# Patient Record
Sex: FEMALE | Race: WHITE | Employment: UNEMPLOYED | URBAN - METROPOLITAN AREA
[De-identification: names, ages, dates, MRNs, and addresses within clinical notes are randomized per-mention and may not be internally consistent; named-entity substitution may affect disease eponyms.]

---

## 2017-02-09 ENCOUNTER — HOSPITAL ENCOUNTER (OUTPATIENT)
Dept: PHYSICAL THERAPY | Age: 57
End: 2017-02-09

## 2017-02-14 ENCOUNTER — APPOINTMENT (OUTPATIENT)
Dept: PHYSICAL THERAPY | Age: 57
End: 2017-02-14

## 2017-02-15 ENCOUNTER — HOSPITAL ENCOUNTER (OUTPATIENT)
Dept: PHYSICAL THERAPY | Age: 57
End: 2017-02-15

## 2017-02-15 ENCOUNTER — HOSPITAL ENCOUNTER (OUTPATIENT)
Dept: PHYSICAL THERAPY | Age: 57
Discharge: HOME | End: 2017-02-15

## 2017-02-15 ENCOUNTER — HOSPITAL ENCOUNTER (EMERGENCY)
Age: 57
Discharge: HOME | End: 2017-02-15
Attending: EMERGENCY MEDICINE | Admitting: EMERGENCY MEDICINE
Payer: COMMERCIAL

## 2017-02-15 VITALS
SYSTOLIC BLOOD PRESSURE: 144 MMHG | HEART RATE: 67 BPM | WEIGHT: 148 LBS | TEMPERATURE: 98.3 F | DIASTOLIC BLOOD PRESSURE: 67 MMHG | HEIGHT: 66 IN | BODY MASS INDEX: 23.78 KG/M2 | RESPIRATION RATE: 13 BRPM | OXYGEN SATURATION: 93 %

## 2017-02-15 DIAGNOSIS — R42 VERTIGO: Primary | ICD-10-CM

## 2017-02-15 DIAGNOSIS — R73.9 HYPERGLYCEMIA: ICD-10-CM

## 2017-02-15 LAB
ALBUMIN SERPL BCP-MCNC: 3.7 G/DL (ref 3.4–5)
ALBUMIN/GLOB SERPL: 1 {RATIO} (ref 0.8–1.7)
ALP SERPL-CCNC: 80 U/L (ref 45–117)
ALT SERPL-CCNC: 30 U/L (ref 13–56)
ANION GAP BLD CALC-SCNC: 3 MMOL/L (ref 3–18)
AST SERPL W P-5'-P-CCNC: 18 U/L (ref 15–37)
ATRIAL RATE: 78 BPM
BASOPHILS # BLD AUTO: 0 K/UL (ref 0–0.1)
BASOPHILS # BLD: 0 % (ref 0–2)
BILIRUB SERPL-MCNC: 0.9 MG/DL (ref 0.2–1)
BUN SERPL-MCNC: 15 MG/DL (ref 7–18)
BUN/CREAT SERPL: 19 (ref 12–20)
CALCIUM SERPL-MCNC: 9.3 MG/DL (ref 8.5–10.1)
CALCULATED P AXIS, ECG09: 79 DEGREES
CALCULATED R AXIS, ECG10: 62 DEGREES
CALCULATED T AXIS, ECG11: 57 DEGREES
CHLORIDE SERPL-SCNC: 97 MMOL/L (ref 100–108)
CO2 SERPL-SCNC: 31 MMOL/L (ref 21–32)
CREAT SERPL-MCNC: 0.8 MG/DL (ref 0.6–1.3)
DIAGNOSIS, 93000: NORMAL
DIFFERENTIAL METHOD BLD: NORMAL
EOSINOPHIL # BLD: 0.1 K/UL (ref 0–0.4)
EOSINOPHIL NFR BLD: 2 % (ref 0–5)
ERYTHROCYTE [DISTWIDTH] IN BLOOD BY AUTOMATED COUNT: 12.6 % (ref 11.6–14.5)
GLOBULIN SER CALC-MCNC: 3.6 G/DL (ref 2–4)
GLUCOSE SERPL-MCNC: 257 MG/DL (ref 74–99)
HCT VFR BLD AUTO: 39.6 % (ref 35–45)
HGB BLD-MCNC: 13.8 G/DL (ref 12–16)
LYMPHOCYTES # BLD AUTO: 42 % (ref 21–52)
LYMPHOCYTES # BLD: 2.8 K/UL (ref 0.9–3.6)
MCH RBC QN AUTO: 32.1 PG (ref 24–34)
MCHC RBC AUTO-ENTMCNC: 34.8 G/DL (ref 31–37)
MCV RBC AUTO: 92.1 FL (ref 74–97)
MONOCYTES # BLD: 0.4 K/UL (ref 0.05–1.2)
MONOCYTES NFR BLD AUTO: 5 % (ref 3–10)
NEUTS SEG # BLD: 3.5 K/UL (ref 1.8–8)
NEUTS SEG NFR BLD AUTO: 51 % (ref 40–73)
P-R INTERVAL, ECG05: 172 MS
PLATELET # BLD AUTO: 246 K/UL (ref 135–420)
PMV BLD AUTO: 11.2 FL (ref 9.2–11.8)
POTASSIUM SERPL-SCNC: 4.1 MMOL/L (ref 3.5–5.5)
PROT SERPL-MCNC: 7.3 G/DL (ref 6.4–8.2)
Q-T INTERVAL, ECG07: 398 MS
QRS DURATION, ECG06: 86 MS
QTC CALCULATION (BEZET), ECG08: 453 MS
RBC # BLD AUTO: 4.3 M/UL (ref 4.2–5.3)
SODIUM SERPL-SCNC: 131 MMOL/L (ref 136–145)
VENTRICULAR RATE, ECG03: 78 BPM
WBC # BLD AUTO: 6.8 K/UL (ref 4.6–13.2)

## 2017-02-15 PROCEDURE — 93005 ELECTROCARDIOGRAM TRACING: CPT

## 2017-02-15 PROCEDURE — 74011250637 HC RX REV CODE- 250/637: Performed by: EMERGENCY MEDICINE

## 2017-02-15 PROCEDURE — 99283 EMERGENCY DEPT VISIT LOW MDM: CPT

## 2017-02-15 PROCEDURE — 85025 COMPLETE CBC W/AUTO DIFF WBC: CPT | Performed by: EMERGENCY MEDICINE

## 2017-02-15 PROCEDURE — 80053 COMPREHEN METABOLIC PANEL: CPT | Performed by: EMERGENCY MEDICINE

## 2017-02-15 RX ORDER — MECLIZINE HCL 12.5 MG 12.5 MG/1
25 TABLET ORAL ONCE
Status: COMPLETED | OUTPATIENT
Start: 2017-02-15 | End: 2017-02-15

## 2017-02-15 RX ADMIN — MECLIZINE 25 MG: 12.5 TABLET ORAL at 17:02

## 2017-02-15 NOTE — DISCHARGE INSTRUCTIONS
Vertigo: Care Instructions  Your Care Instructions  Vertigo is the feeling that you or your surroundings are moving when there is no actual movement. It is often described as a feeling of spinning, whirling, falling, or tilting. Vertigo may make you vomit or feel nauseated. You may have trouble standing or walking and may lose your balance. Vertigo is often related to an inner ear problem, but it can have other more serious causes. If vertigo continues, you may need more tests to find its cause. Follow-up care is a key part of your treatment and safety. Be sure to make and go to all appointments, and call your doctor if you are having problems. Its also a good idea to know your test results and keep a list of the medicines you take. How can you care for yourself at home? · Do not lie flat on your back. Prop yourself up slightly. This may reduce the spinning feeling. Keep your eyes open. · Move slowly so that you do not fall. · If your doctor recommends medicine, take it exactly as directed. · Do not drive while you are having vertigo. Certain exercises, called Le-Daroff exercises, can help decrease vertigo. To do Le-Daroff exercises:  · Sit on the edge of a bed or sofa and quickly lie down on the side that causes the worst vertigo. Lie on your side with your ear down. · Stay in this position for at least 30 seconds or until the vertigo goes away. · Sit up. If this causes vertigo, wait for it to stop. · Repeat the procedure on the other side. · Repeat this 10 times. Do these exercises 2 times a day until the vertigo is gone. When should you call for help? Call 911 anytime you think you may need emergency care. For example, call if:  · You passed out (lost consciousness). · You have symptoms of a stroke. These may include:  ¨ Sudden numbness, tingling, weakness, or loss of movement in your face, arm, or leg, especially on only one side of your body. ¨ Sudden vision changes.   ¨ Sudden trouble speaking. ¨ Sudden confusion or trouble understanding simple statements. ¨ Sudden problems with walking or balance. ¨ A sudden, severe headache that is different from past headaches. Call your doctor now or seek immediate medical care if:  · Vertigo occurs with a fever, a headache, or ringing in your ears. · You have new or increased nausea and vomiting. Watch closely for changes in your health, and be sure to contact your doctor if:  · Vertigo gets worse or happens more often. · Vertigo has not gotten better after 2 weeks. Where can you learn more? Go to http://rhona-ilia.info/. Enter L353 in the search box to learn more about \"Vertigo: Care Instructions. \"  Current as of: July 29, 2016  Content Version: 11.1  © 9831-3864 [employer]. Care instructions adapted under license by [employer] (which disclaims liability or warranty for this information). If you have questions about a medical condition or this instruction, always ask your healthcare professional. [place] [address] any warranty or liability for your use of this information.

## 2017-02-15 NOTE — ED NOTES
Patient is in bed resting with eyes open , patient is alert and oriented at this time. Patient is stable on the monitor. Call bell is within reach. Patient has been updated on plan of care. Will continue to monitor at this time.

## 2017-02-15 NOTE — ED PROVIDER NOTES
HPI Comments: Evonne Bhatti is a 49 y.o. female presenting with complaints of dizziness. States she was at PT for a wrist fracture when she began to have symptoms so she was sent to the ED. States when she turns her head to the left she feels \"dizzy\", nauseated and compares the feeling to being intoxicated. States she was diagnosed with vertigo before and is being worked up by ENT and neurology to rule out any other abnormality but has had these symptoms intermittently for the past 6 months. Denies any cp, sob, headache, blurry vision or any other complaints. The history is provided by the patient. Past Medical History:   Diagnosis Date    Arthritis     Back pain     Chronic pain     Compression fracture T9-10    [facility]     Depression     Diabetes mellitus ([facility]      type 1    GERD (gastroesophageal reflux disease)     Hypertension     Osteoporosis     Tobacco use        Past Surgical History:   Procedure Laterality Date    Hx hysterectomy  2002    Hx back surgery  2009    Hx dilation and curettage           Family History:   Problem Relation Age of Onset    Thyroid Disease Mother     Stroke Mother    Francescastmonet iMguel Arthritis-rheumatoid Mother     Osteoporosis Mother     Diabetes Father     Kidney Disease Father     Tip's Disease Father    Fer Miguel Arthritis-rheumatoid Sister     Diabetes Brother        Social History     Social History    Marital status:      Spouse name: N/A    Number of children: N/A    Years of education: N/A     Occupational History    Not on file.      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 Denies any history chemical and or asbestos exposure          ALLERGIES: Bactrim [sulfamethoprim]; Dilaudid [hydromorphone (bulk)]; Morphine; and Pcn [penicillins]    Review of Systems   Constitutional: Negative for fever. HENT: Negative for congestion. Respiratory: Negative for cough and shortness of breath. Cardiovascular: Negative for chest pain and leg swelling. Gastrointestinal: Negative for abdominal pain, nausea and vomiting. Genitourinary: Negative for dysuria. Musculoskeletal: Negative. Neurological: Positive for dizziness. Negative for syncope, speech difficulty and headaches. All other systems reviewed and are negative. Vitals:    02/15/17 1158 02/15/17 1530   BP: 129/82 144/67   Pulse: 70 67   Resp: 20 13   Temp: 98.3 °F (36.8 °C)    SpO2: 99% 93%   Weight: 67.1 kg (148 lb)    Height: 5' 6\" (1.676 m)             Physical Exam   Constitutional: She is oriented to person, place, and time. No distress. HENT:   Head: Atraumatic. Eyes: Conjunctivae and EOM are normal. Pupils are equal, round, and reactive to light. Horizontal nystagmus. Neck: Normal range of motion. Neck supple. No JVD present. No carotid bruit bilaterally   Cardiovascular: Normal rate, regular rhythm and normal heart sounds. No murmur heard. Pulmonary/Chest: Effort normal and breath sounds normal. She exhibits no tenderness. Abdominal: Soft. Bowel sounds are normal. She exhibits no distension. There is no tenderness. There is no rebound and no guarding. Musculoskeletal: Normal range of motion. She exhibits no edema or tenderness. Neurological: She is alert and oriented to person, place, and time. She has normal strength. No cranial nerve deficit or sensory deficit. Gait normal.   Skin: Skin is warm and dry. Psychiatric: She has a normal mood and affect. Nursing note and vitals reviewed.        MDM  Number of Diagnoses or Management Options  Hyperglycemia:   Vertigo:   Diagnosis management comments: Harmony Maciel is a 49 y.o. female presenting with vertigo symptoms c/w peripheral vertigo. Neuro exam wnl. Is scheduled to have carotid dopplers as outpt. Labs significant for hyperglycemia otherwise wnl. No evidence of WPW, HOCM, Brugada, prolonged QTc, or acute ischemia on ekg. Pt given meclizine and denied any noticeable difference but denies any vertigo symptoms at this time. Pt declines any prescriptions states she will follow up with her pcp and neurologist as outpatient. No furhter inpt work up indicated at this time. Return precautions discussed. Patient stated verbal understanding and agrees with course and plan. 1249 - NSR, rate of 78, normal axis, QTc 453ms, no STEMI    ED Course       Procedures      IMPRESSION:   1. Vertigo    2. Hyperglycemia          Dispo:  Patient was discharged home in stable condition. Patient is to return to emergency department with any new or worsening condition.      Nicolás Ortega MD

## 2017-02-15 NOTE — PROGRESS NOTES
PT DAILY TREATMENT NOTE/LUMBAR EVAL 3-16    Patient Name: Hanna Gordon  Date:2/15/2017  : 1960  [x]  Patient  Verified  Payor: Heidi Santiago / Plan: Donovan Gomes PPO / Product Type: PPO /    In time:10:06  Out time:11:05  Total Treatment Time (min): 61  Visit #: - of -    Treatment Area: Open compression fracture of L5 lumbar vertebra (HCC) [S32.050B]  SUBJECTIVE  Pain Level (0-10 scale): 3/10  []constant []intermittent []improving []worsening []no change since onset    Any medication changes, allergies to medications, adverse drug reactions, diagnosis change, or new procedure performed?: [x] No    [] Yes (see summary sheet for update)  Subjective functional status/changes:     Pt reports that she has broken her back 3x. L1 fracture in  with surgery. T9 compression fracture last year and collapsed with no surgery. L5 is healing conservatively. Pt reports that she blacked out and has no memory after turning to the R and walking into the kitchen. She woke up on the floor. Her BP was 70/40 when the ambulance got there. They think she had a seizure. The EKG this past Monday () and she sees the neurologist .   Her wrist was in a cast for 2 months and she has a lot of pain with moving it and playing with her grandsson    Barriers: other medical issues  Motivation: high  Substance use: []Alcohol []Tobacco []other:   FABQ Score: []low [x]elevate - based on FOTO   Cognition: A & O x 4    Other:    OBJECTIVE/EXAMINATION      15 min [x]Eval                  []Re-Eval     44 min Calling MD's offices and pt education regarding findings of VBI and importance of going to ER          With   [] TE   [] TA   [] neuro   [] other: Patient Education: [x] Review HEP    [] Progressed/Changed HEP based on:   [] positioning   [] body mechanics   [] transfers   [] heat/ice application    [] other:      Other Objective/Functional Measures:     /71 taken manually prior to therapy     Physical Therapy Evaluation - Lumbar Spine ([facility])    SUBJECTIVE    Symptoms:  Aggravated by:   [x] Bending [x] Sitting (45 min) [x] Standing (30 min) [x] Walking (1 hour)   [x] Moving [] Cough [] Sneeze [] Valsalva   [] AM  [] PM  Lying:  [] sup   [] pro   [x] sidelying   [] Other:     Eased by:    [] Bending [] Sitting [] Standing [] Walking   [] Moving [] AM  [] PM  Lying: [x] sup  [x] pro  [] sidelying   [] Other:       General Health:  Red Flags Indicated? [] Yes    [] No  [x] Yes [] No Recent weight change (If yes, due to dieting? [] Yes  [] No) - fluctuates with DM   [x] Yes [] No Weakness in legs during walking - peripheral neuropathy   [x] Yes [] No Unremitting pain at night - wrist pain  [] Yes [x] No Abdominal pain or problems  [] Yes [x] No Rectal bleeding  [x] Yes [] No Feet more cold or painful in cold weather  [] Yes [x] No Blood or pain with urination  [x] Yes [] No Dysfunction of bowel or bladder - prolapsed bladder   [x] Yes [] No Recent illness within past 3 weeks (i.e, cold, flu) - cold  [] Yes [x] No Numbness/tingling in buttock/genitalia region    Past History/Treatments:     Diagnostic Tests: [] Lab work [] X-rays    [] CT [] MRI     [] Other:  Results:    OBJECTIVE  Posture:  Lateral Shift: [] R    [] L     [] +  [] -  Kyphosis: [x] Increased [] Decreased   []  WNL  Lordosis:  [] Increased [x] Decreased   [] WNL  Pelvic symmetry: [] WNL    [] Other:    Other tests/comments:    VBI Test (+) B, increased sx to L  Dizziness, significant nausea reported, and pale appearance when performed to L that increased as pt remained in position, moved from position after approximately 15 seconds d/t increasing sx  Dizziness with less intensity and no nausea when performed to the R     Called referring MD who was in surgery. Spoke to Dr. Ofe Alexander who instructed therapy to call PCP for further instructions  Called PCP who was unavailable.   Spoke to referring physician who instructed that pt be sent to ER in ambulance  Pt declined ambulance against medical advice, stating that she would drive the short distance to her daughter's house and have her daughter take her        Pain Level (0-10 scale) post treatment: 3/10 lumbar    ASSESSMENT/Changes in Function:     Pt was not seen for evaluation today d/t positive VBI. Drop attack and findings of (+) VBI with dizziness and significant nausea consistent with vertebrobasilar insufficiency. Followed up with PCP's office, and MD instructed that pt go to ER. Pt was educated on findings and importance of compliance with going to ER d/t danger of vertebrobasilar insufficiency. Pt verbalized compliance with attending ER.   Hold PT until cleared for possible VBI     [x]  See Plan of Care  []  See progress note/recertification  []  See Discharge Summary         Progress towards goals / Updated goals:  See POC    PLAN  [x]  Upgrade activities as tolerated     []  Continue plan of care  [x]  Update interventions per flow sheet       []  Discharge due to:_  []  Other:_      Tan Norwood, PT 2/15/2017  10:01 AM

## 2017-02-15 NOTE — ED TRIAGE NOTES
Was at PT for back and wrist pain. Became dizzy and nauseated. States that when she is at home and doing cleaning around the house (or anything strenuous) she becomes dizzy, nauseous  And her heart races.

## 2017-02-16 LAB
ATRIAL RATE: 59 BPM
CALCULATED P AXIS, ECG09: 19 DEGREES
CALCULATED R AXIS, ECG10: 58 DEGREES
CALCULATED T AXIS, ECG11: 43 DEGREES
DIAGNOSIS, 93000: NORMAL
P-R INTERVAL, ECG05: 156 MS
Q-T INTERVAL, ECG07: 430 MS
QRS DURATION, ECG06: 96 MS
QTC CALCULATION (BEZET), ECG08: 425 MS
VENTRICULAR RATE, ECG03: 59 BPM

## 2019-01-09 ENCOUNTER — HOSPITAL ENCOUNTER (OUTPATIENT)
Dept: LAB | Age: 59
Discharge: HOME | End: 2019-01-09
Payer: COMMERCIAL

## 2019-01-09 ENCOUNTER — OFFICE VISIT (OUTPATIENT)
Dept: FAMILY MEDICINE CLINIC | Age: 59
End: 2019-01-09

## 2019-01-09 VITALS
RESPIRATION RATE: 20 BRPM | WEIGHT: 147.6 LBS | OXYGEN SATURATION: 99 % | TEMPERATURE: 97.6 F | BODY MASS INDEX: 23.72 KG/M2 | HEART RATE: 71 BPM | SYSTOLIC BLOOD PRESSURE: 133 MMHG | HEIGHT: 66 IN | DIASTOLIC BLOOD PRESSURE: 68 MMHG

## 2019-01-09 DIAGNOSIS — Z00.00 GENERAL MEDICAL EXAM: ICD-10-CM

## 2019-01-09 DIAGNOSIS — Z12.11 SCREENING FOR COLON CANCER: ICD-10-CM

## 2019-01-09 DIAGNOSIS — Z79.4 TYPE 2 DIABETES MELLITUS WITH OTHER SPECIFIED COMPLICATION, WITH LONG-TERM CURRENT USE OF INSULIN (HCC): ICD-10-CM

## 2019-01-09 DIAGNOSIS — F41.9 ANXIETY: ICD-10-CM

## 2019-01-09 DIAGNOSIS — K21.9 GASTROESOPHAGEAL REFLUX DISEASE, ESOPHAGITIS PRESENCE NOT SPECIFIED: Primary | ICD-10-CM

## 2019-01-09 DIAGNOSIS — R91.8 LUNG NODULES: ICD-10-CM

## 2019-01-09 DIAGNOSIS — M54.50 CHRONIC MIDLINE LOW BACK PAIN WITHOUT SCIATICA: ICD-10-CM

## 2019-01-09 DIAGNOSIS — R32 URINARY INCONTINENCE, UNSPECIFIED TYPE: ICD-10-CM

## 2019-01-09 DIAGNOSIS — G89.29 CHRONIC MIDLINE LOW BACK PAIN WITHOUT SCIATICA: ICD-10-CM

## 2019-01-09 DIAGNOSIS — E11.69 TYPE 2 DIABETES MELLITUS WITH OTHER SPECIFIED COMPLICATION, WITH LONG-TERM CURRENT USE OF INSULIN (HCC): ICD-10-CM

## 2019-01-09 DIAGNOSIS — E11.42 DIABETIC POLYNEUROPATHY ASSOCIATED WITH TYPE 2 DIABETES MELLITUS (HCC): ICD-10-CM

## 2019-01-09 LAB
ALBUMIN SERPL-MCNC: 3 G/DL (ref 3.4–5)
ALBUMIN/GLOB SERPL: 0.8 {RATIO} (ref 0.8–1.7)
ALP SERPL-CCNC: 96 U/L (ref 45–117)
ALT SERPL-CCNC: 14 U/L (ref 13–56)
ANION GAP SERPL CALC-SCNC: 4 MMOL/L (ref 3–18)
AST SERPL-CCNC: 7 U/L (ref 15–37)
BASOPHILS # BLD: 0 K/UL (ref 0–0.1)
BASOPHILS NFR BLD: 1 % (ref 0–2)
BILIRUB SERPL-MCNC: 0.4 MG/DL (ref 0.2–1)
BUN SERPL-MCNC: 16 MG/DL (ref 7–18)
BUN/CREAT SERPL: 21 (ref 12–20)
CALCIUM SERPL-MCNC: 8.9 MG/DL (ref 8.5–10.1)
CHLORIDE SERPL-SCNC: 100 MMOL/L (ref 100–108)
CHOLEST SERPL-MCNC: 134 MG/DL
CO2 SERPL-SCNC: 29 MMOL/L (ref 21–32)
CREAT SERPL-MCNC: 0.76 MG/DL (ref 0.6–1.3)
DIFFERENTIAL METHOD BLD: ABNORMAL
EOSINOPHIL # BLD: 0.1 K/UL (ref 0–0.4)
EOSINOPHIL NFR BLD: 2 % (ref 0–5)
ERYTHROCYTE [DISTWIDTH] IN BLOOD BY AUTOMATED COUNT: 12.2 % (ref 11.6–14.5)
EST. AVERAGE GLUCOSE BLD GHB EST-MCNC: 298 MG/DL
GLOBULIN SER CALC-MCNC: 3.9 G/DL (ref 2–4)
GLUCOSE SERPL-MCNC: 260 MG/DL (ref 74–99)
HBA1C MFR BLD: 12 % (ref 4.2–5.6)
HCT VFR BLD AUTO: 33.7 % (ref 35–45)
HDLC SERPL-MCNC: 45 MG/DL (ref 40–60)
HDLC SERPL: 3 {RATIO} (ref 0–5)
HGB BLD-MCNC: 11.2 G/DL (ref 12–16)
LDLC SERPL CALC-MCNC: 71.8 MG/DL (ref 0–100)
LIPID PROFILE,FLP: NORMAL
LYMPHOCYTES # BLD: 2.5 K/UL (ref 0.9–3.6)
LYMPHOCYTES NFR BLD: 36 % (ref 21–52)
MCH RBC QN AUTO: 31 PG (ref 24–34)
MCHC RBC AUTO-ENTMCNC: 33.2 G/DL (ref 31–37)
MCV RBC AUTO: 93.4 FL (ref 74–97)
MONOCYTES # BLD: 0.5 K/UL (ref 0.05–1.2)
MONOCYTES NFR BLD: 7 % (ref 3–10)
NEUTS SEG # BLD: 3.7 K/UL (ref 1.8–8)
NEUTS SEG NFR BLD: 54 % (ref 40–73)
PLATELET # BLD AUTO: 407 K/UL (ref 135–420)
PMV BLD AUTO: 10.9 FL (ref 9.2–11.8)
POTASSIUM SERPL-SCNC: 5.1 MMOL/L (ref 3.5–5.5)
PROT SERPL-MCNC: 6.9 G/DL (ref 6.4–8.2)
RBC # BLD AUTO: 3.61 M/UL (ref 4.2–5.3)
SODIUM SERPL-SCNC: 133 MMOL/L (ref 136–145)
TRIGL SERPL-MCNC: 86 MG/DL (ref ?–150)
VLDLC SERPL CALC-MCNC: 17.2 MG/DL
WBC # BLD AUTO: 6.8 K/UL (ref 4.6–13.2)

## 2019-01-09 PROCEDURE — 36415 COLL VENOUS BLD VENIPUNCTURE: CPT

## 2019-01-09 PROCEDURE — 80061 LIPID PANEL: CPT

## 2019-01-09 PROCEDURE — 80053 COMPREHEN METABOLIC PANEL: CPT

## 2019-01-09 PROCEDURE — 83036 HEMOGLOBIN GLYCOSYLATED A1C: CPT

## 2019-01-09 PROCEDURE — 85025 COMPLETE CBC W/AUTO DIFF WBC: CPT

## 2019-01-09 RX ORDER — GABAPENTIN 300 MG/1
600 CAPSULE ORAL
Qty: 60 CAP | Refills: 1 | Status: SHIPPED | OUTPATIENT
Start: 2019-01-09 | End: 2019-02-08

## 2019-01-09 RX ORDER — OMEPRAZOLE 40 MG/1
40 CAPSULE, DELAYED RELEASE ORAL DAILY
Qty: 30 CAP | Refills: 1 | Status: SHIPPED | OUTPATIENT
Start: 2019-01-09 | End: 2019-04-05 | Stop reason: SDUPTHER

## 2019-01-09 RX ORDER — INSULIN DEGLUDEC 100 U/ML
30 INJECTION, SOLUTION SUBCUTANEOUS
COMMUNITY
End: 2019-03-11 | Stop reason: SDUPTHER

## 2019-01-09 RX ORDER — PRAVASTATIN SODIUM 40 MG/1
40 TABLET ORAL
COMMUNITY
End: 2019-11-04 | Stop reason: SDUPTHER

## 2019-01-09 NOTE — PROGRESS NOTES
Chief Complaint   Patient presents with   Lizette Thakur Establish Care     1. Have you been to the ER, urgent care clinic since your last visit? Hospitalized since your last visit? No    2. Have you seen or consulted any other health care providers outside of the [address] [place] [address] since your last visit? Include any pap smears or colon screening.  No

## 2019-01-09 NOTE — PROGRESS NOTES
HPI  Fernanda Flaherty is a 66 y.o. female who presents today to establish care with new PCP. Pt just recently received health insurance, pt states in the past she was seen by multiple specialist and would like to have some referrals placed today to go back to specialist for respiratory condition(lung nodules, COPD), ortho condition, GI and psych. Pt states she has been having some daily burning to chest and sensation like her food and traveling back up her throat. She has never been seen by GI and has never had a colonoscopy screening done, she has been taking Omeprazole OTC for reflux symptoms and would like a prescription for this medication. She also notes she has been having daily low back pain with history of chronic back pain, denies shooting/sharp pain and numbness/tingling down legs. She also notes she has been having urinary incontinence and was seen and had a bladder procedure by an [facility] provider in the past.  Pt also notes that lately she has been very irritable and easily aggrevated and would like to start a medication for anxiety like Xanax. Current Outpatient Medications   Medication Sig Dispense Refill    insulin degludec (TRESIBA FLEXTOUCH U-100) 100 unit/mL (3 mL) inpn by SubCUTAneous route.  pravastatin (PRAVACHOL) 40 mg tablet Take 40 mg by mouth nightly.  tiotropium (SPIRIVA WITH HANDIHALER) 18 mcg inhalation capsule Take 1 Cap by inhalation daily.  omeprazole (PRILOSEC) 40 mg capsule Take 1 Cap by mouth daily for 30 days. 30 Cap 1    gabapentin (NEURONTIN) 300 mg capsule Take 2 Caps by mouth nightly for 30 days. 60 Cap 1    albuterol (PROVENTIL HFA, VENTOLIN HFA, PROAIR HFA) 90 mcg/actuation inhaler Take 2 Puffs by inhalation every four (4) hours as needed for Wheezing or Shortness of Breath. 2 Inhaler 3    insulin glargine (LANTUS) 100 unit/mL injection 32 Units by SubCUTAneous route nightly.       insulin aspart (NOVOLOG FLEXPEN) 100 unit/mL flexpen by SubCUTAneous route. Each meal, per sliding scale      alendronate (FOSAMAX) 70 mg tablet Take 70 mg by mouth every Sunday.  fluticasone (FLONASE) 50 mcg/actuation nasal spray 2 Sprays by Both Nostrils route daily. 1 Bottle 3    insulin lispro (HUMALOG) 100 unit/mL injection Use as directed up to 50units daily 2 Vial 1    ALPRAZolam (XANAX) 0.25 mg tablet Take 0.25 mg by mouth nightly as needed for Anxiety. Allergies   Allergen Reactions    Bactrim [Sulfamethoprim] Hives and Rash    Dilaudid [Hydromorphone (Bulk)] Other (comments)     Patient was confused for 5 days. Did not know who she was.  Morphine Itching    Pcn [Penicillins] Swelling       SUBJECTIVE:  Review of Systems   Constitutional: Negative for chills, fever and malaise/fatigue. HENT: Positive for congestion. Negative for ear pain, sinus pain and sore throat. Eyes: Negative for blurred vision. Respiratory: Positive for cough. Negative for shortness of breath and wheezing. Cardiovascular: Negative for chest pain, palpitations and leg swelling. Gastrointestinal: Negative for abdominal pain, diarrhea, nausea and vomiting. Genitourinary: Negative for dysuria, frequency and urgency. Musculoskeletal: Positive for back pain. Negative for myalgias. Skin: Negative for rash. Neurological: Positive for tingling (and numbness to finger tips and toes). Negative for dizziness, sensory change and headaches. Endo/Heme/Allergies: Negative for polydipsia. OBJECTIVE:  Visit Vitals  /68 (BP 1 Location: Left arm, BP Patient Position: Sitting)   Pulse 71   Temp 97.6 °F (36.4 °C) (Oral)   Resp 20   Ht 5' 6\" (1.676 m)   Wt 147 lb 9.6 oz (67 kg)   SpO2 99%   BMI 23.82 kg/m²      Physical Exam   Constitutional: She is oriented to person, place, and time and well-developed, well-nourished, and in no distress. HENT:   Head: Normocephalic.    Right Ear: Hearing, tympanic membrane, external ear and ear canal normal.   Left Ear: Hearing, tympanic membrane, external ear and ear canal normal.   Nose: No mucosal edema. Right sinus exhibits no maxillary sinus tenderness and no frontal sinus tenderness. Left sinus exhibits no maxillary sinus tenderness and no frontal sinus tenderness. Mouth/Throat: Uvula is midline. No posterior oropharyngeal edema or posterior oropharyngeal erythema. Eyes: Conjunctivae and EOM are normal. Pupils are equal, round, and reactive to light. Neck: Normal range of motion. Neck supple. No thyromegaly present. Cardiovascular: Normal rate, regular rhythm and normal heart sounds. Pulmonary/Chest: Effort normal. She has no wheezes. She has no rales. She exhibits no tenderness. Clear diminished breath sounds   Abdominal: Soft. Bowel sounds are normal. She exhibits no distension. There is no tenderness. There is no guarding. Musculoskeletal: She exhibits tenderness (lumbar region tenderness, neg straight leg raise bilaterally). Lymphadenopathy:     She has no cervical adenopathy. Neurological: She is alert and oriented to person, place, and time. No cranial nerve deficit. Gait normal.   Skin: Skin is warm and dry. No rash noted. No erythema. Nursing note and vitals reviewed. ASSESSMENT:  Diagnoses and all orders for this visit:    1. Gastroesophageal reflux disease, esophagitis presence not specified  -     omeprazole (PRILOSEC) 40 mg capsule; Take 1 Cap by mouth daily for 30 days. 2. Diabetic polyneuropathy associated with type 2 diabetes mellitus (HCC)  -     gabapentin (NEURONTIN) 300 mg capsule; Take 2 Caps by mouth nightly for 30 days. 3. Screening for colon cancer  -     REFERRAL TO GASTROENTEROLOGY    4. Lung nodules  -     REFERRAL TO PULMONARY DISEASE    5. Urinary incontinence, unspecified type  -     REFERRAL TO GYNECOLOGY    6. Chronic midline low back pain without sciatica  -     REFERRAL TO ORTHOPEDICS    7.  Type 2 diabetes mellitus with other specified complication, with long-term current use of insulin (HCC)  -     METABOLIC PANEL, COMPREHENSIVE; Future  -     LIPID PANEL; Future  -     HEMOGLOBIN A1C WITH EAG; Future    8. General medical exam  -     CBC WITH AUTOMATED DIFF; Future    9. Anxiety  -     REFERRAL TO PSYCHIATRY      PLAN:  The patient was counseled on the dangers of tobacco use, and was advised to quit. Reviewed strategies to maximize success, including removing cigarettes and smoking materials from environment. Also discussed medication options for smoking cessation   Referral for Pharmacist for DM management   checked, Gabapentin 300 mg #120 filled on 10/18/2018, no Xanax located on   Pt gave personal history of xanax on medication list, no script located on     I have discussed the diagnosis with the patient and the intended plan as seen in the above orders. The patient has received an after-visit summary and questions were answered concerning future plans. I have discussed medication side effects and warnings with the patient as well. Patient will call for further questions. Follow-up Disposition:  Return in about 4 weeks (around 2/6/2019) for follow up.     Crissy Loya NP

## 2019-01-09 NOTE — PATIENT INSTRUCTIONS
Well Visit, Women 48 to 72: Care Instructions  Your Care Instructions    Physical exams can help you stay healthy. Your doctor has checked your overall health and may have suggested ways to take good care of yourself. He or she also may have recommended tests. At home, you can help prevent illness with healthy eating, regular exercise, and other steps. Follow-up care is a key part of your treatment and safety. Be sure to make and go to all appointments, and call your doctor if you are having problems. It's also a good idea to know your test results and keep a list of the medicines you take. How can you care for yourself at home? · Reach and stay at a healthy weight. This will lower your risk for many problems, such as obesity, diabetes, heart disease, and high blood pressure. · Get at least 30 minutes of exercise on most days of the week. Walking is a good choice. You also may want to do other activities, such as running, swimming, cycling, or playing tennis or team sports. · Do not smoke. Smoking can make health problems worse. If you need help quitting, talk to your doctor about stop-smoking programs and medicines. These can increase your chances of quitting for good. · Protect your skin from too much sun. When you're outdoors from 10 a.m. to 4 p.m., stay in the shade or cover up with clothing and a hat with a wide brim. Wear sunglasses that block UV rays. Even when it's cloudy, put broad-spectrum sunscreen (SPF 30 or higher) on any exposed skin. · See a dentist one or two times a year for checkups and to have your teeth cleaned. · Wear a seat belt in the car. · Limit alcohol to 1 drink a day. Too much alcohol can cause health problems. Follow your doctor's advice about when to have certain tests. These tests can spot problems early. · Cholesterol.  Your doctor will tell you how often to have this done based on your age, family history, or other things that can increase your risk for heart attack and stroke. · Blood pressure. Have your blood pressure checked during a routine doctor visit. Your doctor will tell you how often to check your blood pressure based on your age, your blood pressure results, and other factors. · Mammogram. Ask your doctor how often you should have a mammogram, which is an X-ray of your breasts. A mammogram can spot breast cancer before it can be felt and when it is easiest to treat. · Pap test and pelvic exam. Ask your doctor how often you should have a Pap test. You may not need to have a Pap test as often as you used to. · Vision. Have your eyes checked every year or two or as often as your doctor suggests. Some experts recommend that you have yearly exams for glaucoma and other age-related eye problems starting at age 48. · Hearing. Tell your doctor if you notice any change in your hearing. You can have tests to find out how well you hear. · Diabetes. Ask your doctor whether you should have tests for diabetes. · Colon cancer. You should begin tests for colon cancer at age 48. You may have one of several tests. Your doctor will tell you how often to have tests based on your age and risk. Risks include whether you already had a precancerous polyp removed from your colon or whether your parents, sisters and brothers, or children have had colon cancer. · Thyroid disease. Talk to your doctor about whether to have your thyroid checked as part of a regular physical exam. Women have an increased chance of a thyroid problem. · Osteoporosis. You should begin tests for bone density at age 72. If you are younger than 72, ask your doctor whether you have factors that may increase your risk for this disease. You may want to have this test before age 72. · Heart attack and stroke risk. At least every 4 to 6 years, you should have your risk for heart attack and stroke assessed.  Your doctor uses factors such as your age, blood pressure, cholesterol, and whether you smoke or have diabetes to show what your risk for a heart attack or stroke is over the next 10 years. When should you call for help? Watch closely for changes in your health, and be sure to contact your doctor if you have any problems or symptoms that concern you. Where can you learn more? Go to http://rhona-ilia.info/. Enter E314 in the search box to learn more about \"Well Visit, Women 50 to 72: Care Instructions. \"  Current as of: March 29, 2018  Content Version: 11.8  © 7593-0356 [employer]. Care instructions adapted under license by [employer] (which disclaims liability or warranty for this information). If you have questions about a medical condition or this instruction, always ask your healthcare professional. [address] any warranty or liability for your use of this information.

## 2019-02-10 NOTE — PROGRESS NOTES
Please advise patient that CBC- blood count was stable, CMP- electrolytes kidney function liver enzymes were abnormal, Hemoglobin A1C screening for diabetes was 12 (should be below 7), Lipid panel- cholesterol screening was elevated. Encourage patient to limit pasta, bread and sweets. Limit fried, fatty and fast food. Participate in daily exercise for at least 30 mins.

## 2019-02-13 ENCOUNTER — TELEPHONE (OUTPATIENT)
Dept: FAMILY MEDICINE CLINIC | Age: 59
End: 2019-02-13

## 2019-02-13 DIAGNOSIS — E10.8 TYPE 1 DIABETES MELLITUS WITH COMPLICATION (HCC): Primary | ICD-10-CM

## 2019-02-13 NOTE — TELEPHONE ENCOUNTER
Returned call to patient and gave her results and recommendations. Patient verbalized understanding.      Patient is interested in diabetic counseling and seeing a nutrition specialist.

## 2019-02-19 ENCOUNTER — TELEPHONE (OUTPATIENT)
Dept: ORTHOPEDIC SURGERY | Age: 59
End: 2019-02-19

## 2019-02-19 ENCOUNTER — OFFICE VISIT (OUTPATIENT)
Dept: ORTHOPEDIC SURGERY | Age: 59
End: 2019-02-19

## 2019-02-19 VITALS
RESPIRATION RATE: 16 BRPM | TEMPERATURE: 97.8 F | WEIGHT: 149 LBS | OXYGEN SATURATION: 99 % | HEART RATE: 76 BPM | SYSTOLIC BLOOD PRESSURE: 130 MMHG | DIASTOLIC BLOOD PRESSURE: 81 MMHG | BODY MASS INDEX: 24.05 KG/M2

## 2019-02-19 DIAGNOSIS — G89.29 CHRONIC BILATERAL THORACIC BACK PAIN: ICD-10-CM

## 2019-02-19 DIAGNOSIS — S22.000D CLOSED COMPRESSION FRACTURE OF THORACIC VERTEBRA WITH ROUTINE HEALING, SUBSEQUENT ENCOUNTER: Primary | ICD-10-CM

## 2019-02-19 DIAGNOSIS — M54.50 LUMBAR PAIN: ICD-10-CM

## 2019-02-19 DIAGNOSIS — M54.6 CHRONIC BILATERAL THORACIC BACK PAIN: ICD-10-CM

## 2019-02-19 RX ORDER — GABAPENTIN 300 MG/1
CAPSULE ORAL
Refills: 0 | COMMUNITY
Start: 2019-02-10 | End: 2019-03-11 | Stop reason: SDUPTHER

## 2019-02-19 RX ORDER — DICLOFENAC SODIUM 10 MG/G
GEL TOPICAL
Qty: 500 G | Refills: 2 | Status: SHIPPED | OUTPATIENT
Start: 2019-02-19 | End: 2019-11-04

## 2019-02-19 NOTE — TELEPHONE ENCOUNTER
Katerina from [employer] called for NP [place]. Erik Ortega said she needs Verification on the Voltaren 1% Gel medication that was prescribed today by JUAN C Vega. Erik Ortega said she needs to know how many Grams the patient needs for each dose. Tyra Winter. [phone].

## 2019-02-19 NOTE — PROGRESS NOTES
[address]  [address], [address]  [place], [address] [place] [address] Street  Phone: [phone]  Fax: [phone]    Carolene Boas  : 1960  PCP: Amy Brower NP    PROGRESS NOTE    HISTORY OF PRESENT ILLNESS:  Chief Complaint   Patient presents with    Back Pain     f/u     Shakira Johnston is a 66 y.o.  female with history of cervical and thoracic pain. She was last seen 2 years ago with Dr. Obadiah Severe. Of note, Pt has a PMHx of two compression fractures at L1 and T9. Pt has a PMHx of GERD. She denies any known renal issues. Pt is taking care of her mother who is bed-ridden and paralyzed on the left side. She admits that she often has to lift her mother. She continues to smoke. She is here today for a follow up. She states she broke L5 in 2016. She also broke her wrist.  She states she has pain sitting. She has lumbar pain. She is complaining of thoracic pain. She states shampooing her hair in the shower causes back pain. She denies radiating leg pain except over the last few weeks. She states she has a radiating left posterior thigh pain. She is on Gabapentin 600 mg QHS. She is being treated for osteoporosis by her PCP. Denies bladder/bowel dysfunction, saddle paresthesia, weakness, gait disturbance, or other neurological deficit. Pt at this time desires to continue with current care/proceed with medication evaluation. Cervical Spine 2V X-rays from 2016 were personally reviewed with the Pt and demonstrated:  1) Minimal degenerative changes.      Thoracic Spine 2V X-rays from 2016 were personally reviewed with the Pt and demonstrated:  1) Mild degenerative changes. 2) Previous T9 compression fracture noted.     TMRI 2015  Impression: Chronic compression deformity T9, with about 25% residual vertebral  body height. No active marrow edema. No acute compression fracture in T5 or T6. Vertebroplasty in L1 noted. Thoracic spinal cord is unremarkable.  No compression  or edema.       ASSESSMENT  66 y.o. female with lumbar and thoracic pain. Diagnoses and all orders for this visit:    1. Closed compression fracture of thoracic vertebra with routine healing, subsequent encounter  -     POC XRAY, SPINE; THOR-LUMB SP, 2 VIEW    2. Chronic bilateral thoracic back pain  -     POC XRAY, SPINE; THOR-LUMB SP, 2 VIEW    3. Lumbar pain  -     POC XRAY, SPINE; THOR-LUMB SP, 2 VIEW    Other orders  -     diclofenac (VOLTAREN) 1 % gel; Apply  to affected area three (3) times daily as needed for Pain. Apply to back and knees as needed         IMPRESSION/PLAN    1) Pt was given information on lumbar and upper back exercises. 2) 2 view of Thoracic and lumbar today, Mild degenerative changes, Previous T9 compression fracture noted. Final interpretation will be scanned in after Dr. Jyoti Reich review. 3) trial of Voltaren gel. OTC Aleve and Tylenol PRN. Ice and heat. Offered PT, patient declined. 4) Ms. Baker has a reminder for a \"due or due soon\" health maintenance. I have asked that she contact her primary care provider, Mateo Burns NP, for follow-up on this health maintenance. 5) We have informed patient to notify us for immediate appointment if he has any worsening neurogical symptoms or if an emergency situation presents, then call 911  6) Pt will follow-up, will call for FU. PRN for now. Risks and benefits of ongoing  therapy have been reviewed with the patient.  is appropriate.        PAST MEDICAL HISTORY  Past Medical History:   Diagnosis Date    Arthritis     Back pain     Chronic pain     Compression fracture T9-10    COPD (chronic obstructive pulmonary disease) ([facility])     Depression     Diabetes mellitus (HCC)     type 1    GERD (gastroesophageal reflux disease)     Hypertension     Osteoporosis     Tobacco use         MEDICATIONS  Current Outpatient Medications   Medication Sig Dispense Refill    gabapentin (NEURONTIN) 300 mg capsule   0    diclofenac (VOLTAREN) 1 % gel Apply  to affected area three (3) times daily as needed for Pain. Apply to back and knees as needed 500 g 2    insulin degludec (TRESIBA FLEXTOUCH U-100) 100 unit/mL (3 mL) inpn by SubCUTAneous route.  pravastatin (PRAVACHOL) 40 mg tablet Take 40 mg by mouth nightly.  tiotropium (SPIRIVA WITH HANDIHALER) 18 mcg inhalation capsule Take 1 Cap by inhalation daily.  albuterol (PROVENTIL HFA, VENTOLIN HFA, PROAIR HFA) 90 mcg/actuation inhaler Take 2 Puffs by inhalation every four (4) hours as needed for Wheezing or Shortness of Breath. 2 Inhaler 3    insulin aspart (NOVOLOG FLEXPEN) 100 unit/mL flexpen by SubCUTAneous route. Each meal, per sliding scale      alendronate (FOSAMAX) 70 mg tablet Take 70 mg by mouth every Sunday.  fluticasone (FLONASE) 50 mcg/actuation nasal spray 2 Sprays by Both Nostrils route daily. 1 Bottle 3    insulin glargine (LANTUS) 100 unit/mL injection 32 Units by SubCUTAneous route nightly.  ALPRAZolam (XANAX) 0.25 mg tablet Take 0.25 mg by mouth nightly as needed for Anxiety.  insulin lispro (HUMALOG) 100 unit/mL injection Use as directed up to 50units daily 2 Vial 1       ALLERGIES  Allergies   Allergen Reactions    Bactrim [Sulfamethoprim] Hives and Rash    Dilaudid [Hydromorphone (Bulk)] Other (comments)     Patient was confused for 5 days. Did not know who she was.     Morphine Itching    Pcn [Penicillins] Swelling       SOCIAL HISTORY    Social History     Socioeconomic History    Marital status:      Spouse name: Not on file    Number of children: Not on file    Years of education: Not on file    Highest education level: Not on file   Social Needs    Financial resource strain: Not on file    Food insecurity - worry: Not on file    Food insecurity - inability: Not on file    Transportation needs - medical: Not on file   [employer] needs - non-medical: Not on file   Occupational History    Not on file   Tobacco Use    Smoking status: Current Every Day Smoker     Packs/day: 1.00     Years: 38.00     Pack years: 38.00     Types: Cigarettes     Start date: 7/27/1976    Smokeless tobacco: Never Used    Tobacco comment: on Chantix   Substance and Sexual Activity    Alcohol use: No    Drug use: No    Sexual activity: Not on file   Other Topics Concern    Not on file   Social History Narrative    Works as [employer]  for [employer]. Denies any history chemical and or asbestos exposure        SUBJECTIVE      Pain Scale: 3/10    Pain Assessment  2/19/2019   Location of Pain Back   Pain Location Comment -   Location Modifiers Inferior   Severity of Pain 3   Quality of Pain Sharp; Aching   Duration of Pain Persistent   Frequency of Pain Constant   Aggravating Factors Other (Comment)   Aggravating Factors Comment Anything physical   Limiting Behavior Some   Relieving Factors Other (Comment)   Relieving Factors Comment Medication use to help now not on anything   Result of Injury No       Accompanied by self. REVIEW OF SYSTEMS  ROS    Constitutional: Negative for fever, chills, or weight change. Respiratory: Negative for cough or shortness of breath. Cardiovascular: Negative for chest pain or palpitations. Gastrointestinal: Negative for acid reflux, change in bowel habits, or constipation. Genitourinary: Negative for incontinence, dysuria and flank pain. Musculoskeletal: Positive for lumbar and thoracic pain. Skin: Negative for rash. Neurological: Negative for headaches, dizziness, or numbness. Endo/Heme/Allergies: Negative . Psychiatric/Behavioral: Negative. PHYSICAL EXAMINATION  Visit Vitals  /81   Pulse 76   Temp 97.8 °F (36.6 °C) (Tympanic)   Resp 16   Wt 149 lb (67.6 kg)   SpO2 99%   BMI 24.05 kg/m²       Constitutional: Well developed,  well nourished,  awake, alert, and in no acute distress. Neurological:  Sensation to light touch is intact. Psychiatric: Affect and mood are appropriate.    Integumentary: No rashes or abrasions noted on exposed areas,  warm, dry and intact. Cardiovascular/Peripheral Vascular:  No peripheral edema is noted. Lymphatic:  No evidence of lymphedema. No cervical lymphadenopathy. SPINE/MUSCULOSKELETAL EXAM  Lumbar spine:  No rash, ecchymosis, or gross obliquity. No fasciculations. No focal atrophy is noted. Range of motion is intact. Mild Tenderness to palpation to lumbar spine. SI joints non-tender. Trochanters non tender. Musculoskeletal:  No pain with extension, axial loading, or forward flexion. No pain with internal or external rotation of her hips. MOTOR    Biceps  Triceps Deltoids Wrist Ext Wrist Flex Hand Intrin   Right +4/5 +4/5 +4/5 +4/5 +4/5 +4/5   Left +4/5 +4/5 +4/5 +4/5 +4/5 +4/5      Hip Flex  Quads Hamstrings Ankle DF EHL Ankle PF   Right +4/5 +4/5 +4/5 +4/5 +4/5 +4/5   Left +4/5 +4/5 +4/5 +4/5 +4/5 +4/5     Straight Leg raise - bilaterally. normal gait and station    Ambulation without assistive device. full weight bearing, non-antalgic gait.     Yg Perales NP

## 2019-02-19 NOTE — PATIENT INSTRUCTIONS
Low Back Arthritis: Exercises  Your Care Instructions  Here are some examples of typical rehabilitation exercises for your condition. Start each exercise slowly. Ease off the exercise if you start to have pain. Your doctor or physical therapist will tell you when you can start these exercises and which ones will work best for you. When you are not being active, find a comfortable position for rest. Some people are comfortable on the floor or a medium-firm bed with a small pillow under their head and another under their knees. Some people prefer to lie on their side with a pillow between their knees. Don't stay in one position for too long. Take short walks (10 to 20 minutes) every 2 to 3 hours. Avoid slopes, hills, and stairs until you feel better. Walk only distances you can manage without pain, especially leg pain. How to do the exercises  Pelvic tilt    1. Lie on your back with your knees bent. 2. \"Brace\" your stomach--tighten your muscles by pulling in and imagining your belly button moving toward your spine. 3. Press your lower back into the floor. You should feel your hips and pelvis rock back. 4. Hold for 6 seconds while breathing smoothly. 5. Relax and allow your pelvis and hips to rock forward. 6. Repeat 8 to 12 times. Back stretches    1. Get down on your hands and knees on the floor. 2. Relax your head and allow it to droop. Round your back up toward the ceiling until you feel a nice stretch in your upper, middle, and lower back. Hold this stretch for as long as it feels comfortable, or about 15 to 30 seconds. 3. Return to the starting position with a flat back while you are on your hands and knees. 4. Let your back sway by pressing your stomach toward the floor. Lift your buttocks toward the ceiling. 5. Hold this position for 15 to 30 seconds. 6. Repeat 2 to 4 times. Follow-up care is a key part of your treatment and safety.  Be sure to make and go to all appointments, and call your doctor if you are having problems. It's also a good idea to know your test results and keep a list of the medicines you take. Where can you learn more? Go to http://rhona-ilia.info/. Enter H513 in the search box to learn more about \"Low Back Arthritis: Exercises. \"  Current as of: September 20, 2018  Content Version: 11.9  © 7159-6842 [employer]. Care instructions adapted under license by [employer] (which disclaims liability or warranty for this information). If you have questions about a medical condition or this instruction, always ask your healthcare professional. [place] [address] any warranty or liability for your use of this information. Low Back Pain: Exercises  Your Care Instructions  Here are some examples of typical rehabilitation exercises for your condition. Start each exercise slowly. Ease off the exercise if you start to have pain. Your doctor or physical therapist will tell you when you can start these exercises and which ones will work best for you. How to do the exercises  Press-up    1. Lie on your stomach, supporting your body with your forearms. 2. Press your elbows down into the floor to raise your upper back. As you do this, relax your stomach muscles and allow your back to arch without using your back muscles. As your press up, do not let your hips or pelvis come off the floor. 3. Hold for 15 to 30 seconds, then relax. 4. Repeat 2 to 4 times. Alternate arm and leg (bird dog) exercise    1. Start on the floor, on your hands and knees. 2. Tighten your belly muscles. 3. Raise one leg off the floor, and hold it straight out behind you. Be careful not to let your hip drop down, because that will twist your trunk. 4. Hold for about 6 seconds, then lower your leg and switch to the other leg. 5. Repeat 8 to 12 times on each leg. 6. Over time, work up to holding for 10 to 30 seconds each time.   7. If you feel stable and secure with your leg raised, try raising the opposite arm straight out in front of you at the same time. Knee-to-chest exercise    1. Lie on your back with your knees bent and your feet flat on the floor. 2. Bring one knee to your chest, keeping the other foot flat on the floor (or keeping the other leg straight, whichever feels better on your lower back). 3. Keep your lower back pressed to the floor. Hold for at least 15 to 30 seconds. 4. Relax, and lower the knee to the starting position. 5. Repeat with the other leg. Repeat 2 to 4 times with each leg. 6. To get more stretch, put your other leg flat on the floor while pulling your knee to your chest.    Curl-ups    1. Lie on the floor on your back with your knees bent at a 90-degree angle. Your feet should be flat on the floor, about 12 inches from your buttocks. 2. Cross your arms over your chest. If this bothers your neck, try putting your hands behind your neck (not your head), with your elbows spread apart. 3. Slowly tighten your belly muscles and raise your shoulder blades off the floor. 4. Keep your head in line with your body, and do not press your chin to your chest.  5. Hold this position for 1 or 2 seconds, then slowly lower yourself back down to the floor. 6. Repeat 8 to 12 times. Pelvic tilt exercise    1. Lie on your back with your knees bent. 2. \"Brace\" your stomach. This means to tighten your muscles by pulling in and imagining your belly button moving toward your spine. You should feel like your back is pressing to the floor and your hips and pelvis are rocking back. 3. Hold for about 6 seconds while you breathe smoothly. 4. Repeat 8 to 12 times. Heel dig bridging    1. Lie on your back with both knees bent and your ankles bent so that only your heels are digging into the floor. Your knees should be bent about 90 degrees.   2. Then push your heels into the floor, squeeze your buttocks, and lift your hips off the floor until your shoulders, hips, and knees are all in a straight line. 3. Hold for about 6 seconds as you continue to breathe normally, and then slowly lower your hips back down to the floor and rest for up to 10 seconds. 4. Do 8 to 12 repetitions. Hamstring stretch in doorway    1. Lie on your back in a doorway, with one leg through the open door. 2. Slide your leg up the wall to straighten your knee. You should feel a gentle stretch down the back of your leg. 3. Hold the stretch for at least 15 to 30 seconds. Do not arch your back, point your toes, or bend either knee. Keep one heel touching the floor and the other heel touching the wall. 4. Repeat with your other leg. 5. Do 2 to 4 times for each leg. Hip flexor stretch    1. Kneel on the floor with one knee bent and one leg behind you. Place your forward knee over your foot. Keep your other knee touching the floor. 2. Slowly push your hips forward until you feel a stretch in the upper thigh of your rear leg. 3. Hold the stretch for at least 15 to 30 seconds. Repeat with your other leg. 4. Do 2 to 4 times on each side. Wall sit    1. Stand with your back 10 to 12 inches away from a wall. 2. Lean into the wall until your back is flat against it. 3. Slowly slide down until your knees are slightly bent, pressing your lower back into the wall. 4. Hold for about 6 seconds, then slide back up the wall. 5. Repeat 8 to 12 times. Follow-up care is a key part of your treatment and safety. Be sure to make and go to all appointments, and call your doctor if you are having problems. It's also a good idea to know your test results and keep a list of the medicines you take. Where can you learn more? Go to http://rhona-ilia.info/. Enter A312 in the search box to learn more about \"Low Back Pain: Exercises. \"  Current as of: September 20, 2018  Content Version: 11.9  © 7433-0101 [employer].  Care instructions adapted under license by [address] (which disclaims liability or warranty for this information). If you have questions about a medical condition or this instruction, always ask your healthcare professional. [address] any warranty or liability for your use of this information. Spondylolysis and Spondylolisthesis: Exercises  Your Care Instructions  Here are some examples of typical rehabilitation exercises for your condition. Start each exercise slowly. Ease off the exercise if you start to have pain. Your doctor or physical therapist will tell you when you can start these exercises and which ones will work best for you. How to do the exercises  Single knee-to-chest    7. Lie on your back with your knees bent and your feet flat on the floor. You can put a small pillow under your head and neck if it is more comfortable. 8. Bring one knee to your chest, keeping the other foot flat on the floor. 9. Keep your lower back pressed to the floor. Hold for 15 to 30 seconds. 10. Relax, and lower the knee to the starting position. 11. Repeat with the other leg. Repeat 2 to 4 times with each leg. 12. To get more stretch, put your other leg flat on the floor while pulling your knee to your chest.    Double knee-to-chest    7. Lie on your back with your knees bent and your feet flat on the floor. You can put a small pillow under your head and neck if it is more comfortable. 8. Bring both knees to your chest.  9. Keep your lower back pressed to the floor. Hold for 15 to 30 seconds. 10. Relax, and lower your knees to the starting position. 11. Repeat 2 to 4 times. Alternate arm and leg (bird dog) exercise    1. Start on the floor, on your hands and knees. 2. Tighten your belly muscles by pulling your belly button in toward your spine. Be sure you continue to breathe normally and do not hold your breath. 3. Raise one arm off the floor, and hold it straight out in front of you.  Be careful not to let your shoulder drop down, because that will twist your trunk. 4. Hold for about 6 seconds, then lower your arm and switch to your other arm. 5. Repeat 8 to 12 times on each arm. 6. When you can do this exercise with ease and no pain, repeat steps 1 through 5. But this time do it with one leg raised off the floor, holding your leg straight out behind you. Be careful not to let your hip drop down, because that will twist your trunk. 7. When holding your leg straight out becomes easier, try raising your opposite arm at the same time, and repeat steps 1 through 5. Bridging    1. Lie on your back with both knees bent. Your knees should be bent about 90 degrees. 2. Then push your feet into the floor, squeeze your buttocks, and lift your hips off the floor until your shoulders, hips, and knees are all in a straight line. 3. Hold for about 6 seconds as you continue to breathe normally, and then slowly lower your hips back down to the floor and rest for up to 10 seconds. 4. Repeat 8 to 12 times. Curl-ups    1. Lie on the floor on your back with your knees bent at a 90-degree angle. Your feet should be flat on the floor, about 12 inches from your buttocks. 2. Cross your arms over your chest. If this bothers your neck, try putting your hands behind your neck (not your head), with your elbows spread apart. 3. Slowly tighten your belly muscles and raise your shoulder blades off the floor. 4. Keep your head in line with your body, and do not press your chin to your chest.  5. Hold this position for 1 or 2 seconds, then slowly lower yourself back down to the floor. 6. Repeat 8 to 12 times. Plank    1. Lie on your stomach, resting your upper body on your forearms. 2. Tighten your belly muscles by pulling your belly button in toward your spine. 3. Keeping your knees on the floor, press down with your forearms to lift your upper body off the floor.   4. Hold for about 6 seconds, then lower your body to the floor. Rest for up to 10 seconds. 5. Repeat 8 to 12 times. 6. Over time, work up to holding for 15 to 30 seconds each time. 7. If this exercise is easy to do with your knees on the floor, try doing this exercise with your knees and legs straight, supported by your toes on the floor. Follow-up care is a key part of your treatment and safety. Be sure to make and go to all appointments, and call your doctor if you are having problems. It's also a good idea to know your test results and keep a list of the medicines you take. Where can you learn more? Go to http://rhona-ilia.info/. Enter [phone] in the search box to learn more about \"Spondylolysis and Spondylolisthesis: Exercises. \"  Current as of: September 20, 2018  Content Version: 11.9  © 7043-1637 [employer]. Care instructions adapted under license by [employer] (which disclaims liability or warranty for this information). If you have questions about a medical condition or this instruction, always ask your healthcare professional. [address] any warranty or liability for your use of this information. Healthy Upper Back: Exercises  Your Care Instructions  Here are some examples of exercises for your upper back. Start each exercise slowly. Ease off the exercise if you start to have pain. Your doctor or physical therapist will tell you when you can start these exercises and which ones will work best for you. How to do the exercises  Lower neck and upper back stretch    13. Stretch your arms out in front of your body. Clasp one hand on top of your other hand. 14. Gently reach out so that you feel your shoulder blades stretching away from each other. 15. Gently bend your head forward. 16. Hold for 15 to 30 seconds. 17. Repeat 2 to 4 times. Midback stretch    12. Kneel on the floor, and sit back on your ankles.   13. Lean forward, place your hands on the floor, and stretch your arms out in front of you. Rest your head between your arms. 14. Gently push your chest toward the floor, reaching as far in front of you as possible. 15. Hold for 15 to 30 seconds. 16. Repeat 2 to 4 times. Shoulder rolls    8. Sit comfortably with your feet shoulder-width apart. You can also do this exercise while standing. 9. Roll your shoulders up, then back, and then down in a smooth, circular motion. 10. Repeat 2 to 4 times. Wall push-up    5. Stand against a wall with your feet about 12 to 24 inches back from the wall. If you feel any pain when you do this exercise, stand closer to the wall. 6. Place your hands on the wall slightly wider apart than your shoulders, and lean forward. 7. Gently lean your body toward the wall. Then push back to your starting position. Keep the motion smooth and controlled. 8. Repeat 8 to 12 times. Resisted shoulder blade squeeze    7. Sit or stand, holding the band in both hands in front of you. Keep your elbows close to your sides, bent at a 90-degree angle. Your palms should face up. 8. Squeeze your shoulder blades together, and move your arms to the outside, stretching the band. Be sure to keep your elbows at your sides while you do this. 9. Relax. 10. Repeat 8 to 12 times. Resisted rows    8. Put the band around a solid object, such as a bedpost, at about waist level. Hold one end of the band in each hand. 9. With your elbows at your sides and bent to 90 degrees, pull the band back to move your shoulder blades toward each other. Return to the starting position. 10. Repeat 8 to 12 times. Follow-up care is a key part of your treatment and safety. Be sure to make and go to all appointments, and call your doctor if you are having problems. It's also a good idea to know your test results and keep a list of the medicines you take. Where can you learn more? Go to http://rhona-ilia.info/.   Enter G876 in the search box to learn more about \"Healthy Upper Back: Exercises. \"  Current as of: September 20, 2018  Content Version: 11.9  © 3013-4585 [employer]. Care instructions adapted under license by [employer] (which disclaims liability or warranty for this information). If you have questions about a medical condition or this instruction, always ask your healthcare professional. [address] any warranty or liability for your use of this information.

## 2019-02-20 ENCOUNTER — OFFICE VISIT (OUTPATIENT)
Dept: FAMILY MEDICINE CLINIC | Age: 59
End: 2019-02-20

## 2019-02-20 VITALS
RESPIRATION RATE: 18 BRPM | BODY MASS INDEX: 23.82 KG/M2 | WEIGHT: 148.2 LBS | SYSTOLIC BLOOD PRESSURE: 138 MMHG | DIASTOLIC BLOOD PRESSURE: 64 MMHG | TEMPERATURE: 98.6 F | OXYGEN SATURATION: 100 % | HEIGHT: 66 IN | HEART RATE: 71 BPM

## 2019-02-20 DIAGNOSIS — E10.8 TYPE 1 DIABETES MELLITUS WITH COMPLICATION (HCC): ICD-10-CM

## 2019-02-20 DIAGNOSIS — Z86.79 HISTORY OF HYPERTENSION: ICD-10-CM

## 2019-02-20 DIAGNOSIS — F33.1 MODERATE EPISODE OF RECURRENT MAJOR DEPRESSIVE DISORDER (HCC): Primary | ICD-10-CM

## 2019-02-20 RX ORDER — ACETAMINOPHEN 500 MG
1 TABLET ORAL AS NEEDED
Qty: 1 KIT | Refills: 0 | Status: SHIPPED | OUTPATIENT
Start: 2019-02-20 | End: 2019-03-22

## 2019-02-20 NOTE — PATIENT INSTRUCTIONS
Learning About Type 2 Diabetes  What is type 2 diabetes? Insulin is a hormone that helps your body use sugar from your food as energy. Type 2 diabetes happens when your body can't use insulin the right way. Over time, the pancreas can't make enough insulin. If you don't have enough insulin, too much sugar stays in your blood. If you are overweight, get little or no exercise, or have type 2 diabetes in your family, you are more likely to have problems with the way insulin works in your body.  Americans, Hispanics, Native Americans,  Americans, and Pacific Islanders have a higher risk for type 2 diabetes. Type 2 diabetes can be prevented or delayed with a healthy lifestyle, which includes staying at a healthy weight, making smart food choices, and getting regular exercise. What can you expect with type 2 diabetes? Doris Nelson keep hearing about how important it is to keep your blood sugar within a target range. That's because over time, high blood sugar can lead to serious problems. It can:  · Harm your eyes, nerves, and kidneys. · Damage your blood vessels, leading to heart disease and stroke. · Reduce blood flow and cause nerve damage to parts of your body, especially your feet. This can cause slow healing and pain when you walk. · Make your immune system weak and less able to fight infections. When people hear the word \"diabetes,\" they often think of problems like these. But daily care and treatment can help prevent or delay these problems. The goal is to keep your blood sugar in a target range. That's the best way to reduce your chance of having more problems from diabetes. What are the symptoms? Some people who have type 2 diabetes may not have any symptoms early on. Many people with the disease don't even know they have it at first. But with time, diabetes starts to cause symptoms. You experience most symptoms of type 2 diabetes when your blood sugar is either too high or too low.   The most common symptoms of high blood sugar include:  · Thirst.  · Frequent urination. · Weight loss. · Blurry vision. The symptoms of low blood sugar include:  · Sweating. · Shakiness. · Weakness. · Hunger. · Confusion. How can you prevent type 2 diabetes? The best way to prevent or delay type 2 diabetes is to adopt healthy habits, which include:  · Staying at a healthy weight. · Exercising regularly. · Eating healthy foods. How is type 2 diabetes treated? If you have type 2 diabetes, here are the most important things you can do. · Take your diabetes medicines. · Check your blood sugar as often as your doctor recommends. Also, get a hemoglobin A1c test at least every 6 months. · Try to eat a variety of foods and to spread carbohydrate throughout the day. Carbohydrate raises blood sugar higher and more quickly than any other nutrient does. Carbohydrate is found in sugar, breads and cereals, fruit, starchy vegetables such as potatoes and corn, and milk and yogurt. · Get at least 30 minutes of exercise on most days of the week. Walking is a good choice. You also may want to do other activities, such as running, swimming, cycling, or playing tennis or team sports. If your doctor says it's okay, do muscle-strengthening exercises at least 2 times a week. · See your doctor for checkups and tests on a regular schedule. · If you have high blood pressure or high cholesterol, take the medicines as prescribed by your doctor. · Do not smoke. Smoking can make health problems worse. This includes problems you might have with type 2 diabetes. If you need help quitting, talk to your doctor about stop-smoking programs and medicines. These can increase your chances of quitting for good. Follow-up care is a key part of your treatment and safety. Be sure to make and go to all appointments, and call your doctor if you are having problems.  It's also a good idea to know your test results and keep a list of the medicines you take. Where can you learn more? Go to http://rhona-ilia.info/. Enter B913 in the search box to learn more about \"Learning About Type 2 Diabetes. \"  Current as of: July 25, 2018  Content Version: 11.9  © 0657-4986 [employer]. Care instructions adapted under license by [employer] (which disclaims liability or warranty for this information). If you have questions about a medical condition or this instruction, always ask your healthcare professional. [address] any warranty or liability for your use of this information.

## 2019-02-20 NOTE — PROGRESS NOTES
HPI  Melissa Hampton is a 66 y.o. female who presents today for lab follow up and information regarding referrals. Diabetic Review of Systems - medication compliance: compliant most of the time, diabetic diet compliance: noncompliant some of the time, home glucose monitoring: is performed regularly. Other symptoms and concerns: new referral for endocrinology, past experience with  office was not favorable per patient. Pt notes she was seen by orthopedic and spine specialist yesterday for initial eval yesterday 2/19/2019 and was given a topical pain gel. Pt has an appointment coming up with pulmonary specialist set for March 2019. Pt went to eye specialist yesterday 2/19/2019. Pt is requesting a update on status of psych referral.  Pt is requesting an Rx for blood pressure monitoring kit. Current Outpatient Medications   Medication Sig Dispense Refill    gabapentin (NEURONTIN) 300 mg capsule   0    diclofenac (VOLTAREN) 1 % gel Apply  to affected area three (3) times daily as needed for Pain. Apply to back and knees as needed 500 g 2    insulin degludec (TRESIBA FLEXTOUCH U-100) 100 unit/mL (3 mL) inpn by SubCUTAneous route.  pravastatin (PRAVACHOL) 40 mg tablet Take 40 mg by mouth nightly.  tiotropium (SPIRIVA WITH HANDIHALER) 18 mcg inhalation capsule Take 1 Cap by inhalation daily.  albuterol (PROVENTIL HFA, VENTOLIN HFA, PROAIR HFA) 90 mcg/actuation inhaler Take 2 Puffs by inhalation every four (4) hours as needed for Wheezing or Shortness of Breath. 2 Inhaler 3    insulin glargine (LANTUS) 100 unit/mL injection 32 Units by SubCUTAneous route nightly.  insulin aspart (NOVOLOG FLEXPEN) 100 unit/mL flexpen by SubCUTAneous route. Each meal, per sliding scale      alendronate (FOSAMAX) 70 mg tablet Take 70 mg by mouth every Sunday.  fluticasone (FLONASE) 50 mcg/actuation nasal spray 2 Sprays by Both Nostrils route daily.  1 Bottle 3    ALPRAZolam (XANAX) 0.25 mg tablet Take 0.25 mg by mouth nightly as needed for Anxiety.  insulin lispro (HUMALOG) 100 unit/mL injection Use as directed up to 50units daily 2 Vial 1      Allergies   Allergen Reactions    Bactrim [Sulfamethoprim] Hives and Rash    Dilaudid [Hydromorphone (Bulk)] Other (comments)     Patient was confused for 5 days. Did not know who she was.  Morphine Itching    Pcn [Penicillins] Swelling       SUBJECTIVE:  Review of Systems   Constitutional: Negative for chills, fever and malaise/fatigue. Eyes: Negative for photophobia, pain and discharge. Respiratory: Negative for shortness of breath. Cardiovascular: Negative for chest pain, palpitations and leg swelling. Gastrointestinal: Negative for abdominal pain, diarrhea, nausea and vomiting. Genitourinary: Negative for dysuria, frequency and urgency. Musculoskeletal: Positive for back pain and joint pain. Negative for myalgias. Neurological: Negative for dizziness, tingling, sensory change and headaches. Endo/Heme/Allergies: Negative for polydipsia. Bruises/bleeds easily (bruising). OBJECTIVE:  Visit Vitals  /64 (BP 1 Location: Left arm, BP Patient Position: Sitting)   Pulse 71   Temp 98.6 °F (37 °C) (Oral)   Resp 18   Ht 5' 6\" (1.676 m)   Wt 148 lb 3.2 oz (67.2 kg)   SpO2 100%   BMI 23.92 kg/m²      Physical Exam   Constitutional: She is oriented to person, place, and time and well-developed, well-nourished, and in no distress. HENT:   Head: Normocephalic. Eyes: Conjunctivae and EOM are normal. Pupils are equal, round, and reactive to light. Neck: Normal range of motion. Neck supple. No thyromegaly present. Cardiovascular: Normal rate, regular rhythm and normal heart sounds. Pulmonary/Chest: Effort normal and breath sounds normal. She has no wheezes. She has no rales. She exhibits no tenderness. Musculoskeletal: She exhibits no edema. Lymphadenopathy:     She has no cervical adenopathy.    Neurological: She is alert and oriented to person, place, and time. Gait normal.   Skin: Skin is warm and dry. Psychiatric: Mood and affect normal.   Nursing note and vitals reviewed. ASSESSMENT:  Diagnoses and all orders for this visit:    1. Moderate episode of recurrent major depressive disorder (HCC)  -     REFERRAL TO PSYCHIATRY    2. Type 1 diabetes mellitus with complication (HCC)  -     REFERRAL TO PHARMACIST    3. History of hypertension  -     Blood Pressure Monitor kit; 1 Device by Does Not Apply route as needed for up to 30 days. PLAN:  Referral info provided for Psych & GYN  Referral to Diabetic Education department  Diabetic diet discussed in detail, written exchange diet given  Low cholesterol diet, weight control and daily exercise discussed  Glycohemoglobin and other lab monitoring discussed and long term diabetic complications discussed  Encouraged patient to reach out to insurance company to find in-network endocrinologist so new referral may be place    I have discussed the diagnosis with the patient and the intended plan as seen in the above orders. The patient has received an after-visit summary and questions were answered concerning future plans. I have discussed medication side effects and warnings with the patient as well. Patient will call for further questions. Follow-up Disposition:  Return in about 4 weeks (around 3/20/2019) for DM.     Pam Larar, NP

## 2019-02-20 NOTE — PROGRESS NOTES
Chief Complaint   Patient presents with    Labs     review labs, referral placed    Bleeding/Bruising         1. Have you been to the ER, urgent care clinic since your last visit? Hospitalized since your last visit? No    2. Have you seen or consulted any other health care providers outside of the [address] [place] [address] since your last visit? Include any pap smears or colon screening.  No

## 2019-03-11 ENCOUNTER — OFFICE VISIT (OUTPATIENT)
Dept: FAMILY MEDICINE CLINIC | Age: 59
End: 2019-03-11

## 2019-03-11 DIAGNOSIS — E10.8 TYPE 1 DIABETES MELLITUS WITH COMPLICATION (HCC): Primary | ICD-10-CM

## 2019-03-11 RX ORDER — GABAPENTIN 300 MG/1
600 CAPSULE ORAL
Qty: 60 CAP | Refills: 2 | Status: SHIPPED | OUTPATIENT
Start: 2019-03-11 | End: 2019-06-24 | Stop reason: SDUPTHER

## 2019-03-11 RX ORDER — INSULIN DEGLUDEC 100 U/ML
30 INJECTION, SOLUTION SUBCUTANEOUS
Qty: 15 ML | Refills: 2 | Status: SHIPPED | OUTPATIENT
Start: 2019-03-11 | End: 2019-06-03

## 2019-03-11 NOTE — PROGRESS NOTES
Pharmacy Note - Diabetes   03/11/19       Patient name: Junaid Paul (53 y.o., female)  YOB: 1960    Referred by: Priscilla Freitas NP for diabetes education / management   Past Medical History:   Diagnosis Date    Arthritis     Back pain     Chronic pain     Compression fracture T9-10    COPD [facility] obstructive pulmonary disease) ([facility] [address]     Depression     Diabetes mellitus (HCC)     type 1    GERD (gastroesophageal reflux disease)     Hypertension     Osteoporosis     Tobacco use      Allergies: Allergies   Allergen Reactions    Bactrim [Sulfamethoprim] Hives and Rash    Dilaudid [Hydromorphone (Bulk)] Other (comments)     Patient was confused for 5 days. Did not know who she was.  Morphine Itching    Pcn [Penicillins] Swelling     Subjective / Objective      Medications:   Current medications for diabetes include:      Key Antihyperglycemic Medications             insulin degludec (TRESIBA FLEXTOUCH U-100) 100 unit/mL (3 mL) inpn 30 Units by SubCUTAneous route nightly. insulin aspart (NOVOLOG FLEXPEN) 100 unit/mL flexpen by SubCUTAneous route. Each meal, per sliding scale        Patient denies adherence with her medications. Takes 28 to 30 units of [place] daily (takes this most days of the week). Uses Novolog maybe once or twice a week. Blood Glucose Findings:   She checks her blood glucose readings 1-2 per week. Patient did not bring her blood sugar log to today's visit. - fasting range: 300-400    Hypoglycemic episodes: Yes - maybe 2 time per week usually in the morning.      The patient endorses the following s/sx of hypoglycemia  Hypo: jitteriness, sweating and chills    Her last A1c value(s) noted to be:      Lab Results   Component Value Date/Time    Hemoglobin A1c 12.0 (H) 01/09/2019 09:20 AM    Hemoglobin A1c 10.6 (H) 11/17/2017 09:28 AM    Hemoglobin A1c 8.5 (H) 03/30/2012 03:02 AM    Hemoglobin A1c (POC) 7.8 05/14/2012 04:16 PM       Dietary Considerations:   A typical days food intake is as follows:   - breakfast: yogurt this morning, sausage biscuit. 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; home left overs, soups, sandwich   - dinner: pork chops (breaded), green beans, mac and cheese    - beverages: diet soda, coffee (cream and artifical sugar) , water   - snacks: chips, candy bar, brownie     Physical Activity:     · Limited currently    Screenings/Prevention Parameters:  -Diabetic Eye and Foot Exams:      Diabetic Foot and Eye Exam HM Status   Topic Date Due    Diabetic Foot Care  05/14/2013    Eye Exam  06/15/2014     -Microalbumin / Creatinine ratio:       Lab Results   Component Value Date/Time    Microalbumin,urine random 14.9 11/17/2017 09:28 AM    Microalbumin urine (POC) ALB 30mg/L  CRE 50mg/dL  A:C 30-300mg/g Abnormal 05/14/2012 04:17 PM     -Immunizations: There is no immunization history on file for this patient. Additional Laboratory Parameters of Interest:     Estimation of renal function:  Lab Results   Component Value Date/Time    Creatinine 0.76 01/09/2019 09:20 AM    Creatinine 0.7 11/17/2017 09:28 AM    Creatinine 0.80 02/15/2017 12:45 PM    GFR est AA >60 01/09/2019 09:20 AM    GFR est AA >60.0 11/17/2017 09:28 AM    GFR est AA >60 02/15/2017 12:45 PM    GFR est non-AA >60 01/09/2019 09:20 AM    GFR est non-AA >60 11/17/2017 09:28 AM    GFR est non-AA >60 02/15/2017 12:45 PM     Wt Readings from Last 3 Encounters:   02/20/19 148 lb 3.2 oz (67.2 kg)   02/19/19 149 lb (67.6 kg)   01/09/19 147 lb 9.6 oz (67 kg)     Ht Readings from Last 1 Encounters:   02/20/19 5' 6\" (1.676 m)     Assessment / Plan        1. Diabetes:  Goal A1C: < 7%.  Patient's most recent A1c is not at their current goal. During the visit today reviewed with patient: self-monitoring blood glucose fasting/post-prandial goals and/or technique, importance of blood glucose control in avoidance of diabetic complications or further progression if already present, signs/symptoms/management of hypoglycemia, and diet and health maintenance items explained below. Noted that she doesn't check her blood sugars because she doesn't like to prick her fingers. She also doesn't use the Novolog because she is unsure about how much to give without having readings for the sliding scale. She states that she usually remembers to take her [place] at night. Sent [employer] system to [facility]. Given patient is a type 1 diabetic, she will start using roughly 3 units of Novolog with her breakfast since this is a smaller meal usually. She will take 5 units of Novolog with her dinner. She has an appointment with her new endocrinologist in May. Will follow up with patient at her next office visit on 3/21/19    [facility] pharmacy after patient had left. Pharmacist informed me that reader would cost $64 and sensors would be $75 per month. Called patient to inform her of this and she noted that this would not be something that would be affordable for her. Will research other options through patient's insurance later this week and see if there is anything that can be done to lower cost.            2. Diet/lifestyle:  Reviewed with patient utilization of the plate method as a way to encourage healthy eating. Reviewed carbohydrate and non-carbohydrate rich foods, carbohydrate content of various foods, appropriate serving sizes for carbohydrates (15 grams = 1 carb serving), reading the nutrition label focusing on total carbohydrates while being mindful of serving size, recommended serving sizes for carbohydrates for meals and snacks (45-60g with meals and less than or equal to 15g for snacks ), and discussion regarding fruits as a carbohydrate. Patient education materials were provided and reviewed with the patient for their future reference and use.      Patient noted to have a follow up appointment with nutrition team scheduled in April. Encouraged patient to begin tracking carbohydrates and work to reduce portion sizes. She noted that her dinner is her biggest meal of the day so encoruaged her to break this up into smaller meals if possible. She did note that her type of work does not usually allow for a formal lunch break and must eat things that she can graze one. Patient seemed committed to change and will work to implement changes suggested. Patient verbalized understanding of the information presented and all of the patients questions were answered. AVS was handed to the patient and information reviewed. Patient advised to call the office with any additional questions or concerns. Notification of recommendations will be sent to Chari Madera NP for review.     Future Appointments   Date Time Provider Osiris Navas   3/19/2019 10:30 AM Nitin Handley MD [address]   3/21/2019  9:40 AM Katie Blackwell [facility] SCHED   3/21/2019  9:40 AM Chari Madera NP [facility] SCHED   3/27/2019  9:30 AM Angélica Lara MD [facility]   4/16/2019 10:30 AM [facility]sie Winters [facility]         Thank you for the consult,  Becki Esparza, PharmD, BCPS, BCACP, BC-ADM

## 2019-03-15 ENCOUNTER — DOCUMENTATION ONLY (OUTPATIENT)
Dept: FAMILY MEDICINE CLINIC | Age: 59
End: 2019-03-15

## 2019-03-22 ENCOUNTER — TELEPHONE (OUTPATIENT)
Dept: FAMILY MEDICINE CLINIC | Age: 59
End: 2019-03-22

## 2019-03-22 NOTE — TELEPHONE ENCOUNTER
Spoke with patient in regards to rescheduling her diabetes follow up with Alicja Rivera. Patient states she is at work and will call the office today to reschedule. Patient was advised, if it is after hours she can request an appointment via [address] [place] [address] [phone]. Patient verbalized understanding. Patient is scheduled to see endocrinology on 5/22/19.

## 2019-03-25 ENCOUNTER — TELEPHONE (OUTPATIENT)
Dept: FAMILY MEDICINE CLINIC | Age: 59
End: 2019-03-25

## 2019-03-26 ENCOUNTER — OFFICE VISIT (OUTPATIENT)
Dept: PULMONOLOGY | Age: 59
End: 2019-03-26

## 2019-03-26 VITALS
OXYGEN SATURATION: 97 % | TEMPERATURE: 97.8 F | HEIGHT: 66 IN | BODY MASS INDEX: 24.17 KG/M2 | DIASTOLIC BLOOD PRESSURE: 70 MMHG | HEART RATE: 66 BPM | SYSTOLIC BLOOD PRESSURE: 112 MMHG | RESPIRATION RATE: 18 BRPM | WEIGHT: 150.4 LBS

## 2019-03-26 DIAGNOSIS — M81.8 OTHER OSTEOPOROSIS, UNSPECIFIED PATHOLOGICAL FRACTURE PRESENCE: ICD-10-CM

## 2019-03-26 DIAGNOSIS — F17.200 TOBACCO DEPENDENCE: ICD-10-CM

## 2019-03-26 DIAGNOSIS — S22.000D CLOSED COMPRESSION FRACTURE OF THORACIC VERTEBRA WITH ROUTINE HEALING, SUBSEQUENT ENCOUNTER: ICD-10-CM

## 2019-03-26 DIAGNOSIS — E10.43 TYPE 1 DIABETES MELLITUS WITH DIABETIC AUTONOMIC NEUROPATHY (HCC): ICD-10-CM

## 2019-03-26 DIAGNOSIS — K21.9 GASTROESOPHAGEAL REFLUX DISEASE WITHOUT ESOPHAGITIS: ICD-10-CM

## 2019-03-26 DIAGNOSIS — R91.8 LUNG NODULES: Primary | ICD-10-CM

## 2019-03-26 DIAGNOSIS — J44.9 COPD MIXED TYPE (HCC): ICD-10-CM

## 2019-03-26 RX ORDER — BUDESONIDE AND FORMOTEROL FUMARATE DIHYDRATE 160; 4.5 UG/1; UG/1
1 AEROSOL RESPIRATORY (INHALATION) 2 TIMES DAILY
Qty: 1 INHALER | Refills: 0 | Status: SHIPPED | COMMUNITY
Start: 2019-03-26 | End: 2019-04-26 | Stop reason: SDUPTHER

## 2019-03-26 NOTE — PATIENT INSTRUCTIONS
Stopping Smoking: Care Instructions  Your Care Instructions  Cigarette smokers crave the nicotine in cigarettes. Giving it up is much harder than simply changing a habit. Your body has to stop craving the nicotine. It is hard to quit, but you can do it. There are many tools that people use to quit smoking. You may find that combining tools works best for you. There are several steps to quitting. First you get ready to quit. Then you get support to help you. After that, you learn new skills and behaviors to become a nonsmoker. For many people, a necessary step is getting and using medicine. Your doctor will help you set up the plan that best meets your needs. You may want to attend a smoking cessation program to help you quit smoking. When you choose a program, look for one that has proven success. Ask your doctor for ideas. You will greatly increase your chances of success if you take medicine as well as get counseling or join a cessation program.  Some of the changes you feel when you first quit tobacco are uncomfortable. Your body will miss the nicotine at first, and you may feel short-tempered and grumpy. You may have trouble sleeping or concentrating. Medicine can help you deal with these symptoms. You may struggle with changing your smoking habits and rituals. The last step is the tricky one: Be prepared for the smoking urge to continue for a time. This is a lot to deal with, but keep at it. You will feel better. Follow-up care is a key part of your treatment and safety. Be sure to make and go to all appointments, and call your doctor if you are having problems. It's also a good idea to know your test results and keep a list of the medicines you take. How can you care for yourself at home? · Ask your family, friends, and coworkers for support. You have a better chance of quitting if you have help and support.   · Join a support group, such as Nicotine Anonymous, for people who are trying to quit smoking. · Consider signing up for a smoking cessation program, such as the American Lung Association's Freedom from Smoking program.  · Get text messaging support. Go to the website at www.smokefree. gov to sign up for the [facility] program.  · Set a quit date. Pick your date carefully so that it is not right in the middle of a big deadline or stressful time. Once you quit, do not even take a puff. Get rid of all ashtrays and lighters after your last cigarette. Clean your house and your clothes so that they do not smell of smoke. · Learn how to be a nonsmoker. Think about ways you can avoid those things that make you reach for a cigarette. ? Avoid situations that put you at greatest risk for smoking. For some people, it is hard to have a drink with friends without smoking. For others, they might skip a coffee break with coworkers who smoke. ? Change your daily routine. Take a different route to work or eat a meal in a different place. · Cut down on stress. Calm yourself or release tension by doing an activity you enjoy, such as reading a book, taking a hot bath, or gardening. · Talk to your doctor or pharmacist about nicotine replacement therapy, which replaces the nicotine in your body. You still get nicotine but you do not use tobacco. Nicotine replacement products help you slowly reduce the amount of nicotine you need. These products come in several forms, many of them available over-the-counter:  ? Nicotine patches  ? Nicotine gum and lozenges  ? Nicotine inhaler  · Ask your doctor about bupropion (Wellbutrin) or varenicline (Chantix), which are prescription medicines. They do not contain nicotine. They help you by reducing withdrawal symptoms, such as stress and anxiety. · Some people find hypnosis, acupuncture, and massage helpful for ending the smoking habit. · Eat a healthy diet and get regular exercise. Having healthy habits will help your body move past its craving for nicotine.   · Be prepared to keep trying. Most people are not successful the first few times they try to quit. Do not get mad at yourself if you smoke again. Make a list of things you learned and think about when you want to try again, such as next week, next month, or next year. Where can you learn more? Go to http://rhona-ilia.info/. Enter V215 in the search box to learn more about \"Stopping Smoking: Care Instructions. \"  Current as of: September 26, 2018  Content Version: 11.9  © 0889-0750 [employer]. Care instructions adapted under license by [employer] (which disclaims liability or warranty for this information). If you have questions about a medical condition or this instruction, always ask your healthcare professional. [address] any warranty or liability for your use of this information.

## 2019-03-26 NOTE — PROGRESS NOTES
[facility]  Pulmonary, Critical Care, and Sleep Medicine      Pulmonary Office Initial Consultation    Name: Matt Gaucher     : 1960     Date: 3/26/2019        Subjective:   Patient has been referred for evaluation of: Abnormal CT scan of the chest and COPD    Patient is a 66 y.o. female who has been a lifelong smoker and as part of follow-up and screening had a CT scan of the chest done. Abnormalities were noted and therefore was referred to pulmonary evaluation. She has an extensive past medical history and previously has had CT scans of the chest done dating back to  for other indications-per reports they were reported abnormalities. She smokes up to 1 pack of cigarettes per day for past 43 years. For the past 1 week or so she has increased cough with some chest congestion. SOB:   Symptoms occur at exertion. Able to walk about 200 feet. Cannot climb stairs. Activities of daily living are somewhat affected  Denies orthopnea/ paroxysmal nocturnal dyspnea  SOB relieved by rescue inhaler and resting    Cough:  Has been present since several years  Cough is described as dry to productive of mucus. Mucus- white. She does not have hemoptysis. Cough occurs daily daily a.m. and intermittently worse with change of weather    Has occasional associated wheezing, denies chest pain, fever, chills, night sweats   Has dyspepsia, reflux. 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 Pt just recently received health insurance, pt states in the past she was seen by multiple specialists. Pt states she has been having some daily burning to chest and sensation like her food and traveling back up her throat.   She has never been seen by GI and has never had a colonoscopy screening done, she has been taking Omeprazole OTC for reflux symptoms and would like a prescription for this medication. She also notes she has been having daily low back pain with history of chronic back pain, denies shooting/sharp pain and numbness/tingling down legs. She also notes she has been having urinary incontinence and was seen and had a bladder procedure by an [facility] provider in the past.  Pt also notes that lately she has been very irritable and easily aggrevated and would like to start a medication for anxiety like Xanax. Past Medical History:   Diagnosis Date    Arthritis     Back pain     Chronic pain     Compression fracture T9-10    COPD [facility]chronic obstructive pulmonary disease) ([facility])     Depression     Diabetes mellitus ([facility])     type 1    GERD (gastroesophageal reflux disease)     Hypertension     Osteoporosis     Tobacco use        Past Surgical History:   Procedure Laterality Date    HX BACK SURGERY  2009    HX DILATION AND CURETTAGE      HX HYSTERECTOMY  2002       Social History     Socioeconomic History    Marital status:      Spouse name: Not on file    Number of children: Not on file    Years of education: Not on file    Highest education level: Not on file   Tobacco Use    Smoking status: Current Every Day Smoker     Packs/day: 1.00     Years: 43.00     Pack years: 43.00     Types: Cigarettes     Start date: 7/27/1976    Smokeless tobacco: Never Used    Tobacco comment: on Chantix   Substance and Sexual Activity    Alcohol use: No    Drug use: No   Social History Narrative    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  Denies any history chemical and or asbestos exposure        Family History   Problem Relation Age of Onset    Thyroid Disease Mother     Stroke Mother    [employer]Sears Arthritis-rheumatoid Mother     Osteoporosis Mother     Diabetes Father     Kidney Disease Father     Tip's Disease Father    [employer] Arthritis-rheumatoid Sister     Diabetes Brother        Allergies   Allergen Reactions    Bactrim [Sulfamethoprim] Hives and Rash    Dilaudid [Hydromorphone (Bulk)] Other (comments)     Patient was confused for 5 days. Did not know who she was.  Morphine Itching    Pcn [Penicillins] Swelling       . Current Outpatient Medications   Medication Sig Dispense Refill    gabapentin (NEURONTIN) 300 mg capsule Take 2 Caps by mouth nightly. 60 Cap 2    insulin degludec (TRESIBA FLEXTOUCH U-100) 100 unit/mL (3 mL) inpn 30 Units by SubCUTAneous route nightly. 15 mL 2    flash glucose sensor (FREESTYLE MARLEY 14 DAY SENSOR) kit Apply every 14 days to monitor blood sugar 2 Kit 11    flash glucose scanning reader (FREESTYLE MARLEY 14 DAY READER) misc Use to check blood sugar 3 times a day 1 Each 0    diclofenac (VOLTAREN) 1 % gel Apply  to affected area three (3) times daily as needed for Pain. Apply to back and knees as needed 500 g 2    pravastatin (PRAVACHOL) 40 mg tablet Take 40 mg by mouth nightly.  tiotropium (SPIRIVA WITH HANDIHALER) 18 mcg inhalation capsule Take 1 Cap by inhalation daily.  albuterol (PROVENTIL HFA, VENTOLIN HFA, PROAIR HFA) 90 mcg/actuation inhaler Take 2 Puffs by inhalation every four (4) hours as needed for Wheezing or Shortness of Breath. 2 Inhaler 3    insulin aspart (NOVOLOG FLEXPEN) 100 unit/mL flexpen by SubCUTAneous route. Each meal, per sliding scale      alendronate (FOSAMAX) 70 mg tablet Take 70 mg by mouth every Sunday.  fluticasone (FLONASE) 50 mcg/actuation nasal spray 2 Sprays by Both Nostrils route daily. 1 Bottle 3    ALPRAZolam (XANAX) 0.25 mg tablet Take 0.25 mg by mouth nightly as needed for Anxiety.            Review of Systems:  HEENT: No epistaxis, no nasal drainage, no difficulty in swallowing, no redness in eyes  Respiratory: as above  Cardiovascular: no chest pain, no palpitations, no chronic leg edema, no syncope  Gastrointestinal: no abd pain, no vomiting, no diarrhea, no bleeding symptoms  Genitourinary: No urinary symptoms or hematuria  Integument/breast: No ulcers or rashes  Musculoskeletal:Neg  Neurological: No focal weakness, no seizures, no headaches  Behvioral/Psych: No anxiety, no depression  Constitutional: No fever, no chills, no weight loss, no night sweats     Objective:     Visit Vitals  /70 (BP 1 Location: Left arm, BP Patient Position: Sitting)   Pulse 66   Temp 97.8 °F (36.6 °C) (Oral)   Resp 18   Ht 5' 6\" (1.676 m)   Wt 68.2 kg (150 lb 6.4 oz)   SpO2 97%   BMI 24.28 kg/m²        Physical Exam:   General: comfortable, no acute distress, lean built intermittently does use accessory muscles while talking  HEENT: pupils reactive, sclera anicteric, EOM intact  Neck: No adenopathy or thyroid swelling, no lymphadenopathy or JVD, supple  CVS: S1S2 no murmurs  RS: Mod AE bilaterally, prolonged expiratory phase and scattered expiratory wheezing heard no tactile fremitus or egophony, no accessory muscle use  Abd: soft, non tender, no hepatosplenomegaly  Neuro: non focal, awake, alert  Extrm: no leg edema, clubbing or cyanosis  Skin: no rash    Data review:   Pertinent labs: CBC, BMP, LFT's    PFT: Pending    Date FVC FEV1  FEV1/FVC GOG40-84 TLC RV RV/TLC VC DLCO   2017  82%  75%  71   94%  100%  40  82%  45%                                           6 min walk test; pending    Results for orders placed or performed during the hospital encounter of 02/15/17   EKG, 12 LEAD, INITIAL   Result Value Ref Range    Ventricular Rate 78 BPM    Atrial Rate 78 BPM    P-R Interval 172 ms    QRS Duration 86 ms    Q-T Interval 398 ms    QTC Calculation (Bezet) 453 ms    Calculated P Axis 79 degrees    Calculated R Axis 62 degrees    Calculated T Axis 57 degrees    Diagnosis       Normal sinus rhythm  Nonspecific T wave abnormality  Abnormal ECG  When compared with ECG of 28-NOV-2016 08:01,  Nonspecific T wave abnormality now evident in Anterolateral leads  Confirmed by Parrish Teixeira MD, Kenisha Prajapati ([id number]) on 2/15/2017 5:04:10 PM     ECHO- 2017 from [facility]:     NORMAL LEFT VENTRICULAR CAVITY SIZE AND SYSTOLIC FUNCTION WITH AN EJECTION FRACTION OF  55  %. NORMAL DIASTOLIC FUNCTION. NO HEMODYNAMICALLY SIGNIFICANT VALVULAR PATHOLOGY. NORMAL PULMONARY ARTERY PRESSURE OF 23 MM/HG. NO PREVIOUS REPORT FOR COMPARISON. PFT 2017:  SPIROMETRY:  Pre-bronchodilator spirometry demonstrated forced vital capacity of 3.05 liters, 82% of predicted. FEV1 of 2.17 liters, 75% of predicted. Ratio of FEV1/FVC 71 (79 is normal).     Post-bronchodilator spirometry has not revealed any appreciable change in FVC and/or FEV1.     LUNG VOLUMES:  Demonstrated vital capacity of 3.04 liters, 82% of predicted. Total lung capacity of 5.04 liters, 94% of predicted. Residual volume of 2 liters, 100% of predicted. RV/TLC ratio is 40 (37 is normal).    Diffusion capacity demonstrated a diffusion capacity of 45% of predicted. Corrected diffusion capacity with hemoglobin is 12.1, 45% of predicted. DLCO corrected for alveolar volume is 3.47, 67% of predicted.     IMPRESSION:  These pulmonary function test findings are consistent with mild obstructive changes with a relatively well preserved FEV1 and mild to moderate reduction in diffusion capacity    Imaging:  I have personally reviewed the patients radiographs and have reviewed the reports:  [facility]:  1.  Evidence of mild emphysematous changes in the upper lobes. 2. Minor pleural thickening right lung associated with a calcified nodule. Calcified nodule is seen on chest x-ray. 3. Osteopenia with compression of T9 vertebral body, unchanged. Vertebral plasty cement L1.    4. Nonspecific pulmonary nodule in the left lower lobe. Suggest followup: Fleischner Society (Chest Radiology) Pulmonary Nodule Guidelines:    [facility] 2015 -2017 CT scan:   Follow up pulmonary nodules.  Current smoker. COMPARISON:  07/08/15 and 11/07/14.   LUNGS: There are stable emphysematous changes and subpleural bulla mainly in the upper lung fields.  No consolidations.  There are areas of groundglass attenuation bases, likely atelectasis.  Stable calcified tubular structure in the right middle lobe, image 52.    -Stable 2 mm right upper lobe pulmonary nodule, image 63.  -Stable 3 mm right upper lobe pulmonary nodule, image 70.  -Relatively stable 5 mm left lower lobe pulmonary nodule, image 78.  -No new pulmonary nodules. PLEURA:  No pleural effusion or pneumothorax. Mary Embs heart size.  No pericardial effusion.  Mild arthrosclerotic vascular calcifications.  The thoracic aorta is normal in caliber.  There are coronary artery calcifications. LYMPH NODES:  No enlarged lymph nodes.  Stable precarinal lymph node measuring 7 mm in short axis. UPPER ABDOMEN:  Unremarkable. BONE:  There is osteopenia.  Stable compression fracture of T9.  Partially visualized vertebral plasty of L1    CT scan chest ( MRI-CT diagnostics)- completed 2/2019  Bilateral less than 6 mm nodules in right upper lobe left lower lobe    XR Results (most recent):  Results from Hospital Encounter encounter on 11/28/16   XR ELBOW LT MIN 3 V    Narrative Left elbow, 3 views:        INDICATION:    Trauma, as the patient passed out with this morning. Pain involving left elbow  and wrist.        COMPARISON STUDY: None. FINDINGS:    There is no evidence of fracture or malalignment of bones at left elbow. There  is no obvious positive fat-pad sign. Impression IMPRESSION:    Negative study. No evidence of fracture or dislocation at left elbow. CT Results (most recent):  Results from Hospital Encounter encounter on 11/28/16   CT HEAD WO CONT    Narrative CT HEAD WO CONT    History: Status post fall with injury to back of head. Comparison: None.     TECHNIQUE: 5 mm helical scan obtained of the head were obtained from the skull  vertex through the base of the skull without intravenous contrast.      BRAIN RESULT: Gray-white matter differentiation is preserved throughout. Ill-defined small  subcentimeter hypodensity in the left basal ganglia (series 2, image 11). No  significant volume loss. Ventricles are normal in caliber. Basilar cisterns are  patent. No evidence for acute intracranial hemorrhage or extra-axial fluid  collection. No intraventricular hemorrhage. Orbits, soft tissues and osseous structures are grossly normal. Mild ethmoid  sinus mucosal thickening. Remaining visualized paranasal sinuses and mastoid air  cells are patent. Impression IMPRESSION:      1.  Ill-defined subcentimeter hypodensity in the left basal ganglia may  represent an age indeterminate lacunar infarct or dilated perivascular space. Contusion is less felt to be likely. 2.  Mild sinonasal mucosal disease. .     Patient Active Problem List   Diagnosis Code    Diabetes mellitus type 1 ([address] E10.9    Diabetic neuropathy ([address] E11.40    Osteoporosis M81.0    Proteinuria R80.9    HLD (hyperlipidemia) E78.5    Depression F32.9    Tobacco abuse Z72.0    Surgical menopause E89.40    Thoracic compression fracture (HCC) S22.000A    Gastroesophageal reflux disease K21.9     IMPRESSION:   · Abnormal CT scan of the chest with bilateral less than 6 mm nodules that have been detected at least for the past 5 years with stability.   Per nodule follow-up guidelines and criteria patient is high risk with history of ongoing nicotine use and will need annual LD CT for follow-up  · COPD with current acute exacerbation likely related to lower respiratory tract infection needs acute treatment and further evaluation stable state  · Tobacco abuse  · Diabetes with diabetic neuropathy  · GERD  · Osteoporosis      RECOMMENDATIONS:   · Annual LD CT for follow-up of nodules  · We will add Symbicort to current treatment of Spiriva and follow-up to assess response  · Pulmonary function tests will be repeated for better COPD staging and long-term treatment interventions  · Will obtain baseline 6-minute walk at stable state  · Complete cessation of cigarette smoking-patient counseled on harm, need for behavioral intervention as well as needed pharmacotherapy. She states that she reacted adversely to Chantix in the past.   · Preventive vaccinations-annual influenza vaccine recommended. She has received Pneumovax and will be due for the next vaccine at age 74  · Healthy diet weight recommended  · Active lifestyle  · Will consider referral to pulmonary rehab after PFTs completed  · We will follow-up for COPD management and nodule management  · Thank you for referring this pleasant lady     1. Low smoking or other exposure risk:     < or =4 mm: No follow up necessary     4-6 mm: Follow up CT at 12 m; if stable, no further follow up.     6-8 mm: Follow up CT at 6-12 m and if stable, follow up 12-18 m. If stable, no further follow up. 2. High smoking or other exposure risk:     < or =4 mm: Follow up CT at 12 m; if stable, no further follow up.     4-6 mm: Follow up CT at 6-12 m and if stable follow up at 24 m. If stable, no further follow up.     6-8 mm: Follow up CT at 3-6 m; if stable, get another at 9-12 m and then if still stable, again at 24 m. If stable at 25 m, no further follow up. 3. Regardless of risk history:     >8 mm: Dedicated evaluation with Contrast CT, PET, biopsy or VATS.        Cherise Cortez MD

## 2019-03-26 NOTE — PROGRESS NOTES
Chief Complaint   Patient presents with    Lung Nodule     referred by Maria Esther Blankenship NP    Cough     1. Have you been to the ER, urgent care clinic since your last visit? Hospitalized since your last visit? No    2. Have you seen or consulted any other health care providers outside of the [address] [place] [address] since your last visit? Include any pap smears or colon screening.  No

## 2019-03-27 PROBLEM — J44.9 COPD (CHRONIC OBSTRUCTIVE PULMONARY DISEASE) (HCC): Status: ACTIVE | Noted: 2019-03-27

## 2019-04-02 ENCOUNTER — OFFICE VISIT (OUTPATIENT)
Dept: SURGERY | Age: 59
End: 2019-04-02

## 2019-04-02 VITALS
OXYGEN SATURATION: 99 % | TEMPERATURE: 97.8 F | SYSTOLIC BLOOD PRESSURE: 126 MMHG | BODY MASS INDEX: 23.78 KG/M2 | WEIGHT: 148 LBS | HEIGHT: 66 IN | HEART RATE: 72 BPM | RESPIRATION RATE: 18 BRPM | DIASTOLIC BLOOD PRESSURE: 72 MMHG

## 2019-04-02 DIAGNOSIS — Z12.11 ENCOUNTER FOR SCREENING COLONOSCOPY: Primary | ICD-10-CM

## 2019-04-02 NOTE — PROGRESS NOTES
Colon Screen    Patient: Keturah Lara MRN: [id number]  SSN: [id number]    YOB: 1960  Age: 66 y.o. Sex: female        Subjective:   Keturah Lara was referred by Dr. Meredith Leahy. PCP is Patrice Roy NP. Patient referred for colonoscopy for   Screening colonoscopy. Patient denies rectal pain or bleeding. Abdominal surgeries as described below, specifically BTL  Family history as described below, specifically maternal grandmother. Never had colonoscopy. Patient will be scheduled at [facility] due to her history of autonomic neuropathy and states in 2002 with hysterectomy she went into respiratory distress. Allergies   Allergen Reactions    Bactrim [Sulfamethoprim] Hives and Rash    Dilaudid [Hydromorphone (Bulk)] Other (comments)     Patient was confused for 5 days. Did not know who she was.     Morphine Itching    Pcn [Penicillins] Swelling       Past Medical History:   Diagnosis Date    Arthritis     Back pain     Chronic pain     back    Compression fracture T9-10    COPD (chronic obstructive pulmonary disease) (HCC)     Depression     Diabetes mellitus (HCC)     type 1    GERD (gastroesophageal reflux disease)     Hypertension     no longer on meds    Osteoporosis     Tobacco use      Past Surgical History:   Procedure Laterality Date    HX BACK SURGERY  2009    HX DILATION AND CURETTAGE      HX HYSTERECTOMY  2002      Family History   Problem Relation Age of Onset    Thyroid Disease Mother     Stroke Mother    Chriss Patrick Arthritis-rheumatoid Mother     Osteoporosis Mother     Diabetes Father     Kidney Disease Father     Tip's Disease Father     Arthritis-rheumatoid Sister     Diabetes Brother     Colon Cancer Maternal Grandmother      Social History     Tobacco Use    Smoking status: Current Every Day Smoker     Packs/day: 1.00     Years: 43.00     Pack years: 43.00     Types: Cigarettes     Start date: 7/27/1976    Smokeless tobacco: Never Used    Tobacco comment: on Chantix   Substance Use Topics    Alcohol use: No      Prior to Admission medications    Medication Sig Start Date End Date Taking? Authorizing Provider   budesonide-formoterol (SYMBICORT) 160-4.5 mcg/actuation HFAA Take 1 Puff by inhalation two (2) times a day. 3/26/19  Yes Maya Soto MD   gabapentin (NEURONTIN) 300 mg capsule Take 2 Caps by mouth nightly. 3/11/19  Yes Abhijeet Domingo NP   insulin degludec (TRESIBA FLEXTOUCH U-100) 100 unit/mL (3 mL) inpn 30 Units by SubCUTAneous route nightly. 3/11/19  Yes Abhijeet Domingo NP   flash glucose sensor (FREESTYLE MARLEY 14 DAY SENSOR) kit Apply every 14 days to monitor blood sugar 3/11/19  Yes Abhijeet Domingo NP   flash glucose scanning reader (FREESTYLE MARLEY 14 DAY READER) misc Use to check blood sugar 3 times a day 3/11/19  Yes Abhijeet Domingo NP   diclofenac (VOLTAREN) 1 % gel Apply  to affected area three (3) times daily as needed for Pain. Apply to back and knees as needed 2/19/19  Yes Carla Vega NP   pravastatin (PRAVACHOL) 40 mg tablet Take 40 mg by mouth nightly. Yes Provider, Historical   tiotropium (SPIRIVA WITH HANDIHALER) 18 mcg inhalation capsule Take 1 Cap by inhalation daily. Yes Provider, Historical   albuterol (PROVENTIL HFA, VENTOLIN HFA, PROAIR HFA) 90 mcg/actuation inhaler Take 2 Puffs by inhalation every four (4) hours as needed for Wheezing or Shortness of Breath. 5/17/16  Yes Dian Kessler MD   insulin aspart (NOVOLOG FLEXPEN) 100 unit/mL flexpen by SubCUTAneous route. Each meal, per sliding scale   Yes Provider, Historical   alendronate (FOSAMAX) 70 mg tablet Take 70 mg by mouth every Sunday. Yes Provider, Historical   fluticasone (FLONASE) 50 mcg/actuation nasal spray 2 Sprays by Both Nostrils route daily. 9/24/12  Yes Kimberlee VILLEGAS, DO   ALPRAZolam Yovanny Brisk) 0.25 mg tablet Take 0.25 mg by mouth nightly as needed for Anxiety.     Provider, Historical          Review of Systems:  Review of Systems   Constitutional: Positive for malaise/fatigue. Negative for chills, diaphoresis, fever and weight loss. HENT: Negative. Eyes: Positive for pain. Negative for blurred vision, double vision, photophobia, discharge and redness. Respiratory: Positive for shortness of breath and wheezing. On exertion   Cardiovascular: Positive for leg swelling and PND. Negative for chest pain, palpitations, orthopnea and claudication. Gastrointestinal: Negative. Genitourinary: Negative. Musculoskeletal: Positive for back pain and joint pain. Negative for falls and myalgias. Skin: Negative. Neurological: Positive for dizziness and headaches. Negative for tingling, tremors, sensory change, speech change, focal weakness, seizures, loss of consciousness and weakness. Endo/Heme/Allergies: Negative for polydipsia. Bruises/bleeds easily. Psychiatric/Behavioral: Positive for depression. Negative for hallucinations, memory loss, substance abuse and suicidal ideas. The patient is not nervous/anxious and does not have insomnia.           Risks colonoscopy described- colon injury, missed lesion, anesthesia problems, bleeding       Yane Cota RN  April 2, 2019  11:57 AM

## 2019-04-05 ENCOUNTER — HOSPITAL ENCOUNTER (OUTPATIENT)
Dept: LAB | Age: 59
Discharge: HOME | End: 2019-04-05
Payer: COMMERCIAL

## 2019-04-05 ENCOUNTER — OFFICE VISIT (OUTPATIENT)
Dept: FAMILY MEDICINE CLINIC | Age: 59
End: 2019-04-05

## 2019-04-05 VITALS
TEMPERATURE: 97.8 F | WEIGHT: 150.4 LBS | HEART RATE: 72 BPM | RESPIRATION RATE: 18 BRPM | HEIGHT: 66 IN | BODY MASS INDEX: 24.17 KG/M2 | OXYGEN SATURATION: 100 % | DIASTOLIC BLOOD PRESSURE: 68 MMHG | SYSTOLIC BLOOD PRESSURE: 120 MMHG

## 2019-04-05 DIAGNOSIS — Z11.59 ENCOUNTER FOR HEPATITIS C SCREENING TEST FOR LOW RISK PATIENT: ICD-10-CM

## 2019-04-05 DIAGNOSIS — G89.29 CHRONIC MIDLINE LOW BACK PAIN WITHOUT SCIATICA: ICD-10-CM

## 2019-04-05 DIAGNOSIS — Z82.61 FAMILY HISTORY OF RHEUMATOID ARTHRITIS: ICD-10-CM

## 2019-04-05 DIAGNOSIS — K21.9 GASTROESOPHAGEAL REFLUX DISEASE, ESOPHAGITIS PRESENCE NOT SPECIFIED: Primary | ICD-10-CM

## 2019-04-05 DIAGNOSIS — E10.8 TYPE 1 DIABETES MELLITUS WITH COMPLICATION (HCC): ICD-10-CM

## 2019-04-05 DIAGNOSIS — Z12.39 SCREENING FOR BREAST CANCER: ICD-10-CM

## 2019-04-05 DIAGNOSIS — M54.50 CHRONIC MIDLINE LOW BACK PAIN WITHOUT SCIATICA: ICD-10-CM

## 2019-04-05 PROCEDURE — 36415 COLL VENOUS BLD VENIPUNCTURE: CPT

## 2019-04-05 PROCEDURE — 86803 HEPATITIS C AB TEST: CPT

## 2019-04-05 PROCEDURE — 82043 UR ALBUMIN QUANTITATIVE: CPT

## 2019-04-05 RX ORDER — PEN NEEDLE, DIABETIC 32GX 5/32"
1 NEEDLE, DISPOSABLE MISCELLANEOUS
Qty: 40 EACH | Refills: 2 | Status: SHIPPED | OUTPATIENT
Start: 2019-04-05 | End: 2019-05-05

## 2019-04-05 RX ORDER — OMEPRAZOLE 40 MG/1
40 CAPSULE, DELAYED RELEASE ORAL DAILY
Qty: 30 CAP | Refills: 2 | Status: SHIPPED | OUTPATIENT
Start: 2019-04-05 | End: 2019-05-05

## 2019-04-05 NOTE — PATIENT INSTRUCTIONS
Type 1 Diabetes: Care Instructions  Your Care Instructions    Type 1 diabetes is a lifelong disease that develops when the pancreas stops making insulin. The body needs insulin to let sugar (glucose) move from the blood into the body's cells, where it can be used for energy or stored for later use. Without insulin, the sugar cannot get into the cells to do its work. It stays in the blood instead. This can cause high blood sugar levels. A person has diabetes when the blood sugar is too high. Over time, diabetes can lead to diseases of the heart, blood vessels, nerves, kidneys, and eyes. To treat type 1 diabetes, you need insulin. You can give yourself insulin through an insulin pump, an insulin pen, or a syringe (needle). Insulin, exercise, and a healthy diet can help prevent or delay problems from diabetes. With education and support, you will treat diabetes as a part of your life--not your whole life. Seek support when you need it from your family, friends, and your doctor or other diabetes experts. Follow-up care is a key part of your treatment and safety. Be sure to make and go to all appointments, and call your doctor if you are having problems. It's also a good idea to know your test results and keep a list of the medicines you take. How can you care for yourself at home? · Take your insulin on time and in the right dose. This helps keep your blood sugar steady. Do not stop or change your insulin without talking to your doctor first.  · Check and record your blood sugar as often as directed. These records can help your doctor see how you are doing and adjust your treatment if needed. It is important to keep track of any symptoms you have, such as low blood sugar, and any changes in your activities, diet, or insulin use. · Eat a good diet that spreads carbohydrate throughout the day. Carbohydrate--the body's main source of fuel--affects blood sugar more than any other nutrient.  Carbohydrate is in fruits, vegetables, milk, and yogurt. It also is in breads, cereals, vegetables such as potatoes and corn, and sugary foods such as candy and cakes. · Aim for 30 minutes of exercise on most, preferably all, days of the week. Walking is a good choice. You also may want to do other activities, such as running, swimming, cycling, or playing tennis or team sports. If your doctor says it's okay, do muscle-strengthening exercises at least 2 times a week. · Control your cholesterol and blood pressure. Exercise and healthy eating can help with these goals. If you have medicine for cholesterol or high blood pressure, take it as directed. Do not stop or change a medicine without talking to your doctor first.  · If you have discussed it with your doctor, take a low-dose aspirin every day to help prevent heart attack and stroke. Do not start taking aspirin unless your doctor knows about it. · Do not smoke. If you need help quitting, talk to your doctor about stop-smoking programs and medicines. These can increase your chances of quitting for good. · Check your feet daily for blisters, cracks, and sores. Have your doctor look at your feet whenever you have a checkup. · Get a checkup every 3 to 6 months. Your doctor will tell you how often to come in. You will need regular tests such as:  ? A hemoglobin A1c test. You may need this test more often than once a year. It is a good measure of how well your treatment is working. ? A cholesterol test.  ? A urine test for protein. This checks for kidney problems. ? A complete foot exam.  ? An eye exam, even if you do not think your vision has changed. When should you call for help? Call 911 anytime you think you may need emergency care. For example, call if:    · You passed out (lost consciousness), or you suddenly become very sleepy or confused. (You may have very low blood sugar.)     · You have symptoms of high blood sugar, such as:  ? Blurred vision. ?  Trouble staying awake or being woken up. ? Fast, deep breathing. ? Breath that smells fruity. ? Belly pain, not feeling hungry, and vomiting. ? Feeling confused.    Call your doctor now or seek immediate medical care if:    · Your blood sugar stays higher than the level your doctor has set for you.     · You have symptoms of low blood sugar, such as:  ? Sweating. ? Feeling nervous, shaky, and weak. ? Extreme hunger and slight nausea. ? Dizziness and headache.  ? Blurred vision. ? Confusion.    Watch closely for changes in your health, and be sure to contact your doctor if:    · You often have problems controlling your blood sugar.     · You have symptoms of long-term diabetes problems, such as:  ? New vision changes. ? New pain, numbness, or tingling in your hands or feet. ? Skin problems. Where can you learn more? Go to http://rhona-ilia.info/. Enter P859 in the search box to learn more about \"Type 1 Diabetes: Care Instructions. \"  Current as of: July 25, 2018  Content Version: 11.9  © 4025-0353 [employer]wise, Incorporated. Care instructions adapted under license by [employer] (which disclaims liability or warranty for this information). If you have questions about a medical condition or this instruction, always ask your healthcare professional. [address] any warranty or liability for your use of this information.

## 2019-04-05 NOTE — PROGRESS NOTES
Chief Complaint   Patient presents with    Medication Evaluation     Patient needs a Rx for Levimer     1. Have you been to the ER, urgent care clinic since your last visit? Hospitalized since your last visit? No    2. Have you seen or consulted any other health care providers outside of the [address] [place] [address] since your last visit? Include any pap smears or colon screening.  No

## 2019-04-05 NOTE — PROGRESS NOTES
HPI  Santosh Yu is a 66 y.o. female who presents today for DM follow up and medication refill. Pt has an appointment on April 17, 2019 to establish care with [facility]. Pt states her BGL range has been , average around 200. Pt has an appointment with GYN on April 27, 2019. Pt continues to have joint pain and has a family history of lupus and rheumatoid arthritis and would like to see a rheumatologist for second opinion, pt was seen by spine specialist.  Pt was unable to get InnographyraGraphicly due to issues with insurance coverage. Pt was able to speak with a representative that explained she needed to complete a trial of Levemir before the [place] would be covered. Pt is also requesting a refill for Omeprazole today. Pt denies any other acute medical concerns today. Current Outpatient Medications   Medication Sig Dispense Refill    omeprazole (PRILOSEC) 40 mg capsule Take 1 Cap by mouth daily for 30 days. 30 Cap 2    budesonide-formoterol (SYMBICORT) 160-4.5 mcg/actuation HFAA Take 1 Puff by inhalation two (2) times a day. 1 Inhaler 0    gabapentin (NEURONTIN) 300 mg capsule Take 2 Caps by mouth nightly. 60 Cap 2    flash glucose sensor (FREESTYLE MARLEY 14 DAY SENSOR) kit Apply every 14 days to monitor blood sugar 2 Kit 11    flash glucose scanning reader (FREESTYLE MARLEY 14 DAY READER) misc Use to check blood sugar 3 times a day 1 Each 0    diclofenac (VOLTAREN) 1 % gel Apply  to affected area three (3) times daily as needed for Pain. Apply to back and knees as needed 500 g 2    pravastatin (PRAVACHOL) 40 mg tablet Take 40 mg by mouth nightly.  tiotropium (SPIRIVA WITH HANDIHALER) 18 mcg inhalation capsule Take 1 Cap by inhalation daily.  albuterol (PROVENTIL HFA, VENTOLIN HFA, PROAIR HFA) 90 mcg/actuation inhaler Take 2 Puffs by inhalation every four (4) hours as needed for Wheezing or Shortness of Breath.  2 Inhaler 3    insulin aspart (NOVOLOG FLEXPEN) 100 unit/mL flexpen by SubCUTAneous route. Each meal, per sliding scale      alendronate (FOSAMAX) 70 mg tablet Take 70 mg by mouth every Sunday.  fluticasone (FLONASE) 50 mcg/actuation nasal spray 2 Sprays by Both Nostrils route daily. 1 Bottle 3    insulin degludec (TRESIBA FLEXTOUCH U-100) 100 unit/mL (3 mL) inpn 30 Units by SubCUTAneous route nightly. 15 mL 2    ALPRAZolam (XANAX) 0.25 mg tablet Take 0.25 mg by mouth nightly as needed for Anxiety. Allergies   Allergen Reactions    Bactrim [Sulfamethoprim] Hives and Rash    Dilaudid [Hydromorphone (Bulk)] Other (comments)     Patient was confused for 5 days. Did not know who she was.  Morphine Itching    Pcn [Penicillins] Swelling       SUBJECTIVE:  Review of Systems   Constitutional: Negative for chills, fever and malaise/fatigue. Eyes: Negative for blurred vision. Respiratory: Negative for shortness of breath. Cardiovascular: Negative for chest pain, palpitations and leg swelling. Gastrointestinal: Negative for abdominal pain, diarrhea, nausea and vomiting. Genitourinary: Negative for dysuria and frequency. Stress incontinence   Musculoskeletal: Positive for back pain and joint pain. Negative for falls and myalgias. Neurological: Negative for dizziness, tingling, sensory change and headaches. OBJECTIVE:  Visit Vitals  /68 (BP 1 Location: Left arm, BP Patient Position: Sitting)   Pulse 72   Temp 97.8 °F (36.6 °C) (Oral)   Resp 18   Ht 5' 6\" (1.676 m)   Wt 150 lb 6.4 oz (68.2 kg)   SpO2 100%   BMI 24.28 kg/m²      Physical Exam   Constitutional: She is oriented to person, place, and time and well-developed, well-nourished, and in no distress. HENT:   Head: Normocephalic. Eyes: Pupils are equal, round, and reactive to light. Conjunctivae and EOM are normal.   Neck: Neck supple. No thyromegaly present. Cardiovascular: Normal rate, regular rhythm and normal heart sounds.    Pulmonary/Chest: Effort normal and breath sounds normal. She has no wheezes. She has no rales. She exhibits no tenderness. Musculoskeletal: She exhibits tenderness (lumbar region). She exhibits no edema. Neurological: She is alert and oriented to person, place, and time. Gait normal.   Skin: Skin is warm and dry. Psychiatric: Mood and affect normal.   Nursing note and vitals reviewed. ASSESSMENT:  Diagnoses and all orders for this visit:    1. Gastroesophageal reflux disease, esophagitis presence not specified  -     omeprazole (PRILOSEC) 40 mg capsule; Take 1 Cap by mouth daily for 30 days. 2. Type 1 diabetes mellitus with complication (HCC)  -     insulin detemir U-100 (LEVEMIR FLEXTOUCH) 100 unit/mL (3 mL) inpn; 30 Units by SubCUTAneous route nightly for 30 days. -     Insulin Needles, Disposable, (COMFORT EZ PEN NEEDLES) 32 gauge x 5/16\" ndle; 1 Pen Needle by Does Not Apply route nightly for 30 days.  -     MICROALBUMIN, UR, RAND W/ MICROALB/CREAT RATIO; Future    3. Chronic midline low back pain without sciatica    4. Family history of rheumatoid arthritis  -     REFERRAL TO RHEUMATOLOGY    5. Encounter for hepatitis C screening test for low risk patient  -     HEPATITIS C AB; Future    6. Screening for breast cancer  -     [facility] MAMMO BI SCREENING INCL CAD; Future    PLAN:  Refilled omeprazole 40 mg  Start Levemir 30 units at bedtime  Microalbumin urine screening ordered  One time screening for hep c today for   Mammogram screening ordered today for   New referral placed for Rheumatology    I have discussed the diagnosis with the patient and the intended plan as seen in the above orders. The patient has received an after-visit summary and questions were answered concerning future plans. I have discussed medication side effects and warnings with the patient as well. Patient will call for further questions. Follow-up and Dispositions    · Return in about 3 months (around 7/5/2019) for follow up.        Milena Vargas NP

## 2019-04-06 LAB
CREAT UR-MCNC: 17 MG/DL (ref 30–125)
MICROALBUMIN UR-MCNC: 1.28 MG/DL (ref 0–3)
MICROALBUMIN/CREAT UR-RTO: 75 MG/G (ref 0–30)

## 2019-04-08 LAB
HCV AB SER IA-ACNC: <0.02 INDEX
HCV AB SERPL QL IA: NEGATIVE
HCV COMMENT,HCGAC: NORMAL

## 2019-04-16 ENCOUNTER — HOSPITAL ENCOUNTER (OUTPATIENT)
Dept: NUTRITION | Age: 59
Discharge: HOME | End: 2019-04-16
Payer: COMMERCIAL

## 2019-04-16 PROCEDURE — 97802 MEDICAL NUTRITION INDIV IN: CPT

## 2019-04-16 NOTE — PROGRESS NOTES
[facility] [address] [facility]  [address] [facility], [address] [facility], [address] [address]  Phone: [phone]  Fax: [phone]   Nutrition Assessment - Medical Nutrition Therapy   Outpatient Initial Evaluation         Patient Name: Newt Gowers : 1960   Treatment Diagnosis: Diabetes, Eating Disorder   Referral Source: Lina Holguin NP Start of Care [facility]): 2019     Gender: female Age: 66 y.o. Ht: 66 in Wt: 150  lb  kg   BMI:  BMR   Male  BMR Female    Anthropometrics Assessment:      Past Medical History includes: High chol, ED, Heart Dz, Diabetes, osteoporosis, Depression     Pertinent Medications:   See MAR  Pt is on insulin, Levimir, Novolog     Biochemical Data:   Lab Results   Component Value Date/Time    Hemoglobin A1c 12.0 (H) 2019 09:20 AM    Hemoglobin A1c (POC) 7.8 2012 04:16 PM     Lab Results   Component Value Date/Time    Cholesterol, total 134 2019 09:20 AM    HDL Cholesterol 45 2019 09:20 AM    LDL, calculated 71.8 2019 09:20 AM    VLDL, calculated 17.2 2019 09:20 AM    Triglyceride 86 2019 09:20 AM    CHOL/HDL Ratio 3.0 2019 09:20 AM     Lab Results   Component Value Date/Time    ALT (SGPT) 14 2019 09:20 AM    AST (SGOT) 7 (L) 2019 09:20 AM    Alk.  phosphatase 96 2019 09:20 AM    Bilirubin, direct 0.2 2011 12:25 PM    Bilirubin, total 0.4 2019 09:20 AM     Lab Results   Component Value Date/Time    Creatinine, POC 0.9 2009 06:24 PM    Creatinine 0.76 2019 09:20 AM     Lab Results   Component Value Date/Time    BUN 16 2019 09:20 AM    BUN, POC 25 (H) 2009 06:24 PM     Lab Results   Component Value Date/Time    Microalbumin/Creat ratio (mg/g creat) 75 (H) 2019 09:25 AM    Microalbumin,urine random 1.28 2019 09:25 AM    Microalbumin urine (POC) ALB 30mg/L  CRE 50mg/dL  A:C 30-300mg/g Abnormal 2012 04:17 PM        Subjective/Assessment:   Pt is here seeking  for Diabetes, but Eating Disorder as well. She discovered when she was first diagnosed that when her BS were high she would lose weight. SHe keeps herself in a constant state of high BS in order to promote weight loss and maintain it. She is finally to a point where she admits she has a problem and this is not healthy. She has an appointment with a a psychiatrist tomorrow to help with that aspect. Pt would like to have education on healthy eating habits, how to promote weight loss/maintenance while keep BS under control and also increasing energy. Provided this to patient, answered all questions, and developed realistic goals for pt to work toward. Pt expressed comprehension, high motivation, and compliance is expected. Current Eating Patterns: N/A     Estimate Needs   Calories:  1600 Protein: 120 Carbs: 160 Fat: 53   Kcal/day  g/day  g/day  g/day        percent: 30  40  30               Education & Recommendations provided: Educated pt on the pathophysiology of Type II Diabetes, insulin resistance and the rationale for dietary modifications and increased activity. Educated pt on carbohydrate food sources, counting carbohydrates, label reading, meal timing, and appropriate serving sizes. Encouraged pt to avoid sugary beverages.    Handouts Provided: [x]  Carbohydrates  [x]  Protein  [x]  Fiber  [x]  Serving Sizes  [x]  Meal and Snack Ideas  [x]  Food Journals []  Diabetes  []  Cholesterol  []  Sodium  []  Gen Nutr Guidelines  []  SBGM Guidelines  []  Others:   Information Reviewed with: Patient   Readiness to Change Stage: []  Pre-contemplative    []  Contemplative  []  Preparation               [x]  Action                  []  Maintenance   Potential Barriers to Learning: []  Decline in memory    []  Language barrier   []  Other:  [x]  Emotional                  []  Limited mobility  []  Lack of motivation     [] Vision, hearing or cognitive impairment   Expected Compliance: Fair due to psychological connection to food     Nutritional Goal - To promote lifestyle changes to result in:    []  Weight loss  [x]  Improved diabetic control  []  Decreased cholesterol levels  []  Decreased blood pressure  []  Weight maintenance []  Preventing any interactions associated with food allergies  []  Adequate weight gain toward goal weight  []  Other:        Patient Goals:  SMART goals 1. Work on not skipping meals; better-consistent meal timing; use chart as a reference  2. Get a notebook; build structure through a schedule for meal times; use meal builder to create well balanced meals, write feelings while eating to see if triggers are discovered  3. Slow down when eating; set fork or food down between bites, no distracted eating.    4. Focus on protein first, then veg, last carbs     Dietitian Signature: Aisha Thomas MA, MEMO Date: 4/16/2019   Follow-up: May 17 @ 1400 Time: 10:16 AM

## 2019-04-26 ENCOUNTER — OFFICE VISIT (OUTPATIENT)
Dept: PULMONOLOGY | Age: 59
End: 2019-04-26

## 2019-04-26 VITALS
HEIGHT: 66 IN | DIASTOLIC BLOOD PRESSURE: 70 MMHG | WEIGHT: 147 LBS | BODY MASS INDEX: 23.63 KG/M2 | HEART RATE: 74 BPM | TEMPERATURE: 97.5 F | RESPIRATION RATE: 20 BRPM | OXYGEN SATURATION: 99 % | SYSTOLIC BLOOD PRESSURE: 120 MMHG

## 2019-04-26 DIAGNOSIS — F41.9 ANXIETY: ICD-10-CM

## 2019-04-26 DIAGNOSIS — J44.9 COPD, GROUP B, BY GOLD 2017 CLASSIFICATION (HCC): Primary | ICD-10-CM

## 2019-04-26 DIAGNOSIS — R91.8 LUNG NODULES: ICD-10-CM

## 2019-04-26 DIAGNOSIS — F17.200 TOBACCO DEPENDENCY: ICD-10-CM

## 2019-04-26 DIAGNOSIS — J44.9 COPD MIXED TYPE (HCC): ICD-10-CM

## 2019-04-26 RX ORDER — BUDESONIDE AND FORMOTEROL FUMARATE DIHYDRATE 160; 4.5 UG/1; UG/1
1 AEROSOL RESPIRATORY (INHALATION) 2 TIMES DAILY
Qty: 1 INHALER | Refills: 3 | Status: SHIPPED | OUTPATIENT
Start: 2019-04-26 | End: 2021-05-04 | Stop reason: SDUPTHER

## 2019-04-26 RX ORDER — IBUPROFEN 200 MG
1 TABLET ORAL EVERY 24 HOURS
Qty: 30 PATCH | Refills: 0 | Status: SHIPPED | OUTPATIENT
Start: 2019-04-26 | End: 2019-05-26

## 2019-04-26 RX ORDER — LORAZEPAM 0.5 MG/1
0.5 TABLET ORAL
Qty: 60 TAB | Refills: 0 | Status: SHIPPED | OUTPATIENT
Start: 2019-04-26

## 2019-04-26 NOTE — PATIENT INSTRUCTIONS
Stopping Smoking: Care Instructions Your Care Instructions Cigarette smokers crave the nicotine in cigarettes. Giving it up is much harder than simply changing a habit. Your body has to stop craving the nicotine. It is hard to quit, but you can do it. There are many tools that people use to quit smoking. You may find that combining tools works best for you. There are several steps to quitting. First you get ready to quit. Then you get support to help you. After that, you learn new skills and behaviors to become a nonsmoker. For many people, a necessary step is getting and using medicine. Your doctor will help you set up the plan that best meets your needs. You may want to attend a smoking cessation program to help you quit smoking. When you choose a program, look for one that has proven success. Ask your doctor for ideas. You will greatly increase your chances of success if you take medicine as well as get counseling or join a cessation program. 
Some of the changes you feel when you first quit tobacco are uncomfortable. Your body will miss the nicotine at first, and you may feel short-tempered and grumpy. You may have trouble sleeping or concentrating. Medicine can help you deal with these symptoms. You may struggle with changing your smoking habits and rituals. The last step is the tricky one: Be prepared for the smoking urge to continue for a time. This is a lot to deal with, but keep at it. You will feel better. Follow-up care is a key part of your treatment and safety. Be sure to make and go to all appointments, and call your doctor if you are having problems. It's also a good idea to know your test results and keep a list of the medicines you take. How can you care for yourself at home? · Ask your family, friends, and coworkers for support. You have a better chance of quitting if you have help and support.  
· Join a support group, such as Nicotine Anonymous, for people who are trying to quit smoking. · Consider signing up for a smoking cessation program, such as the American Lung Association's Freedom from Smoking program. 
· Get text messaging support. Go to the website at www.smokefree. gov to sign up for the [facility] program. 
· Set a quit date. Pick your date carefully so that it is not right in the middle of a big deadline or stressful time. Once you quit, do not even take a puff. Get rid of all ashtrays and lighters after your last cigarette. Clean your house and your clothes so that they do not smell of smoke. · Learn how to be a nonsmoker. Think about ways you can avoid those things that make you reach for a cigarette. ? Avoid situations that put you at greatest risk for smoking. For some people, it is hard to have a drink with friends without smoking. For others, they might skip a coffee break with coworkers who smoke. ? Change your daily routine. Take a different route to work or eat a meal in a different place. · Cut down on stress. Calm yourself or release tension by doing an activity you enjoy, such as reading a book, taking a hot bath, or gardening. · Talk to your doctor or pharmacist about nicotine replacement therapy, which replaces the nicotine in your body. You still get nicotine but you do not use tobacco. Nicotine replacement products help you slowly reduce the amount of nicotine you need. These products come in several forms, many of them available over-the-counter: ? Nicotine patches ? Nicotine gum and lozenges ? Nicotine inhaler · Ask your doctor about bupropion (Wellbutrin) or varenicline (Chantix), which are prescription medicines. They do not contain nicotine. They help you by reducing withdrawal symptoms, such as stress and anxiety. · Some people find hypnosis, acupuncture, and massage helpful for ending the smoking habit. · Eat a healthy diet and get regular exercise. Having healthy habits will help your body move past its craving for nicotine. · Be prepared to keep trying. Most people are not successful the first few times they try to quit. Do not get mad at yourself if you smoke again. Make a list of things you learned and think about when you want to try again, such as next week, next month, or next year. Where can you learn more? Go to http://rhona-ilia.info/. Enter H996 in the search box to learn more about \"Stopping Smoking: Care Instructions. \" Current as of: September 26, 2018 Content Version: 11.9 © 3997-6710 [employer]. Care instructions adapted under license by [employer] (which disclaims liability or warranty for this information). If you have questions about a medical condition or this instruction, always ask your healthcare professional. [address] any warranty or liability for your use of this information.

## 2019-04-26 NOTE — PROGRESS NOTES
Six Minute Walk Test (6MWT) recording form Hanna Gordon       [id number]                                    @ @ female [phone] [x]  Medical history checked 
[x]  Medical clearance provided for the patient to participate in exercise testing Contraindications to 6MWT: 
[x] Resting heart rate > 120 beats / min after 10 minutes rest (relative contraindication) [x] Systolic blood pressure > 180 mm Hg +/- diastolic blood pressure > 100 mm Hg (relative contraindication) [] Resting SpO2 < 85% on room air or on prescribed level of supplemental oxygen 
[] Physical disability preventing safe performance [x] No contraindications identified 6MWT        2019  9:41 AM 
  
 
Supplemental Oxygen  Mobility Aid Time Mins BP SP02 HR RR Distance Walked Rests/Comments Rest 120/70 99 74 20 Room Air and at rest  
1  99 87  1.5 Laps   (51 meters) Room Air 2  96 91  1    Lap    ( 34 meters) Room Air 3  96 92  1.5 Laps  (51 meters) Room Air 4  97 92  2    Laps   (68 meters) Room Air 5  97 91  2    Laps   (68 meters) Room Air 6  97 91  2    Laps   (68 meters) Room Air Recovery 1 120/70 97 90   Room Air Recovery 2  97 70   Room Air Total distance:           340 Meters              Symptom recovery:   15 seconds              HR recovery:     1 minute Limiting factor:         none Was test terminated: [x] No   [] Yes  If yes, when? 6MWT Termination Criteria: 
[] Chest pain or angina-like symptons [] Heart rate > Predicted HR max.  
[] Evolving mental confusioin, light-headedness or incoordination 
[] Physical or verbal severe fatigue [] Intolerable dyspnea, unrelieved by rest 
 [] Persistent SP02 < 85% (Note pending clinical presentation) [] Abnormal gait pattern (leg cramps, staggering, ataxia) [] Other clinically warranted reason INTERPRETATION: 
Patient walked a total distance of 340 m without any significant oxygen desaturation or significant change in dyspnea index. Comparision 6 min walk distance: Not available RECOMMENDATION: 
No indication for oxygen therapy Jorge Wright MD

## 2019-04-26 NOTE — PROGRESS NOTES
Samantakenny Gonzalez presents today for Chief Complaint Patient presents with  COPD  
  follow up 3/26/2019  Lung Nodule  Other  
  tobacco dependence Is someone accompanying this pt? no 
 
Is the patient using any DME equipment during [address]? No  
 -DME Company N/A Depression Screening: 
3 most recent PHQ Screens 1/9/2019 Little interest or pleasure in doing things Nearly every day Feeling down, depressed, irritable, or hopeless Nearly every day Total Score PHQ 2 6 Trouble falling or staying asleep, or sleeping too much Nearly every day Feeling tired or having little energy Nearly every day Poor appetite, weight loss, or overeating Nearly every day Feeling bad about yourself - or that you are a failure or have let yourself or your family down Nearly every day Trouble concentrating on things such as school, work, reading, or watching TV Several days Moving or speaking so slowly that other people could have noticed; or the opposite being so fidgety that others notice Nearly every day Thoughts of being better off dead, or hurting yourself in some way Nearly every day PHQ 9 Score 25 How difficult have these problems made it for you to do your work, take care of your home and get along with others Extremely difficult Learning Assessment: 
Learning Assessment 1/9/2019 PRIMARY LEARNER Patient HIGHEST LEVEL OF EDUCATION - PRIMARY LEARNER  GRADUATED HIGH SCHOOL OR GED  
BARRIERS PRIMARY LEARNER NONE PRIMARY LANGUAGE ENGLISH  
LEARNER PREFERENCE PRIMARY LISTENING  
  READING  
ANSWERED BY Patient  
  -  
RELATIONSHIP SELF Abuse Screening: 
Abuse Screening Questionnaire 4/26/2019 Do you ever feel afraid of your partner? Lia Vines Are you in a relationship with someone who physically or mentally threatens you? Lia Vines Is it safe for you to go home? Miguel Angel Baig Fall Risk Fall Risk Assessment, last 12 mths 4/26/2019 Able to walk? Yes Fall in past 12 months?  No  
 
 
 
 Coordination of Care: 1. Have you been to the ER, urgent care clinic since your last visit? Hospitalized since your last visit? No 
 
2. Have you seen or consulted any other health care providers outside of the [address] [place] [address] since your last visit? Include any pap smears or colon screening. Yes. Dr. Colby Lamb, PCP Medication variance in dosage/sig per patient as follows: None.

## 2019-04-26 NOTE — PROGRESS NOTES
[facility] Pulmonary, Critical Care, and Sleep Medicine Pulmonary Office visit Name: Malissa Pinedo : 1960 Date: 2019 Subjective:  
Patient has been referred for evaluation of: Abnormal CT scan of the chest and COPD 
 
19 Feels some improved with addition of Symbicort. He continues to use the Spiriva. She completed pulmonary function test today which I have discussed results with her and explained to her that the tests are consistent with moderate COPD. Discussed at length need for complete cessation of cigarette smoking. She is committed however she feels that she cannot do it cold turkey and will need assistance. Denies any interval new complaints of chest discomfort, wheezing, cough, fever or chills No complaints of pedal edema HPI: 
Patient is a 66 y.o. female who has been a lifelong smoker and as part of follow-up and screening had a CT scan of the chest done. Abnormalities were noted and therefore was referred to pulmonary evaluation. She has an extensive past medical history and previously has had CT scans of the chest done dating back to  for other indications-per reports they were reported abnormalities. She smokes up to 1 pack of cigarettes per day for past 43 years. For the past 1 week or so she has increased cough with some chest congestion. SOB:  
Symptoms occur at exertion. Able to walk about 200 feet. Cannot climb stairs. Activities of daily living are somewhat affected Denies orthopnea/ paroxysmal nocturnal dyspnea SOB relieved by rescue inhaler and resting Cough: 
Has been present since several years Cough is described as dry to productive of mucus. Mucus- white. She does not have hemoptysis. Cough occurs daily daily a.m. and intermittently worse with change of weather Has occasional associated wheezing, denies chest pain, fever, chills, night sweats Has dyspepsia, reflux. Treatment so far-Spiriva HandiHaler and albuterol as needed Imaging studies-CT scan of chest 
 
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 Pt just recently received health insurance, pt states in the past she was seen by multiple specialists. Pt states she has been having some daily burning to chest and sensation like her food and traveling back up her throat. She has never been seen by GI and has never had a colonoscopy screening done, she has been taking Omeprazole OTC for reflux symptoms and would like a prescription for this medication. She also notes she has been having daily low back pain with history of chronic back pain, denies shooting/sharp pain and numbness/tingling down legs. She also notes she has been having urinary incontinence and was seen and had a bladder procedure by an [facility] provider in the past.  Pt also notes that lately she has been very irritable and easily aggrevated and would like to start a medication for anxiety like Xanax. Past Medical History:  
Diagnosis Date  Arthritis  Autonomic neuropathy  Back pain  Chronic lung disease  Chronic pain   
 back  Compression fracture T9-10  
 COPD (chronic obstructive pulmonary disease) ([facility])  Depression   
 panic attacks  Diabetes mellitus ([facility] [address] type 1  
 GERD (gastroesophageal reflux disease)  Hypertension   
 no longer on meds  Neuropathy  Osteoporosis  Tobacco use Past Surgical History:  
Procedure Laterality Date [facility] BACK SURGERY  2009  HX DILATION AND CURETTAGE    
 HX HYSTERECTOMY  2002 Allergies Allergen Reactions  Bactrim [Sulfamethoprim] Hives and Rash  Dilaudid [Hydromorphone (Bulk)] Other (comments) Patient was confused for 5 days. Did not know who she was.  Morphine Itching  Pcn [Penicillins] Swelling Current Outpatient Medications Medication Sig Dispense Refill  nicotine (NICODERM CQ) 21 mg/24 hr 1 Patch by TransDERmal route every twenty-four (24) hours for 30 days. 30 Patch 0  
 budesonide-formoterol (SYMBICORT) 160-4.5 mcg/actuation HFAA Take 1 Puff by inhalation two (2) times a day. 1 Inhaler 3  
 LORazepam (ATIVAN) 0.5 mg tablet Take 1 Tab by mouth every eight (8) hours as needed for Anxiety. Max Daily Amount: 1.5 mg. 60 Tab 0  
 omeprazole (PRILOSEC) 40 mg capsule Take 1 Cap by mouth daily for 30 days. 30 Cap 2  
 insulin detemir U-100 (LEVEMIR FLEXTOUCH) 100 unit/mL (3 mL) inpn 30 Units by SubCUTAneous route nightly for 30 days. 6 Pen 1  
 Insulin Needles, Disposable, (COMFORT EZ PEN NEEDLES) 32 gauge x 5/16\" ndle 1 Pen Needle by Does Not Apply route nightly for 30 days. 40 Each 2  
 gabapentin (NEURONTIN) 300 mg capsule Take 2 Caps by mouth nightly. 60 Cap 2  
 flash glucose sensor (FREESTYLE MARLEY 14 DAY SENSOR) kit Apply every 14 days to monitor blood sugar 2 Kit 11  
 flash glucose scanning reader (FREESTYLE MARLEY 14 DAY READER) misc Use to check blood sugar 3 times a day 1 Each 0  
 diclofenac (VOLTAREN) 1 % gel Apply  to affected area three (3) times daily as needed for Pain. Apply to back and knees as needed 500 g 2  pravastatin (PRAVACHOL) 40 mg tablet Take 40 mg by mouth nightly.  tiotropium (SPIRIVA WITH HANDIHALER) 18 mcg inhalation capsule Take 1 Cap by inhalation daily.  albuterol (PROVENTIL HFA, VENTOLIN HFA, PROAIR HFA) 90 mcg/actuation inhaler Take 2 Puffs by inhalation every four (4) hours as needed for Wheezing or Shortness of Breath. 2 Inhaler 3  
 insulin aspart (NOVOLOG FLEXPEN) 100 unit/mL flexpen by SubCUTAneous route. Each meal, per sliding scale  alendronate (FOSAMAX) 70 mg tablet Take 70 mg by mouth every Sunday.  fluticasone (FLONASE) 50 mcg/actuation nasal spray 2 Sprays by Both Nostrils route daily.  1 Bottle 3  
  insulin degludec (TRESIBA FLEXTOUCH U-100) 100 unit/mL (3 mL) inpn 30 Units by SubCUTAneous route nightly. 15 mL 2 Review of Systems: 
HEENT: No epistaxis, no nasal drainage, no difficulty in swallowing, no redness in eyes Respiratory: as above Cardiovascular: no chest pain, no palpitations, no chronic leg edema, no syncope Gastrointestinal: no abd pain, no vomiting, no diarrhea, no bleeding symptoms Genitourinary: No urinary symptoms or hematuria Integument/breast: No ulcers or rashes Musculoskeletal:Neg 
Neurological: No focal weakness, no seizures, no headaches Behvioral/Psych: No anxiety, no depression Constitutional: No fever, no chills, no weight loss, no night sweats Objective:  
 
Visit Vitals /70 (BP 1 Location: Left arm, BP Patient Position: At rest) Pulse 74 Temp 97.5 °F (36.4 °C) (Oral) Resp 20 Ht 5' 6\" (1.676 m) Wt 66.7 kg (147 lb) SpO2 99% BMI 23.73 kg/m² Physical Exam:  
General: comfortable, no acute distress, lean built intermittently does use accessory muscles while talking HEENT: pupils reactive, sclera anicteric, EOM intact Neck: No adenopathy or thyroid swelling, no lymphadenopathy or JVD, supple CVS: S1S2 no murmurs RS: Mod AE bilaterally, prolonged expiratory phase no tactile fremitus or egophony, no accessory muscle use Abd: soft, non tender, no hepatosplenomegaly Neuro: non focal, awake, alert Extrm: no leg edema, clubbing or cyanosis Skin: no rash Data review: 
Pertinent labs: CBC, BMP, LFT's PFT: Pending Date FVC FEV1  FEV1/FVC [id number] TLC RV RV/TLC VC DLCO  
2017  82%  75%  71   94%  100%  40  82%  45% 4/26/2019  83%  76%  72  56%  98%  127%  %  83%  59% 6 min walk test; walked a distance of 340 m without any oxygen desaturation or change in dyspnea index Results for orders placed or performed during the hospital encounter of 02/15/17 EKG, 12 LEAD, INITIAL Result Value Ref Range Ventricular Rate 78 BPM  
 Atrial Rate 78 BPM  
 P-R Interval 172 ms QRS Duration 86 ms  
 Q-T Interval 398 ms QTC Calculation (Bezet) 453 ms Calculated P Axis 79 degrees Calculated R Axis 62 degrees Calculated T Axis 57 degrees Diagnosis Normal sinus rhythm Nonspecific T wave abnormality Abnormal ECG When compared with ECG of 28-NOV-2016 08:01, Nonspecific T wave abnormality now evident in Anterolateral leads Confirmed by Rosalia Hanson MD, Napoleon Bone ([id number]) on 2/15/2017 5:04:10 PM 
  
ECHO- 2017 from [facility]: 
 
 NORMAL LEFT VENTRICULAR CAVITY SIZE AND SYSTOLIC FUNCTION WITH AN EJECTION FRACTION OF  55  %. NORMAL DIASTOLIC FUNCTION. NO HEMODYNAMICALLY SIGNIFICANT VALVULAR PATHOLOGY. NORMAL PULMONARY ARTERY PRESSURE OF 23 MM/HG. NO PREVIOUS REPORT FOR COMPARISON. PFT 2017: 
SPIROMETRY: 
Pre-bronchodilator spirometry demonstrated forced vital capacity of 3.05 liters, 82% of predicted. FEV1 of 2.17 liters, 75% of predicted. Ratio of FEV1/FVC 71 (79 is normal). 
  
Post-bronchodilator spirometry has not revealed any appreciable change in FVC and/or FEV1. 
  
LUNG VOLUMES: 
Demonstrated vital capacity of 3.04 liters, 82% of predicted. Total lung capacity of 5.04 liters, 94% of predicted. Residual volume of 2 liters, 100% of predicted. RV/TLC ratio is 40 (37 is normal).   
Diffusion capacity demonstrated a diffusion capacity of 45% of predicted. Corrected diffusion capacity with hemoglobin is 12.1, 45% of predicted. DLCO corrected for alveolar volume is 3.47, 67% of predicted. 
  
IMPRESSION: 
These pulmonary function test findings are consistent with mild obstructive changes with a relatively well preserved FEV1 and mild to moderate reduction in diffusion capacity Imaging: 
I have personally reviewed the patients radiographs and have reviewed the reports: 
[facility] 2014 CT scan chest: 1.  Evidence of mild emphysematous changes in the upper lobes. 2. Minor pleural thickening right lung associated with a calcified nodule. Calcified nodule is seen on chest x-ray. 3. Osteopenia with compression of T9 vertebral body, unchanged. Vertebral plasty cement L1. 
 
4. Nonspecific pulmonary nodule in the left lower lobe. Suggest followup: Fleischner Society (Chest Radiology) Pulmonary Nodule Guidelines: 
 
[place] 2015 -2017 CT scan: 
 Follow up pulmonary nodules.  Current smoker. COMPARISON:  07/08/15 and 11/07/14. LUNGS: There are stable emphysematous changes and subpleural bulla mainly in the upper lung fields.  No consolidations.  There are areas of groundglass attenuation bases, likely atelectasis.  Stable calcified tubular structure in the right middle lobe, image 52. 
 
-Stable 2 mm right upper lobe pulmonary nodule, image 63. 
-Stable 3 mm right upper lobe pulmonary nodule, image 70. 
-Relatively stable 5 mm left lower lobe pulmonary nodule, image 78. 
-No new pulmonary nodules. PLEURA:  No pleural effusion or pneumothorax. Vijay Le heart size.  No pericardial effusion.  Mild arthrosclerotic vascular calcifications.  The thoracic aorta is normal in caliber.  There are coronary artery calcifications. LYMPH NODES:  No enlarged lymph nodes.  Stable precarinal lymph node measuring 7 mm in short axis. UPPER ABDOMEN:  Unremarkable. BONE:  There is osteopenia.  Stable compression fracture of T9.  Partially visualized vertebral plasty of L1 
 
CT scan chest ( MRI-CT diagnostics)- completed 2/2019 Bilateral less than 6 mm nodules in right upper lobe left lower lobe XR Results (most recent): 
Results from Hospital Encounter encounter on 11/28/16 XR ELBOW LT MIN 3 V Narrative Left elbow, 3 views: 
 
 
 
INDICATION: 
 
Trauma, as the patient passed out with this morning. Pain involving left elbow 
and wrist. 
 
 
 
COMPARISON STUDY: None.  
 
 
 
FINDINGS: 
 
 There is no evidence of fracture or malalignment of bones at left elbow. There 
is no obvious positive fat-pad sign. Impression IMPRESSION: 
 
Negative study. No evidence of fracture or dislocation at left elbow. CT Results (most recent): 
Results from Hospital Encounter encounter on 11/28/16 CT HEAD WO CONT Narrative CT HEAD WO CONT History: Status post fall with injury to back of head. Comparison: None. TECHNIQUE: 5 mm helical scan obtained of the head were obtained from the skull 
vertex through the base of the skull without intravenous contrast.   
 
BRAIN RESULT:   
Gray-white matter differentiation is preserved throughout. Ill-defined small 
subcentimeter hypodensity in the left basal ganglia (series 2, image 11). No 
significant volume loss. Ventricles are normal in caliber. Basilar cisterns are 
patent. No evidence for acute intracranial hemorrhage or extra-axial fluid 
collection. No intraventricular hemorrhage. Orbits, soft tissues and osseous structures are grossly normal. Mild ethmoid 
sinus mucosal thickening. Remaining visualized paranasal sinuses and mastoid air 
cells are patent. Impression IMPRESSION:   
 
1.  Ill-defined subcentimeter hypodensity in the left basal ganglia may 
represent an age indeterminate lacunar infarct or dilated perivascular space. Contusion is less felt to be likely. 2.  Mild sinonasal mucosal disease. .  
 
Patient Active Problem List  
Diagnosis Code  Diabetes mellitus type 1 ([facility] [address] E10.9  Diabetic neuropathy ([facility]) E11.40  Osteoporosis M81.0  Proteinuria R80.9  
 HLD (hyperlipidemia) E78.5  Depression F32.9  Tobacco abuse Z72.0  
 Surgical menopause E89.40  Thoracic compression fracture ([facility]) S22.000A  Gastroesophageal reflux disease K21.9  
 COPD (chronic obstructive pulmonary disease) ([facility]) J44.9 IMPRESSION:  
· Abnormal CT scan of the chest with bilateral less than 6 mm nodules that have been detected at least for the past 5 years with stability. Per nodule follow-up guidelines and criteria patient is high risk with history of ongoing nicotine use and will need annual LD CT for follow-up · COPD with current acute exacerbation likely related to lower respiratory tract infection -improved. PFTs with FEV1 76% predicted and midexpiratory flow rate 56% predicted with bronchodilator response. Relatively stable and improved pulmonary function test compared to 2017 · Tobacco abuse · Diabetes with diabetic neuropathy · GERD 
· Osteoporosis RECOMMENDATIONS:  
· Annual LD CT for follow-up of nodules · We  continue Symbicort to current treatment of Spiriva and follow-up to assess response. Consider further de-escalation at later date · Pulmonary function tests completed and results discussed with the patient · Complete cessation of cigarette smoking-patient counseled on harm, need for behavioral intervention as well as needed pharmacotherapy. She states that she reacted adversely to Chantix in the past.  After much discussion she is agreeable to trying NicoDerm along with low-dose Ativan to alleviate her anxiety for the first few days · Preventive vaccinations-annual influenza vaccine recommended. She has received Pneumovax and will be due for the next vaccine at age 74 
· Healthy diet weight recommended · Active lifestyle · Will consider referral to pulmonary rehab · We will follow-up for COPD management and nodule management 1. Low smoking or other exposure risk: 
   < or =4 mm: No follow up necessary 
   4-6 mm: Follow up CT at 12 m; if stable, no further follow up. 
   6-8 mm: Follow up CT at 6-12 m and if stable, follow up 12-18 m. If stable, no further follow up. 2. High smoking or other exposure risk: 
   < or =4 mm: Follow up CT at 12 m; if stable, no further follow up. 
   4-6 mm: Follow up CT at 6-12 m and if stable follow up at 24 m.  If stable, no further follow up. 
   6-8 mm: Follow up CT at 3-6 m; if stable, get another at 9-12 m and then if still stable, again at 24 m. If stable at 25 m, no further follow up. 3. Regardless of risk history: 
   >8 mm: Dedicated evaluation with Contrast CT, PET, biopsy or VATS.  
 
  
El Meyer MD

## 2019-04-27 ENCOUNTER — HOSPITAL ENCOUNTER (OUTPATIENT)
Dept: MAMMOGRAPHY | Age: 59
Discharge: HOME | End: 2019-04-27
Attending: NURSE PRACTITIONER

## 2019-04-27 DIAGNOSIS — Z12.39 SCREENING FOR BREAST CANCER: ICD-10-CM

## 2019-04-28 NOTE — PROGRESS NOTES
Please advise patient that one time hep c screening was negative, microalbumin, lab to determine if kidneys are spilling protein was stable

## 2019-05-07 ENCOUNTER — DOCUMENTATION ONLY (OUTPATIENT)
Dept: FAMILY MEDICINE CLINIC | Age: 59
End: 2019-05-07

## 2019-05-07 ENCOUNTER — TELEPHONE (OUTPATIENT)
Dept: FAMILY MEDICINE CLINIC | Age: 59
End: 2019-05-07

## 2019-05-07 NOTE — PROGRESS NOTES
Patient is aware of the following: one time hep c screening was negative, microalbumin, lab to determine if kidneys are spilling protein was stable

## 2019-05-07 NOTE — TELEPHONE ENCOUNTER
Patient states she found a lump in her breast and when patient told the mammography tech at [facility] they cancelled mammogram and told patient she will need to get a diagnostic mammogram.

## 2019-05-07 NOTE — PROGRESS NOTES
Placed call to [facility] Endocrinology office to obtain last office note/labs. Office staff states the patient has not been seen yet. The appointment for 4/17/19 was rescheduled for 5/16/19.

## 2019-05-12 ENCOUNTER — HOSPITAL ENCOUNTER (EMERGENCY)
Age: 59
Discharge: HOME | End: 2019-05-12
Attending: EMERGENCY MEDICINE | Admitting: EMERGENCY MEDICINE
Payer: COMMERCIAL

## 2019-05-12 VITALS
WEIGHT: 158 LBS | OXYGEN SATURATION: 98 % | SYSTOLIC BLOOD PRESSURE: 134 MMHG | TEMPERATURE: 98.3 F | RESPIRATION RATE: 18 BRPM | HEART RATE: 85 BPM | DIASTOLIC BLOOD PRESSURE: 58 MMHG | HEIGHT: 66 IN | BODY MASS INDEX: 25.39 KG/M2

## 2019-05-12 DIAGNOSIS — R11.2 NON-INTRACTABLE VOMITING WITH NAUSEA, UNSPECIFIED VOMITING TYPE: ICD-10-CM

## 2019-05-12 DIAGNOSIS — E86.0 DEHYDRATION: ICD-10-CM

## 2019-05-12 DIAGNOSIS — N30.00 ACUTE CYSTITIS WITHOUT HEMATURIA: ICD-10-CM

## 2019-05-12 DIAGNOSIS — R73.9 HYPERGLYCEMIA: Primary | ICD-10-CM

## 2019-05-12 DIAGNOSIS — N17.9 AKI (ACUTE KIDNEY INJURY) (HCC): ICD-10-CM

## 2019-05-12 LAB
ALBUMIN SERPL-MCNC: 3.6 G/DL (ref 3.4–5)
ALBUMIN/GLOB SERPL: 0.9 {RATIO} (ref 0.8–1.7)
ALP SERPL-CCNC: 85 U/L (ref 45–117)
ALT SERPL-CCNC: 25 U/L (ref 13–56)
ANION GAP SERPL CALC-SCNC: 16 MMOL/L (ref 3–18)
ANION GAP SERPL CALC-SCNC: 5 MMOL/L (ref 3–18)
APPEARANCE UR: CLEAR
AST SERPL-CCNC: 20 U/L (ref 15–37)
ATRIAL RATE: 76 BPM
BACTERIA URNS QL MICRO: ABNORMAL /HPF
BASOPHILS # BLD: 0 K/UL (ref 0–0.1)
BASOPHILS NFR BLD: 1 % (ref 0–2)
BILIRUB SERPL-MCNC: 1.1 MG/DL (ref 0.2–1)
BILIRUB UR QL: NEGATIVE
BUN SERPL-MCNC: 18 MG/DL (ref 7–18)
BUN SERPL-MCNC: 30 MG/DL (ref 7–18)
BUN/CREAT SERPL: 20 (ref 12–20)
BUN/CREAT SERPL: 23 (ref 12–20)
CALCIUM SERPL-MCNC: 8.1 MG/DL (ref 8.5–10.1)
CALCIUM SERPL-MCNC: 9.7 MG/DL (ref 8.5–10.1)
CALCULATED P AXIS, ECG09: 71 DEGREES
CALCULATED R AXIS, ECG10: 68 DEGREES
CALCULATED T AXIS, ECG11: 30 DEGREES
CHLORIDE SERPL-SCNC: 109 MMOL/L (ref 100–108)
CHLORIDE SERPL-SCNC: 92 MMOL/L (ref 100–108)
CK MB CFR SERPL CALC: 5.3 % (ref 0–4)
CK MB CFR SERPL CALC: 5.9 % (ref 0–4)
CK MB SERPL-MCNC: 4.2 NG/ML (ref 5–25)
CK MB SERPL-MCNC: 4.4 NG/ML (ref 5–25)
CK SERPL-CCNC: 75 U/L (ref 26–192)
CK SERPL-CCNC: 80 U/L (ref 26–192)
CO2 SERPL-SCNC: 21 MMOL/L (ref 21–32)
CO2 SERPL-SCNC: 26 MMOL/L (ref 21–32)
COLOR UR: YELLOW
CREAT SERPL-MCNC: 0.79 MG/DL (ref 0.6–1.3)
CREAT SERPL-MCNC: 1.51 MG/DL (ref 0.6–1.3)
DIAGNOSIS, 93000: NORMAL
DIFFERENTIAL METHOD BLD: ABNORMAL
EOSINOPHIL # BLD: 0 K/UL (ref 0–0.4)
EOSINOPHIL NFR BLD: 1 % (ref 0–5)
EPITH CASTS URNS QL MICRO: ABNORMAL /LPF (ref 0–5)
ERYTHROCYTE [DISTWIDTH] IN BLOOD BY AUTOMATED COUNT: 12.1 % (ref 11.6–14.5)
EST. AVERAGE GLUCOSE BLD GHB EST-MCNC: 312 MG/DL
GLOBULIN SER CALC-MCNC: 3.9 G/DL (ref 2–4)
GLUCOSE BLD STRIP.AUTO-MCNC: 182 MG/DL (ref 70–110)
GLUCOSE BLD STRIP.AUTO-MCNC: 226 MG/DL (ref 70–110)
GLUCOSE BLD STRIP.AUTO-MCNC: 281 MG/DL (ref 70–110)
GLUCOSE BLD STRIP.AUTO-MCNC: 302 MG/DL (ref 70–110)
GLUCOSE BLD STRIP.AUTO-MCNC: 447 MG/DL (ref 70–110)
GLUCOSE BLD STRIP.AUTO-MCNC: >600 MG/DL (ref 70–110)
GLUCOSE SERPL-MCNC: 183 MG/DL (ref 74–99)
GLUCOSE SERPL-MCNC: 628 MG/DL (ref 74–99)
GLUCOSE UR STRIP.AUTO-MCNC: >1000 MG/DL
HBA1C MFR BLD: 12.5 % (ref 4.2–5.6)
HCT VFR BLD AUTO: 37.4 % (ref 35–45)
HGB BLD-MCNC: 12.7 G/DL (ref 12–16)
HGB UR QL STRIP: NEGATIVE
KETONES UR QL STRIP.AUTO: 80 MG/DL
LEUKOCYTE ESTERASE UR QL STRIP.AUTO: NEGATIVE
LIPASE SERPL-CCNC: 107 U/L (ref 73–393)
LYMPHOCYTES # BLD: 1.3 K/UL (ref 0.9–3.6)
LYMPHOCYTES NFR BLD: 16 % (ref 21–52)
MAGNESIUM SERPL-MCNC: 1.8 MG/DL (ref 1.6–2.6)
MAGNESIUM SERPL-MCNC: 2 MG/DL (ref 1.6–2.6)
MAGNESIUM SERPL-MCNC: 2.1 MG/DL (ref 1.6–2.6)
MCH RBC QN AUTO: 30.6 PG (ref 24–34)
MCHC RBC AUTO-ENTMCNC: 34 G/DL (ref 31–37)
MCV RBC AUTO: 90.1 FL (ref 74–97)
MONOCYTES # BLD: 0.4 K/UL (ref 0.05–1.2)
MONOCYTES NFR BLD: 4 % (ref 3–10)
NEUTS SEG # BLD: 6.6 K/UL (ref 1.8–8)
NEUTS SEG NFR BLD: 78 % (ref 40–73)
NITRITE UR QL STRIP.AUTO: POSITIVE
P-R INTERVAL, ECG05: 178 MS
PH UR STRIP: 5 [PH] (ref 5–8)
PHOSPHATE SERPL-MCNC: 3.2 MG/DL (ref 2.5–4.9)
PLATELET # BLD AUTO: 202 K/UL (ref 135–420)
PMV BLD AUTO: 12 FL (ref 9.2–11.8)
POTASSIUM SERPL-SCNC: 3.5 MMOL/L (ref 3.5–5.5)
POTASSIUM SERPL-SCNC: 4.1 MMOL/L (ref 3.5–5.5)
PROT SERPL-MCNC: 7.5 G/DL (ref 6.4–8.2)
PROT UR STRIP-MCNC: NEGATIVE MG/DL
Q-T INTERVAL, ECG07: 406 MS
QRS DURATION, ECG06: 92 MS
QTC CALCULATION (BEZET), ECG08: 456 MS
RBC # BLD AUTO: 4.15 M/UL (ref 4.2–5.3)
RBC #/AREA URNS HPF: ABNORMAL /HPF (ref 0–5)
SODIUM SERPL-SCNC: 129 MMOL/L (ref 136–145)
SODIUM SERPL-SCNC: 140 MMOL/L (ref 136–145)
SP GR UR REFRACTOMETRY: 1.03 (ref 1–1.03)
TROPONIN I SERPL-MCNC: <0.02 NG/ML (ref 0–0.04)
TROPONIN I SERPL-MCNC: <0.02 NG/ML (ref 0–0.04)
UROBILINOGEN UR QL STRIP.AUTO: 0.2 EU/DL (ref 0.2–1)
VENTRICULAR RATE, ECG03: 76 BPM
WBC # BLD AUTO: 8.4 K/UL (ref 4.6–13.2)
WBC URNS QL MICRO: ABNORMAL /HPF (ref 0–4)

## 2019-05-12 PROCEDURE — 81001 URINALYSIS AUTO W/SCOPE: CPT

## 2019-05-12 PROCEDURE — 87077 CULTURE AEROBIC IDENTIFY: CPT

## 2019-05-12 PROCEDURE — 93005 ELECTROCARDIOGRAM TRACING: CPT

## 2019-05-12 PROCEDURE — 87186 SC STD MICRODIL/AGAR DIL: CPT

## 2019-05-12 PROCEDURE — 82962 GLUCOSE BLOOD TEST: CPT

## 2019-05-12 PROCEDURE — 96361 HYDRATE IV INFUSION ADD-ON: CPT

## 2019-05-12 PROCEDURE — 83735 ASSAY OF MAGNESIUM: CPT

## 2019-05-12 PROCEDURE — 99285 EMERGENCY DEPT VISIT HI MDM: CPT

## 2019-05-12 PROCEDURE — 84100 ASSAY OF PHOSPHORUS: CPT

## 2019-05-12 PROCEDURE — 87086 URINE CULTURE/COLONY COUNT: CPT

## 2019-05-12 PROCEDURE — 80053 COMPREHEN METABOLIC PANEL: CPT

## 2019-05-12 PROCEDURE — 74011636637 HC RX REV CODE- 636/637: Performed by: EMERGENCY MEDICINE

## 2019-05-12 PROCEDURE — 96365 THER/PROPH/DIAG IV INF INIT: CPT

## 2019-05-12 PROCEDURE — 85025 COMPLETE CBC W/AUTO DIFF WBC: CPT

## 2019-05-12 PROCEDURE — 74011250637 HC RX REV CODE- 250/637: Performed by: PHYSICIAN ASSISTANT

## 2019-05-12 PROCEDURE — 83036 HEMOGLOBIN GLYCOSYLATED A1C: CPT

## 2019-05-12 PROCEDURE — 96366 THER/PROPH/DIAG IV INF ADDON: CPT

## 2019-05-12 PROCEDURE — 82550 ASSAY OF CK (CPK): CPT

## 2019-05-12 PROCEDURE — 74011000250 HC RX REV CODE- 250: Performed by: PHYSICIAN ASSISTANT

## 2019-05-12 PROCEDURE — 74011250636 HC RX REV CODE- 250/636: Performed by: PHYSICIAN ASSISTANT

## 2019-05-12 PROCEDURE — 83690 ASSAY OF LIPASE: CPT

## 2019-05-12 RX ORDER — ONDANSETRON 4 MG/1
TABLET, ORALLY DISINTEGRATING ORAL
Qty: 10 TAB | Refills: 0 | Status: SHIPPED | OUTPATIENT
Start: 2019-05-12 | End: 2019-06-03 | Stop reason: ALTCHOICE

## 2019-05-12 RX ORDER — NITROFURANTOIN 25; 75 MG/1; MG/1
100 CAPSULE ORAL 2 TIMES DAILY
Qty: 14 CAP | Refills: 0 | Status: SHIPPED | OUTPATIENT
Start: 2019-05-12 | End: 2019-05-19

## 2019-05-12 RX ORDER — DEXTROSE 50 % IN WATER (D50W) INTRAVENOUS SYRINGE
25-50 AS NEEDED
Status: DISCONTINUED | OUTPATIENT
Start: 2019-05-12 | End: 2019-05-13 | Stop reason: HOSPADM

## 2019-05-12 RX ORDER — MAGNESIUM SULFATE 100 %
4 CRYSTALS MISCELLANEOUS AS NEEDED
Status: DISCONTINUED | OUTPATIENT
Start: 2019-05-12 | End: 2019-05-13 | Stop reason: HOSPADM

## 2019-05-12 RX ORDER — LIDOCAINE HYDROCHLORIDE 20 MG/ML
15 SOLUTION OROPHARYNGEAL AS NEEDED
Status: DISCONTINUED | OUTPATIENT
Start: 2019-05-12 | End: 2019-05-13 | Stop reason: HOSPADM

## 2019-05-12 RX ADMIN — SODIUM CHLORIDE 1000 ML: 900 INJECTION, SOLUTION INTRAVENOUS at 07:34

## 2019-05-12 RX ADMIN — ALUMINUM HYDROXIDE AND MAGNESIUM HYDROXIDE 30 ML: 200; 200 SUSPENSION ORAL at 14:04

## 2019-05-12 RX ADMIN — Medication 7.7 UNITS/HR: at 08:12

## 2019-05-12 RX ADMIN — LIDOCAINE HYDROCHLORIDE 15 ML: 20 SOLUTION ORAL; TOPICAL at 14:04

## 2019-05-12 NOTE — ED PROVIDER NOTES
EMERGENCY DEPARTMENT HISTORY AND PHYSICAL EXAM    7:12 AM      Date: 5/12/2019  Patient Name: [place] Scot    History of Presenting Illness     Chief Complaint   Patient presents with    High Blood Sugar    Vomiting    Diarrhea       History Provided By: Patient    Chief Complaint: high blood sugar, nausea with vomiting  Duration: 6 Hours  Timing:  Acute  Location:   Quality: Aching  Severity: Moderate  Modifying Factors: no improvement with 6 units of Lantus  Associated Symptoms: dental pain, diarrhea, cough      Additional History (Context):Katerina Baker is a 66 y.o. female with a pertinent history of IDDM, autonomic neuropathy, COPD, GERD, HTN who presents to the emergency department for evaluation of nausea with vomiting since approximately  2 AM this morning. Patient states that when she got home, she checked her blood sugar and noted that it read \"high. \"  Patient states this happened last weekend also and was accompanied by fatigue and body aches. Patient states that she was recently switched from [place] to [address] this past month and her blood sugars have been erratic ever since. She admits to one episode of diarrhea after arriving her in the ED. Otherwise no changes in bowel habits. Patient is also on Symbicort and Spiriva for COPD. No recent systemic oral glucocorticoids. She admits to an increased cough lately that she attributes to the humidity. She states her doctor placed her on Ativan in an attempt to help control her anxiety, so that she can quit smoking. She has only taken approximately 4 doses in the past few weeks. She also complains of right lower dental pain that has been on going for quite a while and she thinks she may have an infection, but has a follow-up appointment with a dentist on Wednesday. Patient also has a follow-up appointment with a new endocrinologist at [facility] on Thursday.   Patient denies recent chest pain, shortness of breath, fevers, chills, severe abdominal pain, urinary pain, urinary frequency, rash, leg swelling. She states she is eating and drinking normally and has not changed any of her other medications. PCP:  Amelie Amor NP      Current Facility-Administered Medications   Medication Dose Route Frequency Provider Last Rate Last Dose    sodium chloride 0.9 % bolus infusion 1,000 mL  1,000 mL IntraVENous ONCE Leydi Real MD        glucose chewable tablet 16 g  4 Tab Oral PRN Bary Sensor, PA-C        glucagon (GLUCAGEN) injection 1 mg  1 mg IntraMUSCular PRN Bary Sensor, PA-C        dextrose (D50W) injection syrg 12.5-25 g  25-50 mL IntraVENous PRN Bary Sensor, PA-C        insulin regular (NOVOLIN,HUMULIN) 100 units/100 ml NS infusion (premix)  1-10 Units/hr IntraVENous TITRATE Sara Dutta MD   Stopped at 05/12/19 1221    lidocaine (XYLOCAINE) 2 % viscous solution 15 mL  15 mL Mouth/Throat PRN Bary Sensor, PA-C   15 mL at 05/12/19 1404     Current Outpatient Medications   Medication Sig Dispense Refill    nitrofurantoin, macrocrystal-monohydrate, (MACROBID) 100 mg capsule Take 1 Cap by mouth two (2) times a day for 7 days. 14 Cap 0    ondansetron (ZOFRAN ODT) 4 mg disintegrating tablet Take 1-2 tablets every 6-8 hours as needed for nausea and vomiting. 10 Tab 0    nicotine (NICODERM CQ) 21 mg/24 hr 1 Patch by TransDERmal route every twenty-four (24) hours for 30 days. 30 Patch 0    budesonide-formoterol (SYMBICORT) 160-4.5 mcg/actuation HFAA Take 1 Puff by inhalation two (2) times a day. 1 Inhaler 3    LORazepam (ATIVAN) 0.5 mg tablet Take 1 Tab by mouth every eight (8) hours as needed for Anxiety. Max Daily Amount: 1.5 mg. 60 Tab 0    gabapentin (NEURONTIN) 300 mg capsule Take 2 Caps by mouth nightly. 60 Cap 2    insulin degludec (TRESIBA FLEXTOUCH U-100) 100 unit/mL (3 mL) inpn 30 Units by SubCUTAneous route nightly.  15 mL 2    flash glucose sensor (FREESTYLE MARLEY 14 DAY SENSOR) kit Apply every 14 days to monitor blood sugar 2 Kit 11    flash glucose scanning reader ([employer] MARLEY 14 DAY READER) [facility] Use to check blood sugar 3 times a day 1 Each 0    diclofenac (VOLTAREN) 1 % gel Apply  to affected area three (3) times daily as needed for Pain. Apply to back and knees as needed 500 g 2    pravastatin (PRAVACHOL) 40 mg tablet Take 40 mg by mouth nightly.  tiotropium (SPIRIVA WITH HANDIHALER) 18 mcg inhalation capsule Take 1 Cap by inhalation daily.  albuterol (PROVENTIL HFA, VENTOLIN HFA, PROAIR HFA) 90 mcg/actuation inhaler Take 2 Puffs by inhalation every four (4) hours as needed for Wheezing or Shortness of Breath. 2 Inhaler 3    insulin aspart (NOVOLOG FLEXPEN) 100 unit/mL flexpen by SubCUTAneous route. Each meal, per sliding scale      alendronate (FOSAMAX) 70 mg tablet Take 70 mg by mouth every Sunday.  fluticasone (FLONASE) 50 mcg/actuation nasal spray 2 Sprays by Both Nostrils route daily.  1 Bottle 3       Past History     Past Medical History:  Past Medical History:   Diagnosis Date    Arthritis     Autonomic neuropathy     Back pain     Chronic lung disease     Chronic pain     back    Compression fracture T9-10    COPD [facility]chronic [facility]) [facility]     Depression     panic attacks    Diabetes mellitus ([facility]     type 1    GERD (gastroesophageal reflux disease)     Hypertension     no longer on meds    Neuropathy     Osteoporosis     Tobacco use        Past Surgical History:  Past Surgical History:   Procedure Laterality Date    HX BACK SURGERY  2009    HX DILATION AND CURETTAGE      HX HYSTERECTOMY  2002       Family History:  Family History   Problem Relation Age of Onset    Thyroid Disease Mother     Stroke Mother    [facility] Arthritis-rheumatoid Mother     Osteoporosis Mother     Diabetes Father     Kidney Disease Father     Tip's Disease Father     Arthritis-rheumatoid Sister     Diabetes Brother     Colon Cancer Maternal Grandmother        Social History:  Social History     Tobacco Use    Smoking status: Current Every Day Smoker     Packs/day: 1.00     Years: 43.00     Pack years: 43.00     Types: Cigarettes     Start date: 7/27/1976    Smokeless tobacco: Never Used    Tobacco comment: on Chantix   Substance Use Topics    Alcohol use: No    Drug use: No       Allergies: Allergies   Allergen Reactions    Penicillin G Anaphylaxis    Bactrim [Sulfamethoprim] Hives and Rash    Dilaudid [Hydromorphone (Bulk)] Other (comments)     Patient was confused for 5 days. Did not know who she was.  Morphine Itching    Pcn [Penicillins] Swelling         Review of Systems       Review of Systems   Constitutional: Negative for chills and fever. HENT: Positive for dental problem. Negative for congestion, rhinorrhea and sore throat. Respiratory: Positive for cough. Negative for shortness of breath. Cardiovascular: Negative for chest pain. Gastrointestinal: Positive for nausea and vomiting. Negative for abdominal pain, blood in stool, constipation and diarrhea. Genitourinary: Negative for dysuria, frequency and hematuria. Musculoskeletal: Negative for back pain and myalgias. Skin: Negative for rash and wound. Neurological: Negative for dizziness and headaches. All other systems reviewed and are negative. Physical Exam     Visit Vitals  /58   Pulse 85   Temp 98.3 °F (36.8 °C)   Resp 18   Ht 5' 6\" (1.676 m)   Wt 71.7 kg (158 lb)   SpO2 98%   BMI 25.50 kg/m²         Physical Exam   Constitutional: She is oriented to person, place, and time. She appears well-developed and well-nourished. No distress. HENT:   Head: Normocephalic and atraumatic. Nose: Nose normal.   Mouth/Throat: Oropharynx is clear and moist. No oropharyngeal exudate. Dry mucous membranes. Diffusely poor dentition with erosion. No localized induration or fluctuance to suggest drainable abscess. No sublingual/submandibular induration, trismus, or stridor.   Normal speech. Handling oral secretions without difficulty. Pt with full ROM of neck. Eyes: Pupils are equal, round, and reactive to light. Conjunctivae and EOM are normal. Right eye exhibits no discharge. Left eye exhibits no discharge. Neck: Normal range of motion. Neck supple. No thyromegaly present. Cardiovascular: Normal rate, regular rhythm and normal heart sounds. Exam reveals no gallop and no friction rub. No murmur heard. Pulmonary/Chest: Effort normal and breath sounds normal. No respiratory distress. She has no wheezes. She has no rales. She exhibits no tenderness. Lungs clear to auscultation   Abdominal: Soft. Bowel sounds are normal. She exhibits no distension. There is no tenderness. There is no rebound and no guarding. Musculoskeletal: She exhibits no edema, tenderness or deformity. Lymphadenopathy:     She has no cervical adenopathy. Neurological: She is alert and oriented to person, place, and time. She has normal reflexes. No cranial nerve deficit. Skin: Skin is warm and dry. No rash noted. She is not diaphoretic. Unremarkable skin exam without erythema, edema, or rash   Psychiatric: She has a normal mood and affect. Nursing note and vitals reviewed.       Diagnostic Study Results     Labs -  Recent Results (from the past 12 hour(s))   URINALYSIS W/ RFLX MICROSCOPIC    Collection Time: 05/12/19  6:30 AM   Result Value Ref Range    Color YELLOW      Appearance CLEAR      Specific gravity 1.027 1.005 - 1.030      pH (UA) 5.0 5.0 - 8.0      Protein NEGATIVE  NEG mg/dL    Glucose >1,000 (A) NEG mg/dL    Ketone 80 (A) NEG mg/dL    Bilirubin NEGATIVE  NEG      Blood NEGATIVE  NEG      Urobilinogen 0.2 0.2 - 1.0 EU/dL    Nitrites POSITIVE (A) NEG      Leukocyte Esterase NEGATIVE  NEG     URINE MICROSCOPIC ONLY    Collection Time: 05/12/19  6:30 AM   Result Value Ref Range    WBC 6 to 8 0 - 4 /hpf    RBC NONE 0 - 5 /hpf    Epithelial cells FEW 0 - 5 /lpf    Bacteria 3+ (A) NEG /hpf GLUCOSE, POC    Collection Time: 05/12/19  6:55 AM   Result Value Ref Range    Glucose (POC) >600 (HH) 70 - 110 mg/dL   CBC WITH AUTOMATED DIFF    Collection Time: 05/12/19  7:00 AM   Result Value Ref Range    WBC 8.4 4.6 - 13.2 K/uL    RBC 4.15 (L) 4.20 - 5.30 M/uL    HGB 12.7 12.0 - 16.0 g/dL    HCT 37.4 35.0 - 45.0 %    MCV 90.1 74.0 - 97.0 FL    MCH 30.6 24.0 - 34.0 PG    MCHC 34.0 31.0 - 37.0 g/dL    RDW 12.1 11.6 - 14.5 %    PLATELET 207 954 - 213 K/uL    MPV 12.0 (H) 9.2 - 11.8 FL    NEUTROPHILS 78 (H) 40 - 73 %    LYMPHOCYTES 16 (L) 21 - 52 %    MONOCYTES 4 3 - 10 %    EOSINOPHILS 1 0 - 5 %    BASOPHILS 1 0 - 2 %    ABS. NEUTROPHILS 6.6 1.8 - 8.0 K/UL    ABS. LYMPHOCYTES 1.3 0.9 - 3.6 K/UL    ABS. MONOCYTES 0.4 0.05 - 1.2 K/UL    ABS. EOSINOPHILS 0.0 0.0 - 0.4 K/UL    ABS. BASOPHILS 0.0 0.0 - 0.1 K/UL    DF AUTOMATED     METABOLIC PANEL, COMPREHENSIVE    Collection Time: 05/12/19  7:00 AM   Result Value Ref Range    Sodium 129 (L) 136 - 145 mmol/L    Potassium 4.1 3.5 - 5.5 mmol/L    Chloride 92 (L) 100 - 108 mmol/L    CO2 21 21 - 32 mmol/L    Anion gap 16 3.0 - 18 mmol/L    Glucose 628 (HH) 74 - 99 mg/dL    BUN 30 (H) 7.0 - 18 MG/DL    Creatinine 1.51 (H) 0.6 - 1.3 MG/DL    BUN/Creatinine ratio 20 12 - 20      GFR est AA 43 (L) >60 ml/min/1.73m2    GFR est non-AA 36 (L) >60 ml/min/1.73m2    Calcium 9.7 8.5 - 10.1 MG/DL    Bilirubin, total 1.1 (H) 0.2 - 1.0 MG/DL    ALT (SGPT) 25 13 - 56 U/L    AST (SGOT) 20 15 - 37 U/L    Alk.  phosphatase 85 45 - 117 U/L    Protein, total 7.5 6.4 - 8.2 g/dL    Albumin 3.6 3.4 - 5.0 g/dL    Globulin 3.9 2.0 - 4.0 g/dL    A-G Ratio 0.9 0.8 - 1.7     MAGNESIUM    Collection Time: 05/12/19  7:00 AM   Result Value Ref Range    Magnesium 2.1 1.6 - 2.6 mg/dL   PHOSPHORUS    Collection Time: 05/12/19  7:00 AM   Result Value Ref Range    Phosphorus 3.2 2.5 - 4.9 MG/DL   HEMOGLOBIN A1C WITH EAG    Collection Time: 05/12/19  7:00 AM   Result Value Ref Range    Hemoglobin A1c 12.5 (H) 4.2 - 5.6 %    Est. average glucose 312 mg/dL   LIPASE    Collection Time: 05/12/19  7:00 AM   Result Value Ref Range    Lipase 107 73 - 393 U/L   GLUCOSE, POC    Collection Time: 05/12/19  8:29 AM   Result Value Ref Range    Glucose (POC) 447 (HH) 70 - 110 mg/dL   GLUCOSE, POC    Collection Time: 05/12/19  9:52 AM   Result Value Ref Range    Glucose (POC) 281 (H) 70 - 110 mg/dL   GLUCOSE, POC    Collection Time: 05/12/19 10:53 AM   Result Value Ref Range    Glucose (POC) 226 (H) 70 - 110 mg/dL   GLUCOSE, POC    Collection Time: 05/12/19 12:05 PM   Result Value Ref Range    Glucose (POC) 182 (H) 70 - 110 mg/dL   MAGNESIUM    Collection Time: 05/12/19 12:15 PM   Result Value Ref Range    Magnesium 1.8 1.6 - 2.6 mg/dL   METABOLIC PANEL, BASIC    Collection Time: 05/12/19 12:15 PM   Result Value Ref Range    Sodium 140 136 - 145 mmol/L    Potassium 3.5 3.5 - 5.5 mmol/L    Chloride 109 (H) 100 - 108 mmol/L    CO2 26 21 - 32 mmol/L    Anion gap 5 3.0 - 18 mmol/L    Glucose 183 (H) 74 - 99 mg/dL    BUN 18 7.0 - 18 MG/DL    Creatinine 0.79 0.6 - 1.3 MG/DL    BUN/Creatinine ratio 23 (H) 12 - 20      GFR est AA >60 >60 ml/min/1.73m2    GFR est non-AA >60 >60 ml/min/1.73m2    Calcium 8.1 (L) 8.5 - 10.1 MG/DL   CARDIAC PANEL,(CK, CKMB & TROPONIN)    Collection Time: 05/12/19 12:15 PM   Result Value Ref Range    CK 75 26 - 192 U/L    CK - MB 4.4 (H) <3.6 ng/ml    CK-MB Index 5.9 (H) 0.0 - 4.0 %    Troponin-I, QT <0.02 0.0 - 0.045 NG/ML   EKG, 12 LEAD, INITIAL    Collection Time: 05/12/19  1:33 PM   Result Value Ref Range    Ventricular Rate 76 BPM    Atrial Rate 76 BPM    P-R Interval 178 ms    QRS Duration 92 ms    Q-T Interval 406 ms    QTC Calculation (Bezet) 456 ms    Calculated P Axis 71 degrees    Calculated R Axis 68 degrees    Calculated T Axis 30 degrees    Diagnosis       Normal sinus rhythm  Nonspecific T wave abnormality  Abnormal ECG  When compared with ECG of 15-FEB-2017 17:58,  Nonspecific T wave abnormality now evident in Lateral leads  Confirmed by Bailee Cifuentes ([id number]) on 5/12/2019 3:55:57 PM     GLUCOSE, POC    Collection Time: 05/12/19  3:53 PM   Result Value Ref Range    Glucose (POC) 302 (H) 70 - 110 mg/dL   CARDIAC PANEL,(CK, CKMB & TROPONIN)    Collection Time: 05/12/19  4:10 PM   Result Value Ref Range    CK 80 26 - 192 U/L    CK - MB 4.2 (H) <3.6 ng/ml    CK-MB Index 5.3 (H) 0.0 - 4.0 %    Troponin-I, QT <0.02 0.0 - 0.045 NG/ML   MAGNESIUM    Collection Time: 05/12/19  4:10 PM   Result Value Ref Range    Magnesium 2.0 1.6 - 2.6 mg/dL       Radiologic Studies -   No results found. Medical Decision Making   I am the first provider for this patient. I reviewed the vital signs, available nursing notes, past medical history, past surgical history, family history and social history. Vital Signs-Reviewed the patient's vital signs. Pulse Oximetry Analysis -  98% on room air (Interpretation)    EKG: Interpreted by the EP. Time Interpreted:    Rate:    Rhythm: Normal Sinus Rhythm with nonspecific T-wave abnormality   Interpretation:   Comparison:     Records Reviewed: Nursing Notes and Old Medical Records (Time of Review: 7:12 AM)    ED Course: Progress Notes, Reevaluation, and Consults:  0120 PM: Nurse informed me that patient is now complaining of substernal, nonradiating chest pressure. Will obtain EKG and cardiac panel. As pain just began, patient will need a recheck of her cardiac enzymes at approximately 430pm. Brissa Gong PA-C      Provider Notes (Medical Decision Making):   differential diagnosis: DKA, HHS, UTI, PNA, dehydration, electrolyte abnormality    Plan: Patient presents via EMS for complaints of nausea with vomiting and elevated blood sugar. Work-up reveals urinary tract infection without any other acute process. This is likely cause for patient's hyperglycemia. Labs initially concerning for MAYCO, but resolved after fluids.   Patient treated with glucose stabilizer with successful reduction in blood sugar. Towards the end of visit, patient began complaining of chest pressure and thus EKG and troponin were obtained. Initial and repeat troponins were negative. Patient states her symptoms are consistent with prior episodes of indigestion. She states she is hungry would like to eat something. She is feeling better. No acidosis and normal anion gap. No indication for admission at this time. Will discharge patient home with Macrobid and Zofran. She is advised follow-up with PCP. At this time, patient is stable and appropriate for discharge home. Patient demonstrates understanding of current diagnoses and is in agreement with the treatment plan. They are advised that while the likelihood of serious underlying condition is low at this point given the evaluation performed today, we cannot fully rule it out. They are advised to immediately return with any new symptoms or worsening of current condition. All questions have been answered. Patient is given educational material regarding their diagnoses, including danger symptoms and when to return to the ED. Diagnosis     Clinical Impression:   1. Hyperglycemia    2. Non-intractable vomiting with nausea, unspecified vomiting type    3. Dehydration    4. MAYCO (acute kidney injury) ([facility] [address]    5. Acute cystitis without hematuria        Disposition: DC Home    Follow-up Information     Follow up With Specialties Details Why Contact Info    José Miguel Asencio NP Nurse Practitioner Call in 2 days For follow-up [address]  [address]      [facility] EMERGENCY DEPT Emergency Medicine Go to As needed, If symptoms worsen [address] [place] [id number] [phone]           Patient's Medications   Start Taking    NITROFURANTOIN, MACROCRYSTAL-MONOHYDRATE, (MACROBID) 100 MG CAPSULE    Take 1 Cap by mouth two (2) times a day for 7 days.     ONDANSETRON (ZOFRAN ODT) 4 MG DISINTEGRATING TABLET    Take 1-2 tablets every 6-8 hours as needed for nausea and vomiting. Continue Taking    ALBUTEROL (PROVENTIL HFA, VENTOLIN HFA, PROAIR HFA) 90 MCG/ACTUATION INHALER    Take 2 Puffs by inhalation every four (4) hours as needed for Wheezing or Shortness of Breath. ALENDRONATE (FOSAMAX) 70 MG TABLET    Take 70 mg by mouth every Sunday. BUDESONIDE-FORMOTEROL (SYMBICORT) 160-4.5 MCG/ACTUATION HFAA    Take 1 Puff by inhalation two (2) times a day. DICLOFENAC (VOLTAREN) 1 % GEL    Apply  to affected area three (3) times daily as needed for Pain. Apply to back and knees as needed    FLASH GLUCOSE SCANNING READER ([employer] MARLEY 14 DAY READER) [facility]    Use to check blood sugar 3 times a day    FLASH GLUCOSE SENSOR (FREE[employer]YLE MARLEY 14 DAY SENSOR) KIT    Apply every 14 days to monitor blood sugar    FLUTICASONE (FLONASE) 50 MCG/ACTUATION NASAL SPRAY    2 Sprays by Both Nostrils route daily. GABAPENTIN (NEURONTIN) 300 MG CAPSULE    Take 2 Caps by mouth nightly. INSULIN ASPART (NOVOLOG FLEXPEN) 100 UNIT/ML FLEXPEN    by SubCUTAneous route. Each meal, per sliding scale    INSULIN DEGLUDEC (TRESIBA FLEXTOUCH U-100) 100 UNIT/ML (3 ML) INPN    30 Units by SubCUTAneous route nightly. LORAZEPAM (ATIVAN) 0.5 MG TABLET    Take 1 Tab by mouth every eight (8) hours as needed for Anxiety. Max Daily Amount: 1.5 mg. NICOTINE (NICODERM CQ) 21 MG/24 HR    1 Patch by TransDERmal route every twenty-four (24) hours for 30 days. PRAVASTATIN (PRAVACHOL) 40 MG TABLET    Take 40 mg by mouth nightly. TIOTROPIUM (SPIRIVA WITH HANDIHALER) 18 MCG INHALATION CAPSULE    Take 1 Cap by inhalation daily.    These Medications have changed    No medications on file   Stop Taking    No medications on file     _______________________________

## 2019-05-12 NOTE — ED TRIAGE NOTES
Patient brought by EMS for N/V. Patient BG with medic was 521. Patient stated that she ate around [id number] and felt sick and threw up. Patient arrived to ER and had diarrhea. Took patient's BG 2x per protocol. BG is Costa Meaghan. \"

## 2019-05-12 NOTE — DISCHARGE INSTRUCTIONS
Patient Education     Please return immediately to the Emergency Room for re-evaluation if you are not improving, develop any new symptoms, or develop worsening of current symptoms! If you have been prescribed a medication and are unable to take this medication for any reason, please return to the Emergency Department for further evaluation! If you have been referred for follow-up to a specialist, but are unable to follow-up and your symptoms are either not improving or are worsening, please return to the Emergency Department for further evaluation! Learning About High Blood Sugar  What is high blood sugar? Your body turns the food you eat into glucose (sugar), which it uses for energy. But if your body isn't able to use the sugar right away, it can build up in your blood and lead to high blood sugar. When the amount of sugar in your blood stays too high for too much of the time, you may have diabetes. Diabetes is a disease that can cause serious health problems. The good news is that lifestyle changes may help you get your blood sugar back to normal and avoid or delay diabetes. What causes high blood sugar? Sugar (glucose) can build up in your blood if you:  · Are overweight. · Have a family history of diabetes. · Take certain medicines, such as steroids. What are the symptoms? Having high blood sugar may not cause any symptoms at all. Or it may make you feel very thirsty or very hungry. You may also urinate more often than usual, have blurry vision, or lose weight without trying. How is high blood sugar treated? You can take steps to lower your blood sugar level if you understand what makes it get higher. Your doctor may want you to learn how to test your blood sugar level at home. Then you can see how illness, stress, or different kinds of food or medicine raise or lower your blood sugar level. Other tests may be needed to see if you have diabetes.   How can you prevent high blood sugar?  · Watch your weight. If you're overweight, losing just a small amount of weight may help. Reducing fat around your waist is most important. · Limit the amount of calories, sweets, and unhealthy fat you eat. Ask your doctor if a dietitian can help you. A registered dietitian can help you create meal plans that fit your lifestyle. · Get at least 30 minutes of exercise on most days of the week. Exercise helps control your blood sugar. It also helps you maintain a healthy weight. Walking is a good choice. You also may want to do other activities, such as running, swimming, cycling, or playing tennis or team sports. · If your doctor prescribed medicines, take them exactly as prescribed. Call your doctor if you think you are having a problem with your medicine. You will get more details on the specific medicines your doctor prescribes. Follow-up care is a key part of your treatment and safety. Be sure to make and go to all appointments, and call your doctor if you are having problems. It's also a good idea to know your test results and keep a list of the medicines you take. Where can you learn more? Go to http://rhona-ilia.info/. Enter O108 in the search box to learn more about \"Learning About High Blood Sugar. \"  Current as of: July 25, 2018  Content Version: 11.9  © 8400-9363 [employer]. Care instructions adapted under license by [employer] (which disclaims liability or warranty for this information). If you have questions about a medical condition or this instruction, always ask your healthcare professional. [place] [address] any warranty or liability for your use of this information. Patient Education        Dehydration: Care Instructions  Your Care Instructions  Dehydration happens when your body loses too much fluid.  This might happen when you do not drink enough water or you lose large amounts of fluids from your body because of diarrhea, vomiting, or sweating. Severe dehydration can be life-threatening. Water and minerals called electrolytes help put your body fluids back in balance. Learn the early signs of fluid loss, and drink more fluids to prevent dehydration. Follow-up care is a key part of your treatment and safety. Be sure to make and go to all appointments, and call your doctor if you are having problems. It's also a good idea to know your test results and keep a list of the medicines you take. How can you care for yourself at home? · To prevent dehydration, drink plenty of fluids, enough so that your urine is light yellow or clear like water. Choose water and other caffeine-free clear liquids until you feel better. If you have kidney, heart, or liver disease and have to limit fluids, talk with your doctor before you increase the amount of fluids you drink. · If you do not feel like eating or drinking, try taking small sips of water, sports drinks, or other rehydration drinks. · Get plenty of rest.  To prevent dehydration  · Add more fluids to your diet and daily routine, unless your doctor has told you not to. · During hot weather, drink more fluids. Drink even more fluids if you exercise a lot. Stay away from drinks with alcohol or caffeine. · Watch for the symptoms of dehydration. These include:  ? A dry, sticky mouth. ? Dark yellow urine, and not much of it. ? Dry and sunken eyes. ? Feeling very tired. · Learn what problems can lead to dehydration. These include:  ? Diarrhea, fever, and vomiting. ? Any illness with a fever, such as pneumonia or the flu. ? Activities that cause heavy sweating, such as endurance races and heavy outdoor work in hot or humid weather. ? Alcohol or drug abuse or withdrawal.  ? Certain medicines, such as cold and allergy pills (antihistamines), diet pills (diuretics), and laxatives. ? Certain diseases, such as diabetes, cancer, and heart or kidney disease.   When should you call for help?  Call 911 anytime you think you may need emergency care. For example, call if:    · You passed out (lost consciousness).    Call your doctor now or seek immediate medical care if:    · You are confused and cannot think clearly.     · You are dizzy or lightheaded, or you feel like you may faint.     · You have signs of needing more fluids. You have sunken eyes and a dry mouth, and you pass only a little dark urine.     · You cannot keep fluids down.    Watch closely for changes in your health, and be sure to contact your doctor if:    · You are not making tears.     · Your skin is very dry and sags slowly back into place after you pinch it.     · Your mouth and eyes are very dry. Where can you learn more? Go to http://rhona-ilia.info/. Enter G277 in the search box to learn more about \"Dehydration: Care Instructions. \"  Current as of: September 23, 2018  Content Version: 11.9  © 5160-1474 [employer]. Care instructions adapted under license by [employer] (which disclaims liability or warranty for this information). If you have questions about a medical condition or this instruction, always ask your healthcare professional. [place] [address] any warranty or liability for your use of this information. Patient Education        Nausea and Vomiting: Care Instructions  Your Care Instructions    When you are nauseated, you may feel weak and sweaty and notice a lot of saliva in your mouth. Nausea often leads to vomiting. Most of the time you do not need to worry about nausea and vomiting, but they can be signs of other illnesses. Two common causes of nausea and vomiting are stomach flu and food poisoning. Nausea and vomiting from viral stomach flu will usually start to improve within 24 hours. Nausea and vomiting from food poisoning may last from 12 to 48 hours. The doctor has checked you carefully, but problems can develop later.  If you notice any problems or new symptoms, get medical treatment right away. Follow-up care is a key part of your treatment and safety. Be sure to make and go to all appointments, and call your doctor if you are having problems. It's also a good idea to know your test results and keep a list of the medicines you take. How can you care for yourself at home? · To prevent dehydration, drink plenty of fluids, enough so that your urine is light yellow or clear like water. Choose water and other caffeine-free clear liquids until you feel better. If you have kidney, heart, or liver disease and have to limit fluids, talk with your doctor before you increase the amount of fluids you drink. · Rest in bed until you feel better. · When you are able to eat, try clear soups, mild foods, and liquids until all symptoms are gone for 12 to 48 hours. Other good choices include dry toast, crackers, cooked cereal, and gelatin dessert, such as Jell-O. When should you call for help? Call 911 anytime you think you may need emergency care. For example, call if:    · You passed out (lost consciousness).    Call your doctor now or seek immediate medical care if:    · You have symptoms of dehydration, such as:  ? Dry eyes and a dry mouth. ? Passing only a little dark urine. ? Feeling thirstier than usual.     · You have new or worsening belly pain.     · You have a new or higher fever.     · You vomit blood or what looks like coffee grounds.    Watch closely for changes in your health, and be sure to contact your doctor if:    · You have ongoing nausea and vomiting.     · Your vomiting is getting worse.     · Your vomiting lasts longer than 2 days.     · You are not getting better as expected. Where can you learn more? Go to http://rhona-ilia.info/. Enter 25 [id number] in the search box to learn more about \"Nausea and Vomiting: Care Instructions. \"  Current as of: September 23, 2018  Content Version: 11.9  © 6871-1461 [employer]. Care instructions adapted under license by [employer] (which disclaims liability or warranty for this information). If you have questions about a medical condition or this instruction, always ask your healthcare professional. [address] any warranty or liability for your use of this information. Patient Education        Acute Kidney Injury: Care Instructions  Your Care Instructions    Acute kidney injury (MAYCO) is a sudden decrease in kidney function. This can happen over a period of hours, days or, in some cases, weeks. MAYCO used to be called acute renal failure, but kidney failure doesn't always happen with MAYCO. Common causes of MAYCO are dehydration, blood loss, and medicines. When MAYCO happens, the kidneys have trouble removing waste and excess fluids from the body. The waste and fluids build up and become harmful. Kidney function may return to normal if the cause of MAYCO is treated quickly. Your chance of a full recovery depends on what caused the problem, how quickly the cause was treated, and what other medical problems you have. A machine may be used to help your kidneys remove waste and fluids for a short period of time. This is called dialysis. Follow-up care is a key part of your treatment and safety. Be sure to make and go to all appointments, and call your doctor if you are having problems. It's also a good idea to know your test results and keep a list of the medicines you take. How can you care for yourself at home? · Talk to your doctor about how much fluid you should drink. · Eat a balanced diet. Talk to your doctor or a dietitian about what type of diet may be best for you. You may need to limit sodium, potassium, and phosphorus. · If you need dialysis, follow the instructions and schedule for dialysis that your doctor gives you. · Do not smoke. Smoking can make your condition worse.  If you need help quitting, talk to your doctor about stop-smoking programs and medicines. These can increase your chances of quitting for good. · Do not drink alcohol. · Review all of your medicines with your doctor. Do not take any medicines, including nonsteroidal anti-inflammatory drugs (NSAIDs) such as ibuprofen (Advil, Motrin) or naproxen (Aleve), unless your doctor says it is safe for you to do so. · Make sure that anyone treating you for any health problem knows that you have had MAYCO. When should you call for help? Call 911 anytime you think you may need emergency care. For example, call if:    · You passed out (lost consciousness).    Call your doctor now or seek immediate medical care if:    · You have new or worse nausea and vomiting.     · You have much less urine than normal, or you have no urine.     · You are feeling confused or cannot think clearly.     · You have new or more blood in your urine.     · You have new swelling.     · You are dizzy or lightheaded, or feel like you may faint.    Watch closely for changes in your health, and be sure to contact your doctor if:    · You do not get better as expected. Where can you learn more? Go to http://rhona-ilia.info/. Enter N714 in the search box to learn more about \"Acute Kidney Injury: Care Instructions. \"  Current as of: March 14, 2018  Content Version: 11.9  © 8468-1000 [employer]. Care instructions adapted under license by [employer] (which disclaims liability or warranty for this information). If you have questions about a medical condition or this instruction, always ask your healthcare professional. [place] [address] any warranty or liability for your use of this information. Patient Education        Urinary Tract Infection in Women: Care Instructions  Your Care Instructions    A urinary tract infection, or UTI, is a general term for an infection anywhere between the kidneys and the urethra (where urine comes out).  Most UTIs are bladder infections. They often cause pain or burning when you urinate. UTIs are caused by bacteria and can be cured with antibiotics. Be sure to complete your treatment so that the infection goes away. Follow-up care is a key part of your treatment and safety. Be sure to make and go to all appointments, and call your doctor if you are having problems. It's also a good idea to know your test results and keep a list of the medicines you take. How can you care for yourself at home? · Take your antibiotics as directed. Do not stop taking them just because you feel better. You need to take the full course of antibiotics. · Drink extra water and other fluids for the next day or two. This may help wash out the bacteria that are causing the infection. (If you have kidney, heart, or liver disease and have to limit fluids, talk with your doctor before you increase your fluid intake.)  · Avoid drinks that are carbonated or have caffeine. They can irritate the bladder. · Urinate often. Try to empty your bladder each time. · To relieve pain, take a hot bath or lay a heating pad set on low over your lower belly or genital area. Never go to sleep with a heating pad in place. To prevent UTIs  · Drink plenty of water each day. This helps you urinate often, which clears bacteria from your system. (If you have kidney, heart, or liver disease and have to limit fluids, talk with your doctor before you increase your fluid intake.)  · Urinate when you need to. · Urinate right after you have sex. · Change sanitary pads often. · Avoid douches, bubble baths, feminine hygiene sprays, and other feminine hygiene products that have deodorants. · After going to the bathroom, wipe from front to back. When should you call for help?   Call your doctor now or seek immediate medical care if:    · Symptoms such as fever, chills, nausea, or vomiting get worse or appear for the first time.     · You have new pain in your back just below your rib cage. This is called flank pain.     · There is new blood or pus in your urine.     · You have any problems with your antibiotic medicine.    Watch closely for changes in your health, and be sure to contact your doctor if:    · You are not getting better after taking an antibiotic for 2 days.     · Your symptoms go away but then come back. Where can you learn more? Go to http://rhoan-ilia.info/. Enter C296 in the search box to learn more about \"Urinary Tract Infection in Women: Care Instructions. \"  Current as of: March 20, 2018  Content Version: 11.9  © 7723-7398 [employer]. Care instructions adapted under license by [employer] (which disclaims liability or warranty for this information). If you have questions about a medical condition or this instruction, always ask your healthcare professional. [address] any warranty or liability for your use of this information.

## 2019-05-14 LAB
BACTERIA SPEC CULT: ABNORMAL
SERVICE CMNT-IMP: ABNORMAL

## 2019-05-17 ENCOUNTER — APPOINTMENT (OUTPATIENT)
Dept: NUTRITION | Age: 59
End: 2019-05-17

## 2019-05-21 ENCOUNTER — HOSPITAL ENCOUNTER (OUTPATIENT)
Dept: MAMMOGRAPHY | Age: 59
Discharge: HOME | End: 2019-05-21
Attending: NURSE PRACTITIONER
Payer: COMMERCIAL

## 2019-05-21 ENCOUNTER — HOSPITAL ENCOUNTER (OUTPATIENT)
Dept: ULTRASOUND IMAGING | Age: 59
Discharge: HOME | End: 2019-05-21
Attending: NURSE PRACTITIONER
Payer: COMMERCIAL

## 2019-05-21 DIAGNOSIS — N63.0 BREAST MASS: ICD-10-CM

## 2019-05-21 PROCEDURE — 77062 BREAST TOMOSYNTHESIS BI: CPT

## 2019-05-21 PROCEDURE — 77066 DX MAMMO INCL CAD BI: CPT

## 2019-05-21 PROCEDURE — 76642 ULTRASOUND BREAST LIMITED: CPT

## 2019-05-21 NOTE — PROGRESS NOTES
conducted an initial consultation and Spiritual Assessment for Tamra Loza, who is a 66 y.o.,female. Patients Primary Language is: [place]. According to the patients EMR Catholic Affiliation is: No Zoroastrian. The reason the Patient came to the hospital is:   Patient Active Problem List    Diagnosis Date Noted    COPD (chronic obstructive pulmonary disease) ([facility] 2019    Gastroesophageal reflux disease 2016    Thoracic compression fracture ([facility] 2016    Diabetes mellitus type 1 ([facility] 2012    Diabetic neuropathy ([facility] 2012    Osteoporosis 2012    Proteinuria 2012    HLD (hyperlipidemia) 2012    Depression 2012    Tobacco abuse 2012    Surgical menopause 2012        The  provided the following Interventions:  Patient was comfortably waiting at the waiting room when I met and initiated a relationship of care and support. Explored issues of wolf, belief, spirituality and Baptist/ritual needs while hospitalized. Listened empathically as patient shared that she found a lump on her right upper quadrant. She is here for ultrasound to confirm what it was. Her family history reveals that her maternal aunt had breast cancer (), her maternal grandma had ovarian cancer (), two maternal brothers had prostate cancer, and another maternal aunt  of liver tumor (undiagnosed). Patient's own past  medical history reveals her having endometriosis and fibroid and ovarian cysts. She shared that she had total hysterectomy as a result. Provided chaplaincy education. Provided information about Spiritual Care Services. Offered assurance of continued prayers on patient's behalf. Chart reviewed. The following outcomes where achieved:  Patient shared limited information about both her medical narrative and spiritual journey/beliefs.    confirmed Patient has no  Catholic Affiliation with any organized Baptism. Patient processed feeling about current visit. She said, \"Rather know it now than find out it's too late. \"  Patient expressed gratitude for 's visit. Assessment:  Patient does not have any Jain/cultural needs that will affect patients preferences in health care. There are no spiritual or Jain issues which require intervention at this time. Plan:  Chaplains will continue to follow and will provide pastoral care on an as needed/requested basis.     Chaplain Resident [address]   [phone]

## 2019-05-22 ENCOUNTER — TELEPHONE (OUTPATIENT)
Dept: FAMILY MEDICINE CLINIC | Age: 59
End: 2019-05-22

## 2019-05-22 ENCOUNTER — OFFICE VISIT (OUTPATIENT)
Dept: FAMILY MEDICINE CLINIC | Age: 59
End: 2019-05-22

## 2019-05-22 VITALS
HEIGHT: 66 IN | SYSTOLIC BLOOD PRESSURE: 131 MMHG | OXYGEN SATURATION: 98 % | BODY MASS INDEX: 23.18 KG/M2 | WEIGHT: 144.2 LBS | TEMPERATURE: 97.7 F | HEART RATE: 68 BPM | DIASTOLIC BLOOD PRESSURE: 75 MMHG | RESPIRATION RATE: 18 BRPM

## 2019-05-22 DIAGNOSIS — R94.31 ABNORMAL EKG: Primary | ICD-10-CM

## 2019-05-22 DIAGNOSIS — E10.8 TYPE 1 DIABETES MELLITUS WITH COMPLICATION (HCC): ICD-10-CM

## 2019-05-22 RX ORDER — INSULIN ASPART 100 [IU]/ML
INJECTION, SOLUTION INTRAVENOUS; SUBCUTANEOUS
Qty: 2 PEN | Refills: 2 | Status: SHIPPED | OUTPATIENT
Start: 2019-05-22

## 2019-05-22 RX ORDER — BLOOD SUGAR DIAGNOSTIC
1 STRIP MISCELLANEOUS
Qty: 100 EACH | Refills: 2 | Status: SHIPPED | OUTPATIENT
Start: 2019-05-22 | End: 2022-03-17 | Stop reason: ALTCHOICE

## 2019-05-22 NOTE — PATIENT INSTRUCTIONS
Learning About Type 1 Diabetes  What is type 1 diabetes? Type 1 diabetes is a disease that starts when your body stops making enough of a hormone called insulin. Insulin helps your body use sugar from your food as energy or store it for later use. If you don't have insulin, too much sugar stays in your blood. Type 1 diabetes can occur at any age, but it usually starts in children or young adults. It is a lifelong disease, but with treatment and a healthy lifestyle you can live a long and healthy life. What can you expect with type 1 diabetes? Roby Cope keep hearing about how important it is to keep your blood sugar within a target range. That's because over time, high blood sugar can lead to serious problems. It can:  · Harm your eyes, nerves, and kidneys. · Damage your blood vessels, leading to heart disease and stroke. · Reduce blood flow and cause nerve damage to parts of your body, especially your feet. This can cause slow healing and pain when you walk. A more sudden problem can happen when the blood sugar level gets so high that a serious chemical imbalance develops in the blood. This condition can be life-threatening and needs quick treatment. When people hear the word \"diabetes,\" they often think of problems like these. But daily care and treatment can help prevent or delay these problems. The goal is to keep your blood sugar in a target range. It is the best way to reduce your chance of having more problems from diabetes. What are the symptoms? You experience most symptoms of type 1 diabetes when your blood sugar is either too high or too low. Common symptoms of high blood sugar include:  · Thirst.  · Frequent urination. · Weight loss. · Blurry vision. Common symptoms of low blood sugar include:  · Sweating. · Shakiness. · Weakness. · Hunger. · Confusion. If you wait too long to get medical care when your blood sugar goes too high, you may develop diabetic ketoacidosis.  Symptoms include:  · Flushed, hot, dry skin. · A strong, fruity breath odor. · Restlessness, drowsiness, or trouble waking up. · Lack of interest in normal activities. · Rapid, deep breathing. · Loss of appetite, abdominal pain, and vomiting. · Confusion. How is type 1 diabetes treated? To treat type 1 diabetes, you need insulin. You can give yourself insulin through an insulin pump, an insulin pen, or a syringe (needle). When you have type 1 diabetes, it's more important than ever to have a healthy lifestyle. Here are other things you can do to stay healthy:  · Check your blood sugar level often, as advised by your doctor. · Eat a balanced diet that spreads carbohydrate evenly throughout the day. · Control your blood pressure and cholesterol. · Do not smoke. Smoking can make health problems worse. This includes problems you might have with type 1 diabetes. If you need help quitting, talk to your doctor about stop-smoking programs and medicines. These can increase your chances of quitting for good. · Limit alcohol to 2 drinks a day for men and 1 drink a day for women. Too much alcohol can cause health problems. · Get at least 30 minutes of exercise on most days of the week. Walking is a good choice. You also may want to do other activities, such as running, swimming, cycling, or playing tennis or team sports. Follow-up care is a key part of your treatment and safety. Be sure to make and go to all appointments, and call your doctor if you are having problems. It's also a good idea to know your test results and keep a list of the medicines you take. Where can you learn more? Go to http://rhona-ilia.info/. Enter H135 in the search box to learn more about \"Learning About Type 1 Diabetes. \"  Current as of: July 25, 2018  Content Version: 11.9  © 9717-0432 [employer].  Care instructions adapted under license by [employer] (which disclaims liability or warranty for this information). If you have questions about a medical condition or this instruction, always ask your healthcare professional. [place] [address] any warranty or liability for your use of this information.

## 2019-05-22 NOTE — TELEPHONE ENCOUNTER
Per pt per [facility] the must have the sliding scale info before they can process the RX for:    insulin aspart U-100 (NOVOLOG FLEXPEN U-100 INSULIN)

## 2019-05-22 NOTE — PROGRESS NOTES
Chief Complaint   Patient presents with    Diabetes     1. Have you been to the ER, urgent care clinic since your last visit? Hospitalized since your last visit? Patient was in the hospital on 05/12/19    2. Have you seen or consulted any other health care providers outside of the [address] [place] [address] since your last visit? Include any pap smears or colon screening.  No

## 2019-05-22 NOTE — PROGRESS NOTES
CHANCE La is a 66 y.o. female who presents today for DM. Diabetic Review of Systems - medication compliance: compliant all of the time, diabetic diet compliance: noncompliant some of the time, home glucose monitoring: is performed regularly. BGL range , average around 150-200. Pt was seen at ED on 5/12/2019 for elevated blood glucose level and UTI. Pt also notes she needs a new referral for endocrinology and referral for cardiology irregular EKG while at ED. Current Outpatient Medications   Medication Sig Dispense Refill    insulin aspart U-100 (NOVOLOG FLEXPEN U-100 INSULIN) 100 unit/mL (3 mL) inpn Each meal, per sliding scale 2 Pen 2    glucose blood VI test strips (BLOOD GLUCOSE TEST) strip Contour next test strips 100 Strip 2    insulin detemir U-100 (LEVEMIR FLEXTOUCH) 100 unit/mL (3 mL) inpn 30 Units by SubCUTAneous route nightly for 30 days. 6 Pen 1    pen needle, diabetic (COMFORT EZ PEN NEEDLES) 32 gauge x 3/16\" ndle 1 Pen Needle by Does Not Apply route nightly as needed (with insulin at night). 100 Each 2    nicotine (NICODERM CQ) 21 mg/24 hr 1 Patch by TransDERmal route every twenty-four (24) hours for 30 days. 30 Patch 0    budesonide-formoterol (SYMBICORT) 160-4.5 mcg/actuation HFAA Take 1 Puff by inhalation two (2) times a day. 1 Inhaler 3    LORazepam (ATIVAN) 0.5 mg tablet Take 1 Tab by mouth every eight (8) hours as needed for Anxiety. Max Daily Amount: 1.5 mg. 60 Tab 0    gabapentin (NEURONTIN) 300 mg capsule Take 2 Caps by mouth nightly. 60 Cap 2    diclofenac (VOLTAREN) 1 % gel Apply  to affected area three (3) times daily as needed for Pain. Apply to back and knees as needed 500 g 2    pravastatin (PRAVACHOL) 40 mg tablet Take 40 mg by mouth nightly.  tiotropium (SPIRIVA WITH HANDIHALER) 18 mcg inhalation capsule Take 1 Cap by inhalation daily.       albuterol (PROVENTIL HFA, VENTOLIN HFA, PROAIR HFA) 90 mcg/actuation inhaler Take 2 Puffs by inhalation every four (4) hours as needed for Wheezing or Shortness of Breath. 2 Inhaler 3    alendronate (FOSAMAX) 70 mg tablet Take 70 mg by mouth every Sunday.  fluticasone (FLONASE) 50 mcg/actuation nasal spray 2 Sprays by Both Nostrils route daily. 1 Bottle 3    ondansetron (ZOFRAN ODT) 4 mg disintegrating tablet Take 1-2 tablets every 6-8 hours as needed for nausea and vomiting. 10 Tab 0    insulin degludec (TRESIBA FLEXTOUCH U-100) 100 unit/mL (3 mL) inpn 30 Units by SubCUTAneous route nightly. 15 mL 2    flash glucose sensor ([employer]STYLE MARLEY 14 DAY SENSOR) kit Apply every 14 days to monitor blood sugar 2 Kit 11    flash glucose scanning reader ([employer]STYLE MARLEY 14 DAY READER) misc Use to check blood sugar 3 times a day 1 Each 0      Allergies   Allergen Reactions    Penicillin G Anaphylaxis    Bactrim [Sulfamethoprim] Hives and Rash    Dilaudid [Hydromorphone (Bulk)] Other (comments)     Patient was confused for 5 days. Did not know who she was.  Morphine Itching    Pcn [Penicillins] Swelling       SUBJECTIVE:  Review of Systems   Constitutional: Negative for chills, fever and malaise/fatigue. Eyes: Negative for blurred vision. Respiratory: Negative for cough and shortness of breath. Cardiovascular: Negative for chest pain, palpitations and leg swelling. Gastrointestinal: Negative for abdominal pain, diarrhea, nausea and vomiting. Genitourinary: Negative for dysuria, frequency and urgency. Neurological: Negative for dizziness, tingling, sensory change and headaches. Endo/Heme/Allergies: Negative for polydipsia. OBJECTIVE:  Visit Vitals  /75 (BP 1 Location: Left arm, BP Patient Position: Sitting)   Pulse 68   Temp 97.7 °F (36.5 °C) (Oral)   Resp 18   Ht 5' 6\" (1.676 m)   Wt 144 lb 3.2 oz (65.4 kg)   SpO2 98%   BMI 23.27 kg/m²        Physical Exam   Constitutional: She is oriented to person, place, and time and well-developed, well-nourished, and in no distress.    HENT: Head: Normocephalic. Eyes: Pupils are equal, round, and reactive to light. Conjunctivae and EOM are normal.   Neck: Normal range of motion. Neck supple. No thyromegaly present. Cardiovascular: Normal rate, regular rhythm and normal heart sounds. Pulmonary/Chest: Effort normal and breath sounds normal. She has no wheezes. She has no rales. She exhibits no tenderness. Abdominal: Soft. Bowel sounds are normal. She exhibits no distension. There is no tenderness. There is no guarding. Musculoskeletal: She exhibits no edema. Neurological: She is alert and oriented to person, place, and time. Gait normal.   Skin: Skin is warm and dry. No erythema. Nursing note and vitals reviewed. ASSESSMENT:  Diagnoses and all orders for this visit:    1. Abnormal EKG  -     REFERRAL TO CARDIOLOGY    2. Type 1 diabetes mellitus with complication (HCC)  -     insulin aspart U-100 (NOVOLOG FLEXPEN U-100 INSULIN) 100 unit/mL (3 mL) inpn; Each meal, per sliding scale  -     glucose blood VI test strips (BLOOD GLUCOSE TEST) strip; Contour next test strips  -     insulin detemir U-100 (LEVEMIR FLEXTOUCH) 100 unit/mL (3 mL) inpn; 30 Units by SubCUTAneous route nightly for 30 days. -     pen needle, diabetic (COMFORT EZ PEN NEEDLES) 32 gauge x 3/16\" ndle; 1 Pen Needle by Does Not Apply route nightly as needed (with insulin at night). -     REFERRAL TO ENDOCRINOLOGY    PLAN:  Medication refills   Referral placed for endocrinology for DM  Referral for cardiology for abnormal EKG    I have discussed the diagnosis with the patient and the intended plan as seen in the above orders. The patient has received an after-visit summary and questions were answered concerning future plans. I have discussed medication side effects and warnings with the patient as well. Patient will call for further questions. Follow-up and Dispositions    · Return in about 3 months (around 8/22/2019) for follow up.        Kristan Paul NP

## 2019-05-23 NOTE — TELEPHONE ENCOUNTER
JUAN C Berry called [facility] pharmacy yesterday and give the sliding scale around 1600. No further questions at this time.

## 2019-06-03 ENCOUNTER — HOSPITAL ENCOUNTER (OUTPATIENT)
Dept: LAB | Age: 59
Discharge: HOME | End: 2019-06-03
Payer: MEDICAID

## 2019-06-03 ENCOUNTER — OFFICE VISIT (OUTPATIENT)
Dept: FAMILY MEDICINE CLINIC | Age: 59
End: 2019-06-03

## 2019-06-03 VITALS
TEMPERATURE: 97.6 F | SYSTOLIC BLOOD PRESSURE: 138 MMHG | RESPIRATION RATE: 16 BRPM | DIASTOLIC BLOOD PRESSURE: 80 MMHG | WEIGHT: 143 LBS | HEIGHT: 66 IN | BODY MASS INDEX: 22.98 KG/M2 | HEART RATE: 70 BPM | OXYGEN SATURATION: 98 %

## 2019-06-03 DIAGNOSIS — Z20.818 EXPOSURE TO MRSA: ICD-10-CM

## 2019-06-03 DIAGNOSIS — R30.0 DYSURIA: ICD-10-CM

## 2019-06-03 DIAGNOSIS — N30.01 ACUTE CYSTITIS WITH HEMATURIA: ICD-10-CM

## 2019-06-03 DIAGNOSIS — N30.01 ACUTE CYSTITIS WITH HEMATURIA: Primary | ICD-10-CM

## 2019-06-03 PROBLEM — E11.21 TYPE 2 DIABETES WITH NEPHROPATHY (HCC): Status: ACTIVE | Noted: 2019-06-03

## 2019-06-03 LAB
BILIRUB UR QL STRIP: NEGATIVE
GLUCOSE UR-MCNC: NORMAL MG/DL
KETONES P FAST UR STRIP-MCNC: NEGATIVE MG/DL
PH UR STRIP: 6 [PH] (ref 4.6–8)
PROT UR QL STRIP: NEGATIVE
SP GR UR STRIP: 1.01 (ref 1–1.03)
UA UROBILINOGEN AMB POC: NORMAL (ref 0.2–1)
URINALYSIS CLARITY POC: CLEAR
URINALYSIS COLOR POC: YELLOW
URINE BLOOD POC: NORMAL
URINE LEUKOCYTES POC: NORMAL
URINE NITRITES POC: POSITIVE

## 2019-06-03 PROCEDURE — 87077 CULTURE AEROBIC IDENTIFY: CPT

## 2019-06-03 PROCEDURE — 87641 MR-STAPH DNA AMP PROBE: CPT

## 2019-06-03 PROCEDURE — 87086 URINE CULTURE/COLONY COUNT: CPT

## 2019-06-03 PROCEDURE — 87186 SC STD MICRODIL/AGAR DIL: CPT

## 2019-06-03 RX ORDER — OMEPRAZOLE 40 MG/1
40 CAPSULE, DELAYED RELEASE ORAL DAILY
COMMUNITY
End: 2019-06-03 | Stop reason: SDUPTHER

## 2019-06-03 RX ORDER — NITROFURANTOIN 25; 75 MG/1; MG/1
100 CAPSULE ORAL 2 TIMES DAILY
Qty: 10 CAP | Refills: 0 | Status: SHIPPED | OUTPATIENT
Start: 2019-06-03 | End: 2019-06-05 | Stop reason: ALTCHOICE

## 2019-06-03 RX ORDER — OMEPRAZOLE 40 MG/1
40 CAPSULE, DELAYED RELEASE ORAL DAILY
Qty: 30 CAP | Refills: 0 | Status: SHIPPED | OUTPATIENT
Start: 2019-06-03 | End: 2019-11-04 | Stop reason: SDUPTHER

## 2019-06-03 NOTE — PROGRESS NOTES
Pt is here for burning with urination, clody & malodorous x 3 days. 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 1. Have you been to the ER, urgent care clinic since your last visit? Hospitalized since your last visit? No    2. Have you seen or consulted any other health care providers outside of the [address] [place] [address] since your last visit? Include any pap smears or colon screening.  No

## 2019-06-03 NOTE — PROGRESS NOTES
HISTORY OF PRESENT ILLNESS  Evonne Bhatti is a 66 y.o. female. Bladder Infection    The history is provided by the patient. This is a new problem. Episode onset: 3 days ago  The problem occurs every urination. The problem has not changed since onset. The quality of the pain is described as burning. There has been no fever. Associated symptoms include frequency, hesitancy (chronic, unchanged) and urgency. Pertinent negatives include no chills, no nausea, no vomiting, no hematuria and no flank pain. The patient is not pregnant. Past medical history comments: recently treated for UTI on 5/22/19 with bactrim . patient states she was informed her mother was MRSA carrier while she was hospitalized several weeks ago. Comments she was advised to get tested. Allergies   Allergen Reactions    Penicillin G Anaphylaxis    Bactrim [Sulfamethoprim] Hives and Rash    Dilaudid [Hydromorphone (Bulk)] Other (comments)     Patient was confused for 5 days. Did not know who she was.  Morphine Itching    Pcn [Penicillins] Swelling     Current Outpatient Medications   Medication Sig Dispense Refill    omeprazole (PRILOSEC) 40 mg capsule Take 40 mg by mouth daily.  insulin aspart U-100 (NOVOLOG FLEXPEN U-100 INSULIN) 100 unit/mL (3 mL) inpn Each meal, per sliding scale 2 Pen 2    glucose blood VI test strips (BLOOD GLUCOSE TEST) strip Contour next test strips 100 Strip 2    insulin detemir U-100 (LEVEMIR FLEXTOUCH) 100 unit/mL (3 mL) inpn 30 Units by SubCUTAneous route nightly for 30 days. 6 Pen 1    pen needle, diabetic (COMFORT EZ PEN NEEDLES) 32 gauge x 3/16\" ndle 1 Pen Needle by Does Not Apply route nightly as needed (with insulin at night). 100 Each 2    budesonide-formoterol (SYMBICORT) 160-4.5 mcg/actuation HFAA Take 1 Puff by inhalation two (2) times a day. 1 Inhaler 3    LORazepam (ATIVAN) 0.5 mg tablet Take 1 Tab by mouth every eight (8) hours as needed for Anxiety.  Max Daily Amount: 1.5 mg. 60 Tab 0    gabapentin (NEURONTIN) 300 mg capsule Take 2 Caps by mouth nightly. 60 Cap 2    flash glucose sensor ([employer] MARLEY 14 DAY SENSOR) kit Apply every 14 days to monitor blood sugar 2 Kit 11    flash glucose scanning reader ([employer]YLE MARLEY 14 DAY READER) misc Use to check blood sugar 3 times a day 1 Each 0    diclofenac (VOLTAREN) 1 % gel Apply  to affected area three (3) times daily as needed for Pain. Apply to back and knees as needed 500 g 2    pravastatin (PRAVACHOL) 40 mg tablet Take 40 mg by mouth nightly.  tiotropium (SPIRIVA WITH HANDIHALER) 18 mcg inhalation capsule Take 1 Cap by inhalation daily.  albuterol (PROVENTIL HFA, VENTOLIN HFA, PROAIR HFA) 90 mcg/actuation inhaler Take 2 Puffs by inhalation every four (4) hours as needed for Wheezing or Shortness of Breath. 2 Inhaler 3    alendronate (FOSAMAX) 70 mg tablet Take 70 mg by mouth every Sunday.  fluticasone (FLONASE) 50 mcg/actuation nasal spray 2 Sprays by Both Nostrils route daily.  1 Bottle 3     Past Medical History:   Diagnosis Date    Arthritis     Autonomic neuropathy     Back pain     Chronic lung disease     Chronic pain     back    Compression fracture T9-10    COPD [facility]chronic obstructive pulmonary disease) ([facility])     Depression     panic attacks    Diabetes mellitus ([facility])     type 1    GERD (gastroesophageal reflux disease)     Hypertension     no longer on meds    Neuropathy     Osteoporosis     Tobacco use      Social History     Socioeconomic History    Marital status:      Spouse name: Not on file    Number of children: Not on file    Years of education: Not on file    Highest education level: Not on file   Occupational History    Not on file   Social Needs    Financial resource strain: Not on file    Food insecurity:     Worry: Not on file     Inability: Not on file    Transportation needs:     Medical: Not on file     Non-medical: Not on file   Tobacco Use    Smoking status: Current Every Day Smoker     Packs/day: 1.00     Years: 43.00     Pack years: 43.00     Types: Cigarettes     Start date: 7/27/1976    Smokeless tobacco: Never Used    Tobacco comment: on [employer]   Substance and Sexual Activity    Alcohol use: No    Drug use: No    Sexual activity: Not on file   Lifestyle    Physical activity:     Days per week: Not on file     Minutes per session: Not on file    Stress: Not on file   Relationships    Social connections:     Talks on phone: Not on file     Gets together: Not on file     Attends Orthodoxy service: Not on file     Active member of club or organization: Not on file     Attends meetings of clubs or organizations: Not on file     Relationship status: Not on file    Intimate partner violence:     Fear of current or ex partner: Not on file     Emotionally abused: Not on file     Physically abused: Not on file     Forced sexual activity: Not on file   Other Topics Concern    Not on file   Social History Narrative    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 Denies any history chemical and or asbestos exposure      Review of Systems   Constitutional: Negative for chills and fever. Respiratory: Negative for shortness of breath. Cardiovascular: Negative for chest pain and palpitations. Gastrointestinal: Negative for nausea and vomiting. Genitourinary: Positive for frequency, hesitancy (chronic, unchanged) and urgency. Negative for flank pain and hematuria. /80 (BP 1 Location: Left arm)   Pulse 70   Temp 97.6 °F (36.4 °C) (Oral)   Resp 16   Ht 5' 6\" (1.676 m)   Wt 143 lb (64.9 kg)   SpO2 98%   BMI 23.08 kg/m²      Physical Exam   Constitutional: She appears well-developed and well-nourished. No distress. HENT:   Head: Normocephalic and atraumatic. Neck: Normal range of motion. Neck supple. Cardiovascular: Normal rate, regular rhythm and normal heart sounds. Exam reveals no gallop and no friction rub. No murmur heard.   Pulmonary/Chest: Effort normal and breath sounds normal. She has no wheezes. She has no rhonchi. She has no rales. Abdominal: Soft. Bowel sounds are normal. There is tenderness in the suprapubic area. There is no CVA tenderness. Lymphadenopathy:     She has no cervical adenopathy. Results for orders placed or performed in visit on 06/03/19   AMB POC URINALYSIS DIP STICK AUTO W/ MICRO   Result Value Ref Range    Color (UA POC) Yellow     Clarity (UA POC) Clear     Glucose (UA POC) 3+ Negative    Bilirubin (UA POC) Negative Negative    Ketones (UA POC) Negative Negative    Specific gravity (UA POC) 1.015 1.001 - 1.035    Blood (UA POC) Trace Negative    pH (UA POC) 6.0 4.6 - 8.0    Protein (UA POC) Negative Negative    Urobilinogen (UA POC) 1 mg/dL 0.2 - 1    Nitrites (UA POC) Positive Negative    Leukocyte esterase (UA POC) Trace Negative         ASSESSMENT and PLAN    ICD-10-CM ICD-9-CM    1. Acute cystitis with hematuria N30.01 595.0 CULTURE, URINE      URINALYSIS W/ RFLX MICROSCOPIC   2. Dysuria R30.0 788.1 AMB POC URINALYSIS DIP STICK AUTO W/ MICRO   3. Exposure to MRSA Z20.818 V01.89 MRSA SCREEN - PCR (NASAL)     Orders Placed This Encounter    CULTURE, URINE    MRSA SCREEN - PCR (NASAL)    AMB POC URINALYSIS DIP STICK AUTO W/ MICRO    DISCONTD: omeprazole (PRILOSEC) 40 mg capsule    omeprazole (PRILOSEC) 40 mg capsule    nitrofurantoin, macrocrystal-monohydrate, (MACROBID) 100 mg capsule     I have discussed the diagnosis with the patient and the intended plan as seen in the above orders. The patient has received an after-visit summary and questions were answered concerning future plans. I have discussed medication side effects and warnings with the patient as well. Patient agreeable with above plan and verbalizes understanding. Follow-up and Dispositions    · Return in about 2 weeks (around 6/17/2019) for UTI repeat urine lab only.

## 2019-06-03 NOTE — PATIENT INSTRUCTIONS
Urinary Tract Infection in Women: Care Instructions  Your Care Instructions    A urinary tract infection, or UTI, is a general term for an infection anywhere between the kidneys and the urethra (where urine comes out). Most UTIs are bladder infections. They often cause pain or burning when you urinate. UTIs are caused by bacteria and can be cured with antibiotics. Be sure to complete your treatment so that the infection goes away. Follow-up care is a key part of your treatment and safety. Be sure to make and go to all appointments, and call your doctor if you are having problems. It's also a good idea to know your test results and keep a list of the medicines you take. How can you care for yourself at home? · Take your antibiotics as directed. Do not stop taking them just because you feel better. You need to take the full course of antibiotics. · Drink extra water and other fluids for the next day or two. This may help wash out the bacteria that are causing the infection. (If you have kidney, heart, or liver disease and have to limit fluids, talk with your doctor before you increase your fluid intake.)  · Avoid drinks that are carbonated or have caffeine. They can irritate the bladder. · Urinate often. Try to empty your bladder each time. · To relieve pain, take a hot bath or lay a heating pad set on low over your lower belly or genital area. Never go to sleep with a heating pad in place. To prevent UTIs  · Drink plenty of water each day. This helps you urinate often, which clears bacteria from your system. (If you have kidney, heart, or liver disease and have to limit fluids, talk with your doctor before you increase your fluid intake.)  · Urinate when you need to. · Urinate right after you have sex. · Change sanitary pads often. · Avoid douches, bubble baths, feminine hygiene sprays, and other feminine hygiene products that have deodorants.   · After going to the bathroom, wipe from front to back.  When should you call for help? Call your doctor now or seek immediate medical care if:    · Symptoms such as fever, chills, nausea, or vomiting get worse or appear for the first time.     · You have new pain in your back just below your rib cage. This is called flank pain.     · There is new blood or pus in your urine.     · You have any problems with your antibiotic medicine.    Watch closely for changes in your health, and be sure to contact your doctor if:    · You are not getting better after taking an antibiotic for 2 days.     · Your symptoms go away but then come back. Where can you learn more? Go to http://rhona-ilia.info/. Enter O480 in the search box to learn more about \"Urinary Tract Infection in Women: Care Instructions. \"  Current as of: March 20, 2018  Content Version: 11.9  © 2383-0796 [employer]. Care instructions adapted under license by [employer] (which disclaims liability or warranty for this information). If you have questions about a medical condition or this instruction, always ask your healthcare professional. [address] any warranty or liability for your use of this information. Nitrofurantoin Combination (By mouth)   Nitrofurantoin Monohydrate (szp-xcqf-eihg-AN-toyn wrc-ry-SEZ-drate), Nitrofurantoin, Macrocrystals (omn-bmxl-oegu-AN-toyn WDV-ppk-scuh-tals)  Treats urinary tract infections caused by bacteria. This medicine is an antibiotic. Brand Name(s): Macrobid   There may be other brand names for this medicine. When This Medicine Should Not Be Used: You should not use this medicine if you have had an allergic reaction to nitrofurantoin or if you are in your last weeks of pregnancy (week 38 or later). You should not use this medicine if you have severe kidney disease, if you are unable to urinate, or if you have a decreased amount of urine.  Do not use this medicine if you have a history of liver disease with nitrofurantoin. How to Use This Medicine:   Capsule  · Your doctor will tell you how much medicine to use. Do not use more than directed. · It is best to take this medicine with food or milk. · Take all of the medicine in your prescription to clear up your infection, even if you feel better after the first few doses. If a dose is missed:   · Take a dose as soon as you remember. If it is almost time for your next dose, wait until then and take a regular dose. Do not take extra medicine to make up for a missed dose. How to Store and Dispose of This Medicine:   · Store the medicine in a closed container at room temperature, away from heat, moisture, and direct light. · Ask your pharmacist, doctor, or health caregiver about the best way to dispose of any outdated medicine or medicine no longer needed. · Keep all medicine out of the reach of children. Never share your medicine with anyone. Drugs and Foods to Avoid:   Ask your doctor or pharmacist before using any other medicine, including over-the-counter medicines, vitamins, and herbal products. · Make sure your doctor knows if you are also using probenecid (Benemid®) or sulfinpyrazone (Anturane®). · It is best not to use antacids containing magnesium trisilicate (such as Foamicon® or Gaviscon®) while you are using nitrofurantoin. Warnings While Using This Medicine:   · Make sure your doctor knows if you are pregnant or breastfeeding, or if you have liver disease, heart disease, lung disease, anemia, diabetes, a mineral imbalance in the blood, or vitamin B deficiency. Make sure your doctor knows if you have a condition called [id number]. · This medicine may cause your urine to be a brown color. This is normal and will not affect how the medicine works. · This medicine can cause diarrhea. Call your doctor if the diarrhea becomes severe, does not stop, or is bloody.  Do not take any medicine to stop diarrhea until you have talked to your doctor. Diarrhea can occur 2 months or more after you stop taking this medicine. · Use this medicine only to treat the infection you now have. This medicine will not work for colds, flu, or other virus infections. Possible Side Effects While Using This Medicine:   Call your doctor right away if you notice any of these side effects:  · Allergic reaction: Itching or hives, swelling in your face or hands, swelling or tingling in your mouth or throat, chest tightness, trouble breathing  · Blisters, peeling, or red skin rash. · Cough, fever, chills, weakness, shortness of breath, or chest pain. · Dark-colored urine or pale stools. · Diarrhea or loose, watery stools that may contain blood. · Nausea, vomiting, loss of appetite, or pain in your upper stomach. · Numbness, tingling, or burning pain in your hands, arms, legs, or feet. · Yellowing of your skin or the whites of your eyes. If you notice these less serious side effects, talk with your doctor:   · Dizziness, headache, or blurred vision. · Hair loss. · Mild nausea, vomiting, constipation, stomach upset, or pain. · Vaginal itching or fluids. If you notice other side effects that you think are caused by this medicine, tell your doctor. Call your doctor for medical advice about side effects. You may report side effects to FDA at 6-623-FDA-7078  © 2017 [facility]– [place][note 2] Information is for End User's use only and may not be sold, redistributed or otherwise used for commercial purposes. The above information is an  only. It is not intended as medical advice for individual conditions or treatments. Talk to your doctor, nurse or pharmacist before following any medical regimen to see if it is safe and effective for you. Learning About Preventing MRSA Infection  What is a MRSA infection? MRSA stands for methicillin-resistant Staphylococcus aureus. It is a type of bacteria that can cause a staph infection.   Staph bacteria normally live on your skin and in your nose, usually without causing problems. Sometimes the bacteria cause infection. Usually you can treat this infection with antibiotics. But MRSA infections are harder to treat than other staph infections. This is because antibiotics may not be able to kill MRSA. For some people, especially those who are weak or ill, MRSA infections can become serious. MRSA can spread from person to person. It is commonly spread from the hands of someone who has MRSA. This could be anyone in a health care setting or in the community. MRSA is more likely to develop when antibiotics are used too often or aren't used the right way. Over time, bacteria can change so that these antibiotics no longer work well. How can you prevent MRSA infection? Practice good hygiene  · Wash your hands often and thoroughly with soap and clean, running water. You can also use an alcohol-based hand . Hand-washing is the best way to avoid spreading germs. · Keep cuts and scrapes clean and covered with a bandage. Avoid contact with other people's wounds or bandages. · Don't share personal items such as towels or razors. · If you are in the hospital, remind doctors and nurses to wash their hands before they touch you. Use antibiotics wisely  · Always ask your doctor if antibiotics are the best treatment. They can help treat bacterial infections, but they can't cure viral infections. Don't pressure your doctor to prescribe antibiotics when they won't help you get better. · If your doctor prescribed antibiotics, take them as directed. Do not stop taking them just because you feel better. You need to take the full course of antibiotics. Using only part of the medicine may cause antibiotic-resistant bacteria to develop. · Do not save any antibiotics. Don't use antibiotics that were prescribed for someone else. What are the symptoms?   Symptoms of a MRSA infection depend on where the infection is:  · If the infection is in a wound, that area of your skin may be red or tender. · If the infection is in the skin, you may have boils or abscesses. It may look like you have been bitten by a spider or insect. How is it diagnosed? The doctor will take a sample of your infected wound or take a blood or urine sample. The sample is tested to see if antibiotics can kill the bacteria. This test may take several days. You may also be tested to see if you are a MRSA carrier. A carrier is a person who has the bacteria on his or her skin but who isn't sick. The doctor will take a swab from the inside of your nose for this test.  How is MRSA treated? Your doctor may:  · Drain your wound. · Give you antibiotics as pills or through a needle put in your vein (IV). · Give you an ointment to put on your skin or inside your nose. · Have you wash your skin daily with an antiseptic soap. You may have to stay in the hospital for treatment. In the hospital, you may be kept apart from others to reduce the chances of spreading the bacteria. Follow-up care is a key part of your treatment and safety. Be sure to make and go to all appointments, and call your doctor if you are having problems. It's also a good idea to know your test results and keep a list of the medicines you take. Where can you learn more? Go to http://rhona-ilia.info/. Enter [id number] in the search box to learn more about \"Learning About Preventing MRSA Infection. \"  Current as of: July 30, 2018  Content Version: 11.9  © 6952-5427 [employer]. Care instructions adapted under license by [employer] (which disclaims liability or warranty for this information). If you have questions about a medical condition or this instruction, always ask your healthcare professional. [address] any warranty or liability for your use of this information.

## 2019-06-04 LAB
BACTERIA SPEC CULT: ABNORMAL
SERVICE CMNT-IMP: ABNORMAL

## 2019-06-05 ENCOUNTER — PATIENT MESSAGE (OUTPATIENT)
Dept: FAMILY MEDICINE CLINIC | Age: 59
End: 2019-06-05

## 2019-06-05 ENCOUNTER — TELEPHONE (OUTPATIENT)
Dept: FAMILY MEDICINE CLINIC | Age: 59
End: 2019-06-05

## 2019-06-05 RX ORDER — TERCONAZOLE 8 MG/G
1 CREAM VAGINAL
Qty: 20 G | Refills: 0 | Status: SHIPPED | OUTPATIENT
Start: 2019-06-05 | End: 2019-06-08

## 2019-06-05 RX ORDER — CIPROFLOXACIN 250 MG/1
250 TABLET, FILM COATED ORAL EVERY 12 HOURS
Qty: 14 TAB | Refills: 0 | Status: SHIPPED | OUTPATIENT
Start: 2019-06-05 | End: 2019-06-12

## 2019-06-05 NOTE — TELEPHONE ENCOUNTER
From: Karen Centeno  To: Yahir Ellison NP  Sent: 6/5/2019 5:20 AM EDT  Subject: Test Results Question    I have a question about MRSA SCREEN BY PCR resulted on 6/4/19 at 8:35 AM. Does this mean I am positive?

## 2019-06-05 NOTE — TELEPHONE ENCOUNTER
Patient states that she has been taking Macrobid since Monday however her abdominal pain is now a 8/10 which is worse than originally. She also reports that she has the constant feeling that she has to urinate but when she goes she  eliminates very little urine. She was advised that her culture results were not final as of yet. She was also informed of MRSA screening result and that a script has been sent for this to her pharmacy.

## 2019-06-06 LAB
BACTERIA SPEC CULT: ABNORMAL
SERVICE CMNT-IMP: ABNORMAL

## 2019-06-06 NOTE — PROGRESS NOTES
Pt aware of results & medication was sent in. Pt states her pharmacy doesn't have it but will get the RX transferred to a different pharmacy.

## 2019-06-10 ENCOUNTER — OFFICE VISIT (OUTPATIENT)
Dept: OBGYN CLINIC | Age: 59
End: 2019-06-10

## 2019-06-10 VITALS
OXYGEN SATURATION: 97 % | HEIGHT: 66 IN | DIASTOLIC BLOOD PRESSURE: 65 MMHG | RESPIRATION RATE: 12 BRPM | TEMPERATURE: 97.4 F | WEIGHT: 145 LBS | HEART RATE: 71 BPM | BODY MASS INDEX: 23.3 KG/M2 | SYSTOLIC BLOOD PRESSURE: 118 MMHG

## 2019-06-10 DIAGNOSIS — N81.10 PROLAPSE OF FEMALE BLADDER, ACQUIRED: Primary | ICD-10-CM

## 2019-06-10 DIAGNOSIS — Z22.322 MRSA CARRIER: ICD-10-CM

## 2019-06-10 NOTE — PROGRESS NOTES
Progress Note    Patient: Jessica Senior  MRN: [id number]  Date: 6/10/2019     Age:  66 y.o.,      Sex: female    YOB: 1960    Jessica Senior is a 66y.o. year-old female, G 2 P 2 who had CIERRA 2002 in [place]. She presents today with prolapsed bladder. Admits to stress incontinence and frequency x >5 years ago. She denies any coughing. She also admits to being positive for MRSA. Chief Complaint   Patient presents with    New Patient     Pt states she has a prolapsed bladder    Breast Mass    Skin Problem     Pt states she tested postive for MRSA a week ago   . Review of Systems: General, Cardiovascular, Respiratory, Gastrointestinal, Genitourinary, Neuropsychiatry, Musculoskeletal.  Patient denies any problems associated with these systems except for diabetes mellitus. General Examination: She is a well-developed, well-nourished forin no acute distress. Abdomen--soft, nontender, and normal active. Pelvic exam--External genitalia and BUS--normal except for the               bladder prolapse. Cervix  S/P hysterectomy. :     Vagina: vaginal discharge malodorous. Uterus: S/P hysterectomy. Adnexa: normal bimanual exam    Impression. ---Prolapsed Bladder. Plan: --Will refer back to Dr Blanche Landers. (her previous Urogynecologist)              RTC--prn.     Darion Cheney MD  Cherise 10, 2019

## 2019-06-13 ENCOUNTER — PATIENT MESSAGE (OUTPATIENT)
Dept: FAMILY MEDICINE CLINIC | Age: 59
End: 2019-06-13

## 2019-06-18 ENCOUNTER — TELEPHONE (OUTPATIENT)
Dept: OBGYN CLINIC | Age: 59
End: 2019-06-18

## 2019-06-18 ENCOUNTER — HOSPITAL ENCOUNTER (OUTPATIENT)
Dept: LAB | Age: 59
Discharge: HOME | End: 2019-06-18
Payer: MEDICAID

## 2019-06-18 DIAGNOSIS — N30.01 ACUTE CYSTITIS WITH HEMATURIA: ICD-10-CM

## 2019-06-18 LAB
APPEARANCE UR: CLEAR
BACTERIA URNS QL MICRO: ABNORMAL /HPF
BILIRUB UR QL: NEGATIVE
COLOR UR: YELLOW
EPITH CASTS URNS QL MICRO: ABNORMAL /LPF (ref 0–5)
GLUCOSE UR STRIP.AUTO-MCNC: NEGATIVE MG/DL
HGB UR QL STRIP: NEGATIVE
KETONES UR QL STRIP.AUTO: NEGATIVE MG/DL
LEUKOCYTE ESTERASE UR QL STRIP.AUTO: ABNORMAL
NITRITE UR QL STRIP.AUTO: NEGATIVE
PH UR STRIP: 7 [PH] (ref 5–8)
PROT UR STRIP-MCNC: NEGATIVE MG/DL
RBC #/AREA URNS HPF: 0 /HPF (ref 0–5)
SP GR UR REFRACTOMETRY: 1.01 (ref 1–1.03)
UROBILINOGEN UR QL STRIP.AUTO: 1 EU/DL (ref 0.2–1)
WBC URNS QL MICRO: ABNORMAL /HPF (ref 0–4)

## 2019-06-18 PROCEDURE — 81001 URINALYSIS AUTO W/SCOPE: CPT

## 2019-06-18 NOTE — TELEPHONE ENCOUNTER
Patient would like for a nurse to call her concerning a new referral. Please call asap, her telephone is [phone]

## 2019-06-19 ENCOUNTER — TELEPHONE (OUTPATIENT)
Dept: OBGYN CLINIC | Age: 59
End: 2019-06-19

## 2019-06-19 NOTE — TELEPHONE ENCOUNTER
Return call made to the pt using two identifiers,name and . Pt stated that she was referred to go to urogynecology for prolapsed bladder and her they do not take her insurance . Pt requesting a new referral to be sent to  [facility], Dr. Ana Laura Hudson because they do take her insurance. I verbalized understanding. Phone # is [phone] and [phone]. Message forwarded to the provider.

## 2019-06-24 RX ORDER — GABAPENTIN 300 MG/1
600 CAPSULE ORAL
Qty: 60 CAP | Refills: 2 | Status: SHIPPED | OUTPATIENT
Start: 2019-06-24 | End: 2019-10-02 | Stop reason: SDUPTHER

## 2019-06-27 ENCOUNTER — TELEPHONE (OUTPATIENT)
Dept: CARDIOLOGY CLINIC | Age: 59
End: 2019-06-27

## 2019-06-27 ENCOUNTER — OFFICE VISIT (OUTPATIENT)
Dept: CARDIOLOGY CLINIC | Age: 59
End: 2019-06-27

## 2019-06-27 VITALS
HEIGHT: 66 IN | SYSTOLIC BLOOD PRESSURE: 100 MMHG | HEART RATE: 75 BPM | WEIGHT: 142 LBS | DIASTOLIC BLOOD PRESSURE: 60 MMHG | BODY MASS INDEX: 22.82 KG/M2 | OXYGEN SATURATION: 98 %

## 2019-06-27 DIAGNOSIS — R55 SYNCOPE, UNSPECIFIED SYNCOPE TYPE: ICD-10-CM

## 2019-06-27 DIAGNOSIS — R07.9 CHEST PAIN, UNSPECIFIED TYPE: Primary | ICD-10-CM

## 2019-06-27 NOTE — TELEPHONE ENCOUNTER
Called Dr. Janie Simmonds. Manuel Velasco, [facility]              [phone]    Appt: June 23, 2020    Office does not accept Aj and pt is aware she will have to sign a waiver if she does not change insurance. Pt is changing insurance in Jan.  Dr. Leanna Paz will fax us a list of insurance they take and I will mail the list to patients home.   Darren Moon

## 2019-06-27 NOTE — PROGRESS NOTES
Miranda Foley presents today for   Chief Complaint   Patient presents with    Abnormal EKG     NEW PATIENT     Chest Pain     4 years; worsening for the past 6 months comes and goes heaviness in the center     Shortness of Breath     with chest pain     Dizziness       Miranda Foley preferred language for health care discussion is english/other. Is someone accompanying this pt? yes    Is the patient using any DME equipment during [address]? no    Depression Screening:  3 most recent PHQ Screens 1/9/2019   Little interest or pleasure in doing things Nearly every day   Feeling down, depressed, irritable, or hopeless Nearly every day   Total Score PHQ 2 6   Trouble falling or staying asleep, or sleeping too much Nearly every day   Feeling tired or having little energy Nearly every day   Poor appetite, weight loss, or overeating Nearly every day   Feeling bad about yourself - or that you are a failure or have let yourself or your family down Nearly every day   Trouble concentrating on things such as school, work, reading, or watching TV Several days   Moving or speaking so slowly that other people could have noticed; or the opposite being so fidgety that others notice Nearly every day   Thoughts of being better off dead, or hurting yourself in some way Nearly every day   PHQ 9 Score 25   How difficult have these problems made it for you to do your work, take care of your home and get along with others Extremely difficult       Learning Assessment:  Learning Assessment 1/9/2019   PRIMARY LEARNER Patient   HIGHEST LEVEL OF EDUCATION - PRIMARY LEARNER  GRADUATED HIGH SCHOOL OR GED   BARRIERS PRIMARY LEARNER NONE   PRIMARY LANGUAGE ENGLISH   LEARNER PREFERENCE PRIMARY LISTENING     READING   ANSWERED BY Patient     -   RELATIONSHIP SELF       Abuse Screening:  Abuse Screening Questionnaire 4/26/2019   Do you ever feel afraid of your partner?  N   Are you in a relationship with someone who physically or mentally threatens you? N   Is it safe for you to go home? Y       Fall Risk  Fall Risk Assessment, last 12 mths 4/26/2019   Able to walk? Yes   Fall in past 12 months? No       Pt currently taking Anticoagulant therapy? no    Coordination of Care:  1. Have you been to the ER, urgent care clinic since your last visit? Hospitalized since your last visit? yes    2. Have you seen or consulted any other health care providers outside of the [address] [place] [address] since your last visit? Include any pap smears or colon screening.  yes

## 2019-06-27 NOTE — PATIENT INSTRUCTIONS
Referral placed to Dr. Alexandru Marinelli  will call you to schedule your cardiac testing.  If you do not receive a phone call within 48 hours or miss it you may call; Central Scheduling [phone]    Follow up in 4-6 weeks

## 2019-06-27 NOTE — PROGRESS NOTES
William Conley    Chief Complaint   Patient presents with    Abnormal EKG     NEW PATIENT     Chest Pain     4 years; worsening for the past 6 months comes and goes heaviness in the center     Shortness of Breath     with chest pain     Dizziness    Palpitations       HPI    William Conley is a 66 y.o. female here to establish her long-term cardiovascular care but has several complaints. As you know patient is established with Dr. Michael Hyde back in 2016 in the setting of a sudden onset syncopal episode. Apparently patient was in her kitchen early in the morning when she suddenly lost consciousness. There was quite a bit of injury family she sustained several fractures. This led to quite an extensive cardiac work-up. I did personally obtain and review available records but not all of them are available at the time of my encounter. I see patient had an echocardiogram that was completely normal as documented normal LV function of 55% no significant valvular pathology no effusions. She had a Holter monitor that was uneventful. Apparently she did have a positive tilt table test through Dr. See Humphrey office but says she was not able to follow-up because she lost her insurance. She has been having lots of symptoms which we discussed at length today. She complains of sudden onset chest discomfort that can be when she is walking around in her backyard or suddenly at rest.  Very sharp sudden onset pain in the center of her chest and any other associated symptoms. Additionally she gets sudden onset dizziness and \"spells\" where she is apparently not responsive/ \"staring into space. \" She has had no witnessed seizure-like activity. He does mention being diagnosed with white matter disease in her brain was also told she had autonomic dysfunction by this tilt table test but denies having a neurologist that she follows up with.     Did review her history at length together and she does describe a long history of uncontrolled type 1 diabetes, hypertension dyslipidemia, COPD (follows with Dr. Aruna Caal), and chronic pain. Luckily since that initial episode back in 2016 she has not had another set of sudden onset loss of consciousness. He does feel somewhat lightheaded if she changes positions too quickly but does not seem to frankly pass out. She also recently went to the emergency department and apparently had been having chest discomfort she ruled out for MI with negative troponin. I personally obtained and reviewed these records and listed her labs below. Cardiac RFs: DM1, HTN, HL, +tobacco    Past Medical History:   Diagnosis Date    Arthritis     Autonomic neuropathy     Back pain     Chronic lung disease     Chronic pain     back    Compression fracture T9-10    COPD (chronic obstructive pulmonary disease) (HCC)     Depression     panic attacks    Diabetes mellitus (HCC)     type 1    GERD (gastroesophageal reflux disease)     Hypertension     no longer on meds    MRSA nasal colonization     Neuropathy     Osteoporosis     Tobacco use        Past Surgical History:   Procedure Laterality Date    HX BACK SURGERY  2009    HX DILATION AND CURETTAGE      HX HYSTERECTOMY  2002       Current Outpatient Medications   Medication Sig Dispense Refill    insulin detemir U-100 (LEVEMIR FLEXTOUCH) 100 unit/mL (3 mL) inpn 30 Units by SubCUTAneous route nightly. Indications: 18 UNITS IN AM 30 UNITS IN THE PM      gabapentin (NEURONTIN) 300 mg capsule Take 2 Caps by mouth nightly. 60 Cap 2    blood-glucose meter ([address]) by Does Not Apply route.  omeprazole (PRILOSEC) 40 mg capsule Take 1 Cap by mouth daily.  30 Cap 0    insulin aspart U-100 (NOVOLOG FLEXPEN U-100 INSULIN) 100 unit/mL (3 mL) inpn Each meal, per sliding scale 2 Pen 2    glucose blood VI test strips (BLOOD GLUCOSE TEST) strip Contour next test strips 100 Strip 2    pen needle, diabetic (COMFORT EZ PEN NEEDLES) 32 gauge x 3/16\" ndle 1 Pen Needle by Does Not Apply route nightly as needed (with insulin at night). 100 Each 2    budesonide-formoterol (SYMBICORT) 160-4.5 mcg/actuation HFAA Take 1 Puff by inhalation two (2) times a day. 1 Inhaler 3    LORazepam (ATIVAN) 0.5 mg tablet Take 1 Tab by mouth every eight (8) hours as needed for Anxiety. Max Daily Amount: 1.5 mg. 60 Tab 0    flash glucose sensor (FREESTYLE MARLEY 14 DAY SENSOR) kit Apply every 14 days to monitor blood sugar 2 Kit 11    flash glucose scanning reader (FREESTYLE MARLEY 14 DAY READER) misc Use to check blood sugar 3 times a day 1 Each 0    diclofenac (VOLTAREN) 1 % gel Apply  to affected area three (3) times daily as needed for Pain. Apply to back and knees as needed 500 g 2    pravastatin (PRAVACHOL) 40 mg tablet Take 40 mg by mouth nightly.  tiotropium (SPIRIVA WITH HANDIHALER) 18 mcg inhalation capsule Take 1 Cap by inhalation daily.  albuterol (PROVENTIL HFA, VENTOLIN HFA, PROAIR HFA) 90 mcg/actuation inhaler Take 2 Puffs by inhalation every four (4) hours as needed for Wheezing or Shortness of Breath. 2 Inhaler 3    alendronate (FOSAMAX) 70 mg tablet Take 70 mg by mouth every Sunday.  fluticasone (FLONASE) 50 mcg/actuation nasal spray 2 Sprays by Both Nostrils route daily. 1 Bottle 3       Allergies   Allergen Reactions    Penicillin G Anaphylaxis    Bactrim [Sulfamethoprim] Hives and Rash    Dilaudid [Hydromorphone (Bulk)] Other (comments)     Patient was confused for 5 days. Did not know who she was.     Morphine Itching    Pcn [Penicillins] Swelling       Social History     Socioeconomic History    Marital status:      Spouse name: Not on file    Number of children: Not on file    Years of education: Not on file    Highest education level: Not on file   Occupational History    Not on file   Social Needs    Financial resource strain: Not on file    Food insecurity:     Worry: Not on file     Inability: Not on file   [facility] Transportation needs:     Medical: Not on file     Non-medical: Not on file   Tobacco Use    Smoking status: Current Every Day Smoker     Packs/day: 1.00     Years: 43.00     Pack years: 43.00     Types: Cigarettes     Start date: 7/27/1976    Smokeless tobacco: Never Used    Tobacco comment: on Chantix   Substance and Sexual Activity    Alcohol use: No    Drug use: No    Sexual activity: Not Currently   Lifestyle    Physical activity:     Days per week: Not on file     Minutes per session: Not on file    Stress: Not on file   Relationships    Social connections:     Talks on phone: Not on file     Gets together: Not on file     Attends Islam service: Not on file     Active member of club or organization: Not on file     Attends meetings of clubs or organizations: Not on file     Relationship status: Not on file    Intimate partner violence:     Fear of current or ex partner: Not on file     Emotionally abused: Not on file     Physically abused: Not on file     Forced sexual activity: Not on file   Other Topics Concern    Not on file   Social History Narrative    Works as [employer]  for [employer]. Denies any history chemical and or asbestos exposure         Review of Systems    14 pt Review of Systems is negative unless otherwise mentioned in the HPI.     Wt Readings from Last 3 Encounters:   06/27/19 64.4 kg (142 lb)   06/10/19 65.8 kg (145 lb)   06/03/19 64.9 kg (143 lb)     Temp Readings from Last 3 Encounters:   06/10/19 97.4 °F (36.3 °C) (Oral)   06/03/19 97.6 °F (36.4 °C) (Oral)   05/22/19 97.7 °F (36.5 °C) (Oral)     BP Readings from Last 3 Encounters:   06/27/19 100/60   06/10/19 118/65   06/03/19 138/80     Pulse Readings from Last 3 Encounters:   06/27/19 75   06/10/19 71   06/03/19 70     Physical Exam:    Visit Vitals  /60   Pulse 75   Ht 5' 6\" (1.676 m)   Wt 64.4 kg (142 lb)   SpO2 98%   BMI 22.92 kg/m²      Physical Exam   Constitutional: She is oriented to person, place, and time.   HENT:   Head: Normocephalic and atraumatic. Eyes: Pupils are equal, round, and reactive to light. EOM are normal.   Cardiovascular: Normal rate, regular rhythm, normal heart sounds and intact distal pulses. Exam reveals no gallop and no friction rub. No murmur heard. Pulmonary/Chest: Effort normal and breath sounds normal. No respiratory distress. She has no wheezes. She has no rales. She exhibits no tenderness. Abdominal: Soft. Bowel sounds are normal.   Musculoskeletal: She exhibits no edema or tenderness. Neurological: She is alert and oriented to person, place, and time. Skin: Skin is warm and dry. Psychiatric: She has a normal mood and affect. EKG today shows: NSR, normal axis and intervals, nonspecific ST segment and T wave abn- similar to priors    Lab Results   Component Value Date/Time    Hemoglobin A1c 12.5 (H) 05/12/2019 07:00 AM    Hemoglobin A1c (POC) 7.8 05/14/2012 04:16 PM     Lab Results   Component Value Date/Time    CK 80 05/12/2019 04:10 PM    CK - MB 4.2 (H) 05/12/2019 04:10 PM    CK-MB Index 5.3 (H) 05/12/2019 04:10 PM    Troponin-I, QT <0.02 05/12/2019 04:10 PM     Lab Results   Component Value Date/Time    Sodium 140 05/12/2019 12:15 PM    Potassium 3.5 05/12/2019 12:15 PM    Chloride 109 (H) 05/12/2019 12:15 PM    CO2 26 05/12/2019 12:15 PM    Anion gap 5 05/12/2019 12:15 PM    Glucose 183 (H) 05/12/2019 12:15 PM    BUN 18 05/12/2019 12:15 PM    Creatinine 0.79 05/12/2019 12:15 PM    BUN/Creatinine ratio 23 (H) 05/12/2019 12:15 PM    GFR est AA >60 05/12/2019 12:15 PM    GFR est non-AA >60 05/12/2019 12:15 PM    Calcium 8.1 (L) 05/12/2019 12:15 PM    Bilirubin, total 1.1 (H) 05/12/2019 07:00 AM    AST (SGOT) 20 05/12/2019 07:00 AM    Alk.  phosphatase 85 05/12/2019 07:00 AM    Protein, total 7.5 05/12/2019 07:00 AM    Albumin 3.6 05/12/2019 07:00 AM    Globulin 3.9 05/12/2019 07:00 AM    A-G Ratio 0.9 05/12/2019 07:00 AM    ALT (SGPT) 25 05/12/2019 07:00 AM Impression and Plan:  Daniel Dill is a 66 y.o. with:    1.) Atypical Chest Pain, but persistent  2.) Lightheadedness with h/o syncope  3.) DM1  4.) HTN  5.) Tobacco abuse/ COPD  6.) Abn ekg t wave abn at baseline, known  7.) Normal LV function by echo    1.) Obtain records of abnormal tilt table  2.) Suspect autonomic dysfunction- refer to Dr. Camden Cole   3.) Pharm nuc r/o ACS  4.) RTC ~4-6 weeks discuss results, then discuss other concerns- HTN, HL etc    Patient had a very complex medical history with several concerns today. He tried to prioritize focused on her chest discomfort as well as this history of autonomic dysfunction. After we safely rule out acute coronary syndrome as contributing to her symptoms I think we can then go on optimizing her comorbidities. Typically being a type I diabetic puts her at risk for atypical symptoms but still have premature aggressive coronary disease. Specifically that she still smokes is also of great concern. In addition I am concerned that she could have additional autoimmune pathology that contributes to this autonomic dysfunction. Further recommendations to follow. Thank you for allowing me to participate in the care of your patient, please do not hesitate to call with questions or concerns.     Kindest Regards,    Red Sheriff, DO

## 2019-07-07 NOTE — PROGRESS NOTES
Please advise patient that mammogram imaging was abnormal.  It is recommended that patient have an additional mammogram done with ultrasound for better view in November 2019, please call the office to schedule this.

## 2019-07-08 NOTE — PROGRESS NOTES
Left message for patient a message will be sent via My Chart. Patient has already had additional views recommended 6 month follow up.

## 2019-07-09 ENCOUNTER — HOSPITAL ENCOUNTER (INPATIENT)
Age: 59
LOS: 5 days | Discharge: HOME HEALTH | DRG: 720 | End: 2019-07-15
Attending: EMERGENCY MEDICINE | Admitting: INTERNAL MEDICINE
Payer: COMMERCIAL

## 2019-07-09 ENCOUNTER — APPOINTMENT (OUTPATIENT)
Dept: CT IMAGING | Age: 59
DRG: 720 | End: 2019-07-09
Attending: STUDENT IN AN ORGANIZED HEALTH CARE EDUCATION/TRAINING PROGRAM
Payer: COMMERCIAL

## 2019-07-09 DIAGNOSIS — N12 EMPHYSEMATOUS PYELITIS: Primary | ICD-10-CM

## 2019-07-09 DIAGNOSIS — R10.84 ABDOMINAL PAIN, GENERALIZED: ICD-10-CM

## 2019-07-09 LAB
ALBUMIN SERPL-MCNC: 3.3 G/DL (ref 3.4–5)
ALBUMIN/GLOB SERPL: 0.8 {RATIO} (ref 0.8–1.7)
ALP SERPL-CCNC: 93 U/L (ref 45–117)
ALT SERPL-CCNC: 15 U/L (ref 13–56)
ANION GAP SERPL CALC-SCNC: 9 MMOL/L (ref 3–18)
APPEARANCE UR: CLEAR
AST SERPL-CCNC: 10 U/L (ref 15–37)
BACTERIA URNS QL MICRO: ABNORMAL /HPF
BASOPHILS # BLD: 0 K/UL (ref 0–0.1)
BASOPHILS NFR BLD: 0 % (ref 0–2)
BILIRUB SERPL-MCNC: 0.7 MG/DL (ref 0.2–1)
BILIRUB UR QL: NEGATIVE
BUN SERPL-MCNC: 24 MG/DL (ref 7–18)
BUN/CREAT SERPL: 22 (ref 12–20)
CALCIUM SERPL-MCNC: 9 MG/DL (ref 8.5–10.1)
CHLORIDE SERPL-SCNC: 102 MMOL/L (ref 100–108)
CO2 SERPL-SCNC: 26 MMOL/L (ref 21–32)
COLOR UR: YELLOW
CREAT SERPL-MCNC: 1.09 MG/DL (ref 0.6–1.3)
DIFFERENTIAL METHOD BLD: ABNORMAL
EOSINOPHIL # BLD: 0.1 K/UL (ref 0–0.4)
EOSINOPHIL NFR BLD: 1 % (ref 0–5)
EPITH CASTS URNS QL MICRO: ABNORMAL /LPF (ref 0–5)
ERYTHROCYTE [DISTWIDTH] IN BLOOD BY AUTOMATED COUNT: 12 % (ref 11.6–14.5)
GLOBULIN SER CALC-MCNC: 4.1 G/DL (ref 2–4)
GLUCOSE SERPL-MCNC: 195 MG/DL (ref 74–99)
GLUCOSE UR STRIP.AUTO-MCNC: >1000 MG/DL
HCT VFR BLD AUTO: 32.9 % (ref 35–45)
HGB BLD-MCNC: 11.6 G/DL (ref 12–16)
HGB UR QL STRIP: NEGATIVE
KETONES UR QL STRIP.AUTO: NEGATIVE MG/DL
LEUKOCYTE ESTERASE UR QL STRIP.AUTO: NEGATIVE
LYMPHOCYTES # BLD: 1.7 K/UL (ref 0.9–3.6)
LYMPHOCYTES NFR BLD: 16 % (ref 21–52)
MCH RBC QN AUTO: 31.6 PG (ref 24–34)
MCHC RBC AUTO-ENTMCNC: 35.3 G/DL (ref 31–37)
MCV RBC AUTO: 89.6 FL (ref 74–97)
MONOCYTES # BLD: 0.4 K/UL (ref 0.05–1.2)
MONOCYTES NFR BLD: 4 % (ref 3–10)
NEUTS SEG # BLD: 8.2 K/UL (ref 1.8–8)
NEUTS SEG NFR BLD: 79 % (ref 40–73)
NITRITE UR QL STRIP.AUTO: NEGATIVE
PH UR STRIP: 8 [PH] (ref 5–8)
PLATELET # BLD AUTO: 241 K/UL (ref 135–420)
PMV BLD AUTO: 11.1 FL (ref 9.2–11.8)
POTASSIUM SERPL-SCNC: 3.6 MMOL/L (ref 3.5–5.5)
PROT SERPL-MCNC: 7.4 G/DL (ref 6.4–8.2)
PROT UR STRIP-MCNC: ABNORMAL MG/DL
RBC # BLD AUTO: 3.67 M/UL (ref 4.2–5.3)
RBC #/AREA URNS HPF: ABNORMAL /HPF (ref 0–5)
SODIUM SERPL-SCNC: 137 MMOL/L (ref 136–145)
SP GR UR REFRACTOMETRY: 1.02 (ref 1–1.03)
UROBILINOGEN UR QL STRIP.AUTO: 1 EU/DL (ref 0.2–1)
WBC # BLD AUTO: 10.3 K/UL (ref 4.6–13.2)
WBC URNS QL MICRO: ABNORMAL /HPF (ref 0–4)

## 2019-07-09 PROCEDURE — 81001 URINALYSIS AUTO W/SCOPE: CPT

## 2019-07-09 PROCEDURE — 74011250636 HC RX REV CODE- 250/636: Performed by: STUDENT IN AN ORGANIZED HEALTH CARE EDUCATION/TRAINING PROGRAM

## 2019-07-09 PROCEDURE — 74177 CT ABD & PELVIS W/CONTRAST: CPT

## 2019-07-09 PROCEDURE — 85025 COMPLETE CBC W/AUTO DIFF WBC: CPT

## 2019-07-09 PROCEDURE — 80053 COMPREHEN METABOLIC PANEL: CPT

## 2019-07-09 PROCEDURE — 74011636320 HC RX REV CODE- 636/320: Performed by: EMERGENCY MEDICINE

## 2019-07-09 PROCEDURE — 96375 TX/PRO/DX INJ NEW DRUG ADDON: CPT

## 2019-07-09 PROCEDURE — 96365 THER/PROPH/DIAG IV INF INIT: CPT

## 2019-07-09 PROCEDURE — 99285 EMERGENCY DEPT VISIT HI MDM: CPT

## 2019-07-09 RX ORDER — ONDANSETRON 2 MG/ML
4 INJECTION INTRAMUSCULAR; INTRAVENOUS
Status: COMPLETED | OUTPATIENT
Start: 2019-07-09 | End: 2019-07-09

## 2019-07-09 RX ORDER — HALOPERIDOL 5 MG/ML
2 INJECTION INTRAMUSCULAR ONCE
Status: COMPLETED | OUTPATIENT
Start: 2019-07-09 | End: 2019-07-09

## 2019-07-09 RX ADMIN — HALOPERIDOL LACTATE 2 MG: 5 INJECTION INTRAMUSCULAR at 22:00

## 2019-07-09 RX ADMIN — IOPAMIDOL 80 ML: 612 INJECTION, SOLUTION INTRAVENOUS at 22:10

## 2019-07-09 RX ADMIN — ONDANSETRON 4 MG: 2 INJECTION INTRAMUSCULAR; INTRAVENOUS at 22:47

## 2019-07-10 ENCOUNTER — APPOINTMENT (OUTPATIENT)
Dept: GENERAL RADIOLOGY | Age: 59
DRG: 720 | End: 2019-07-10
Attending: NURSE PRACTITIONER
Payer: COMMERCIAL

## 2019-07-10 ENCOUNTER — HOSPITAL ENCOUNTER (OUTPATIENT)
Dept: ULTRASOUND IMAGING | Age: 59
Setting detail: OBSERVATION
Discharge: HOME | DRG: 720 | End: 2019-07-10
Attending: INTERNAL MEDICINE
Payer: COMMERCIAL

## 2019-07-10 ENCOUNTER — ANESTHESIA (OUTPATIENT)
Dept: SURGERY | Age: 59
DRG: 720 | End: 2019-07-10
Payer: COMMERCIAL

## 2019-07-10 ENCOUNTER — APPOINTMENT (OUTPATIENT)
Dept: GENERAL RADIOLOGY | Age: 59
DRG: 720 | End: 2019-07-10
Attending: UROLOGY
Payer: COMMERCIAL

## 2019-07-10 ENCOUNTER — ANESTHESIA EVENT (OUTPATIENT)
Dept: SURGERY | Age: 59
DRG: 720 | End: 2019-07-10
Payer: COMMERCIAL

## 2019-07-10 PROBLEM — N12 EMPHYSEMATOUS PYELITIS: Status: ACTIVE | Noted: 2019-07-10

## 2019-07-10 PROBLEM — N12 PYELONEPHRITIS: Status: ACTIVE | Noted: 2019-07-10

## 2019-07-10 LAB
APPEARANCE UR: CLEAR
BACTERIA URNS QL MICRO: ABNORMAL /HPF
BILIRUB UR QL: NEGATIVE
COLOR UR: YELLOW
EPITH CASTS URNS QL MICRO: ABNORMAL /LPF (ref 0–5)
EST. AVERAGE GLUCOSE BLD GHB EST-MCNC: 252 MG/DL
GLUCOSE BLD STRIP.AUTO-MCNC: 157 MG/DL (ref 70–110)
GLUCOSE BLD STRIP.AUTO-MCNC: 194 MG/DL (ref 70–110)
GLUCOSE BLD STRIP.AUTO-MCNC: 216 MG/DL (ref 70–110)
GLUCOSE BLD STRIP.AUTO-MCNC: 270 MG/DL (ref 70–110)
GLUCOSE BLD STRIP.AUTO-MCNC: 333 MG/DL (ref 70–110)
GLUCOSE BLD STRIP.AUTO-MCNC: 434 MG/DL (ref 70–110)
GLUCOSE BLD STRIP.AUTO-MCNC: 458 MG/DL (ref 70–110)
GLUCOSE UR STRIP.AUTO-MCNC: >1000 MG/DL
HBA1C MFR BLD: 10.4 % (ref 4.2–5.6)
HGB UR QL STRIP: ABNORMAL
KETONES UR QL STRIP.AUTO: 80 MG/DL
LACTATE SERPL-SCNC: 1.2 MMOL/L (ref 0.4–2)
LEUKOCYTE ESTERASE UR QL STRIP.AUTO: NEGATIVE
MUCOUS THREADS URNS QL MICRO: ABNORMAL /LPF
NITRITE UR QL STRIP.AUTO: NEGATIVE
PH UR STRIP: 5 [PH] (ref 5–8)
PROT UR STRIP-MCNC: NEGATIVE MG/DL
RBC #/AREA URNS HPF: ABNORMAL /HPF (ref 0–5)
SP GR UR REFRACTOMETRY: 1.03 (ref 1–1.03)
UROBILINOGEN UR QL STRIP.AUTO: 1 EU/DL (ref 0.2–1)
WBC URNS QL MICRO: ABNORMAL /HPF (ref 0–4)

## 2019-07-10 PROCEDURE — 74011250636 HC RX REV CODE- 250/636

## 2019-07-10 PROCEDURE — 74011250636 HC RX REV CODE- 250/636: Performed by: EMERGENCY MEDICINE

## 2019-07-10 PROCEDURE — 76010000154 HC OR TIME FIRST 0.5 HR: Performed by: UROLOGY

## 2019-07-10 PROCEDURE — 74420 UROGRAPHY RTRGR +-KUB: CPT

## 2019-07-10 PROCEDURE — 87186 SC STD MICRODIL/AGAR DIL: CPT

## 2019-07-10 PROCEDURE — 36415 COLL VENOUS BLD VENIPUNCTURE: CPT

## 2019-07-10 PROCEDURE — 99218 HC RM OBSERVATION: CPT

## 2019-07-10 PROCEDURE — 81001 URINALYSIS AUTO W/SCOPE: CPT

## 2019-07-10 PROCEDURE — 74011636637 HC RX REV CODE- 636/637: Performed by: INTERNAL MEDICINE

## 2019-07-10 PROCEDURE — 87086 URINE CULTURE/COLONY COUNT: CPT

## 2019-07-10 PROCEDURE — 74011250636 HC RX REV CODE- 250/636: Performed by: INTERNAL MEDICINE

## 2019-07-10 PROCEDURE — 74011000250 HC RX REV CODE- 250: Performed by: ANESTHESIOLOGY

## 2019-07-10 PROCEDURE — 96365 THER/PROPH/DIAG IV INF INIT: CPT

## 2019-07-10 PROCEDURE — 77030035230

## 2019-07-10 PROCEDURE — 87040 BLOOD CULTURE FOR BACTERIA: CPT

## 2019-07-10 PROCEDURE — 94640 AIRWAY INHALATION TREATMENT: CPT

## 2019-07-10 PROCEDURE — 74018 RADEX ABDOMEN 1 VIEW: CPT

## 2019-07-10 PROCEDURE — 77030034696 HC CATH URETH FOL 2W BARD -A: Performed by: UROLOGY

## 2019-07-10 PROCEDURE — 76770 US EXAM ABDO BACK WALL COMP: CPT

## 2019-07-10 PROCEDURE — 77030019927 HC TBNG IRR CYSTO BAXT -A: Performed by: UROLOGY

## 2019-07-10 PROCEDURE — 77030020268 HC MISC GENERAL SUPPLY: Performed by: UROLOGY

## 2019-07-10 PROCEDURE — 76210000006 HC OR PH I REC 0.5 TO 1 HR: Performed by: UROLOGY

## 2019-07-10 PROCEDURE — 77030032490 HC SLV COMPR SCD KNE COVD -B: Performed by: UROLOGY

## 2019-07-10 PROCEDURE — 74011250636 HC RX REV CODE- 250/636: Performed by: UROLOGY

## 2019-07-10 PROCEDURE — 74011250637 HC RX REV CODE- 250/637: Performed by: INTERNAL MEDICINE

## 2019-07-10 PROCEDURE — 74011250637 HC RX REV CODE- 250/637: Performed by: HOSPITALIST

## 2019-07-10 PROCEDURE — 74011636320 HC RX REV CODE- 636/320: Performed by: UROLOGY

## 2019-07-10 PROCEDURE — 77030018832 HC SOL IRR H20 ICUM -A: Performed by: UROLOGY

## 2019-07-10 PROCEDURE — BT1F1ZZ FLUOROSCOPY OF LEFT KIDNEY, URETER AND BLADDER USING LOW OSMOLAR CONTRAST: ICD-10-PCS | Performed by: UROLOGY

## 2019-07-10 PROCEDURE — C1769 GUIDE WIRE: HCPCS | Performed by: UROLOGY

## 2019-07-10 PROCEDURE — 65270000029 HC RM PRIVATE

## 2019-07-10 PROCEDURE — 87077 CULTURE AEROBIC IDENTIFY: CPT

## 2019-07-10 PROCEDURE — 74011636637 HC RX REV CODE- 636/637: Performed by: HOSPITALIST

## 2019-07-10 PROCEDURE — 82962 GLUCOSE BLOOD TEST: CPT

## 2019-07-10 PROCEDURE — C2617 STENT, NON-COR, TEM W/O DEL: HCPCS | Performed by: UROLOGY

## 2019-07-10 PROCEDURE — 74011250636 HC RX REV CODE- 250/636: Performed by: ANESTHESIOLOGY

## 2019-07-10 PROCEDURE — 76060000031 HC ANESTHESIA FIRST 0.5 HR: Performed by: UROLOGY

## 2019-07-10 PROCEDURE — 83605 ASSAY OF LACTIC ACID: CPT

## 2019-07-10 PROCEDURE — 0T778DZ DILATION OF LEFT URETER WITH INTRALUMINAL DEVICE, VIA NATURAL OR ARTIFICIAL OPENING ENDOSCOPIC: ICD-10-PCS | Performed by: UROLOGY

## 2019-07-10 PROCEDURE — 77030020782 HC GWN BAIR PAWS FLX 3M -B: Performed by: UROLOGY

## 2019-07-10 PROCEDURE — 77030010545: Performed by: UROLOGY

## 2019-07-10 PROCEDURE — 83036 HEMOGLOBIN GLYCOSYLATED A1C: CPT

## 2019-07-10 DEVICE — URETERAL STENT
Type: IMPLANTABLE DEVICE | Site: URETER | Status: FUNCTIONAL
Brand: POLARIS™ ULTRA

## 2019-07-10 RX ORDER — ALBUTEROL SULFATE 0.83 MG/ML
2.5 SOLUTION RESPIRATORY (INHALATION)
Status: DISCONTINUED | OUTPATIENT
Start: 2019-07-10 | End: 2019-07-15 | Stop reason: HOSPADM

## 2019-07-10 RX ORDER — MIDAZOLAM HYDROCHLORIDE 1 MG/ML
INJECTION, SOLUTION INTRAMUSCULAR; INTRAVENOUS AS NEEDED
Status: DISCONTINUED | OUTPATIENT
Start: 2019-07-10 | End: 2019-07-10 | Stop reason: HOSPADM

## 2019-07-10 RX ORDER — FENTANYL CITRATE 50 UG/ML
25 INJECTION, SOLUTION INTRAMUSCULAR; INTRAVENOUS AS NEEDED
Status: DISCONTINUED | OUTPATIENT
Start: 2019-07-10 | End: 2019-07-11

## 2019-07-10 RX ORDER — FLUTICASONE PROPIONATE 50 MCG
2 SPRAY, SUSPENSION (ML) NASAL DAILY
Status: DISCONTINUED | OUTPATIENT
Start: 2019-07-10 | End: 2019-07-15 | Stop reason: HOSPADM

## 2019-07-10 RX ORDER — PANTOPRAZOLE SODIUM 40 MG/1
40 TABLET, DELAYED RELEASE ORAL
Status: DISCONTINUED | OUTPATIENT
Start: 2019-07-10 | End: 2019-07-15 | Stop reason: HOSPADM

## 2019-07-10 RX ORDER — ENOXAPARIN SODIUM 100 MG/ML
40 INJECTION SUBCUTANEOUS EVERY 24 HOURS
Status: DISCONTINUED | OUTPATIENT
Start: 2019-07-10 | End: 2019-07-15 | Stop reason: HOSPADM

## 2019-07-10 RX ORDER — TAMSULOSIN HYDROCHLORIDE 0.4 MG/1
0.4 CAPSULE ORAL DAILY
Status: DISCONTINUED | OUTPATIENT
Start: 2019-07-10 | End: 2019-07-10

## 2019-07-10 RX ORDER — DEXTROSE MONOHYDRATE 100 MG/ML
125-250 INJECTION, SOLUTION INTRAVENOUS AS NEEDED
Status: DISCONTINUED | OUTPATIENT
Start: 2019-07-10 | End: 2019-07-15 | Stop reason: HOSPADM

## 2019-07-10 RX ORDER — PROPOFOL 10 MG/ML
INJECTION, EMULSION INTRAVENOUS AS NEEDED
Status: DISCONTINUED | OUTPATIENT
Start: 2019-07-10 | End: 2019-07-10 | Stop reason: HOSPADM

## 2019-07-10 RX ORDER — INSULIN LISPRO 100 [IU]/ML
INJECTION, SOLUTION INTRAVENOUS; SUBCUTANEOUS
Status: DISCONTINUED | OUTPATIENT
Start: 2019-07-10 | End: 2019-07-15 | Stop reason: HOSPADM

## 2019-07-10 RX ORDER — PRAVASTATIN SODIUM 20 MG/1
40 TABLET ORAL
Status: DISCONTINUED | OUTPATIENT
Start: 2019-07-10 | End: 2019-07-15 | Stop reason: HOSPADM

## 2019-07-10 RX ORDER — INSULIN LISPRO 100 [IU]/ML
INJECTION, SOLUTION INTRAVENOUS; SUBCUTANEOUS ONCE
Status: ACTIVE | OUTPATIENT
Start: 2019-07-10 | End: 2019-07-11

## 2019-07-10 RX ORDER — LEVOFLOXACIN 5 MG/ML
750 INJECTION, SOLUTION INTRAVENOUS ONCE
Status: DISCONTINUED | OUTPATIENT
Start: 2019-07-10 | End: 2019-07-10

## 2019-07-10 RX ORDER — SODIUM CHLORIDE 0.9 % (FLUSH) 0.9 %
5-40 SYRINGE (ML) INJECTION AS NEEDED
Status: DISCONTINUED | OUTPATIENT
Start: 2019-07-10 | End: 2019-07-11

## 2019-07-10 RX ORDER — BUDESONIDE AND FORMOTEROL FUMARATE DIHYDRATE 160; 4.5 UG/1; UG/1
1 AEROSOL RESPIRATORY (INHALATION)
Status: DISCONTINUED | OUTPATIENT
Start: 2019-07-10 | End: 2019-07-15 | Stop reason: HOSPADM

## 2019-07-10 RX ORDER — MAGNESIUM SULFATE 100 %
4 CRYSTALS MISCELLANEOUS AS NEEDED
Status: DISCONTINUED | OUTPATIENT
Start: 2019-07-10 | End: 2019-07-11

## 2019-07-10 RX ORDER — IBUPROFEN 200 MG
1 TABLET ORAL DAILY
Status: DISCONTINUED | OUTPATIENT
Start: 2019-07-10 | End: 2019-07-15 | Stop reason: HOSPADM

## 2019-07-10 RX ORDER — INSULIN GLARGINE 100 [IU]/ML
30 INJECTION, SOLUTION SUBCUTANEOUS
Status: DISCONTINUED | OUTPATIENT
Start: 2019-07-10 | End: 2019-07-10

## 2019-07-10 RX ORDER — GENTAMICIN SULFATE 80 MG/100ML
80 INJECTION, SOLUTION INTRAVENOUS EVERY 12 HOURS
Status: DISCONTINUED | OUTPATIENT
Start: 2019-07-10 | End: 2019-07-11

## 2019-07-10 RX ORDER — LORAZEPAM 0.5 MG/1
0.5 TABLET ORAL
Status: DISCONTINUED | OUTPATIENT
Start: 2019-07-10 | End: 2019-07-15 | Stop reason: HOSPADM

## 2019-07-10 RX ORDER — FENTANYL CITRATE 50 UG/ML
INJECTION, SOLUTION INTRAMUSCULAR; INTRAVENOUS AS NEEDED
Status: DISCONTINUED | OUTPATIENT
Start: 2019-07-10 | End: 2019-07-10 | Stop reason: HOSPADM

## 2019-07-10 RX ORDER — METOCLOPRAMIDE 5 MG/1
10 TABLET ORAL
Status: DISCONTINUED | OUTPATIENT
Start: 2019-07-10 | End: 2019-07-15 | Stop reason: HOSPADM

## 2019-07-10 RX ORDER — ACETAMINOPHEN 325 MG/1
650 TABLET ORAL
Status: DISCONTINUED | OUTPATIENT
Start: 2019-07-10 | End: 2019-07-10

## 2019-07-10 RX ORDER — LEVOFLOXACIN 5 MG/ML
500 INJECTION, SOLUTION INTRAVENOUS EVERY 24 HOURS
Status: DISCONTINUED | OUTPATIENT
Start: 2019-07-11 | End: 2019-07-11

## 2019-07-10 RX ORDER — SODIUM CHLORIDE 9 MG/ML
INJECTION, SOLUTION INTRAVENOUS
Status: DISCONTINUED | OUTPATIENT
Start: 2019-07-10 | End: 2019-07-10 | Stop reason: HOSPADM

## 2019-07-10 RX ORDER — SODIUM CHLORIDE 0.9 % (FLUSH) 0.9 %
5-40 SYRINGE (ML) INJECTION EVERY 8 HOURS
Status: DISCONTINUED | OUTPATIENT
Start: 2019-07-10 | End: 2019-07-11

## 2019-07-10 RX ORDER — MAGNESIUM SULFATE 100 %
4 CRYSTALS MISCELLANEOUS AS NEEDED
Status: DISCONTINUED | OUTPATIENT
Start: 2019-07-10 | End: 2019-07-15 | Stop reason: HOSPADM

## 2019-07-10 RX ORDER — ALBUTEROL SULFATE 90 UG/1
2 AEROSOL, METERED RESPIRATORY (INHALATION)
Status: DISCONTINUED | OUTPATIENT
Start: 2019-07-10 | End: 2019-07-10 | Stop reason: CLARIF

## 2019-07-10 RX ORDER — KETOROLAC TROMETHAMINE 30 MG/ML
30 INJECTION, SOLUTION INTRAMUSCULAR; INTRAVENOUS
Status: DISPENSED | OUTPATIENT
Start: 2019-07-10 | End: 2019-07-13

## 2019-07-10 RX ORDER — GABAPENTIN 300 MG/1
600 CAPSULE ORAL
Status: DISCONTINUED | OUTPATIENT
Start: 2019-07-10 | End: 2019-07-15 | Stop reason: HOSPADM

## 2019-07-10 RX ORDER — ACETAMINOPHEN 500 MG
1000 TABLET ORAL
Status: DISCONTINUED | OUTPATIENT
Start: 2019-07-10 | End: 2019-07-15 | Stop reason: HOSPADM

## 2019-07-10 RX ORDER — SILODOSIN 8 MG/1
8 CAPSULE ORAL
Status: DISCONTINUED | OUTPATIENT
Start: 2019-07-10 | End: 2019-07-15 | Stop reason: HOSPADM

## 2019-07-10 RX ORDER — ONDANSETRON 2 MG/ML
INJECTION INTRAMUSCULAR; INTRAVENOUS AS NEEDED
Status: DISCONTINUED | OUTPATIENT
Start: 2019-07-10 | End: 2019-07-10 | Stop reason: HOSPADM

## 2019-07-10 RX ORDER — INSULIN GLARGINE 100 [IU]/ML
30 INJECTION, SOLUTION SUBCUTANEOUS DAILY
Status: DISCONTINUED | OUTPATIENT
Start: 2019-07-10 | End: 2019-07-15 | Stop reason: HOSPADM

## 2019-07-10 RX ORDER — INSULIN LISPRO 100 [IU]/ML
INJECTION, SOLUTION INTRAVENOUS; SUBCUTANEOUS ONCE
Status: DISCONTINUED | OUTPATIENT
Start: 2019-07-10 | End: 2019-07-10 | Stop reason: HOSPADM

## 2019-07-10 RX ORDER — DEXTROSE 50 % IN WATER (D50W) INTRAVENOUS SYRINGE
25-50 AS NEEDED
Status: DISCONTINUED | OUTPATIENT
Start: 2019-07-10 | End: 2019-07-11

## 2019-07-10 RX ORDER — SODIUM CHLORIDE 9 MG/ML
60 INJECTION, SOLUTION INTRAVENOUS CONTINUOUS
Status: DISCONTINUED | OUTPATIENT
Start: 2019-07-10 | End: 2019-07-15 | Stop reason: HOSPADM

## 2019-07-10 RX ADMIN — INSULIN LISPRO 9 UNITS: 100 INJECTION, SOLUTION INTRAVENOUS; SUBCUTANEOUS at 15:40

## 2019-07-10 RX ADMIN — TIOTROPIUM BROMIDE 18 MCG: 18 CAPSULE ORAL; RESPIRATORY (INHALATION) at 08:42

## 2019-07-10 RX ADMIN — GABAPENTIN 600 MG: 300 CAPSULE ORAL at 21:10

## 2019-07-10 RX ADMIN — INSULIN LISPRO 8 UNITS: 100 INJECTION, SOLUTION INTRAVENOUS; SUBCUTANEOUS at 12:35

## 2019-07-10 RX ADMIN — SODIUM CHLORIDE 100 ML/HR: 900 INJECTION, SOLUTION INTRAVENOUS at 16:13

## 2019-07-10 RX ADMIN — SODIUM CHLORIDE 100 ML/HR: 900 INJECTION, SOLUTION INTRAVENOUS at 21:31

## 2019-07-10 RX ADMIN — GENTAMICIN SULFATE 80 MG: 80 INJECTION, SOLUTION INTRAVENOUS at 11:53

## 2019-07-10 RX ADMIN — ACETAMINOPHEN 650 MG: 325 TABLET ORAL at 06:48

## 2019-07-10 RX ADMIN — ACETAMINOPHEN 1000 MG: 500 TABLET ORAL at 22:24

## 2019-07-10 RX ADMIN — PRAVASTATIN SODIUM 40 MG: 20 TABLET ORAL at 21:09

## 2019-07-10 RX ADMIN — INSULIN GLARGINE 30 UNITS: 100 INJECTION, SOLUTION SUBCUTANEOUS at 07:01

## 2019-07-10 RX ADMIN — LEVOFLOXACIN 750 MG: 5 INJECTION, SOLUTION INTRAVENOUS at 01:30

## 2019-07-10 RX ADMIN — BUDESONIDE AND FORMOTEROL FUMARATE DIHYDRATE 1 PUFF: 160; 4.5 AEROSOL RESPIRATORY (INHALATION) at 21:08

## 2019-07-10 RX ADMIN — INSULIN LISPRO 10 UNITS: 100 INJECTION, SOLUTION INTRAVENOUS; SUBCUTANEOUS at 06:51

## 2019-07-10 RX ADMIN — ONDANSETRON 4 MG: 2 INJECTION INTRAMUSCULAR; INTRAVENOUS at 17:14

## 2019-07-10 RX ADMIN — FENTANYL CITRATE 50 MCG: 50 INJECTION, SOLUTION INTRAMUSCULAR; INTRAVENOUS at 17:31

## 2019-07-10 RX ADMIN — PROPOFOL 100 MG: 10 INJECTION, EMULSION INTRAVENOUS at 17:12

## 2019-07-10 RX ADMIN — SODIUM CHLORIDE 100 ML/HR: 900 INJECTION, SOLUTION INTRAVENOUS at 03:06

## 2019-07-10 RX ADMIN — FENTANYL CITRATE 50 MCG: 50 INJECTION, SOLUTION INTRAMUSCULAR; INTRAVENOUS at 17:13

## 2019-07-10 RX ADMIN — GABAPENTIN 600 MG: 300 CAPSULE ORAL at 03:03

## 2019-07-10 RX ADMIN — SODIUM CHLORIDE: 9 INJECTION, SOLUTION INTRAVENOUS at 17:19

## 2019-07-10 RX ADMIN — GENTAMICIN SULFATE 80 MG: 80 INJECTION, SOLUTION INTRAVENOUS at 23:42

## 2019-07-10 RX ADMIN — MIDAZOLAM HYDROCHLORIDE 2 MG: 1 INJECTION, SOLUTION INTRAMUSCULAR; INTRAVENOUS at 17:12

## 2019-07-10 RX ADMIN — PRAVASTATIN SODIUM 40 MG: 20 TABLET ORAL at 03:03

## 2019-07-10 RX ADMIN — Medication 10 ML: at 21:20

## 2019-07-10 RX ADMIN — FAMOTIDINE: 10 INJECTION INTRAVENOUS at 16:19

## 2019-07-10 RX ADMIN — BUDESONIDE AND FORMOTEROL FUMARATE DIHYDRATE 1 PUFF: 160; 4.5 AEROSOL RESPIRATORY (INHALATION) at 08:41

## 2019-07-10 RX ADMIN — PANTOPRAZOLE SODIUM 40 MG: 40 TABLET, DELAYED RELEASE ORAL at 06:48

## 2019-07-10 RX ADMIN — INSULIN LISPRO 3 UNITS: 100 INJECTION, SOLUTION INTRAVENOUS; SUBCUTANEOUS at 22:00

## 2019-07-10 NOTE — ED PROVIDER NOTES
62-year-old female with past medical history of emphysema, type 1 diabetes, GERD presented with chief complaint of abdominal pain and nausea vomiting. Patient states this began experiencing left lower quadrant abdominal pain that radiates to the back with multiple episodes of nonbloody nonbilious emesis earlier this afternoon. Reports one episode of loose stool that was nonbloody as well. States the pain is worse when she lays down. Denies any fevers, chest pain, difficulty breathing.            Past Medical History:   Diagnosis Date    Arthritis     Autonomic neuropathy     Back pain     Chronic lung disease     Chronic pain     back    Compression fracture T9-10    COPD [facility]ch[facility] obstructive [facility]) ([facility])     Depression     panic attacks    Diabetes mellitus ([facility])     type 1    GERD (gastroesophageal reflux disease)     Hypertension     no longer on meds    MRSA nasal colonization     Neuropathy     Osteoporosis     Tobacco use        Past Surgical History:   Procedure Laterality Date    HX BACK SURGERY  2009    HX DILATION AND CURETTAGE      HX HYSTERECTOMY  2002         Family History:   Problem Relation Age of Onset    Thyroid Disease Mother     Stroke Mother     Arthritis-rheumatoid Mother     Osteoporosis Mother     Diabetes Father     Kidney Disease Father     Tip's Disease Father     Arthritis-rheumatoid Sister     Diabetes Brother     Colon Cancer Maternal Grandmother        Social History     Socioeconomic History    Marital status:      Spouse name: Not on file    Number of children: Not on file    Years of education: Not on file    Highest education level: Not on file   Occupational History    Not on file   Social Needs    Financial resource strain: Not on file    Food insecurity:     Worry: Not on file     Inability: Not on file    Transportation needs:     Medical: Not on file     Non-medical: Not on file   Tobacco Use    Smoking status: Current Every Day Smoker     Packs/day: 1.00     Years: 43.00     Pack years: 43.00     Types: Cigarettes     Start date: 7/27/1976    Smokeless tobacco: Never Used    Tobacco comment: on Chantix   Substance and Sexual Activity    Alcohol use: No    Drug use: No    Sexual activity: Not Currently   Lifestyle    Physical activity:     Days per week: Not on file     Minutes per session: Not on file    Stress: Not on file   Relationships    Social connections:     Talks on phone: Not on file     Gets together: Not on file     Attends [employer] service: Not on file     Active member of club or organization: Not on file     Attends meetings of clubs or organizations: Not on file     Relationship status: Not on file    Intimate partner violence:     Fear of current or ex partner: Not on file     Emotionally abused: Not on file     Physically abused: Not on file     Forced sexual activity: Not on file   Other Topics Concern    Not on file   Social History Narrative    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 Denies any history chemical and or asbestos exposure          ALLERGIES: Penicillin g; Bactrim [sulfamethoprim]; Dilaudid [hydromorphone (bulk)]; Morphine; and Pcn [penicillins]    Review of Systems   Constitutional: Negative for chills and fatigue. HENT: Negative for sinus pain. Eyes: Negative for visual disturbance. Respiratory: Negative for shortness of breath. Cardiovascular: Negative for chest pain. Gastrointestinal: Positive for abdominal pain, diarrhea, nausea and vomiting. Genitourinary: Negative for dysuria and hematuria. Musculoskeletal: Positive for back pain. Skin: Negative for rash. Neurological: Negative for numbness. Vitals:    07/09/19 1900   BP: 145/68   Pulse: 77   Resp: 20   Temp: 98.9 °F (37.2 °C)   SpO2: 96%            Physical Exam   Constitutional: She is oriented to person, place, and time. She appears well-developed.  She appears distressed (Mild secondary to pain).   HENT:   Head: Normocephalic and atraumatic. Eyes: Pupils are equal, round, and reactive to light. EOM are normal.   Cardiovascular: Normal rate, regular rhythm, normal heart sounds and intact distal pulses. Pulmonary/Chest: Effort normal and breath sounds normal.   Abdominal: Soft. Normal appearance and bowel sounds are normal. She exhibits no distension. There is tenderness in the left lower quadrant. There is no rigidity, no rebound and no guarding. Neurological: She is alert and oriented to person, place, and time. Skin: Skin is warm and dry. Capillary refill takes less than 2 seconds. Psychiatric: She has a normal mood and affect. Nursing note and vitals reviewed. MDM  Number of Diagnoses or Management Options  Diagnosis management comments: 22-year-old female with past medical history of emphysema, type 1 diabetes presenting with chief complaint of left lower quadrant abdominal pain and nonbloody emesis with one episode of nonbloody loose stools. .  Vital signs stable in mild distress secondary to pain, nontoxic appearing. Physical exam significant for a soft nondistended abdomen with tenderness to palpation of the left lower quadrant without rebound guarding, no peritoneal findings. No CVA tenderness. Lungs clear to auscultation bilaterally. Lab work showing no evidence of leukocytosis, no evidence of anemia elevated blood sugar around 200, however no anion gap concerning for DKA. No right lower quadrant tenderness concerning for appendicitis. No right upper quadrant tenderness concerning for cholangitis cholecystitis. UA showing no evidence of urinary tract infection, no blood present. Patient has been taking antibiotics for UTI recently, however given small volume of only one episode of loose stools, less concerning for C. difficile infection. Ordering CT abdomen pelvis to evaluate for possible diverticulosis. 11:15 PM  -Care turned over to Dr. Chi Ray. Procedures

## 2019-07-10 NOTE — PROGRESS NOTES
Albuterol 2.5 mg nebulizer was therapeutically interchanged for albuterol 90 mcg inhaler per the P&T Committee approved Therapeutic Interchanges Policy.      Adonay Villalobos RPH, Pharmacist  7/10/2019 2:25 AM

## 2019-07-10 NOTE — PERIOP NOTES
TRANSFER - OUT REPORT:    Verbal report given to [address] on Herman Arlelano  being transferred to [facility] for routine post - op       Report consisted of patients Situation, Background, Assessment and   Recommendations(SBAR). Information from the following report(s) SBAR, Procedure Summary, Intake/Output, MAR and Recent Results was reviewed with the receiving nurse. Lines:   Peripheral IV 07/09/19 Left Antecubital (Active)   Site Assessment Clean, dry, & intact 7/10/2019  5:44 PM   Phlebitis Assessment 0 7/10/2019  5:44 PM   Infiltration Assessment 0 7/10/2019  5:44 PM   Dressing Status Clean, dry, & intact 7/10/2019  5:44 PM   Dressing Type Transparent 7/10/2019  5:44 PM   Hub Color/Line Status Pink; Infusing;Patent 7/10/2019  5:44 PM   Action Taken Open ports on tubing capped 7/10/2019  4:11 AM   Alcohol Cap Used Yes 7/10/2019 10:52 AM        Opportunity for questions and clarification was provided.       Patient transported with:   Registered Nurse

## 2019-07-10 NOTE — PROGRESS NOTES
Reason for Admission:  Emphysematous pyelitis [N12]  Pyelonephritis [N12]                 RRAT Score:    20            Plan for utilizing home health:    no                      Likelihood of Readmission:   Moderate                         Do you (patient/family) have any concerns for transition/discharge?  no    Transition of Care Plan:       Initial assessment completed with patient. Cognitive status of patient: oriented to time, place, person and situation. Face sheet information confirmed:  yes. The patient designates Daughter Stacy You to participate in her discharge plan and to receive any needed information. This patient lives in a multifamily building with patient and son and mother. Patient is not able to navigate steps as needed. Prior to hospitalization, patient was considered to be independent with ADLs/IADLS no.  PT  STATES SHE HOLDS ON TO WALLS OR DOOR HANDLES OR RAILS TO WALK. .     Patient has a current ACP document on file: no  The patient and son will be available to transport patient home upon discharge. The patient already has none reported medical equipment available in the home. Patient is not currently active with home health. Patient has not stayed in a skilled nursing facility or rehab. Was  stay within last 60 days : no. This patient is on dialysis :no         Freedom of choice signed: no.. Currently, the discharge plan is Home. The patient states that she can obtain her medications from the pharmacy, and take her medications as directed. Patient's current insurance is [employer] [facility] Management Interventions  PCP Verified by CM: Yes  Last Visit to PCP: 04/10/20  Mode of Transport at Discharge: Self  Transition of Care Consult (CM Consult):  Other(Home )  Current Support Network: Relative's Home  Confirm Follow Up Transport: Family  Plan discussed with Pt/Family/Caregiver: Yes  Discharge Location  Discharge Placement: [address] Felipe RN  Care Manager  [phone]

## 2019-07-10 NOTE — PROGRESS NOTES
Observation notice provided in writing to patient and/or caregiver as well as verbal explanation of the policy. Patients who are in outpatient status also receive the Observation notice [place] and state Observation forms signed by patient.       Airam Araujo RN  Care Manager  [phone]

## 2019-07-10 NOTE — PERIOP NOTES
TRANSFER - IN REPORT:    Verbal report received from [facility]) on Jeff Buckner  being received from [facility](unit) for routine progression of care      Report consisted of patients Situation, Background, Assessment and   Recommendations(SBAR). Information from the following report(s) SBAR was reviewed with the receiving nurse. Opportunity for questions and clarification was provided. Assessment completed upon patients arrival to unit and care assumed.

## 2019-07-10 NOTE — CONSULTS
[address], [address]                                                      Phone: [phone]  Urology Consult Note             1. Emphysematous pyelitis    2. Abdominal pain, generalized          Assessment & Plan   Assessment  Left Emphysematous Pyelitis. T max 100.8  Left Hydro. Creat 1.09  (baseline 0.8)  Recurrent UTI- Ucx 6/3/19 & 5/12/19 w/ >100k E.coli   ? Cystocele  Incomplete bladder emptying  Dysfunctional voiding  LOPEZ  DM    Plan:  Maintain NPO Status  Plan for possible OR for cysto, left RPG and Left JJ stent today with Dr. Goldie Miller. KUB Stat to eval for any delay in contrast excretion  LUMA ordered by primary and pending  Urine Cx ordered. Abx per primary currently on Levaquin. Will also add Gent per Dr. Jose R Grace recommendation  Nursing to place martinez now- ordered  Maintain martinez until afebrile x 24 hours  IVF and symptom management per primary- appreciate assistance in managing patient  Follow Up arranged: NO. Will have patient f/up with Dr. Skyler Mcghee as oupt. Following closely      Daniel Middleton. Barb Garcia [facility]  [facility] [place]  Pager # [phone]    Available M-F 7:30- 5pm .  After 5pm and weekends please call answering service at [phone]         Chief Complaint   Patient presents with    Abdominal Pain    Vomiting         HISTORY OF PRESENT ILLNESS:    Francois Connor is a 66 y.o. female who is seen in consultation as referred by Dr. Barb Garcia  for Emphysematous Pyelitis. Patient has not been seen by a urologist and is not currently receiving urological care. Urological history is significant for recurrent UTI.      HPI: Patient originally presented to the ER on 7/9/19 with c/o left lower abdominal pain, nausea and vomiting. Patient states pain radiates to her back and is worse with lying down. Labs were significant for WBC 10.3, Hgb 11.6.  Creat 1.09, UA 1+ Bacteria, 1-3 WBC's. 1+ Epithelial cells. Urine Culture not performed. CT was performed and showed Abnormal left kidney with hydronephrosis, air in the collecting system, diminished enhancement and perinephric fat stranding. CT findings are highly  suggestive of emphysematous pyelitis with mild obstructive uropathy. Small volume air in the bladder lumen also likely related to suspected left urinary tract infection. . Patient is currently febrile. Other VSS. Patient reports Pain is better however still there. Reports last drank water late last night, hasn't had anything to drink since. States she hasn't eaten in 2 days due to vomiting. Currently denies gross hematuria or dysuria. Reports seeing a Uro gynecologist in June for her cystocele and incomplete bladder emptying. Her MD office is now closed and she is looking for someone else. Reports baseline urinary symptoms including frequency, urgency, Mixed UI requiring 6 PPD, and Nocturia x 1. Reports double voiding, straining to try to empty as well as sensation of incomplete bladder emptying. No recent martinez or  instrumentation. Location  tract  Duration hours   Associated signs/symptoms as above  Modifying factors as above  Severity moderate        No flowsheet data found.       Past Medical History:   Diagnosis Date    Arthritis     Autonomic neuropathy     Back pain     Chronic lung disease     Chronic pain     back    Compression fracture T9-10    COPD (chronic obstructive pulmonary disease) (HCC)     Depression     panic attacks    Diabetes mellitus (HCC)     type 1    GERD (gastroesophageal reflux disease)     Hypertension     no longer on meds    MRSA nasal colonization     Neuropathy     Osteoporosis     Tobacco use        Past Surgical History:   Procedure Laterality Date    HX BACK SURGERY  2009    HX DILATION AND CURETTAGE      HX HYSTERECTOMY  2002       Social History     Tobacco Use    Smoking status: Current Every Day Smoker     Packs/day: 1.00 Years: 43.00     Pack years: 43.00     Types: Cigarettes     Start date: 7/27/1976    Smokeless tobacco: Never Used    Tobacco comment: on Chantix   Substance Use Topics    Alcohol use: No    Drug use: No       Allergies   Allergen Reactions    Penicillin G Anaphylaxis    Bactrim [Sulfamethoprim] Hives and Rash    Dilaudid [Hydromorphone (Bulk)] Other (comments)     Patient was confused for 5 days. Did not know who she was.     Morphine Itching    Pcn [Penicillins] Swelling       Family History   Problem Relation Age of Onset    Thyroid Disease Mother     Stroke Mother    Velta Ganser Arthritis-rheumatoid Mother     Osteoporosis Mother     Diabetes Father     Kidney Disease Father     Tip's Disease Father     Arthritis-rheumatoid Sister     Diabetes Brother     Colon Cancer Maternal Grandmother        Current Facility-Administered Medications   Medication Dose Route Frequency Provider Last Rate Last Dose    budesonide-formoterol (SYMBICORT) 160-4.5 mcg/actuation HFA inhaler 1 Puff  1 Puff Inhalation BID RT Jodie Pineda MD   1 Puff at 07/10/19 0841    fluticasone propionate (FLONASE) 50 mcg/actuation nasal spray 2 Spray  2 Spray Both Nostrils DAILY Jodie Pineda MD        gabapentin (NEURONTIN) capsule 600 mg  600 mg Oral QHS Jodie Pineda MD   600 mg at 07/10/19 0303    LORazepam (ATIVAN) tablet 0.5 mg  0.5 mg Oral Q8H PRN Jodie Pineda MD        pantoprazole (PROTONIX) tablet 40 mg  40 mg Oral ACB Jodie Pineda MD   40 mg at 07/10/19 0648    pravastatin (PRAVACHOL) tablet 40 mg  40 mg Oral QHS Jodie Pineda MD   40 mg at 07/10/19 0303    tiotropium (SPIRIVA) inhalation capsule 18 mcg  1 Cap Inhalation QAM RT Jodie Pineda MD   18 mcg at 07/10/19 0842    0.9% sodium chloride infusion  100 mL/hr IntraVENous CONTINUOUS Jodie Pineda  mL/hr at 07/10/19 0306 100 mL/hr at 07/10/19 0306    metoclopramide HCl (REGLAN) tablet 10 mg  10 mg Oral Q6H PRN Mendoza Pineda MD        enoxaparin (LOVENOX) injection 40 mg  40 mg SubCUTAneous Q24H Jodie Pineda MD   Stopped at 07/10/19 0300    albuterol (PROVENTIL VENTOLIN) nebulizer solution 2.5 mg  2.5 mg Nebulization Q4H PRN Jodie Pineda MD        [START ON 7/11/2019] levoFLOXacin (LEVAQUIN) 500 mg in D5W IVPB  500 mg IntraVENous Q24H Jodie Pineda MD        insulin lispro (HUMALOG) injection   SubCUTAneous AC&HS Jodie Pineda MD   10 Units at 07/10/19 0651    glucose chewable tablet 16 g  4 Tab Oral PRN Jodie Pineda MD        glucagon (GLUCAGEN) injection 1 mg  1 mg IntraMUSCular PRN Jodie Pineda MD        dextrose 10% infusion 125-250 mL  125-250 mL IntraVENous PRN Jodie Pineda MD        ketorolac (TORADOL) injection 30 mg  30 mg IntraVENous Q6H PRN Jodie Pineda MD        nicotine (NICODERM CQ) 21 mg/24 hr patch 1 Patch  1 Patch TransDERmal DAILY Jodie Pineda MD        acetaminophen (TYLENOL) tablet 650 mg  650 mg Oral Q4H PRN Jodie Pineda MD   650 mg at 07/10/19 0648    insulin glargine (LANTUS) injection 30 Units  30 Units SubCUTAneous DAILY Jodie Pineda MD   30 Units at 07/10/19 0701       Review of Systems    A comprehensive review of systems was negative except for that written in the History of Present Illness. PHYSICAL EXAMINATION:   Visit Vitals  /66 (BP 1 Location: Right arm, BP Patient Position: At rest)   Pulse 92   Temp (!) 100.8 °F (38.2 °C) Comment: RN Informed   Resp 16   Wt 126 lb 12.8 oz (57.5 kg)   SpO2 95%   Breastfeeding? No   BMI 20.47 kg/m²       Constitutional: Well developed, well nourished female. No acute distress. HEENT: Normocephalic, Atraumatic  CV:  RRR   Pulm: No respiratory distress or difficulties breathing   GI:  LLQ abdominal pain to palpation. Suprapubic pain to palpation  :  Left CVA tenderness   Skin: No evidence of jaundice. Normal color  Neuro/Psych:  Alert and oriented. Affect appropriate. Lymphatic:    No inguinal lymphadenopathy  MSK: FROM         REVIEW OF LABS AND IMAGING:      Renal US pending    CT abd/ pelvis 7/9/19  IMPRESSION:     Abnormal left kidney with hydronephrosis, air in the collecting system,  diminished enhancement and perinephric fat stranding. CT findings are highly  suggestive of emphysematous pyelitis with mild obstructive uropathy.      Small volume air in the bladder lumen also likely related to suspected left  urinary tract infection.     Left para-aortic adenopathy. Likely reactive.     Mild intrahepatic duct dilatation of uncertain etiology.     Age indeterminant compression fracture of L5.             Labs: Results:   Chemistry    Recent Labs     07/09/19  1850   *      K 3.6      CO2 26   BUN 24*   CREA 1.09   CA 9.0   AGAP 9   BUCR 22*   AP 93   TP 7.4   ALB 3.3*   GLOB 4.1*   AGRAT 0.8      CBC w/Diff Recent Labs     07/09/19  1850   WBC 10.3   RBC 3.67*   HGB 11.6*   HCT 32.9*      GRANS 79*   LYMPH 16*   EOS 1      Cultures Recent Labs     07/10/19  0311 07/10/19  0301   CULT NO GROWTH AFTER 2 HOURS NO GROWTH AFTER 2 HOURS     All Micro Results     Procedure Component Value Units Date/Time    CULTURE, BLOOD [[id number]] Collected:  07/10/19 0301    Order Status:  Completed Specimen:  Blood Updated:  07/10/19 0754     Special Requests: NO SPECIAL REQUESTS        Culture result: NO GROWTH AFTER 2 HOURS       CULTURE, BLOOD [[id number]] Collected:  07/10/19 0311    Order Status:  Completed Specimen:  Blood Updated:  07/10/19 0754     Special Requests: NO SPECIAL REQUESTS        Culture result: NO GROWTH AFTER 2 HOURS               Urinalysis Color   Date Value Ref Range Status   07/09/2019 YELLOW   Final     Appearance   Date Value Ref Range Status   07/09/2019 CLEAR   Final     Specific gravity   Date Value Ref Range Status   07/09/2019 1.018 1.005 - 1.030   Final     pH (UA)   Date Value Ref Range Status   07/09/2019 8.0 5.0 - 8.0   Final     Protein   Date Value Ref Range Status   07/09/2019 TRACE (A) NEG mg/dL Final     Ketone   Date Value Ref Range Status   07/09/2019 NEGATIVE  NEG mg/dL Final     Bilirubin   Date Value Ref Range Status   07/09/2019 NEGATIVE  NEG   Final     Blood   Date Value Ref Range Status   07/09/2019 NEGATIVE  NEG   Final     Urobilinogen   Date Value Ref Range Status   07/09/2019 1.0 0.2 - 1.0 EU/dL Final     Nitrites   Date Value Ref Range Status   07/09/2019 NEGATIVE  NEG   Final     Leukocyte Esterase   Date Value Ref Range Status   07/09/2019 NEGATIVE  NEG   Final     Potassium   Date Value Ref Range Status   07/09/2019 3.6 3.5 - 5.5 mmol/L Final     Creatinine   Date Value Ref Range Status   07/09/2019 1.09 0.6 - 1.3 MG/DL Final     BUN   Date Value Ref Range Status   07/09/2019 24 (H) 7.0 - 18 MG/DL Final      PSA No results for input(s): PSA in the last 72 hours.    Coagulation No results found for: PTP, INR, APTT

## 2019-07-10 NOTE — DIABETES MGMT
GLYCEMIC CONTROL PLAN OF CARE    Assessment/Recommendations:  Blood glucose elevated above targets. Lantus insulin started today along with correctional Lispro insulin (very resistant scale). Monitor need to adjust daily to reach glucose targets. Most recent blood glucose values:  7/10/2019 06:35 7/10/2019 06:38 7/10/2019 11:13   434 (HH) 458 (HH) 333 (H)     Current A1C of 10.4% is equivalent to average blood glucose of 252 mg/dl over the past 2-3 months. (down from 12.5% on 5/12/19)    Current hospital diabetes medications:   Lantus insulin 30 units daily   Correctional Lispro insulin 4 times daily ACHS     Home diabetes medications:  Levemir insulin 18 units in the morning and 30 units in the evening  Novolog insulin per sliding scale    Diet:  NPO    Education: Pt not available at time of visit, will continue to follow      Shona Rowland RD, CDE

## 2019-07-10 NOTE — H&P
History & Physical    Patient: Harmony Maciel MRN: [id number]  CSN: [id number]    YOB: 1960  Age: 66 y.o. Sex: female      DOA: 7/9/2019    Chief Complaint:   Chief Complaint   Patient presents with    Abdominal Pain    Vomiting          HPI:       This is a 65-year-old  female with past medical history of emphysema, type 1 diabetes ( hbAIC 12.5% in May)  GERD, COPD, HTN  who presents with  abdominal pain and nausea vomiting. Patient reports Left lower abdominal pain with multiple episodes ( approx  6)  Non bloody non bilious vomitus associated with severe nausea. She denies any dysuria or hematuria. Patient has had recent UTI's-- in June and May due to a  bladder ans streee incontinence. Both cultures growing E.coli. Denies recent instrumentation. Urine analysis in ER-- negative. Reports one episode of loose stool that was nonbloody as well. States the pain is worse when she lays down. Denies any fevers, chest pain, difficulty breathing.     On my exam patient just received haldol and zofran and is pretty lethargic- not toxic appearing, just sleepy. Most of the history from records and d/w ER provider. CT scan:    Abnormal left kidney with hydronephrosis, air in the collecting system,  diminished enhancement and perinephric fat stranding. CT findings are highly  suggestive of emphysematous pyelitis with mild obstructive uropathy.      Small volume air in the bladder lumen also likely related to suspected left  urinary tract infection.     Left para-aortic adenopathy. Likely reactive.     Mild intrahepatic duct dilatation of uncertain etiology.     Age indeterminant compression fracture of L5.   Past Medical History:   Diagnosis Date    Arthritis     Autonomic neuropathy     Back pain     Chronic lung disease     Chronic pain     back    Compression fracture T9-10    COPD (chronic obstructive pulmonary disease) (HCC)     Depression     panic attacks    Diabetes mellitus ([facility] [address]     type 1    GERD (gastroesophageal reflux disease)     Hypertension     no longer on meds    MRSA nasal colonization     Neuropathy     Osteoporosis     Tobacco use        Past Surgical History:   Procedure Laterality Date    HX BACK SURGERY  2009    HX DILATION AND CURETTAGE      HX HYSTERECTOMY  2002       Family History   Problem Relation Age of Onset    Thyroid Disease Mother     Stroke Mother     Arthritis-rheumatoid Mother     Osteoporosis Mother     Diabetes Father     Kidney Disease Father     Tip's Disease Father     Arthritis-rheumatoid Sister     Diabetes Brother     Colon Cancer Maternal Grandmother        Social History     Socioeconomic History    Marital status:      Spouse name: Not on file    Number of children: Not on file    Years of education: Not on file    Highest education level: Not on file   Tobacco Use    Smoking status: Current Every Day Smoker     Packs/day: 1.00     Years: 43.00     Pack years: 43.00     Types: Cigarettes     Start date: 7/27/1976    Smokeless tobacco: Never Used    Tobacco comment: on Chantix   Substance and Sexual Activity    Alcohol use: No    Drug use: No    Sexual activity: Not Currently   Social History Narrative    Works as Griv[employer]  for [employer]. Denies any history chemical and or asbestos exposure        Prior to Admission medications    Medication Sig Start Date End Date Taking? Authorizing Provider   insulin detemir U-100 (LEVEMIR FLEXTOUCH) 100 unit/mL (3 mL) inpn 30 Units by SubCUTAneous route nightly. Indications: 18 UNITS IN AM 30 UNITS IN THE PM    Provider, Historical   gabapentin (NEURONTIN) 300 mg capsule Take 2 Caps by mouth nightly. 6/24/19   Valentin Dao NP   blood-glucose meter ([address]) by Does Not Apply route. Provider, Historical   omeprazole (PRILOSEC) 40 mg capsule Take 1 Cap by mouth daily.  6/3/19   Neal MURILLO NP   insulin aspart U-100 (NOVOLOG FLEXPEN U-100 INSULIN) 100 unit/mL (3 mL) inpn Each meal, per sliding scale 5/22/19   Hugh Zamora NP   glucose blood VI test strips (BLOOD GLUCOSE TEST) strip Contour next test strips 5/22/19   Hugh Zamora NP   pen needle, diabetic (COMFORT EZ PEN NEEDLES) 32 gauge x 3/16\" ndle 1 Pen Needle by Does Not Apply route nightly as needed (with insulin at night). 5/22/19   Hugh Zamora NP   budesonide-formoterol (SYMBICORT) 160-4.5 mcg/actuation HFAA Take 1 Puff by inhalation two (2) times a day. 4/26/19   Angella Bishop MD   LORazepam (ATIVAN) 0.5 mg tablet Take 1 Tab by mouth every eight (8) hours as needed for Anxiety. Max Daily Amount: 1.5 mg. 4/26/19   Angella Bishop MD   flash glucose sensor (FREESTYLE MARLEY 14 DAY SENSOR) kit Apply every 14 days to monitor blood sugar 3/11/19   Hugh Zamora NP   flash glucose scanning reader (FREESTYLE MARLEY 14 DAY READER) [facility] Use to check blood sugar 3 times a day 3/11/19   Hugh Zamora NP   diclofenac (VOLTAREN) 1 % gel Apply  to affected area three (3) times daily as needed for Pain. Apply to back and knees as needed 2/19/19   Lamar CHAIREZ NP   pravastatin (PRAVACHOL) 40 mg tablet Take 40 mg by mouth nightly. Provider, Historical   tiotropium (SPIRIVA WITH HANDIHALER) 18 mcg inhalation capsule Take 1 Cap by inhalation daily. Provider, Historical   albuterol (PROVENTIL HFA, VENTOLIN HFA, PROAIR HFA) 90 mcg/actuation inhaler Take 2 Puffs by inhalation every four (4) hours as needed for Wheezing or Shortness of Breath. 5/17/16   Charmaine Fontana MD   alendronate (FOSAMAX) 70 mg tablet Take 70 mg by mouth every Sunday. Provider, Historical   fluticasone (FLONASE) 50 mcg/actuation nasal spray 2 Sprays by Both Nostrils route daily. 9/24/12   Frances Johnson,        Allergies   Allergen Reactions    Penicillin G Anaphylaxis    Bactrim [Sulfamethoprim] Hives and Rash    Dilaudid [Hydromorphone (Bulk)] Other (comments)     Patient was confused for 5 days.   Did not know who she was.  Morphine Itching    Pcn [Penicillins] Swelling         Review of Systems  GENERAL: Patient alert, awake and oriented times 3, able to communicate full sentences and not in distress. HEENT: No change in vision, no earache, tinnitus, sore throat or sinus congestion. NECK: No pain or stiffness. PULMONARY: No shortness of breath, cough or wheeze. Cardiovascular: no pnd or orthopnea, no CP  GASTROINTESTINAL: abdominal pain, nausea, vomiting one loose stool, no severe  diarrhea, melena or bright red blood per rectum. GENITOURINARY: No urinary frequency, urgency, hesitancy or dysuria. MUSCULOSKELETAL: No joint or muscle pain, back pain, no recent trauma. DERMATOLOGIC: No rash, no itching, no lesions. ENDOCRINE: No polyuria, polydipsia, no heat or cold intolerance. No recent change in weight. HEMATOLOGICAL: No anemia or easy bruising or bleeding. NEUROLOGIC: No headache, seizures, numbness, tingling or weakness. Physical Exam:     Physical Exam:  Visit Vitals  /68 (BP 1 Location: Right arm, BP Patient Position: At rest;Sitting)   Pulse 77   Temp 98.9 °F (37.2 °C)   Resp 20   SpO2 96%           Temp (24hrs), Av.9 °F (37.2 °C), Min:98.9 °F (37.2 °C), Max:98.9 °F (37.2 °C)    No intake/output data recorded. No intake/output data recorded. General:  Sleepy,non toxic  cooperative, no distress, appears stated age. Head: Normocephalic, without obvious abnormality, atraumatic. Eyes:  Conjunctivae/corneas clear. PERRL, EOMs intact. Nose: Nares normal. No drainage or sinus tenderness. Neck: Supple, symmetrical, trachea midline, no adenopathy, thyroid: no enlargement, no carotid bruit and no JVD. Lungs:   Clear to auscultation bilaterally. Heart:  Regular rate and rhythm, S1, S2 normal.     Abdomen: Soft, -tender to palpation LLQ and LUQ,. Bowel sounds normal.    Extremities: Extremities normal, atraumatic, no cyanosis or edema.    Pulses: 2+ and symmetric all extremities. Skin:  No rashes or lesions   Neurologic: AAOx3, No focal motor or sensory deficit. CBC WITH AUTOMATED DIFF    Collection Time: 07/09/19  6:50 PM   Result Value Ref Range    WBC 10.3 4.6 - 13.2 K/uL    RBC 3.67 (L) 4.20 - 5.30 M/uL    HGB 11.6 (L) 12.0 - 16.0 g/dL    HCT 32.9 (L) 35.0 - 45.0 %    MCV 89.6 74.0 - 97.0 FL    MCH 31.6 24.0 - 34.0 PG    MCHC 35.3 31.0 - 37.0 g/dL    RDW 12.0 11.6 - 14.5 %    PLATELET 305 909 - 265 K/uL    MPV 11.1 9.2 - 11.8 FL    NEUTROPHILS 79 (H) 40 - 73 %    LYMPHOCYTES 16 (L) 21 - 52 %    MONOCYTES 4 3 - 10 %    EOSINOPHILS 1 0 - 5 %    BASOPHILS 0 0 - 2 %    ABS. NEUTROPHILS 8.2 (H) 1.8 - 8.0 K/UL    ABS. LYMPHOCYTES 1.7 0.9 - 3.6 K/UL    ABS. MONOCYTES 0.4 0.05 - 1.2 K/UL    ABS. EOSINOPHILS 0.1 0.0 - 0.4 K/UL    ABS. BASOPHILS 0.0 0.0 - 0.1 K/UL    DF AUTOMATED           METABOLIC PANEL, COMPREHENSIVE    Collection Time: 07/09/19  6:50 PM   Result Value Ref Range    Sodium 137 136 - 145 mmol/L    Potassium 3.6 3.5 - 5.5 mmol/L    Chloride 102 100 - 108 mmol/L    CO2 26 21 - 32 mmol/L    Anion gap 9 3.0 - 18 mmol/L    Glucose 195 (H) 74 - 99 mg/dL    BUN 24 (H) 7.0 - 18 MG/DL    Creatinine 1.09 0.6 - 1.3 MG/DL    BUN/Creatinine ratio 22 (H) 12 - 20      GFR est AA >60 >60 ml/min/1.73m2    GFR est non-AA 52 (L) >60 ml/min/1.73m2    Calcium 9.0 8.5 - 10.1 MG/DL    Bilirubin, total 0.7 0.2 - 1.0 MG/DL    ALT (SGPT) 15 13 - 56 U/L    AST (SGOT) 10 (L) 15 - 37 U/L    Alk. phosphatase 93 45 - 117 U/L    Protein, total 7.4 6.4 - 8.2 g/dL    Albumin 3.3 (L) 3.4 - 5.0 g/dL    Globulin 4.1 (H) 2.0 - 4.0 g/dL    A-G Ratio 0.8 0.8 - 1.7       Labs Reviewed:  Procedures/imaging: see electronic medical records for all procedures/Xrays and details which were not copied into this note but were reviewed prior to creation of Plan      Assessment/Plan     Active Problems:    * No active hospital problems.  *     # Acute left hydronephrosis and emphysematous pyelonephritis  --IV abxs   - Levoquin given and will be continued given patients anaphylaxis to PCN ( ? Risk of ceftriaxone or cefepime)  -- f/u on urine cultures  -- trend WBC and temperature curve  -- Urology consulted ( ER spoke with Dr.Douglass Sandrine Kraus) who recommended Abxs , monitoring and he will see in AM  -- IV hydration - NS @ 100 cc/hr  -- antiemetics ( Zofran)-Protonix  -- renal ultrasound     #Dm-1- poor control  -- check AIC  -- restart daily lantus 30 units  With corrective scale  -- consider inpatient Endocrine consult given poor control    # Pain control  -- multiple drug allergies  -- will add toradol & closely monitor renal function    # nicotine dependence w/ COPD- stable  -- encourage smoking cessation  -- nicoderm patch 21mg/day  -- spiriva and pro-air prn    # F/E/N  -- IV NS @ 100 cc/hr  -- check and maximize electrolytes  -- carbohydrate controlled diet      DVT/GI Prophylaxis: Lovenox    Discussed with patient and  at bedside about hospital admission and my plan care, both understood and agree with my plan care.     Tash Kwon MD  7/10/2019 12:16 AM

## 2019-07-10 NOTE — PROGRESS NOTES
Seen post op. Patient is feeling much improved since placement of left JJ stent by Dr. Dannial Seip. She denies any more pain and is currently eating and drinking well. Discussed with Dr. Dannial Seip about the intraoperative findings. He stated that the entire collecting system was filled with air and that there appeared to be a filling defect in the left distal ureter. Left JJ stent was placed without difficulty and martinez left in place. Culture pending. As such, patient will likely need to undergo formal RPG and likely diagnostic ureteroscopy once she recovers from current infection. Per JUAN C Waterman's note, she is being arranged for follow up with Dr. Adrienne Parekh. All questions answered to the patient's level of satisfaction. Codi Espinal. Justyna Ortega MD, [place] [id number]   Urologic Oncology  [facility] [place]     of Urology  Department of Urology  Romana Rings. [place], [address] [place] [address]     Pager:  [phone]  Office:  [phone]

## 2019-07-10 NOTE — ANESTHESIA PREPROCEDURE EVALUATION
Relevant Problems   No relevant active problems       Anesthetic History   No history of anesthetic complications            Review of Systems / Medical History  Patient summary reviewed and pertinent labs reviewed    Pulmonary    COPD: moderate      Smoker         Neuro/Psych         Psychiatric history     Cardiovascular    Hypertension: well controlled              Exercise tolerance: >4 METS     GI/Hepatic/Renal     GERD: well controlled    Renal disease (Kidney infection)       Endo/Other    Diabetes: poorly controlled, using insulin    Arthritis     Other Findings              Physical Exam    Airway  Mallampati: II  TM Distance: 4 - 6 cm  Neck ROM: normal range of motion   Mouth opening: Normal     Cardiovascular  Regular rate and rhythm,  S1 and S2 normal,  no murmur, click, rub, or gallop             Dental    Dentition: Poor dentition  Comments: Several teeth missing   Pulmonary  Breath sounds clear to auscultation               Abdominal  GI exam deferred       Other Findings            Anesthetic Plan    ASA: 3, emergent  Anesthesia type: general          Induction: Intravenous  Anesthetic plan and risks discussed with: Patient

## 2019-07-10 NOTE — ANESTHESIA POSTPROCEDURE EVALUATION
Procedure(s):  LEFT CYSTOSCOPY RETROGRADES WITH URETERAL STENT INSERTION/ C-ARM. general    Anesthesia Post Evaluation      Multimodal analgesia: multimodal analgesia used between 6 hours prior to anesthesia start to PACU discharge  Patient location during evaluation: bedside  Patient participation: complete - patient participated  Level of consciousness: awake  Pain management: adequate  Airway patency: patent  Anesthetic complications: no  Cardiovascular status: stable  Respiratory status: acceptable  Hydration status: acceptable  Post anesthesia nausea and vomiting:  controlled      Vitals Value Taken Time   /57 7/10/2019  6:23 PM   Temp 38.3 °C (101 °F) 7/10/2019  5:44 PM   Pulse 95 7/10/2019  6:31 PM   Resp 21 7/10/2019  6:31 PM   SpO2 95 % 7/10/2019  6:31 PM   Vitals shown include unvalidated device data.

## 2019-07-10 NOTE — PROGRESS NOTES
Bedside and Verbal shift change report given to Tiff Chauhan RN (oncoming nurse) by Scott Taylor RN (offgoing nurse). Report included the following information SBAR and Kardex.

## 2019-07-10 NOTE — PROGRESS NOTES
Patient is not available to be assessed at this time. No family at bedside.     [address] [facility] Certified [address] [place] [address]   [phone]

## 2019-07-10 NOTE — ROUTINE PROCESS
TRANSFER - IN REPORT: 
 
Verbal report received from [facility] rn(name) on Miranda Foley  being received from ED(unit) for routine progression of care Report consisted of patients Situation, Background, Assessment and  
Recommendations(SBAR). Information from the following report(s) SBAR was reviewed with the receiving nurse. Opportunity for questions and clarification was provided. Assessment completed upon patients arrival to unit and care assumed.

## 2019-07-11 LAB
ANION GAP SERPL CALC-SCNC: 6 MMOL/L (ref 3–18)
BUN SERPL-MCNC: 21 MG/DL (ref 7–18)
BUN/CREAT SERPL: 22 (ref 12–20)
CALCIUM SERPL-MCNC: 7.9 MG/DL (ref 8.5–10.1)
CHLORIDE SERPL-SCNC: 108 MMOL/L (ref 100–108)
CO2 SERPL-SCNC: 24 MMOL/L (ref 21–32)
CREAT SERPL-MCNC: 0.97 MG/DL (ref 0.6–1.3)
ERYTHROCYTE [DISTWIDTH] IN BLOOD BY AUTOMATED COUNT: 12.9 % (ref 11.6–14.5)
GLUCOSE BLD STRIP.AUTO-MCNC: 130 MG/DL (ref 70–110)
GLUCOSE BLD STRIP.AUTO-MCNC: 146 MG/DL (ref 70–110)
GLUCOSE BLD STRIP.AUTO-MCNC: 198 MG/DL (ref 70–110)
GLUCOSE BLD STRIP.AUTO-MCNC: 234 MG/DL (ref 70–110)
GLUCOSE SERPL-MCNC: 187 MG/DL (ref 74–99)
HCT VFR BLD AUTO: 30.4 % (ref 35–45)
HGB BLD-MCNC: 10.3 G/DL (ref 12–16)
MCH RBC QN AUTO: 31.6 PG (ref 24–34)
MCHC RBC AUTO-ENTMCNC: 33.9 G/DL (ref 31–37)
MCV RBC AUTO: 93.3 FL (ref 74–97)
PLATELET # BLD AUTO: 173 K/UL (ref 135–420)
PMV BLD AUTO: 11 FL (ref 9.2–11.8)
POTASSIUM SERPL-SCNC: 3.5 MMOL/L (ref 3.5–5.5)
RBC # BLD AUTO: 3.26 M/UL (ref 4.2–5.3)
SODIUM SERPL-SCNC: 138 MMOL/L (ref 136–145)
WBC # BLD AUTO: 16.5 K/UL (ref 4.6–13.2)

## 2019-07-11 PROCEDURE — 74011636637 HC RX REV CODE- 636/637: Performed by: INTERNAL MEDICINE

## 2019-07-11 PROCEDURE — 80048 BASIC METABOLIC PNL TOTAL CA: CPT

## 2019-07-11 PROCEDURE — 65270000029 HC RM PRIVATE

## 2019-07-11 PROCEDURE — 85027 COMPLETE CBC AUTOMATED: CPT

## 2019-07-11 PROCEDURE — 74011000250 HC RX REV CODE- 250: Performed by: INTERNAL MEDICINE

## 2019-07-11 PROCEDURE — 74011250637 HC RX REV CODE- 250/637: Performed by: INTERNAL MEDICINE

## 2019-07-11 PROCEDURE — 74011250636 HC RX REV CODE- 250/636: Performed by: UROLOGY

## 2019-07-11 PROCEDURE — 74011250636 HC RX REV CODE- 250/636: Performed by: INTERNAL MEDICINE

## 2019-07-11 PROCEDURE — 36415 COLL VENOUS BLD VENIPUNCTURE: CPT

## 2019-07-11 PROCEDURE — 74011250637 HC RX REV CODE- 250/637: Performed by: UROLOGY

## 2019-07-11 PROCEDURE — 74011636637 HC RX REV CODE- 636/637: Performed by: HOSPITALIST

## 2019-07-11 PROCEDURE — 82962 GLUCOSE BLOOD TEST: CPT

## 2019-07-11 PROCEDURE — 99218 HC RM OBSERVATION: CPT

## 2019-07-11 PROCEDURE — 94640 AIRWAY INHALATION TREATMENT: CPT

## 2019-07-11 RX ORDER — DIPHENHYDRAMINE HYDROCHLORIDE 50 MG/ML
25 INJECTION, SOLUTION INTRAMUSCULAR; INTRAVENOUS
Status: DISCONTINUED | OUTPATIENT
Start: 2019-07-11 | End: 2019-07-15 | Stop reason: HOSPADM

## 2019-07-11 RX ADMIN — SILODOSIN 8 MG: 8 CAPSULE ORAL at 08:38

## 2019-07-11 RX ADMIN — PANTOPRAZOLE SODIUM 40 MG: 40 TABLET, DELAYED RELEASE ORAL at 06:46

## 2019-07-11 RX ADMIN — SODIUM CHLORIDE 100 ML/HR: 900 INJECTION, SOLUTION INTRAVENOUS at 21:18

## 2019-07-11 RX ADMIN — FLUTICASONE PROPIONATE 2 SPRAY: 50 SPRAY, METERED NASAL at 08:38

## 2019-07-11 RX ADMIN — GABAPENTIN 600 MG: 300 CAPSULE ORAL at 21:06

## 2019-07-11 RX ADMIN — BUDESONIDE AND FORMOTEROL FUMARATE DIHYDRATE 1 PUFF: 160; 4.5 AEROSOL RESPIRATORY (INHALATION) at 07:43

## 2019-07-11 RX ADMIN — INSULIN GLARGINE 30 UNITS: 100 INJECTION, SOLUTION SUBCUTANEOUS at 08:37

## 2019-07-11 RX ADMIN — PRAVASTATIN SODIUM 40 MG: 20 TABLET ORAL at 21:06

## 2019-07-11 RX ADMIN — CEFTRIAXONE SODIUM 1 G: 1 INJECTION, POWDER, FOR SOLUTION INTRAMUSCULAR; INTRAVENOUS at 14:51

## 2019-07-11 RX ADMIN — INSULIN LISPRO 6 UNITS: 100 INJECTION, SOLUTION INTRAVENOUS; SUBCUTANEOUS at 11:45

## 2019-07-11 RX ADMIN — BUDESONIDE AND FORMOTEROL FUMARATE DIHYDRATE 1 PUFF: 160; 4.5 AEROSOL RESPIRATORY (INHALATION) at 21:30

## 2019-07-11 RX ADMIN — KETOROLAC TROMETHAMINE 30 MG: 30 INJECTION, SOLUTION INTRAMUSCULAR; INTRAVENOUS at 21:12

## 2019-07-11 RX ADMIN — LEVOFLOXACIN 500 MG: 5 INJECTION, SOLUTION INTRAVENOUS at 01:08

## 2019-07-11 RX ADMIN — Medication 10 ML: at 05:53

## 2019-07-11 RX ADMIN — TIOTROPIUM BROMIDE 18 MCG: 18 CAPSULE ORAL; RESPIRATORY (INHALATION) at 07:57

## 2019-07-11 RX ADMIN — ENOXAPARIN SODIUM 40 MG: 40 INJECTION SUBCUTANEOUS at 02:26

## 2019-07-11 RX ADMIN — GENTAMICIN SULFATE 80 MG: 80 INJECTION, SOLUTION INTRAVENOUS at 11:50

## 2019-07-11 RX ADMIN — INSULIN LISPRO 3 UNITS: 100 INJECTION, SOLUTION INTRAVENOUS; SUBCUTANEOUS at 06:53

## 2019-07-11 NOTE — PROGRESS NOTES
Problem: Infection - Risk of, Multi-drug Resistant Organism Colonization (MDRO)  Goal: *Absence of MDRO colonization  Outcome: Progressing Towards Goal  Goal: *Absence of infection signs and symptoms  Outcome: Progressing Towards Goal     Problem: Patient Education: Go to Patient Education Activity  Goal: Patient/Family Education  Outcome: Progressing Towards Goal     Problem: General Infection Care Plan (Adult and Pediatric)  Goal: Improvement in signs and symptoms of infection  Outcome: Progressing Towards Goal  Goal: *Optimize nutritional status  Outcome: Progressing Towards Goal     Problem: Patient Education: Go to Patient Education Activity  Goal: Patient/Family Education  Outcome: Progressing Towards Goal     Problem: Pain  Goal: *Control of Pain  Outcome: Progressing Towards Goal  Goal: *PALLIATIVE CARE:  Alleviation of Pain  Outcome: Progressing Towards Goal     Problem: Patient Education: Go to Patient Education Activity  Goal: Patient/Family Education  Outcome: Progressing Towards Goal     Problem: Falls - Risk of  Goal: *Absence of Falls  Description  Document Edgar Brunner Fall Risk and appropriate interventions in the flowsheet. Outcome: Progressing Towards Goal     Problem: Patient Education: Go to Patient Education Activity  Goal: Patient/Family Education  Outcome: Progressing Towards Goal     Problem: Diabetes Self-Management  Goal: *Disease process and treatment process  Description  Define diabetes and identify own type of diabetes; list 3 options for treating diabetes. Outcome: Progressing Towards Goal  Goal: *Incorporating nutritional management into lifestyle  Description  Describe effect of type, amount and timing of food on blood glucose; list 3 methods for planning meals. Outcome: Progressing Towards Goal  Goal: *Incorporating physical activity into lifestyle  Description  State effect of exercise on blood glucose levels.   Outcome: Progressing Towards Goal  Goal: *Developing strategies to promote health/change behavior  Description  Define the ABC's of diabetes; identify appropriate screenings, schedule and personal plan for screenings. Outcome: Progressing Towards Goal  Goal: *Using medications safely  Description  State effect of diabetes medications on diabetes; name diabetes medication taking, action and side effects. Outcome: Progressing Towards Goal  Goal: *Monitoring blood glucose, interpreting and using results  Description  Identify recommended blood glucose targets  and personal targets. Outcome: Progressing Towards Goal  Goal: *Prevention, detection, treatment of acute complications  Description  List symptoms of hyper- and hypoglycemia; describe how to treat low blood sugar and actions for lowering  high blood glucose level. Outcome: Progressing Towards Goal  Goal: *Prevention, detection and treatment of chronic complications  Description  Define the natural course of diabetes and describe the relationship of blood glucose levels to long term complications of diabetes. Outcome: Progressing Towards Goal  Goal: *Developing strategies to address psychosocial issues  Description  Describe feelings about living with diabetes; identify support needed and support network  Outcome: Progressing Towards Goal  Goal: *Insulin pump training  Outcome: Progressing Towards Goal  Goal: *Sick day guidelines  Outcome: Progressing Towards Goal  Goal: *Patient Specific Goal (EDIT GOAL, INSERT TEXT)  Outcome: Progressing Towards Goal     Problem: Patient Education: Go to Patient Education Activity  Goal: Patient/Family Education  Outcome: Progressing Towards Goal     Problem: Risk for Spread of Infection  Goal: Prevent transmission of infectious organism to others  Description  Prevent the transmission of infectious organisms to other patients, staff members, and visitors.   Outcome: Progressing Towards Goal     Problem: Patient Education:  Go to Education Activity  Goal: Patient/Family Education  Outcome: Progressing Towards Goal

## 2019-07-11 NOTE — OP NOTES
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 PROCEDURES PERFORMED:  Cystoscopy, left retrograde pyelogram, and insertion of left double-J stent. SURGEON:  Juliet Thompson MD    ASSISTANT:  None. ANESTHESIA:  general    COMPLICATIONS:  None. SPECIMENS REMOVED:  None. IMPLANTS:  6 x 24 double-J stent. No suture. ESTIMATED BLOOD LOSS:  None. DESCRIPTION OF PROCEDURE:  Under satisfactory anesthesia, the patient was taken to the OR. Because of some problems with scheduling, the Urology of [address] [place] [address] was unable to see her at this time, and because of her septic condition and hypotension, Anesthesia asked me if I would consider doing her procedure. They talked with Dr. Marla Rodriguez who is on call for the group and who could not be there within the hour, and he was agreeable that I could do this. I talked to her daughter, Yamila Clancy, and she was agreeable for me to go ahead with the procedure. The patient herself had too much medication in her system, and she could not give a readily available and coherent consent. Cystoscopy was carried out. The urethra to the bladder neck was normal, and the bladder itself looked fairly normal.  There was no significant edema around the left side. Fluoroscopy prior to injecting dye showed that the entire kidney all the way down to the ureterovesical junction had filled with air, and although I could not see a stone, a gentle retrograde was done, which showed a questionable filling defect in the distal ureter, and I could not get a catheter to go up. A guidewire was inserted with some difficultly. I could not get it to go up.   A ureteroscope was brought in, and I was able to get the tip of the scope in the ureteral orifice, and through that, the guidewire went on up into the kidney. I did not see a stone or any evidence of that. Once the guidewire was in the kidney, I backloaded the cystoscope over that, and a 6 x 24 double-J stent was put up to the kidney, grossly purulent urine effluxed very rapidly under pressure once that was in place. Looking at the films, I could not really decide whether that was a stone or possibly even a portion of a fungus ball, but the catheter drained well. She was taken to the Recovery in guarded condition, but stable, and she will be continued to be treated on the floor. I will follow up tomorrow, but her care, I think, will be continued by the Urology of [address] [place] [address].       Ann Bates MD      [email or web address]  D:  07/10/2019 18:18  T:  07/10/2019 22:25  JOB #:  [id number]

## 2019-07-11 NOTE — CONSULTS
Infectious Disease Consultation Note    Requested by: [facility] of [facility]    Reason: emphysematous pyelitis    Current abx Prior abx   Levofloxacin, gentamicin since 7/10/19      Lines:       Assessment :    46-year-old female with past medical history of emphysema, type 1 diabetes (last hgbA1C 10.4 on 7/10/19), GERD presented to ed on 7/10/19 with chief complaint of abdominal pain and nausea vomiting. Now with fevers, gram negative bacteremia    Clinical presentation c/w sepsis (POA) due to gram negative bloodstream infection (positive blood culture 7/10), left emphysematous pyelitis, complicated UTI. S/p Cystoscopy, left retrograde pyelogram, and insertion of left double-J stent. Had purulent urine noted in left ureter with significant air in urinary tract. Blood cultures, urine cultures 7/10 reveal gnr    Most likely source of gram negative BSI is emphysematous pyelitis. Abx management complicated by h/o pcn allergy (had swelling of arm when given pcn injection at age 8). Bactrim (redness of face). Patient willing to use ceftriaxone. Benefits and risks of this approach including rash, hives, swelling of throat, anaphylaxis, death explained to patient in details. She verbalized her understanding and wishes to proceed. Nursing staff was advised to monitor closely for allergy. Give antihistaminics & call me if any allergic reaction noted. Recommendations:    1. D/c levofloxacin/gentamicin. Start ceftriaxone. Monitor for allergic reaction  2. F/u id of gnr in blood and urine cultures  3. Repeat blood cultures in am  4. F/u cbc, clinically  5. F/u urology recommendations. urogynecology eval as outpatient. Advance Care planning: full code: discussed  with patient/surrogate decision maker: Tomeka Nunez: [phone]    Thank you for consultation request. Above plan was discussed in details with patient, family . Please call me if any further questions or concerns.  Will continue to participate in the care of this patient. HPI:    56-year-old female with past medical history of emphysema, type 1 diabetes (last hgbA1C 10.4 on 7/10/19), GERD presented to ed on 7/10/19 with chief complaint of abdominal pain and nausea vomiting. Patient states this began experiencing left lower quadrant abdominal pain that radiates to the back with multiple episodes of nonbloody nonbilious emesis earlier this afternoon. Reports one episode of loose stool that was nonbloody as well. Ct scan revealed Abnormal left kidney with hydronephrosis, air in the collecting system, diminished enhancement and perinephric fat stranding. She underwent Cystoscopy, left retrograde pyelogram, and insertion of left double-J stent. Had purulent urine noted in left ureter with significant air in urinary tract. Blood cultures, urine cultures 7/10 reveal gnr. I have been consulted for further recommendations. Patient states that she feels better today compared to day of admission. Still has left flank pain. Has h/o vaginal prolapse. Needed to see urogynecologist as outpatient for vaginal pessary. Had UTI in 5/2019 treated with macrobid, 6/2019 treated with ciprofloxacin. Patient denies headaches, visual disturbances, sore throat, runny nose, earaches, cp, sob, chills, cough, abdominal pain, diarrhea,  pain or weakness in extremities. denies back pain/flank pain.    denies recent sick contacts.                  Past Medical History:   Diagnosis Date    Arthritis     Autonomic neuropathy     Back pain     Chronic lung disease     Chronic pain     back    Compression fracture T9-10    COPD (chronic obstructive pulmonary disease) (HCC)     Depression     panic attacks    Diabetes mellitus (HCC)     type 1    GERD (gastroesophageal reflux disease)     Hypertension     no longer on meds    MRSA nasal colonization     Neuropathy     Osteoporosis     Tobacco use        Past Surgical History:   Procedure Laterality Date    HX BACK SURGERY 2009    HX DILATION AND CURETTAGE      HX HYSTERECTOMY  2002       home Medication List    Details   insulin detemir U-100 (LEVEMIR FLEXTOUCH) 100 unit/mL (3 mL) inpn 30 Units by SubCUTAneous route nightly. Indications: 18 UNITS IN AM 30 UNITS IN THE PM      gabapentin (NEURONTIN) 300 mg capsule Take 2 Caps by mouth nightly. Qty: 60 Cap, Refills: 2      blood-glucose meter ([address]) by Does Not Apply route. omeprazole (PRILOSEC) 40 mg capsule Take 1 Cap by mouth daily. Qty: 30 Cap, Refills: 0      insulin aspart U-100 (NOVOLOG FLEXPEN U-100 INSULIN) 100 unit/mL (3 mL) inpn Each meal, per sliding scale  Qty: 2 Pen, Refills: 2    Associated Diagnoses: Type 1 diabetes mellitus with complication (HCC)      glucose blood VI test strips (BLOOD GLUCOSE TEST) strip Contour next test strips  Qty: 100 Strip, Refills: 2    Associated Diagnoses: Type 1 diabetes mellitus with complication (HCC)      budesonide-formoterol (SYMBICORT) 160-4.5 mcg/actuation HFAA Take 1 Puff by inhalation two (2) times a day. Qty: 1 Inhaler, Refills: 3    Associated Diagnoses: COPD mixed type (HCC)      LORazepam (ATIVAN) 0.5 mg tablet Take 1 Tab by mouth every eight (8) hours as needed for Anxiety. Max Daily Amount: 1.5 mg.  Qty: 60 Tab, Refills: 0    Associated Diagnoses: Anxiety      flash glucose sensor (FREESTYLE MARLEY 14 DAY SENSOR) kit Apply every 14 days to monitor blood sugar  Qty: 2 Kit, Refills: 11      flash glucose scanning reader (FREESTYLE MARLEY 14 DAY READER) misc Use to check blood sugar 3 times a day  Qty: 1 Each, Refills: 0      diclofenac (VOLTAREN) 1 % gel Apply  to affected area three (3) times daily as needed for Pain. Apply to back and knees as needed  Qty: 500 g, Refills: 2      pravastatin (PRAVACHOL) 40 mg tablet Take 40 mg by mouth nightly. tiotropium (SPIRIVA WITH HANDIHALER) 18 mcg inhalation capsule Take 1 Cap by inhalation daily.       albuterol (PROVENTIL HFA, VENTOLIN HFA, PROAIR HFA) 90 mcg/actuation inhaler Take 2 Puffs by inhalation every four (4) hours as needed for Wheezing or Shortness of Breath. Qty: 2 Inhaler, Refills: 3    Associated Diagnoses: Walking pneumonia      alendronate (FOSAMAX) 70 mg tablet Take 70 mg by mouth every Sunday. fluticasone (FLONASE) 50 mcg/actuation nasal spray 2 Sprays by Both Nostrils route daily. Qty: 1 Bottle, Refills: 3    Associated Diagnoses: URI (upper respiratory infection)      pen needle, diabetic (COMFORT EZ PEN NEEDLES) 32 gauge x 3/16\" ndle 1 Pen Needle by Does Not Apply route nightly as needed (with insulin at night).   Qty: 100 Each, Refills: 2    Associated Diagnoses: Type 1 diabetes mellitus with complication (HCC)             Current Facility-Administered Medications   Medication Dose Route Frequency    budesonide-formoterol (SYMBICORT) 160-4.5 mcg/actuation HFA inhaler 1 Puff  1 Puff Inhalation BID RT    fluticasone propionate (FLONASE) 50 mcg/actuation nasal spray 2 Spray  2 Spray Both Nostrils DAILY    gabapentin (NEURONTIN) capsule 600 mg  600 mg Oral QHS    LORazepam (ATIVAN) tablet 0.5 mg  0.5 mg Oral Q8H PRN    pantoprazole (PROTONIX) tablet 40 mg  40 mg Oral ACB    pravastatin (PRAVACHOL) tablet 40 mg  40 mg Oral QHS    tiotropium (SPIRIVA) inhalation capsule 18 mcg  1 Cap Inhalation QAM RT    0.9% sodium chloride infusion  100 mL/hr IntraVENous CONTINUOUS    metoclopramide HCl (REGLAN) tablet 10 mg  10 mg Oral Q6H PRN    enoxaparin (LOVENOX) injection 40 mg  40 mg SubCUTAneous Q24H    albuterol (PROVENTIL VENTOLIN) nebulizer solution 2.5 mg  2.5 mg Nebulization Q4H PRN    insulin lispro (HUMALOG) injection   SubCUTAneous AC&HS    glucose chewable tablet 16 g  4 Tab Oral PRN    glucagon (GLUCAGEN) injection 1 mg  1 mg IntraMUSCular PRN    dextrose 10% infusion 125-250 mL  125-250 mL IntraVENous PRN    ketorolac (TORADOL) injection 30 mg  30 mg IntraVENous Q6H PRN    nicotine (NICODERM CQ) 21 mg/24 hr patch 1 Patch  1 Patch TransDERmal DAILY    insulin glargine (LANTUS) injection 30 Units  30 Units SubCUTAneous DAILY    gentamicin in Saline (Iso-osm) (GARAMYCIN) 80 mg/100 mL IVPB premix 80 mg  80 mg IntraVENous Q12H    silodosin (RAPAFLO) capsule 8 mg  8 mg Oral DAILY WITH BREAKFAST    acetaminophen (TYLENOL) tablet 1,000 mg  1,000 mg Oral Q6H PRN       Allergies: Penicillin g; Bactrim [sulfamethoprim]; Dilaudid [hydromorphone (bulk)];  Morphine; and Pcn [penicillins]    Family History   Problem Relation Age of Onset    Thyroid Disease Mother     Stroke Mother     Arthritis-rheumatoid Mother     Osteoporosis Mother     Diabetes Father     Kidney Disease Father     Tip's Disease Father     Arthritis-rheumatoid Sister     Diabetes Brother     Colon Cancer Maternal Grandmother      Social History     Socioeconomic History    Marital status:      Spouse name: Not on file    Number of children: Not on file    Years of education: Not on file    Highest education level: Not on file   Occupational History    Not on file   Social Needs    Financial resource strain: Not on file    Food insecurity:     Worry: Not on file     Inability: Not on file    Transportation needs:     Medical: Not on file     Non-medical: Not on file   Tobacco Use    Smoking status: Current Every Day Smoker     Packs/day: 1.00     Years: 43.00     Pack years: 43.00     Types: Cigarettes     Start date: 7/27/1976    Smokeless tobacco: Never Used    Tobacco comment: on Chantix   Substance and Sexual Activity    Alcohol use: No    Drug use: No    Sexual activity: Not Currently   Lifestyle    Physical activity:     Days per week: Not on file     Minutes per session: Not on file    Stress: Not on file   Relationships    Social connections:     Talks on phone: Not on file     Gets together: Not on file     Attends Druze service: Not on file     Active member of club or organization: Not on file     Attends meetings of clubs or organizations: Not on file     Relationship status: Not on file    Intimate partner violence:     Fear of current or ex partner: Not on file     Emotionally abused: Not on file     Physically abused: Not on file     Forced sexual activity: Not on file   Other Topics Concern    Not on file   Social History Narrative    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 Denies any history chemical and or asbestos exposure      Social History     Tobacco Use   Smoking Status Current Every Day Smoker    Packs/day: 1.00    Years: 43.00    Pack years: 43.00    Types: Cigarettes    Start date: 1976   Smokeless Tobacco Never Used   Tobacco Comment    on Chantix        Temp (24hrs), Av.7 °F (37.6 °C), Min:97 °F (36.1 °C), Max:101.2 °F (38.4 °C)    Visit Vitals  /65 (BP 1 Location: Left arm)   Pulse 75   Temp 98.6 °F (37 °C)   Resp 16   Wt 57.5 kg (126 lb 12.8 oz)   SpO2 98%   Breastfeeding? No   BMI 20.47 kg/m²       ROS: 12 point ROS obtained in details. Pertinent positives as mentioned in HPI,   otherwise negative    Physical Exam:    Constitutional: She is oriented to person, place, and time. She appears well-developed. Appears comfortable  HENT:   Head: Normocephalic and atraumatic. Eyes: Pupils are equal, round, and reactive to light. EOM are normal.   Cardiovascular: Normal rate, regular rhythm, normal heart sounds and intact distal pulses. Pulmonary/Chest: Effort normal and breath sounds normal.   Abdominal: Soft. Normal appearance and bowel sounds are normal. She exhibits no distension. There is LLQ tenderness There is no rigidity, no rebound and no guarding. Neurological: She is alert and oriented to person, place, and time. Skin: Skin is warm and dry. Psychiatric: She has a normal mood and affect.    Nursing note and vitals reviewed.              Labs: Results:   Chemistry Recent Labs     19  0351 19  1850   * 195*    137   K 3.5 3.6    102   CO2 24 26   BUN 21* 24* CREA 0.97 1.09   CA 7.9* 9.0   AGAP 6 9   BUCR 22* 22*   AP  --  93   TP  --  7.4   ALB  --  3.3*   GLOB  --  4.1*   AGRAT  --  0.8      CBC w/Diff Recent Labs     07/11/19  0351 07/09/19  1850   WBC 16.5* 10.3   RBC 3.26* 3.67*   HGB 10.3* 11.6*   HCT 30.4* 32.9*    241   GRANS  --  79*   LYMPH  --  16*   EOS  --  1      Microbiology Recent Labs     07/10/19  1040 07/10/19  0311 07/10/19  0301   CULT 63020 COLONIES/mL GRAM NEGATIVE RODS* AEROBIC BOTTLE GRAM NEGATIVE RODS* AEROBIC BOTTLE GRAM NEGATIVE RODS*          RADIOLOGY:    All available imaging studies/reports in [facility] for this admission were reviewed    Dr. Amaury Verduzco, Infectious Disease Specialist  [phone]  July 11, 2019  12:53 PM

## 2019-07-11 NOTE — ROUTINE PROCESS
Bedside and Verbal shift change report given to Benigno Osgood, RN (oncoming nurse) by Fer Lanza RN (offgoing nurse). Report included the following information Kardex and MAR 
 
[address] , RN assumes care of pt Leisa Tucker

## 2019-07-11 NOTE — DIABETES MGMT
Diabetes Patient/Family Education Record  Factors That  May Influence Patients Ability  to Learn or  Comply with Recommendations   []   Language barrier    []   Cultural needs   []   Motivation    []   Cognitive limitation    []   Physical   []   Education    []   Physiological factors   []   Hearing/vision/speaking impairment   []   Voodoo beliefs    []   Financial factors   []  Other:   [x]  No factors identified at this time. Person Instructed:   [x]   Patient   []   Family   []  Other     Preference for Learning:   [x]   Verbal   [x]   Written   []  Demonstration     Level of Comprehension & Competence:    []  Good                                      [x] Fair                                     []  Poor                             []  Needs Reinforcement   [x]  Teachback completed    Education Component:   [x]  Medication management, including how to administer insulin (if appropriate) and potential medication interactions Levemir insulin 18 units in the morning and 30 units at night, Novolog insulin with each meal    [x]  Nutritional management -obtain usual meal pattern Pt reports she monitors her carbohydrate intake at meals. States she used to struggle with being overweight. Has cut pasta and rice out of her diet because she struggled to control the portion. States she has been referred by her PCP to follow up with an outpatient dietitian    []  Exercise   [x]  Signs, symptoms, and treatment of hyperglycemia and hypoglycemia   [x] Prevention, recognition and treatment of hyperglycemia and hypoglycemia   [x]  Importance of blood glucose monitoring and how to obtain a blood glucose meter Has glucometer    []  Instruction on use of the blood glucose meter   [x]  Discuss the importance of HbA1C monitoring 10.4% (estimated average glucose of 252 mg/dL), down from 12.5% during previous admission.     []  Sick day guidelines   []  Proper use and disposal of lancets, needles, syringes or insulin pens (if appropriate)   []  Potential long-term complications (retinopathy, kidney disease, neuropathy, foot care)   [] Information about whom to contact in case of emergency or for more information    [x]  Goal:  Patient/family will demonstrate understanding of Diabetes Self Management Skills by: 7/18/19  Plan for post-discharge education or self-management support:    [x] Outpatient class schedule provided            [] Patient Declined    [] Scheduled for outpatient classes (date) _______  Verify:  Does patient understand how diabetes medications work?  yes  Does patient know what their most recent A1c is? yes  Does patient monitor glucose at home? yes  Does patient have difficulty obtaining diabetes medications or testing supplies? no       Darwin Chávez, [address] [place] [address], [facility]  Ext [phone]

## 2019-07-11 NOTE — DIABETES MGMT
NUTRITIONAL ASSESSMENT GLYCEMIC CONTROL/ PLAN OF CARE     Hien Baker           66 y.o.           7/9/2019                 1. Emphysematous pyelitis    2. Abdominal pain, generalized       INTERVENTIONS/PLAN:   Blood glucose fluctuating above targets, consider addition of prandial Lispro insulin, 3 units TID with meals    ASSESSMENT:   Pt is a 66year old female with a past medical history significant for diabetes, GERD, COPD, and hypertension who presented with abdominal pain, nausea, and vomiting. Blood glucose fluctuating above targets. Discussed elevated A1c, pt reports it is improving and she is working to get it below 10% so she can have cataract surgery. Pt states she is planning on working with an outpatient dietitian for nutrition education. Pt also very interested in attending free diabetes classes at the hospital, information provided.      Diabetes Management:   Recent blood glucose:    7/10/2019 18:11 7/10/2019 21:18 7/11/2019 06:48 7/11/2019 11:37   157 (H) 194 (H) 198 (H) 234 (H)   Within target range (non-ICU: <140; ICU<180): [] Yes   [x]  No    Current Insulin regimen:   Lantus insulin 30 units daily   Correctional Lispro insulin 4 times daily ACHS (very insulin resistant)  Home medication/insulin regimen:   Levemir insulin 18 units in the morning and 30 units at night, Novolog insulin with each meal   HbA1c: 10.4% (estimated average glucose of 252 mg/dL)  Adequate glycemic control PTA:  [] Yes  [x] No     SUBJECTIVE/OBJECTIVE:   Information obtained from: patient, chart review     Diet: diabetic  Consistent carbohydrate     Patient Vitals for the past 100 hrs:   % Diet Eaten   07/11/19 0838 25 %     Medications: [x] Reviewed     Most Recent POC Glucose:   Recent Labs     07/11/19  0351 07/09/19  1850   * 195*       Labs:   Lab Results   Component Value Date/Time    Hemoglobin A1c 10.4 (H) 07/10/2019 03:11 AM     Lab Results   Component Value Date/Time    Sodium 138 07/11/2019 03:51 AM Potassium 3.5 07/11/2019 03:51 AM    Chloride 108 07/11/2019 03:51 AM    CO2 24 07/11/2019 03:51 AM    Anion gap 6 07/11/2019 03:51 AM    Glucose 187 (H) 07/11/2019 03:51 AM    BUN 21 (H) 07/11/2019 03:51 AM    Creatinine 0.97 07/11/2019 03:51 AM    Calcium 7.9 (L) 07/11/2019 03:51 AM    Magnesium 2.0 05/12/2019 04:10 PM    Phosphorus 3.2 05/12/2019 07:00 AM    Albumin 3.3 (L) 07/09/2019 06:50 PM     Anthropometrics: Wt Readings from Last 1 Encounters:   07/10/19 57.5 kg (126 lb 12.8 oz)      Ht Readings from Last 1 Encounters:   06/27/19 5' 6\" (1.676 m)     Estimated Nutrition Needs:  1434-5766 Kcal/day, 46-58 grams protein/day   Based on:   [x]  Actual BW    []  IBW   []   Adjusted BW      Nutrition Diagnoses:    Altered nutrition related lab value related to diabetes as evidenced by Hemoglobin A1c of 10.4%  Nutrition Interventions: assessment of home management  Goal: Blood glucose will be within target range of  mg/dL by 7/14/19     Nutrition Monitoring and Evaluation    []     Monitor po intake on meal rounds  [x]     Continue inpatient monitoring and intervention  []     Other:    Moni Faith, [address], [address]

## 2019-07-11 NOTE — PROGRESS NOTES
Bedside shift change report given to Richard Vicente (oncoming nurse) by Hellen and Mel (offgoing nurse). Report included the following information SBAR, Kardex, Intake/Output and MAR.

## 2019-07-11 NOTE — PROGRESS NOTES
[address], [address] [facility] [place] [place]                                                      Phone: [phone]  Urology Daily Progress Note    Patient Active Problem List   Diagnosis Code    Diabetes mellitus type 1 ([facility] E10.9    Diabetic neuropathy ([facility] E11.40    Osteoporosis M81.0    Proteinuria R80.9    HLD (hyperlipidemia) E78.5    Depression F32.9    Tobacco abuse Z72.0    Surgical menopause E89.40    Thoracic compression fracture ([facility] S22.000A    Gastroesophageal reflux disease K21.9    COPD (chronic obstructive pulmonary disease) ([facility]) J44.9    Type 2 diabetes with nephropathy ([facility]) E11.21    Emphysematous pyelitis N12    Pyelonephritis N12         Assessment & Plan   Assessment  Left Emphysematous Pyelitis. Sepsis/ Bacteremia- Bcx + GNR. T max 101.2. WBC 16.5  Left Garrett s/p left JJ stent 7/10/19 by Dr. Juan Kingston. Creat 0.97  Recurrent UTI- Ucx 6/3/19 & 5/12/19 w/ >100k E.coli   Filling defect in the left distal ureter per Dr. Shelton Buckner intraop findings on RPG  ? Cystocele  Incomplete bladder emptying  Dysfunctional voiding  LOPEZ  DM     Plan:  Greatly appreciate assistance from Dr. Juan Kingston with placement of JJ stent yesterday evening  Urine Cx and final Bcx pending. Abx per primary currently on Levaquin and [place]. Recommend ID consult given + Bcx results  Maintain martinez until afebrile x 24 hours. Will need VT at time of martinez removal given hx of retention  Continue Rapaflo. Patient cannot have Flomax 2/2 bactrim allergy  IVF and symptom management per primary- appreciate assistance in managing patient  Trend labs  Follow Up arranged: YES Will have patient f/up with Dr. Karissa Bower as oupt. She will need to be scheduled for diagnostic URS with possible biopsy and JJ stent exchange as outpt. Message sent to Avila Cain to arrange  Following closely       Sabina Crook AGACNP-BC  [facility]  Pager # [phone]    Available M-F 7:30- 5pm .  After 5pm and weekends please call answering service at [phone]     Addendum:  I have reviewed and agree with above assessment and plan. Isis Garcia MD, FACS  7/11/2019        Subjective   I'm feeling much better! Mild discomfort in LLQ but no more pain like I was in yesterday        Objective       PHYSICAL EXAMINATION:   Visit Vitals  /58   Pulse 87   Temp 97.9 °F (36.6 °C)   Resp 16   Wt 126 lb 12.8 oz (57.5 kg)   SpO2 98%   Breastfeeding? No   BMI 20.47 kg/m²       Constitutional: Well developed, well nourished female. No acute distress. HEENT: Normocephalic, Atraumatic   CV:  RRR   Pulm: No respiratory distress or difficulties breathing. On Oxygem   GI:  No abdominal masses or tenderness. [de-identified]   CVA tenderness: Mild left Vela: Draining yellow urine with some sediment   Skin: No evidence of jaundice. Normal color  Neuro/Psych:  Alert and oriented. Affect appropriate.    Lymphatic:   No enlarged supraclavicular lymphnodes  MSK: FROM       REVIEW OF LABS AND IMAGING:        Labs:     Labs: Results:   Chemistry    Recent Labs     07/11/19  0351 07/09/19  1850   * 195*    137   K 3.5 3.6    102   CO2 24 26   BUN 21* 24*   CREA 0.97 1.09   CA 7.9* 9.0   AGAP 6 9   BUCR 22* 22*   AP  --  93   TP  --  7.4   ALB  --  3.3*   GLOB  --  4.1*   AGRAT  --  0.8      CBC w/Diff Recent Labs     07/11/19  0351 07/09/19  1850   WBC 16.5* 10.3   RBC 3.26* 3.67*   HGB 10.3* 11.6*   HCT 30.4* 32.9*    241   GRANS  --  79*   LYMPH  --  16*   EOS  --  1      Cultures Recent Labs     07/10/19  0311 07/10/19  0301   CULT AEROBIC BOTTLE GRAM NEGATIVE RODS* AEROBIC BOTTLE GRAM NEGATIVE RODS*     All Micro Results     Procedure Component Value Units Date/Time    CULTURE, BLOOD [[id number]]  (Abnormal) Collected:  07/10/19 0301    Order Status:  Completed Specimen:  Blood Updated:  07/11/19 7890     Special Requests: NO SPECIAL REQUESTS        GRAM STAIN       AEROBIC BOTTLE GRAM NEGATIVE RODS                  SMEAR CALLED TO AND CORRECTLY REPEATED BY: SHELBY Mendes RN 5S AT 5288 TO 0092. Culture result:       AEROBIC BOTTLE GRAM NEGATIVE RODS          CULTURE, BLOOD [[id number]]  (Abnormal) Collected:  07/10/19 0311    Order Status:  Completed Specimen:  Blood Updated:  07/11/19 0743     Special Requests: NO SPECIAL REQUESTS        GRAM STAIN       AEROBIC BOTTLE GRAM NEGATIVE RODS                  SMEAR CALLED TO AND CORRECTLY REPEATED BY: Gail Jefferson RN 5S AT 2161 [id number] TO [id number].            Culture result:       AEROBIC BOTTLE GRAM NEGATIVE RODS          CULTURE, URINE [[id number]] Collected:  07/10/19 1040    Order Status:  Completed Specimen:  Vela Specimen Updated:  07/10/19 1150            Urinalysis Color   Date Value Ref Range Status   07/10/2019 YELLOW   Final     Appearance   Date Value Ref Range Status   07/10/2019 CLEAR   Final     Specific gravity   Date Value Ref Range Status   07/10/2019 1.028 1.005 - 1.030   Final     pH (UA)   Date Value Ref Range Status   07/10/2019 5.0 5.0 - 8.0   Final     Protein   Date Value Ref Range Status   07/10/2019 NEGATIVE  NEG mg/dL Final     Ketone   Date Value Ref Range Status   07/10/2019 80 (A) NEG mg/dL Final     Bilirubin   Date Value Ref Range Status   07/10/2019 NEGATIVE  NEG   Final     Blood   Date Value Ref Range Status   07/10/2019 SMALL (A) NEG   Final     Urobilinogen   Date Value Ref Range Status   07/10/2019 1.0 0.2 - 1.0 EU/dL Final     Nitrites   Date Value Ref Range Status   07/10/2019 NEGATIVE  NEG   Final     Leukocyte Esterase   Date Value Ref Range Status   07/10/2019 NEGATIVE  NEG   Final     Potassium   Date Value Ref Range Status   07/11/2019 3.5 3.5 - 5.5 mmol/L Final     Creatinine   Date Value Ref Range Status   07/11/2019 0.97 0.6 - 1.3 MG/DL Final     BUN   Date Value Ref Range Status   07/11/2019 21 (H) 7.0 - 18 MG/DL Final      PSA No results for input(s): PSA in the last 72 hours.    Coagulation No results found for: PTP, INR, APTT

## 2019-07-11 NOTE — PHYSICIAN ADVISORY
Letter of Admission Status Determination: Upgrade to Inpatient Karen Centeno was originally hospitalized as Outpatient Observation status on 7/9/2019 Based on the documented clinical condition and care plan, we recommend upgrading patient's hospitalization status to INPATIENT.  
  
The final decision regarding the patient's hospitalization status depends on the attending physician's judgment. Ahsan Sol MD, GRACIE, FACP, Kirill Saleem, CHCQM-PHYADV Physician Advisor [facility] [phone]

## 2019-07-12 LAB
ANION GAP SERPL CALC-SCNC: 8 MMOL/L (ref 3–18)
BACTERIA SPEC CULT: ABNORMAL
BUN SERPL-MCNC: 13 MG/DL (ref 7–18)
BUN/CREAT SERPL: 17 (ref 12–20)
CALCIUM SERPL-MCNC: 7.8 MG/DL (ref 8.5–10.1)
CHLORIDE SERPL-SCNC: 103 MMOL/L (ref 100–108)
CO2 SERPL-SCNC: 23 MMOL/L (ref 21–32)
CREAT SERPL-MCNC: 0.76 MG/DL (ref 0.6–1.3)
ERYTHROCYTE [DISTWIDTH] IN BLOOD BY AUTOMATED COUNT: 12.5 % (ref 11.6–14.5)
GLUCOSE BLD STRIP.AUTO-MCNC: 171 MG/DL (ref 70–110)
GLUCOSE BLD STRIP.AUTO-MCNC: 218 MG/DL (ref 70–110)
GLUCOSE BLD STRIP.AUTO-MCNC: 287 MG/DL (ref 70–110)
GLUCOSE BLD STRIP.AUTO-MCNC: 323 MG/DL (ref 70–110)
GLUCOSE SERPL-MCNC: 283 MG/DL (ref 74–99)
GRAM STN SPEC: ABNORMAL
HCT VFR BLD AUTO: 35.8 % (ref 35–45)
HGB BLD-MCNC: 11.8 G/DL (ref 12–16)
MCH RBC QN AUTO: 31 PG (ref 24–34)
MCHC RBC AUTO-ENTMCNC: 33 G/DL (ref 31–37)
MCV RBC AUTO: 94 FL (ref 74–97)
PLATELET # BLD AUTO: 215 K/UL (ref 135–420)
PMV BLD AUTO: 11.3 FL (ref 9.2–11.8)
POTASSIUM SERPL-SCNC: 4.1 MMOL/L (ref 3.5–5.5)
RBC # BLD AUTO: 3.81 M/UL (ref 4.2–5.3)
SERVICE CMNT-IMP: ABNORMAL
SODIUM SERPL-SCNC: 134 MMOL/L (ref 136–145)
WBC # BLD AUTO: 13.9 K/UL (ref 4.6–13.2)

## 2019-07-12 PROCEDURE — 74011250637 HC RX REV CODE- 250/637: Performed by: UROLOGY

## 2019-07-12 PROCEDURE — 97116 GAIT TRAINING THERAPY: CPT

## 2019-07-12 PROCEDURE — 36415 COLL VENOUS BLD VENIPUNCTURE: CPT

## 2019-07-12 PROCEDURE — 99218 HC RM OBSERVATION: CPT

## 2019-07-12 PROCEDURE — 97161 PT EVAL LOW COMPLEX 20 MIN: CPT

## 2019-07-12 PROCEDURE — 82962 GLUCOSE BLOOD TEST: CPT

## 2019-07-12 PROCEDURE — 85027 COMPLETE CBC AUTOMATED: CPT

## 2019-07-12 PROCEDURE — 94640 AIRWAY INHALATION TREATMENT: CPT

## 2019-07-12 PROCEDURE — 74011250636 HC RX REV CODE- 250/636: Performed by: INTERNAL MEDICINE

## 2019-07-12 PROCEDURE — 74011636637 HC RX REV CODE- 636/637: Performed by: INTERNAL MEDICINE

## 2019-07-12 PROCEDURE — 87040 BLOOD CULTURE FOR BACTERIA: CPT

## 2019-07-12 PROCEDURE — 80048 BASIC METABOLIC PNL TOTAL CA: CPT

## 2019-07-12 PROCEDURE — 74011250637 HC RX REV CODE- 250/637: Performed by: INTERNAL MEDICINE

## 2019-07-12 PROCEDURE — 65270000029 HC RM PRIVATE

## 2019-07-12 PROCEDURE — 74011636637 HC RX REV CODE- 636/637: Performed by: HOSPITALIST

## 2019-07-12 PROCEDURE — 74011250637 HC RX REV CODE- 250/637

## 2019-07-12 PROCEDURE — 74011000250 HC RX REV CODE- 250: Performed by: INTERNAL MEDICINE

## 2019-07-12 RX ADMIN — GABAPENTIN 600 MG: 300 CAPSULE ORAL at 21:49

## 2019-07-12 RX ADMIN — BUDESONIDE AND FORMOTEROL FUMARATE DIHYDRATE 1 PUFF: 160; 4.5 AEROSOL RESPIRATORY (INHALATION) at 07:46

## 2019-07-12 RX ADMIN — INSULIN LISPRO 3 UNITS: 100 INJECTION, SOLUTION INTRAVENOUS; SUBCUTANEOUS at 21:51

## 2019-07-12 RX ADMIN — TIOTROPIUM BROMIDE 18 MCG: 18 CAPSULE ORAL; RESPIRATORY (INHALATION) at 07:46

## 2019-07-12 RX ADMIN — PRAVASTATIN SODIUM 40 MG: 20 TABLET ORAL at 21:49

## 2019-07-12 RX ADMIN — INSULIN GLARGINE 30 UNITS: 100 INJECTION, SOLUTION SUBCUTANEOUS at 09:20

## 2019-07-12 RX ADMIN — ENOXAPARIN SODIUM 40 MG: 40 INJECTION SUBCUTANEOUS at 02:24

## 2019-07-12 RX ADMIN — SODIUM CHLORIDE 100 ML/HR: 900 INJECTION, SOLUTION INTRAVENOUS at 21:49

## 2019-07-12 RX ADMIN — CEFTRIAXONE SODIUM 1 G: 1 INJECTION, POWDER, FOR SOLUTION INTRAMUSCULAR; INTRAVENOUS at 14:05

## 2019-07-12 RX ADMIN — KETOROLAC TROMETHAMINE 30 MG: 30 INJECTION, SOLUTION INTRAMUSCULAR; INTRAVENOUS at 14:06

## 2019-07-12 RX ADMIN — INSULIN LISPRO 6 UNITS: 100 INJECTION, SOLUTION INTRAVENOUS; SUBCUTANEOUS at 16:20

## 2019-07-12 RX ADMIN — SILODOSIN 8 MG: 8 CAPSULE ORAL at 09:15

## 2019-07-12 RX ADMIN — INSULIN LISPRO 12 UNITS: 100 INJECTION, SOLUTION INTRAVENOUS; SUBCUTANEOUS at 11:15

## 2019-07-12 RX ADMIN — FLUTICASONE PROPIONATE 2 SPRAY: 50 SPRAY, METERED NASAL at 11:15

## 2019-07-12 NOTE — PROGRESS NOTES
Bedside shift change report given to Mulu Saldana (oncoming nurse) by Abisai Connor (offgoing nurse). Report included the following information SBAR, Kardex, Intake/Output and MAR.

## 2019-07-12 NOTE — PROGRESS NOTES
Problem: Mobility Impaired (Adult and Pediatric)  Goal: *Acute Goals and Plan of Care (Insert Text)  Description  Physical Therapy Goals  Initiated 7/12/2019 and to be accomplished within 7 day(s)  1. Patient will move from supine to sit and sit to supine , scoot up and down and roll side to side in bed with supervision/set-up. 2.  Patient will transfer from bed to chair and chair to bed with supervision/set-up using the least restrictive device. 3.  Patient will perform sit to stand with supervision/set-up. 4.  Patient will ambulate with supervision/set-up for >150 feet with the least restrictive device. PLOF: Patient lives with her mother (for whom she is caregiver), son, and her son's girlfriend in 1 story home with threshold to enter. Patient has SC at home, though notes she is not able to use it as it is too wide for her tub. Patient has no other AD/DME and reports she walks by furniture/wall walking at home. Outcome: Progressing Towards Goal   PHYSICAL THERAPY EVALUATION    Patient: Jenn Gonzalez (81 y.o. female)  Date: 7/12/2019  Primary Diagnosis: Emphysematous pyelitis [N12]  Pyelonephritis [N12]  Pyelonephritis [N12]  Procedure(s) (LRB):  LEFT CYSTOSCOPY RETROGRADES WITH URETERAL STENT INSERTION/ C-ARM (Left) 2 Days Post-Op   Precautions: Fall    ASSESSMENT :  Patient is 64yo F admitted to hospital for cystoscopy with ureteral stent placement. Patient presents today alert and agreeable to therapy and was supine in bed upon arrival. Patient transfer to EOB with mod independence and ambulated with RW at supervision for 310ft with minimal SOB. Patient transferred to sitting and was left resting with call bell her side in chair. Patient educated on having nursing assist if she needed to get up and was encouraged to ambulate with staff and spend time sitting up in chair each day. Patient educated on role and goals of therapy and acknowledged understanding.   Patient will benefit from skilled intervention to address the above impairments. Patient's rehabilitation potential is considered to be Good  Factors which may influence rehabilitation potential include:   ? None noted  ? Mental ability/status  ? Medical condition  ? Home/family situation and support systems  ? Safety awareness  ? Pain tolerance/management  ? Other:      PLAN :  Recommendations and Planned Interventions:   ?           Bed Mobility Training             ? Neuromuscular Re-Education  ? Transfer Training                   ? Orthotic/Prosthetic Training  ? Gait Training                          ? Modalities  ? Therapeutic Exercises           ? Edema Management/Control  ? Therapeutic Activities            ? Family Training/Education  ? Patient Education  ? Other (comment):    Frequency/Duration: Patient will be followed by physical therapy 1-2 times per day/4-7 days per week to address goals. Discharge Recommendations: Home Health  Further Equipment Recommendations for Discharge: transfer bench and rolling walker     SUBJECTIVE:   Patient stated ? I feel so much better getting out of that bed.?    OBJECTIVE DATA SUMMARY:     Past Medical History:   Diagnosis Date    Arthritis     Autonomic neuropathy     Back pain     Chronic lung disease     Chronic pain     back    Compression fracture T9-10    COPD (chronic obstructive pulmonary disease) (HCC)     Depression     panic attacks    Diabetes mellitus ([facility])     type 1    GERD (gastroesophageal reflux disease)     Hypertension     no longer on meds    MRSA nasal colonization     Neuropathy     Osteoporosis     Tobacco use      Past Surgical History:   Procedure Laterality Date    HX BACK SURGERY  2009    HX DILATION AND CURETTAGE      HX HYSTERECTOMY  2002     Barriers to Learning/Limitations: None  Compensate with: N/A  Home Situation:  [address] [facility] Environment: Private residence  # Steps to Enter: 3  One/Two Story Residence: One story  Living Alone: Yes  Support Systems: Child(jeffry), [employer] / [employer] community, Family member(s), Friends \ neighbors  Patient Expects to be Discharged to[de-identified] Private residence  Current DME Used/Available at Home: None  Critical Behavior:    A&Ox4   Strength:    Strength: Generally decreased, functional(BLE)   Tone & Sensation:   Tone: Normal(BLE)    Sensation: Intact(BLE)   Range Of Motion:  AROM: Within functional limits(BLE)   Functional Mobility:  Bed Mobility:   Supine to Sit: Supervision  Sit to Supine: Supervision   Transfers:  Sit to Stand: Supervision  Stand to Sit: Supervision   Balance:   Sitting: Intact  Standing: Impaired; With support  Ambulation/Gait Training:  Distance (ft): 310 Feet (ft)  Assistive Device: Walker, rolling  Ambulation - Level of Assistance: Supervision   Gait Abnormalities: Decreased step clearance   Speed/Marissa: Slow  Step Length: Left shortened;Right shortened   Interventions: Verbal cues; Visual/Demos    Pain:  Pain level pre-treatment: 0/10   Pain level post-treatment: 0/10     Activity Tolerance:   Patient tolerated activity well and demos improved gait with RW. Please refer to the flowsheet for vital signs taken during this treatment. After treatment:   ?         Patient left in no apparent distress sitting up in chair  ? Patient left in no apparent distress in bed  ? Call bell left within reach  ? Nursing notified  ? Caregiver present  ? Bed alarm activated  ? SCDs applied    COMMUNICATION/EDUCATION:   ?         Role of Physical Therapy in the acute care setting. ?         Fall prevention education was provided and the patient/caregiver indicated understanding. ? Patient/family have participated as able in goal setting and plan of care. ?         Patient/family agree to work toward stated goals and plan of care.   ?         Patient understands intent and goals of therapy, but is neutral about his/her participation. ? Patient is unable to participate in goal setting/plan of care: ongoing with therapy staff. ?         Other:     Thank you for this referral.  Dorena Hamman, PT   Time Calculation: 23 mins      Eval Complexity: History: MEDIUM  Complexity : 1-2 comorbidities / personal factors will impact the outcome/ POC Exam:LOW Complexity : 1-2 Standardized tests and measures addressing body structure, function, activity limitation and / or participation in recreation  Presentation: LOW Complexity : Stable, uncomplicated  Clinical Decision Making:Low Complexity    Overall Complexity:LOW

## 2019-07-12 NOTE — PROGRESS NOTES
[address], [address] [facility]                                                      Phone: [phone]  Urology Daily Progress Note    Patient Active Problem List   Diagnosis Code    Diabetes mellitus type 1 ([facility] E10.9    Diabetic neuropathy ([facility] E11.40    Osteoporosis M81.0    Proteinuria R80.9    HLD (hyperlipidemia) E78.5    Depression F32.9    Tobacco abuse Z72.0    Surgical menopause E89.40    Thoracic compression fracture ([facility] S22.000A    Gastroesophageal reflux disease K21.9    COPD (chronic obstructive pulmonary disease) ([facility]) J44.9    Type 2 diabetes with nephropathy ([facility]) E11.21    Emphysematous pyelitis N12    Pyelonephritis N12         Assessment & Plan   Assessment  Left Emphysematous Pyelitis. UroSepsis/ Bacteremia- Bcx and Ucx + E. Coli. Improving  Left [place] s/p left JJ stent 7/10/19 by Dr. Dario Haney. Creat 0.97  Recurrent UTI- Ucx 6/3/19 & 5/12/19 w/ >100k E.coli   Filling defect in the left distal ureter per Dr. Zoila Staley intraop findings on RPG  ? Cystocele  Incomplete bladder emptying  Dysfunctional voiding  LOPEZ  DM     Plan:  ID Recommends IV abx over the weekend and trial PO abx on Monday. Possibly home on Monday  Maintain martinez until Monday to allow complete drainage of  system. Will need VT at time of martinez removal given hx of retention - possibly on [address] [place] [address]. Patient cannot have Flomax 2/2 bactrim allergy  IVF and symptom management per primary- appreciate assistance in managing patient  Trend labs. Repeat Bcx, BMP and CBC ordered   Follow Up arranged: YES Will have patient f/up with Dr. Mer Main as oupt. She will need to be scheduled for diagnostic URS with possible biopsy and JJ stent exchange as outpt. Message sent to Kenny Oshea to arrange  Following closely       Sabina Stover  [facility]  Pager # [phone]    Available M-F 7:30- 5pm .  After 5pm and weekends please call answering service at [phone]     Chart reviewed and pt examined. Agree with plan. Lesley Benavides MD    Subjective   I am still having some discomfort on my left abd and side. Tolerating PO. No fevers overnight        Objective       PHYSICAL EXAMINATION:   Visit Vitals  /78 (BP 1 Location: Right arm, BP Patient Position: At rest)   Pulse 88   Temp 98.6 °F (37 °C)   Resp 14   Wt 126 lb 12.8 oz (57.5 kg)   SpO2 97%   Breastfeeding? No   BMI 20.47 kg/m²       Constitutional: Well developed, well nourished female. No acute distress. HEENT: Normocephalic, Atraumatic   CV:  RRR   Pulm: No respiratory distress or difficulties breathing. On Oxygem   GI:  Mild left abdominal tenderness. [de-identified]   CVA tenderness: Mild left Vela: Draining light redtinged urine w/o clots  Skin: No evidence of jaundice. Normal color  Neuro/Psych:  Alert and oriented. Affect appropriate.    Lymphatic:   No enlarged supraclavicular lymphnodes  MSK: FROM       REVIEW OF LABS AND IMAGING:        Labs:     Labs: Results:   Chemistry    Recent Labs     07/11/19  0351 07/09/19  1850   * 195*    137   K 3.5 3.6    102   CO2 24 26   BUN 21* 24*   CREA 0.97 1.09   CA 7.9* 9.0   AGAP 6 9   BUCR 22* 22*   AP  --  93   TP  --  7.4   ALB  --  3.3*   GLOB  --  4.1*   AGRAT  --  0.8      CBC w/Diff Recent Labs     07/12/19  0935 07/11/19  0351 07/09/19  1850   WBC 13.9* 16.5* 10.3   RBC 3.81* 3.26* 3.67*   HGB 11.8* 10.3* 11.6*   HCT 35.8 30.4* 32.9*    173 241   GRANS  --   --  79*   LYMPH  --   --  16*   EOS  --   --  1      Cultures Recent Labs     07/10/19  1040 07/10/19  0311 07/10/19  0301   CULT [id number] COLONIES/mL ESCHERICHIA COLI* AEROBIC BOTTLE ESCHERICHIA COLI* AEROBIC BOTTLE ESCHERICHIA COLI*     All Micro Results     Procedure Component Value Units Date/Time    CULTURE, BLOOD [[id number]] Collected:  07/12/19 0935    Order Status: Completed Specimen:  Blood Updated:  07/12/19 1029    CULTURE, BLOOD [[id number]] Collected:  07/12/19 0948    Order Status:  Completed Specimen:  Blood Updated:  07/12/19 1029    CULTURE, URINE [[id number]]  (Abnormal)  (Susceptibility) Collected:  07/10/19 1040    Order Status:  Completed Specimen:  Vela Specimen Updated:  07/12/19 0710     Special Requests: NO SPECIAL REQUESTS        Culture result:       [id number] COLONIES/mL ESCHERICHIA COLI          CULTURE, BLOOD [[id number]]  (Abnormal)  (Susceptibility) Collected:  07/10/19 0311    Order Status:  Completed Specimen:  Blood Updated:  07/12/19 0704     Special Requests: NO SPECIAL REQUESTS        GRAM STAIN       AEROBIC BOTTLE GRAM NEGATIVE RODS                  SMEAR CALLED TO AND CORRECTLY REPEATED BY: Rosaline Rivera RN 5S AT [id number] [id number] TO [id number]. Culture result:       AEROBIC BOTTLE ESCHERICHIA COLI          CULTURE, BLOOD [[id number]]  (Abnormal)  (Susceptibility) Collected:  07/10/19 0301    Order Status:  Completed Specimen:  Blood Updated:  07/12/19 0704     Special Requests: NO SPECIAL REQUESTS        GRAM STAIN       AEROBIC BOTTLE GRAM NEGATIVE RODS                  SMEAR CALLED TO AND CORRECTLY REPEATED BY: SHELBY De Los Santos RN 5S AT [id number] TO 5913.            Culture result:       AEROBIC BOTTLE ESCHERICHIA COLI                  Urinalysis Color   Date Value Ref Range Status   07/10/2019 YELLOW   Final     Appearance   Date Value Ref Range Status   07/10/2019 CLEAR   Final     Specific gravity   Date Value Ref Range Status   07/10/2019 1.028 1.005 - 1.030   Final     pH (UA)   Date Value Ref Range Status   07/10/2019 5.0 5.0 - 8.0   Final     Protein   Date Value Ref Range Status   07/10/2019 NEGATIVE  NEG mg/dL Final     Ketone   Date Value Ref Range Status   07/10/2019 80 (A) NEG mg/dL Final     Bilirubin   Date Value Ref Range Status   07/10/2019 NEGATIVE  NEG   Final     Blood   Date Value Ref Range Status   07/10/2019 SMALL (A) NEG   Final Urobilinogen   Date Value Ref Range Status   07/10/2019 1.0 0.2 - 1.0 EU/dL Final     Nitrites   Date Value Ref Range Status   07/10/2019 NEGATIVE  NEG   Final     Leukocyte Esterase   Date Value Ref Range Status   07/10/2019 NEGATIVE  NEG   Final     Potassium   Date Value Ref Range Status   07/11/2019 3.5 3.5 - 5.5 mmol/L Final     Creatinine   Date Value Ref Range Status   07/11/2019 0.97 0.6 - 1.3 MG/DL Final     BUN   Date Value Ref Range Status   07/11/2019 21 (H) 7.0 - 18 MG/DL Final      PSA No results for input(s): PSA in the last 72 hours.    Coagulation No results found for: PTP, INR, APTT

## 2019-07-12 NOTE — PROGRESS NOTES
Hospitalist Progress Note    Patient: Malissa Pinedo Age: 66 y.o. : 1960 MR#: [id number] SSN: [id number]  Date/Time: 2019 4:07 PM    Subjective:   Ms Rachel Welsh is a 66year old female admitted with acute left hydronephrosis and emphysematous pyelonephritis 7/10/2019. She has gram negative bacteremia and is followed closely by ID who recommends continued IV antibiotics though 7/15/2019, and will transition to PO then, if tolerated without recurrent fever    Review of Systems -   Good appetite today   Participated with PT    Objective:   VS:   Visit Vitals  /65 (BP 1 Location: Right arm, BP Patient Position: At rest)   Pulse 80   Temp 99.3 °F (37.4 °C)   Resp 16   Wt 57.5 kg (126 lb 12.8 oz)   SpO2 95%   Breastfeeding?  No   BMI 20.47 kg/m²      Tmax/24hrs: Temp (24hrs), Av.6 °F (37 °C), Min:97.7 °F (36.5 °C), Max:99.3 °F (37.4 °C)       Intake/Output Summary (Last 24 hours) at 2019 1607  Last data filed at 2019 1210  Gross per 24 hour   Intake 620 ml   Output 2950 ml   Net -2330 ml       General: awake and alert  HEENT:speech clear, conjunctiva clear, neck supple  Cardiovascular: HR reg 80   Pulmonary:  clear  GI: left periumbilical tenderness, BS+, martinez drains pink urine  Extremities:  Moving all 4 extremities well, + pulses, - edema       Labs:    Recent Results (from the past 24 hour(s))   GLUCOSE, POC    Collection Time: 19  4:42 PM   Result Value Ref Range    Glucose (POC) 130 (H) 70 - 110 mg/dL   GLUCOSE, POC    Collection Time: 19  8:47 PM   Result Value Ref Range    Glucose (POC) 146 (H) 70 - 110 mg/dL   GLUCOSE, POC    Collection Time: 19  9:20 AM   Result Value Ref Range    Glucose (POC) 287 (H) 70 - 110 mg/dL   CBC W/O DIFF    Collection Time: 19  9:35 AM   Result Value Ref Range    WBC 13.9 (H) 4.6 - 13.2 K/uL    RBC 3.81 (L) 4.20 - 5.30 M/uL    HGB 11.8 (L) 12.0 - 16.0 g/dL    HCT 35.8 35.0 - 45.0 %    MCV 94.0 74.0 - 97.0 FL    MCH 31.0 24.0 - 34.0 PG MCHC 33.0 31.0 - 37.0 g/dL    RDW 12.5 11.6 - 14.5 %    PLATELET 652 630 - 564 K/uL    MPV 11.3 9.2 - 40.6 FL   METABOLIC PANEL, BASIC    Collection Time: 07/12/19  9:35 AM   Result Value Ref Range    Sodium 134 (L) 136 - 145 mmol/L    Potassium 4.1 3.5 - 5.5 mmol/L    Chloride 103 100 - 108 mmol/L    CO2 23 21 - 32 mmol/L    Anion gap 8 3.0 - 18 mmol/L    Glucose 283 (H) 74 - 99 mg/dL    BUN 13 7.0 - 18 MG/DL    Creatinine 0.76 0.6 - 1.3 MG/DL    BUN/Creatinine ratio 17 12 - 20      GFR est AA >60 >60 ml/min/1.73m2    GFR est non-AA >60 >60 ml/min/1.73m2    Calcium 7.8 (L) 8.5 - 10.1 MG/DL   GLUCOSE, POC    Collection Time: 07/12/19 11:10 AM   Result Value Ref Range    Glucose (POC) 323 (H) 70 - 110 mg/dL   GLUCOSE, POC    Collection Time: 07/12/19  3:56 PM   Result Value Ref Range    Glucose (POC) 218 (H) 70 - 110 mg/dL       Imaging:  No results found.     Assessment/Plan:    Principal Problem:    Sepsis ([facility] (7/10/2019)    Active Problems:    Diabetes mellitus type 1 ([facility] (5/14/2012)      Overview: Diagnosed in 1998 insulin pump      Thoracic compression fracture ([facility] (2/2/2016)      Gastroesophageal reflux disease (9/13/2016)      COPD (chronic obstructive pulmonary disease) ([facility] (3/27/2019)      Emphysematous pyelitis (7/10/2019)      Pyelonephritis (7/10/2019)    Maintain martinez until Monday per Urology  continue ceftriaxone  Repeat blood cultures pending  ID and Urology following  Blood glucose improving  Encourage activity    Additional Notes:      Full Code    Disposition:   Home with [facility]    Case discussed with:  [x]Patient  [x]Family  []Nursing  []Case Management  DVT Prophylaxis:  [x]Lovenox  []Hep SQ  []SCDs  []Coumadin   []On Heparin gtt    Signed By: Lencho Gomez DO     July 12, 2019 4:07 PM

## 2019-07-12 NOTE — CDMP QUERY
Patient is noted to have documented \"DM 1 poor control. \"  If possible, could you please document in the progress notes and Discharge Summary if patient is being evaluated / treated for: 
 
=> DM type 1 with hyperglycemia 
=> other explanation of clinical findings 
=> unable to determine The clinical record reflects the following:  
 
-----> Risk:  66 yr old female with DM type 1 
 
-----> Clinical indicators: * 7-10-19 H&P:  \". ..DM-1-poor control. Priti Jin \" * Labs: 7-10-19 HgbA1c: 10.4.   7-10-19 glucose: 434, 458, 333, 270, 216, 157, 194.   
                 7-11-19 glucose: 198, 234, 130, 146.       
                 7-12-19 glucose: 287, 323, 218. Priti Jin \"   
 
-----> Treatment: accucheck q ACHS with SSI; Lantus; Diabetic education Thank you for your time, 
Madhavi Pacheco RN 
[phone]

## 2019-07-12 NOTE — PROGRESS NOTES
Infectious Disease progress Note    Requested by: [facility]    Reason: emphysematous pyelitis    Current abx Prior abx     Ceftriaxone since 7/11/19 Levofloxacin, gentamicin  7/10/19-7/11/19     Lines:       Assessment :    61-year-old female with past medical history of emphysema, type 1 diabetes (last hgbA1C 10.4 on 7/10/19), GERD presented to ed on 7/10/19 with chief complaint of abdominal pain and nausea vomiting. Now with fevers, gram negative bacteremia    Clinical presentation c/w sepsis (POA) due to e.coli bloodstream infection (positive blood culture 7/10), left emphysematous pyelitis, complicated UTI. S/p Cystoscopy, left retrograde pyelogram, and insertion of left double-J stent. Had purulent urine noted in left ureter with significant air in urinary tract. Blood cultures, urine cultures 7/10 reveal e.coli    Most likely source of gram negative BSI is emphysematous pyelitis. Abx management complicated by h/o pcn allergy (had swelling of arm when given pcn injection at age 8). Bactrim (redness of face). Tolerating ceftriaxone without difficulties. Recommendations:    1. cont ceftriaxone. 2. Repeat blood cultures   3. F/u urology recommendations. urogynecology eval as outpatient. 4. Will switch to po ax on 7/15 if continued clinical improvement, clearance of bacteremia noted    Advance Care planning: full code: discussed  with patient/surrogate decision maker: Jerrell Copper: [phone]    Above plan was discussed in details with patient, urology team . Please call me if any further questions or concerns. Will continue to participate in the care of this patient. HPI:    Patient states that she feels better today compared to day of admission. Still has left flank pain. Has h/o vaginal prolapse. Needed to see urogynecologist as outpatient for vaginal pessary. Had UTI in 5/2019 treated with macrobid, 6/2019 treated with ciprofloxacin.  Patient denies headaches, visual disturbances, sore throat, runny nose, earaches, cp, sob, chills, cough, abdominal pain, diarrhea,  pain or weakness in extremities. denies back pain/flank pain. home Medication List    Details   insulin detemir U-100 (LEVEMIR FLEXTOUCH) 100 unit/mL (3 mL) inpn 30 Units by SubCUTAneous route nightly. Indications: 18 UNITS IN AM 30 UNITS IN THE PM      gabapentin (NEURONTIN) 300 mg capsule Take 2 Caps by mouth nightly. Qty: 60 Cap, Refills: 2      blood-glucose meter ([address]) by Does Not Apply route. omeprazole (PRILOSEC) 40 mg capsule Take 1 Cap by mouth daily. Qty: 30 Cap, Refills: 0      insulin aspart U-100 (NOVOLOG FLEXPEN U-100 INSULIN) 100 unit/mL (3 mL) inpn Each meal, per sliding scale  Qty: 2 Pen, Refills: 2    Associated Diagnoses: Type 1 diabetes mellitus with complication (HCC)      glucose blood VI test strips (BLOOD GLUCOSE TEST) strip Contour next test strips  Qty: 100 Strip, Refills: 2    Associated Diagnoses: Type 1 diabetes mellitus with complication (HCC)      budesonide-formoterol (SYMBICORT) 160-4.5 mcg/actuation HFAA Take 1 Puff by inhalation two (2) times a day. Qty: 1 Inhaler, Refills: 3    Associated Diagnoses: COPD mixed type (HCC)      LORazepam (ATIVAN) 0.5 mg tablet Take 1 Tab by mouth every eight (8) hours as needed for Anxiety. Max Daily Amount: 1.5 mg.  Qty: 60 Tab, Refills: 0    Associated Diagnoses: Anxiety      flash glucose sensor (FREESTYLE MARLEY 14 DAY SENSOR) kit Apply every 14 days to monitor blood sugar  Qty: 2 Kit, Refills: 11      flash glucose scanning reader (FREESTYLE MARLEY 14 DAY READER) misc Use to check blood sugar 3 times a day  Qty: 1 Each, Refills: 0      diclofenac (VOLTAREN) 1 % gel Apply  to affected area three (3) times daily as needed for Pain. Apply to back and knees as needed  Qty: 500 g, Refills: 2      pravastatin (PRAVACHOL) 40 mg tablet Take 40 mg by mouth nightly.       tiotropium (SPIRIVA WITH HANDIHALER) 18 mcg inhalation capsule Take 1 Cap by inhalation daily. albuterol (PROVENTIL HFA, VENTOLIN HFA, PROAIR HFA) 90 mcg/actuation inhaler Take 2 Puffs by inhalation every four (4) hours as needed for Wheezing or Shortness of Breath. Qty: 2 Inhaler, Refills: 3    Associated Diagnoses: Walking pneumonia      alendronate (FOSAMAX) 70 mg tablet Take 70 mg by mouth every Sunday. fluticasone (FLONASE) 50 mcg/actuation nasal spray 2 Sprays by Both Nostrils route daily. Qty: 1 Bottle, Refills: 3    Associated Diagnoses: URI (upper respiratory infection)      pen needle, diabetic (COMFORT EZ PEN NEEDLES) 32 gauge x 3/16\" ndle 1 Pen Needle by Does Not Apply route nightly as needed (with insulin at night).   Qty: 100 Each, Refills: 2    Associated Diagnoses: Type 1 diabetes mellitus with complication (HCC)             Current Facility-Administered Medications   Medication Dose Route Frequency    cefTRIAXone (ROCEPHIN) 1 g in sterile water (preservative free) 10 mL IV syringe  1 g IntraVENous Q24H    diphenhydrAMINE (BENADRYL) injection 25 mg  25 mg IntraVENous Q4H PRN    budesonide-formoterol (SYMBICORT) 160-4.5 mcg/actuation HFA inhaler 1 Puff  1 Puff Inhalation BID RT    fluticasone propionate (FLONASE) 50 mcg/actuation nasal spray 2 Spray  2 Spray Both Nostrils DAILY    gabapentin (NEURONTIN) capsule 600 mg  600 mg Oral QHS    LORazepam (ATIVAN) tablet 0.5 mg  0.5 mg Oral Q8H PRN    pantoprazole (PROTONIX) tablet 40 mg  40 mg Oral ACB    pravastatin (PRAVACHOL) tablet 40 mg  40 mg Oral QHS    tiotropium (SPIRIVA) inhalation capsule 18 mcg  1 Cap Inhalation QAM RT    0.9% sodium chloride infusion  100 mL/hr IntraVENous CONTINUOUS    metoclopramide HCl (REGLAN) tablet 10 mg  10 mg Oral Q6H PRN    enoxaparin (LOVENOX) injection 40 mg  40 mg SubCUTAneous Q24H    albuterol (PROVENTIL VENTOLIN) nebulizer solution 2.5 mg  2.5 mg Nebulization Q4H PRN    insulin lispro (HUMALOG) injection   SubCUTAneous AC&HS    glucose chewable tablet 16 g  4 Tab Oral PRN    glucagon (GLUCAGEN) injection 1 mg  1 mg IntraMUSCular PRN    dextrose 10% infusion 125-250 mL  125-250 mL IntraVENous PRN    ketorolac (TORADOL) injection 30 mg  30 mg IntraVENous Q6H PRN    nicotine (NICODERM CQ) 21 mg/24 hr patch 1 Patch  1 Patch TransDERmal DAILY    insulin glargine (LANTUS) injection 30 Units  30 Units SubCUTAneous DAILY    silodosin (RAPAFLO) capsule 8 mg  8 mg Oral DAILY WITH BREAKFAST    acetaminophen (TYLENOL) tablet 1,000 mg  1,000 mg Oral Q6H PRN       Allergies: Penicillin g; Bactrim [sulfamethoprim]; Dilaudid [hydromorphone (bulk)]; Morphine; and Pcn [penicillins]    Temp (24hrs), Av.4 °F (36.9 °C), Min:97.1 °F (36.2 °C), Max:99.2 °F (37.3 °C)    Visit Vitals  /78 (BP 1 Location: Right arm, BP Patient Position: At rest)   Pulse 88   Temp 98.6 °F (37 °C)   Resp 14   Wt 57.5 kg (126 lb 12.8 oz)   SpO2 97%   Breastfeeding? No   BMI 20.47 kg/m²       ROS: 12 point ROS obtained in details. Pertinent positives as mentioned in HPI,   otherwise negative    Physical Exam:    Constitutional: She is oriented to person, place, and time. She appears well-developed. Appears comfortable  HENT:   Head: Normocephalic and atraumatic. Eyes: Pupils are equal, round, and reactive to light. EOM are normal.   Cardiovascular: Normal rate, regular rhythm, normal heart sounds and intact distal pulses. Pulmonary/Chest: Effort normal and breath sounds normal.   Abdominal: Soft. Normal appearance and bowel sounds are normal. She exhibits no distension. resolved LLQ tenderness There is no rigidity, no rebound and no guarding. Neurological: She is alert and oriented to person, place, and time. Skin: Skin is warm and dry. Psychiatric: She has a normal mood and affect.    Nursing note and vitals reviewed.              Labs: Results:   Chemistry Recent Labs     19  0351 19  1850   * 195*    137   K 3.5 3.6    102   CO2 24 26 BUN 21* 24*   CREA 0.97 1.09   CA 7.9* 9.0   AGAP 6 9   BUCR 22* 22*   AP  --  93   TP  --  7.4   ALB  --  3.3*   GLOB  --  4.1*   AGRAT  --  0.8      CBC w/Diff Recent Labs     07/11/19  0351 07/09/19  1850   WBC 16.5* 10.3   RBC 3.26* 3.67*   HGB 10.3* 11.6*   HCT 30.4* 32.9*    241   GRANS  --  79*   LYMPH  --  16*   EOS  --  1      Microbiology Recent Labs     07/10/19  1040 07/10/19  0311 07/10/19  0301   CULT 32355 COLONIES/mL ESCHERICHIA COLI* AEROBIC BOTTLE ESCHERICHIA COLI* AEROBIC BOTTLE ESCHERICHIA COLI*          RADIOLOGY:    All available imaging studies/reports in [facility] for this admission were reviewed    Dr. Clemente Huffman, Infectious Disease Specialist  [phone]  July 12, 2019  12:53 PM

## 2019-07-13 PROBLEM — E11.21 TYPE 2 DIABETES WITH NEPHROPATHY (HCC): Chronic | Status: ACTIVE | Noted: 2019-06-03

## 2019-07-13 PROBLEM — J44.9 COPD (CHRONIC OBSTRUCTIVE PULMONARY DISEASE) (HCC): Chronic | Status: ACTIVE | Noted: 2019-03-27

## 2019-07-13 PROBLEM — A41.9 SEPSIS (HCC): Status: ACTIVE | Noted: 2019-07-10

## 2019-07-13 LAB
BASOPHILS # BLD: 0 K/UL (ref 0–0.1)
BASOPHILS NFR BLD: 0 % (ref 0–2)
DIFFERENTIAL METHOD BLD: ABNORMAL
EOSINOPHIL # BLD: 0.2 K/UL (ref 0–0.4)
EOSINOPHIL NFR BLD: 2 % (ref 0–5)
ERYTHROCYTE [DISTWIDTH] IN BLOOD BY AUTOMATED COUNT: 12.5 % (ref 11.6–14.5)
GLUCOSE BLD STRIP.AUTO-MCNC: 234 MG/DL (ref 70–110)
GLUCOSE BLD STRIP.AUTO-MCNC: 237 MG/DL (ref 70–110)
GLUCOSE BLD STRIP.AUTO-MCNC: 292 MG/DL (ref 70–110)
GLUCOSE BLD STRIP.AUTO-MCNC: 84 MG/DL (ref 70–110)
HCT VFR BLD AUTO: 29 % (ref 35–45)
HGB BLD-MCNC: 10 G/DL (ref 12–16)
LYMPHOCYTES # BLD: 2.2 K/UL (ref 0.9–3.6)
LYMPHOCYTES NFR BLD: 24 % (ref 21–52)
MCH RBC QN AUTO: 32.1 PG (ref 24–34)
MCHC RBC AUTO-ENTMCNC: 34.5 G/DL (ref 31–37)
MCV RBC AUTO: 92.9 FL (ref 74–97)
MONOCYTES # BLD: 0.9 K/UL (ref 0.05–1.2)
MONOCYTES NFR BLD: 10 % (ref 3–10)
NEUTS SEG # BLD: 5.9 K/UL (ref 1.8–8)
NEUTS SEG NFR BLD: 64 % (ref 40–73)
PLATELET # BLD AUTO: 180 K/UL (ref 135–420)
PMV BLD AUTO: 11.1 FL (ref 9.2–11.8)
RBC # BLD AUTO: 3.12 M/UL (ref 4.2–5.3)
WBC # BLD AUTO: 9.1 K/UL (ref 4.6–13.2)

## 2019-07-13 PROCEDURE — 74011636637 HC RX REV CODE- 636/637: Performed by: EMERGENCY MEDICINE

## 2019-07-13 PROCEDURE — 74011250636 HC RX REV CODE- 250/636: Performed by: INTERNAL MEDICINE

## 2019-07-13 PROCEDURE — 74011250637 HC RX REV CODE- 250/637: Performed by: UROLOGY

## 2019-07-13 PROCEDURE — 82962 GLUCOSE BLOOD TEST: CPT

## 2019-07-13 PROCEDURE — 36415 COLL VENOUS BLD VENIPUNCTURE: CPT

## 2019-07-13 PROCEDURE — 74011250637 HC RX REV CODE- 250/637: Performed by: INTERNAL MEDICINE

## 2019-07-13 PROCEDURE — 94640 AIRWAY INHALATION TREATMENT: CPT

## 2019-07-13 PROCEDURE — 74011636637 HC RX REV CODE- 636/637: Performed by: HOSPITALIST

## 2019-07-13 PROCEDURE — 85025 COMPLETE CBC W/AUTO DIFF WBC: CPT

## 2019-07-13 PROCEDURE — 74011000250 HC RX REV CODE- 250: Performed by: INTERNAL MEDICINE

## 2019-07-13 PROCEDURE — 74011636637 HC RX REV CODE- 636/637: Performed by: INTERNAL MEDICINE

## 2019-07-13 PROCEDURE — 74011250637 HC RX REV CODE- 250/637: Performed by: EMERGENCY MEDICINE

## 2019-07-13 PROCEDURE — 65270000029 HC RM PRIVATE

## 2019-07-13 RX ORDER — DOCUSATE SODIUM 100 MG/1
100 CAPSULE, LIQUID FILLED ORAL 2 TIMES DAILY
Status: DISCONTINUED | OUTPATIENT
Start: 2019-07-13 | End: 2019-07-15 | Stop reason: HOSPADM

## 2019-07-13 RX ORDER — INSULIN GLARGINE 100 [IU]/ML
8 INJECTION, SOLUTION SUBCUTANEOUS
Status: DISCONTINUED | OUTPATIENT
Start: 2019-07-13 | End: 2019-07-15 | Stop reason: HOSPADM

## 2019-07-13 RX ADMIN — FLUTICASONE PROPIONATE 2 SPRAY: 50 SPRAY, METERED NASAL at 07:51

## 2019-07-13 RX ADMIN — INSULIN LISPRO 6 UNITS: 100 INJECTION, SOLUTION INTRAVENOUS; SUBCUTANEOUS at 11:44

## 2019-07-13 RX ADMIN — INSULIN GLARGINE 30 UNITS: 100 INJECTION, SOLUTION SUBCUTANEOUS at 09:54

## 2019-07-13 RX ADMIN — PRAVASTATIN SODIUM 40 MG: 20 TABLET ORAL at 21:21

## 2019-07-13 RX ADMIN — DOCUSATE SODIUM 100 MG: 100 CAPSULE, LIQUID FILLED ORAL at 21:21

## 2019-07-13 RX ADMIN — INSULIN LISPRO 6 UNITS: 100 INJECTION, SOLUTION INTRAVENOUS; SUBCUTANEOUS at 22:58

## 2019-07-13 RX ADMIN — SILODOSIN 8 MG: 8 CAPSULE ORAL at 09:54

## 2019-07-13 RX ADMIN — ENOXAPARIN SODIUM 40 MG: 40 INJECTION SUBCUTANEOUS at 02:52

## 2019-07-13 RX ADMIN — BUDESONIDE AND FORMOTEROL FUMARATE DIHYDRATE 1 PUFF: 160; 4.5 AEROSOL RESPIRATORY (INHALATION) at 20:00

## 2019-07-13 RX ADMIN — INSULIN GLARGINE 8 UNITS: 100 INJECTION, SOLUTION SUBCUTANEOUS at 22:58

## 2019-07-13 RX ADMIN — GABAPENTIN 600 MG: 300 CAPSULE ORAL at 21:21

## 2019-07-13 RX ADMIN — SODIUM CHLORIDE 100 ML/HR: 900 INJECTION, SOLUTION INTRAVENOUS at 06:25

## 2019-07-13 RX ADMIN — CEFTRIAXONE SODIUM 1 G: 1 INJECTION, POWDER, FOR SOLUTION INTRAMUSCULAR; INTRAVENOUS at 14:45

## 2019-07-13 RX ADMIN — KETOROLAC TROMETHAMINE 30 MG: 30 INJECTION, SOLUTION INTRAMUSCULAR; INTRAVENOUS at 02:59

## 2019-07-13 RX ADMIN — BUDESONIDE AND FORMOTEROL FUMARATE DIHYDRATE 1 PUFF: 160; 4.5 AEROSOL RESPIRATORY (INHALATION) at 07:50

## 2019-07-13 RX ADMIN — TIOTROPIUM BROMIDE 18 MCG: 18 CAPSULE ORAL; RESPIRATORY (INHALATION) at 07:50

## 2019-07-13 RX ADMIN — INSULIN LISPRO 9 UNITS: 100 INJECTION, SOLUTION INTRAVENOUS; SUBCUTANEOUS at 17:43

## 2019-07-13 RX ADMIN — PANTOPRAZOLE SODIUM 40 MG: 40 TABLET, DELAYED RELEASE ORAL at 09:54

## 2019-07-13 RX ADMIN — SODIUM CHLORIDE 100 ML/HR: 900 INJECTION, SOLUTION INTRAVENOUS at 15:23

## 2019-07-13 NOTE — PROGRESS NOTES
Bedside and Verbal shift change report given to Jackelyn Villarreal RN (oncoming nurse) by Allan Murrieta RN (offgoing nurse). Report included the following information SBAR, Kardex, OR Summary, Procedure Summary, Intake/Output and MAR.

## 2019-07-13 NOTE — PROGRESS NOTES
Bedside and Verbal shift change report given to Aung Christianson RN (oncoming nurse) by Nato Allen RN (offgoing nurse). Report included the following information SBAR and Kardex.

## 2019-07-13 NOTE — PROGRESS NOTES
Hospitalist Progress Note    Patient: William Conley Age: 66 y.o. : 1960 MR#: [id number] SSN: [id number]  Date/Time: 2019 4:07 PM    Subjective:   Ms Sirisha Grossman is a 66year old female admitted with acute left hydronephrosis and emphysematous pyelonephritis 7/10/2019. She has gram negative bacteremia and is followed closely by ID who recommends continued IV antibiotics though 7/15/2019, and will transition to PO then, if tolerated without recurrent fever    Patient sitting in bed in NAD, awake, alert, feels better    Objective:   VS:   Visit Vitals  /60 (BP 1 Location: Right arm, BP Patient Position: At rest)   Pulse 78   Temp 98.3 °F (36.8 °C)   Resp 16   Wt 57.5 kg (126 lb 12.8 oz)   SpO2 99%   Breastfeeding?  No   BMI 20.47 kg/m²      Tmax/24hrs: Temp (24hrs), Av.6 °F (37 °C), Min:98.3 °F (36.8 °C), Max:99.2 °F (37.3 °C)       Intake/Output Summary (Last 24 hours) at 2019 1826  Last data filed at 2019 1209  Gross per 24 hour   Intake 1696.67 ml   Output 4650 ml   Net -2953.33 ml       General:  Awake, alert  Cardiovascular:  S1S2+, RRR  Pulmonary:  Coarse bs b/l  GI:  Soft, BS+, NT, ND  Extremities:  No edema         Labs:    Recent Results (from the past 24 hour(s))   GLUCOSE, POC    Collection Time: 19  9:23 PM   Result Value Ref Range    Glucose (POC) 171 (H) 70 - 110 mg/dL   GLUCOSE, POC    Collection Time: 19  6:00 AM   Result Value Ref Range    Glucose (POC) 84 70 - 110 mg/dL   CBC WITH AUTOMATED DIFF    Collection Time: 19  9:13 AM   Result Value Ref Range    WBC 9.1 4.6 - 13.2 K/uL    RBC 3.12 (L) 4.20 - 5.30 M/uL    HGB 10.0 (L) 12.0 - 16.0 g/dL    HCT 29.0 (L) 35.0 - 45.0 %    MCV 92.9 74.0 - 97.0 FL    MCH 32.1 24.0 - 34.0 PG    MCHC 34.5 31.0 - 37.0 g/dL    RDW 12.5 11.6 - 14.5 %    PLATELET 296 112 - 664 K/uL    MPV 11.1 9.2 - 11.8 FL    NEUTROPHILS 64 40 - 73 %    LYMPHOCYTES 24 21 - 52 %    MONOCYTES 10 3 - 10 %    EOSINOPHILS 2 0 - 5 %    BASOPHILS 0 0 - 2 %    ABS. NEUTROPHILS 5.9 1.8 - 8.0 K/UL    ABS. LYMPHOCYTES 2.2 0.9 - 3.6 K/UL    ABS. MONOCYTES 0.9 0.05 - 1.2 K/UL    ABS. EOSINOPHILS 0.2 0.0 - 0.4 K/UL    ABS. BASOPHILS 0.0 0.0 - 0.1 K/UL    DF AUTOMATED     GLUCOSE, POC    Collection Time: 07/13/19 10:58 AM   Result Value Ref Range    Glucose (POC) 234 (H) 70 - 110 mg/dL   GLUCOSE, POC    Collection Time: 07/13/19  4:36 PM   Result Value Ref Range    Glucose (POC) 292 (H) 70 - 110 mg/dL       Imaging:  No results found.     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

## 2019-07-13 NOTE — PROGRESS NOTES
0249  Aox3, no apparent distress, martinez draining and intact, clear yellow urine, bed in low positioned. Call bell with in reach.

## 2019-07-13 NOTE — PROGRESS NOTES
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 UroSepsis/ Bacteremia- Bcx and Ucx + E. Coli. Improving  Left [place] s/p left JJ stent 7/10/19 by Dr. Li Valles. Creat 0.97  Recurrent UTI- Ucx 6/3/19 & 5/12/19 w/ >100k E.coli   Filling defect in the left distal ureter per Dr. Jalen Lee intraop findings on RPG  ? Cystocele  Incomplete bladder emptying  Dysfunctional voiding  LOPEZ  DM     Plan:  ID Recommends IV abx over the weekend and trial PO abx on Monday. Possibly home on Monday  Maintain martinez until Monday to allow complete drainage of  system. Will need VT at time of martinez removal given hx of retention - possibly on [address] [place] [address]. Patient cannot have Flomax 2/2 bactrim allergy  IVF and symptom management per primary- appreciate assistance in managing patient  Trend labs. Repeat Bcx, BMP and CBC ordered   Follow Up arranged: YES Will have patient f/up with Dr. Sofie Syed as oupt. She will need to be scheduled for diagnostic URS with possible biopsy and JJ stent exchange as outpt. Message sent to Michaela Marks to arrange  Following closely       Subjective:     Daily Progress Note: 7/13/2019 3:16 PM    Hien Baker is 3 Days Post-Op and doing satisfactory. She reports pain is absent. She has no complaints. She is tolerating a solid diet and ambulating without assistance. Indwelling catheter is draining well.     Objective:     Visit Vitals  /68 (BP 1 Location: Right arm, BP Patient Position: At rest)   Pulse 76   Temp 98.4 °F (36.9 °C)   Resp 17   Wt 126 lb 12.8 oz (57.5 kg)   SpO2 96%   Breastfeeding? No   BMI 20.47 kg/m²        Temp (24hrs), Av.7 °F (37.1 °C), Min:98.4 °F (36.9 °C), Max:99.2 °F (37.3 °C)      Intake and Output:  1901 - 700  In: 2176.7 [P.O.:1340; I.V.:836.7]  Out: 7565 [Urine:5750]  701 - 1900  In: 200 [P.O.:380]  Out: 650 [Urine:650]    Physical Exam:   General appearance: alert, cooperative, no distress, appears stated age  Abdomen: soft, non-tender. Bowel sounds normal. No masses,  no organomegaly    Vela with suresh urine    Data Review:    Recent Results (from the past 24 hour(s))   GLUCOSE, POC    Collection Time: 19  3:56 PM   Result Value Ref Range    Glucose (POC) 218 (H) 70 - 110 mg/dL   GLUCOSE, POC    Collection Time: 19  9:23 PM   Result Value Ref Range    Glucose (POC) 171 (H) 70 - 110 mg/dL   GLUCOSE, POC    Collection Time: 19  6:00 AM   Result Value Ref Range    Glucose (POC) 84 70 - 110 mg/dL   CBC WITH AUTOMATED DIFF    Collection Time: 19  9:13 AM   Result Value Ref Range    WBC 9.1 4.6 - 13.2 K/uL    RBC 3.12 (L) 4.20 - 5.30 M/uL    HGB 10.0 (L) 12.0 - 16.0 g/dL    HCT 29.0 (L) 35.0 - 45.0 %    MCV 92.9 74.0 - 97.0 FL    MCH 32.1 24.0 - 34.0 PG    MCHC 34.5 31.0 - 37.0 g/dL    RDW 12.5 11.6 - 14.5 %    PLATELET 594 121 - 222 K/uL    MPV 11.1 9.2 - 11.8 FL    NEUTROPHILS 64 40 - 73 %    LYMPHOCYTES 24 21 - 52 %    MONOCYTES 10 3 - 10 %    EOSINOPHILS 2 0 - 5 %    BASOPHILS 0 0 - 2 %    ABS. NEUTROPHILS 5.9 1.8 - 8.0 K/UL    ABS. LYMPHOCYTES 2.2 0.9 - 3.6 K/UL    ABS. MONOCYTES 0.9 0.05 - 1.2 K/UL    ABS. EOSINOPHILS 0.2 0.0 - 0.4 K/UL    ABS.  BASOPHILS 0.0 0.0 - 0.1 K/UL    DF AUTOMATED     GLUCOSE, POC    Collection Time: 19 10:58 AM   Result Value Ref Range    Glucose (POC) 234 (H) 70 - 110 mg/dL         Signed By:     Denisha Holliday MD   Urologic Oncologist  [facility]  Professor of Urology  [facility] [employer]  Office [phone] Ext [phone]                          July 13, 2019

## 2019-07-14 LAB
ANION GAP SERPL CALC-SCNC: 6 MMOL/L (ref 3–18)
BASOPHILS # BLD: 0 K/UL (ref 0–0.1)
BASOPHILS NFR BLD: 0 % (ref 0–2)
BUN SERPL-MCNC: 10 MG/DL (ref 7–18)
BUN/CREAT SERPL: 14 (ref 12–20)
CALCIUM SERPL-MCNC: 8.1 MG/DL (ref 8.5–10.1)
CHLORIDE SERPL-SCNC: 108 MMOL/L (ref 100–108)
CO2 SERPL-SCNC: 28 MMOL/L (ref 21–32)
CREAT SERPL-MCNC: 0.73 MG/DL (ref 0.6–1.3)
DIFFERENTIAL METHOD BLD: ABNORMAL
EOSINOPHIL # BLD: 0.2 K/UL (ref 0–0.4)
EOSINOPHIL NFR BLD: 2 % (ref 0–5)
ERYTHROCYTE [DISTWIDTH] IN BLOOD BY AUTOMATED COUNT: 12.4 % (ref 11.6–14.5)
GLUCOSE BLD STRIP.AUTO-MCNC: 109 MG/DL (ref 70–110)
GLUCOSE BLD STRIP.AUTO-MCNC: 158 MG/DL (ref 70–110)
GLUCOSE BLD STRIP.AUTO-MCNC: 245 MG/DL (ref 70–110)
GLUCOSE BLD STRIP.AUTO-MCNC: 314 MG/DL (ref 70–110)
GLUCOSE SERPL-MCNC: 100 MG/DL (ref 74–99)
HCT VFR BLD AUTO: 30.5 % (ref 35–45)
HGB BLD-MCNC: 10.1 G/DL (ref 12–16)
LYMPHOCYTES # BLD: 2.1 K/UL (ref 0.9–3.6)
LYMPHOCYTES NFR BLD: 26 % (ref 21–52)
MAGNESIUM SERPL-MCNC: 1.5 MG/DL (ref 1.6–2.6)
MCH RBC QN AUTO: 30.8 PG (ref 24–34)
MCHC RBC AUTO-ENTMCNC: 33.1 G/DL (ref 31–37)
MCV RBC AUTO: 93 FL (ref 74–97)
MONOCYTES # BLD: 0.9 K/UL (ref 0.05–1.2)
MONOCYTES NFR BLD: 11 % (ref 3–10)
NEUTS SEG # BLD: 5 K/UL (ref 1.8–8)
NEUTS SEG NFR BLD: 61 % (ref 40–73)
PLATELET # BLD AUTO: 227 K/UL (ref 135–420)
PMV BLD AUTO: 10.9 FL (ref 9.2–11.8)
POTASSIUM SERPL-SCNC: 3.7 MMOL/L (ref 3.5–5.5)
RBC # BLD AUTO: 3.28 M/UL (ref 4.2–5.3)
SODIUM SERPL-SCNC: 142 MMOL/L (ref 136–145)
WBC # BLD AUTO: 8.2 K/UL (ref 4.6–13.2)

## 2019-07-14 PROCEDURE — 94640 AIRWAY INHALATION TREATMENT: CPT

## 2019-07-14 PROCEDURE — 83735 ASSAY OF MAGNESIUM: CPT

## 2019-07-14 PROCEDURE — 74011250637 HC RX REV CODE- 250/637: Performed by: UROLOGY

## 2019-07-14 PROCEDURE — 85025 COMPLETE CBC W/AUTO DIFF WBC: CPT

## 2019-07-14 PROCEDURE — 74011000250 HC RX REV CODE- 250: Performed by: INTERNAL MEDICINE

## 2019-07-14 PROCEDURE — 74011250637 HC RX REV CODE- 250/637: Performed by: EMERGENCY MEDICINE

## 2019-07-14 PROCEDURE — 74011250636 HC RX REV CODE- 250/636: Performed by: EMERGENCY MEDICINE

## 2019-07-14 PROCEDURE — 74011250636 HC RX REV CODE- 250/636: Performed by: INTERNAL MEDICINE

## 2019-07-14 PROCEDURE — 74011636637 HC RX REV CODE- 636/637: Performed by: INTERNAL MEDICINE

## 2019-07-14 PROCEDURE — 82962 GLUCOSE BLOOD TEST: CPT

## 2019-07-14 PROCEDURE — 74011636637 HC RX REV CODE- 636/637: Performed by: HOSPITALIST

## 2019-07-14 PROCEDURE — 74011250637 HC RX REV CODE- 250/637: Performed by: INTERNAL MEDICINE

## 2019-07-14 PROCEDURE — 36415 COLL VENOUS BLD VENIPUNCTURE: CPT

## 2019-07-14 PROCEDURE — 80048 BASIC METABOLIC PNL TOTAL CA: CPT

## 2019-07-14 PROCEDURE — 74011636637 HC RX REV CODE- 636/637: Performed by: EMERGENCY MEDICINE

## 2019-07-14 PROCEDURE — 97530 THERAPEUTIC ACTIVITIES: CPT

## 2019-07-14 PROCEDURE — 65270000029 HC RM PRIVATE

## 2019-07-14 RX ORDER — MAGNESIUM SULFATE 1 G/100ML
1 INJECTION INTRAVENOUS ONCE
Status: COMPLETED | OUTPATIENT
Start: 2019-07-14 | End: 2019-07-14

## 2019-07-14 RX ADMIN — BUDESONIDE AND FORMOTEROL FUMARATE DIHYDRATE 1 PUFF: 160; 4.5 AEROSOL RESPIRATORY (INHALATION) at 08:33

## 2019-07-14 RX ADMIN — CEFTRIAXONE SODIUM 1 G: 1 INJECTION, POWDER, FOR SOLUTION INTRAMUSCULAR; INTRAVENOUS at 13:47

## 2019-07-14 RX ADMIN — BUDESONIDE AND FORMOTEROL FUMARATE DIHYDRATE 1 PUFF: 160; 4.5 AEROSOL RESPIRATORY (INHALATION) at 19:52

## 2019-07-14 RX ADMIN — INSULIN GLARGINE 30 UNITS: 100 INJECTION, SOLUTION SUBCUTANEOUS at 08:26

## 2019-07-14 RX ADMIN — PANTOPRAZOLE SODIUM 40 MG: 40 TABLET, DELAYED RELEASE ORAL at 08:28

## 2019-07-14 RX ADMIN — INSULIN LISPRO 12 UNITS: 100 INJECTION, SOLUTION INTRAVENOUS; SUBCUTANEOUS at 16:42

## 2019-07-14 RX ADMIN — GABAPENTIN 600 MG: 300 CAPSULE ORAL at 23:38

## 2019-07-14 RX ADMIN — SILODOSIN 8 MG: 8 CAPSULE ORAL at 08:28

## 2019-07-14 RX ADMIN — INSULIN LISPRO 6 UNITS: 100 INJECTION, SOLUTION INTRAVENOUS; SUBCUTANEOUS at 11:25

## 2019-07-14 RX ADMIN — FLUTICASONE PROPIONATE 2 SPRAY: 50 SPRAY, METERED NASAL at 08:33

## 2019-07-14 RX ADMIN — ENOXAPARIN SODIUM 40 MG: 40 INJECTION SUBCUTANEOUS at 02:42

## 2019-07-14 RX ADMIN — INSULIN GLARGINE 8 UNITS: 100 INJECTION, SOLUTION SUBCUTANEOUS at 23:12

## 2019-07-14 RX ADMIN — MAGNESIUM SULFATE HEPTAHYDRATE 1 G: 1 INJECTION, SOLUTION INTRAVENOUS at 12:51

## 2019-07-14 RX ADMIN — INSULIN LISPRO 3 UNITS: 100 INJECTION, SOLUTION INTRAVENOUS; SUBCUTANEOUS at 23:13

## 2019-07-14 RX ADMIN — DOCUSATE SODIUM 100 MG: 100 CAPSULE, LIQUID FILLED ORAL at 17:59

## 2019-07-14 RX ADMIN — DOCUSATE SODIUM 100 MG: 100 CAPSULE, LIQUID FILLED ORAL at 08:27

## 2019-07-14 RX ADMIN — PRAVASTATIN SODIUM 40 MG: 20 TABLET ORAL at 23:12

## 2019-07-14 NOTE — PROGRESS NOTES
Hospitalist Progress Note    Patient: Santosh Yu Age: 66 y.o. : 1960 MR#: [id number] SSN: [id number]  Date/Time: 2019 4:07 PM    Subjective:   Ms Reyna Mccoy is a 66year old female admitted with acute left hydronephrosis and emphysematous pyelonephritis 7/10/2019. She has gram negative bacteremia and is followed closely by ID who recommends continued IV antibiotics though 7/15/2019, and will transition to PO then, if tolerated without recurrent fever    Patient sitting in bed in NAD, awake, alert, follows commands, feels better, wants to go home. Objective:   VS:   Visit Vitals  /67 (BP 1 Location: Right arm, BP Patient Position: At rest)   Pulse 77   Temp 98.1 °F (36.7 °C)   Resp 18   Wt 57.5 kg (126 lb 12.8 oz)   SpO2 96%   Breastfeeding?  No   BMI 20.47 kg/m²      Tmax/24hrs: Temp (24hrs), Av.3 °F (36.8 °C), Min:98.1 °F (36.7 °C), Max:98.5 °F (36.9 °C)       Intake/Output Summary (Last 24 hours) at 2019 1516  Last data filed at 2019 1329  Gross per 24 hour   Intake 840 ml   Output 5500 ml   Net -4660 ml       General:  Awake, alert  Cardiovascular:  S1S2+, RRR  Pulmonary:  CTA b/l  GI:  Soft, BS+, NT, ND  Extremities:  No edema         Labs:    Recent Results (from the past 24 hour(s))   GLUCOSE, POC    Collection Time: 19  4:36 PM   Result Value Ref Range    Glucose (POC) 292 (H) 70 - 110 mg/dL   GLUCOSE, POC    Collection Time: 19 10:08 PM   Result Value Ref Range    Glucose (POC) 237 (H) 70 - 110 mg/dL   GLUCOSE, POC    Collection Time: 19  5:25 AM   Result Value Ref Range    Glucose (POC) 109 70 - 110 mg/dL   CBC WITH AUTOMATED DIFF    Collection Time: 19  5:36 AM   Result Value Ref Range    WBC 8.2 4.6 - 13.2 K/uL    RBC 3.28 (L) 4.20 - 5.30 M/uL    HGB 10.1 (L) 12.0 - 16.0 g/dL    HCT 30.5 (L) 35.0 - 45.0 %    MCV 93.0 74.0 - 97.0 FL    MCH 30.8 24.0 - 34.0 PG    MCHC 33.1 31.0 - 37.0 g/dL    RDW 12.4 11.6 - 14.5 %    PLATELET 785 652 - 296 K/uL MPV 10.9 9.2 - 11.8 FL    NEUTROPHILS 61 40 - 73 %    LYMPHOCYTES 26 21 - 52 %    MONOCYTES 11 (H) 3 - 10 %    EOSINOPHILS 2 0 - 5 %    BASOPHILS 0 0 - 2 %    ABS. NEUTROPHILS 5.0 1.8 - 8.0 K/UL    ABS. LYMPHOCYTES 2.1 0.9 - 3.6 K/UL    ABS. MONOCYTES 0.9 0.05 - 1.2 K/UL    ABS. EOSINOPHILS 0.2 0.0 - 0.4 K/UL    ABS. BASOPHILS 0.0 0.0 - 0.1 K/UL    DF AUTOMATED     METABOLIC PANEL, BASIC    Collection Time: 07/14/19  5:36 AM   Result Value Ref Range    Sodium 142 136 - 145 mmol/L    Potassium 3.7 3.5 - 5.5 mmol/L    Chloride 108 100 - 108 mmol/L    CO2 28 21 - 32 mmol/L    Anion gap 6 3.0 - 18 mmol/L    Glucose 100 (H) 74 - 99 mg/dL    BUN 10 7.0 - 18 MG/DL    Creatinine 0.73 0.6 - 1.3 MG/DL    BUN/Creatinine ratio 14 12 - 20      GFR est AA >60 >60 ml/min/1.73m2    GFR est non-AA >60 >60 ml/min/1.73m2    Calcium 8.1 (L) 8.5 - 10.1 MG/DL   MAGNESIUM    Collection Time: 07/14/19  5:36 AM   Result Value Ref Range    Magnesium 1.5 (L) 1.6 - 2.6 mg/dL   GLUCOSE, POC    Collection Time: 07/14/19 11:04 AM   Result Value Ref Range    Glucose (POC) 245 (H) 70 - 110 mg/dL       Imaging:  No results found.     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 Patient must void prior to discharge   lantus, on ssi  Counseled smoking cessation  Dispo- Likely home tomorrow    Additional Notes:      Full Code    Disposition:   Home soon    Case discussed with:  [x]Patient  []Family  []Nursing  []Case Management  DVT Prophylaxis:  [x]Lovenox  []Hep SQ  []SCDs  []Coumadin []On Heparin gtt    Signed By: Sobeida Garcia MD     July 14, 2019

## 2019-07-14 NOTE — PROGRESS NOTES
Bedside and Verbal shift change report given to Marco A Boston RN (oncoming nurse) by Mulu Saldana RN (offgoing nurse). Report included the following information SBAR, Kardex, OR Summary, Procedure Summary, Intake/Output and MAR.

## 2019-07-14 NOTE — PROGRESS NOTES
New PT evaluation orders received and acknowledged. PT evaluation has been completed and pt is currently on caseload.     Thank you for this referral.    Bertin Deluna, PT, DPT

## 2019-07-14 NOTE — PROGRESS NOTES
0500  Aox3, no apparent distress, martinez draining and intact, clear yellow urine, bed in low positioned. Call bell with in reach.

## 2019-07-14 NOTE — PROGRESS NOTES
Bedside and Verbal shift change report given to Mitzi Murphy RN (oncoming nurse) by Italia Prajapati RN (offgoing nurse). Report included the following information SBAR and Kardex.

## 2019-07-14 NOTE — PROGRESS NOTES
Problem: Mobility Impaired (Adult and Pediatric)  Goal: *Acute Goals and Plan of Care (Insert Text)  Description  Physical Therapy Goals  Initiated 7/12/2019 and to be accomplished within 7 day(s)  1. Patient will move from supine to sit and sit to supine , scoot up and down and roll side to side in bed with supervision/set-up. 2.  Patient will transfer from bed to chair and chair to bed with supervision/set-up using the least restrictive device. 3.  Patient will perform sit to stand with supervision/set-up. 4.  Patient will ambulate with supervision/set-up for >150 feet with the least restrictive device. PLOF: Patient lives with her mother (for whom she is caregiver), son, and her son's girlfriend in 1 story home with threshold to enter. Patient has SC at home, though notes she is not able to use it as it is too wide for her tub. Patient has no other AD/DME and reports she walks by furniture/wall walking at home. Outcome: Resolved/Met   PHYSICAL THERAPY TREATMENT AND DISCHARGE    Patient: Ari Arias (75 y.o. female)  Date: 7/14/2019  Diagnosis: Emphysematous pyelitis [N12]  Pyelonephritis [N12]  Pyelonephritis [N12] Sepsis ([facility]  Procedure(s) (LRB):  LEFT CYSTOSCOPY RETROGRADES WITH URETERAL STENT INSERTION/ C-ARM (Left) 4 Days Post-Op  Precautions:   fall  PLOF: Patient lives with her mother (for whom she is caregiver), son, and her son's girlfriend in 1 story home with threshold to enter. Patient has SC at home, though notes she is not able to use it as it is too wide for her tub. Patient has no other AD/DME and reports she walks by furniture/wall walking at home. ASSESSMENT:  Pt presented in bed, initially declining PT however agreed once role of PT  discussed and DC plans. 02RA 97%. HR 88. No SOB.   Based on the objective data described below, the patient presents with decreased activity tolerance secondary acute hospital stay for emphysematous pyelitis however demos independence with mobility and ambulating greater than 300 feet without an assistive device. In<> out of bed I'ly and independent for managing her martinez during mobility; \"they said they're taking this out tomorrow and I'm going home. \" Good safety awareness. States she is looking forward to getting home, has a plan to quit smoking and looking forward to eventually getting an apt. PLAN:  Maximum therapeutic gains met at current level of care and patient will be discharged from physical therapy at this time. Rationale for discharge:  ?     Goals Achieved  Discharge Recommendations:  None  Further Equipment Recommendations for Discharge:  N/A     SUBJECTIVE:   Patient stated  why do I need physical therapy?   \"Im up walking by myself all over the place. \"    OBJECTIVE DATA SUMMARY:   Critical Behavior: A & O x 4    Functional Mobility Training:  Bed Mobility:  Rolling: Independent  Supine to Sit: Independent  Sit to Supine: Independent  Transfers:  Sit to Stand: Independent  Stand to Sit: Independent        Bed to Chair: Independent  Balance:  Sitting: Intact  Standing: Intact; Without support   Ambulation/Gait Training:  Distance (ft): 350 Feet (ft)  Assistive Device: (no device)  Ambulation - Level of Assistance: Independent    Gait Abnormalities: Altered arm swing;Decreased step clearance;Trunk sway increased slightly  Pain:  Pain level pre-treatment: 0/10   Pain level post-treatment: 0/10   Activity Tolerance:   fair  Please refer to the flowsheet for vital signs taken during this treatment. After treatment:   ? Patient left in no apparent distress  sitting edge of  bed  ? Call bell left within reach  ? Nursing notified    COMMUNICATION/EDUCATION:   ?         Role of Physical Therapy in the acute care setting. ?         Fall prevention education was provided and the patient/caregiver indicated understanding. ? Patient/family have participated as able and agree with findings and recommendations.   ?         Other: stroke risk factors> smoking, DM, family history (mother); LE standing ex.         Nicole Merino, KARYN   Time Calculation: 20 mins

## 2019-07-15 ENCOUNTER — HOME HEALTH ADMISSION (OUTPATIENT)
Dept: HOME HEALTH SERVICES | Facility: HOME HEALTH | Age: 59
End: 2019-07-15

## 2019-07-15 VITALS
HEART RATE: 70 BPM | RESPIRATION RATE: 16 BRPM | WEIGHT: 126.8 LBS | DIASTOLIC BLOOD PRESSURE: 77 MMHG | SYSTOLIC BLOOD PRESSURE: 140 MMHG | TEMPERATURE: 97.3 F | OXYGEN SATURATION: 98 % | BODY MASS INDEX: 20.47 KG/M2

## 2019-07-15 LAB
GLUCOSE BLD STRIP.AUTO-MCNC: 122 MG/DL (ref 70–110)
GLUCOSE BLD STRIP.AUTO-MCNC: 315 MG/DL (ref 70–110)

## 2019-07-15 PROCEDURE — 51798 US URINE CAPACITY MEASURE: CPT

## 2019-07-15 PROCEDURE — 97165 OT EVAL LOW COMPLEX 30 MIN: CPT

## 2019-07-15 PROCEDURE — 74011250636 HC RX REV CODE- 250/636: Performed by: INTERNAL MEDICINE

## 2019-07-15 PROCEDURE — 74011250637 HC RX REV CODE- 250/637: Performed by: EMERGENCY MEDICINE

## 2019-07-15 PROCEDURE — 94640 AIRWAY INHALATION TREATMENT: CPT

## 2019-07-15 PROCEDURE — 74011250637 HC RX REV CODE- 250/637: Performed by: INTERNAL MEDICINE

## 2019-07-15 PROCEDURE — 74011636637 HC RX REV CODE- 636/637: Performed by: INTERNAL MEDICINE

## 2019-07-15 PROCEDURE — 94761 N-INVAS EAR/PLS OXIMETRY MLT: CPT

## 2019-07-15 PROCEDURE — 74011250637 HC RX REV CODE- 250/637: Performed by: UROLOGY

## 2019-07-15 PROCEDURE — 82962 GLUCOSE BLOOD TEST: CPT

## 2019-07-15 PROCEDURE — 74011636637 HC RX REV CODE- 636/637: Performed by: HOSPITALIST

## 2019-07-15 RX ORDER — SILODOSIN 8 MG/1
8 CAPSULE ORAL
Qty: 30 CAP | Refills: 0 | Status: SHIPPED | OUTPATIENT
Start: 2019-07-16 | End: 2019-11-04

## 2019-07-15 RX ORDER — IBUPROFEN 200 MG
1 TABLET ORAL DAILY
Qty: 30 PATCH | Refills: 0 | Status: SHIPPED | OUTPATIENT
Start: 2019-07-16 | End: 2019-08-15

## 2019-07-15 RX ORDER — CIPROFLOXACIN 500 MG/1
500 TABLET ORAL 2 TIMES DAILY
Qty: 22 TAB | Refills: 0 | Status: SHIPPED | OUTPATIENT
Start: 2019-07-15 | End: 2019-07-26

## 2019-07-15 RX ADMIN — PANTOPRAZOLE SODIUM 40 MG: 40 TABLET, DELAYED RELEASE ORAL at 09:22

## 2019-07-15 RX ADMIN — TIOTROPIUM BROMIDE 18 MCG: 18 CAPSULE ORAL; RESPIRATORY (INHALATION) at 09:00

## 2019-07-15 RX ADMIN — SILODOSIN 8 MG: 8 CAPSULE ORAL at 09:22

## 2019-07-15 RX ADMIN — BUDESONIDE AND FORMOTEROL FUMARATE DIHYDRATE 1 PUFF: 160; 4.5 AEROSOL RESPIRATORY (INHALATION) at 08:00

## 2019-07-15 RX ADMIN — DOCUSATE SODIUM 100 MG: 100 CAPSULE, LIQUID FILLED ORAL at 09:22

## 2019-07-15 RX ADMIN — INSULIN GLARGINE 30 UNITS: 100 INJECTION, SOLUTION SUBCUTANEOUS at 09:22

## 2019-07-15 RX ADMIN — INSULIN LISPRO 12 UNITS: 100 INJECTION, SOLUTION INTRAVENOUS; SUBCUTANEOUS at 11:41

## 2019-07-15 RX ADMIN — ENOXAPARIN SODIUM 40 MG: 40 INJECTION SUBCUTANEOUS at 03:20

## 2019-07-15 NOTE — PROGRESS NOTES
conducted a Follow up consultation and Spiritual Assessment for Hanna Gordon, who is a 66 y.o.,female. The  provided the following Interventions:  Continued the relationship of care and support. Listened empathically. Chart reviewed. The following outcomes were achieved:  Patient expressed gratitude for 's visit. Assessment:  There are no further spiritual or [employer] issues which require Spiritual Care Services interventions at this time. Plan:  Chaplains will continue to follow and will provide pastoral care on an as needed/requested basis.  recommends bedside caregivers page  on duty if patient shows signs of acute spiritual or emotional distress.      [id number] [facility] Certified [address] [place] [address]   [phone]

## 2019-07-15 NOTE — PROGRESS NOTES
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 Improved  UroSepsis/ Bacteremia- Bcx and Ucx + E. Coli. Repeat BCx NG. Resolved  Left [place] s/p left JJ stent 7/10/19 by Dr. Danni Valles. Creat normal  Recurrent UTI- Ucx 6/3/19 & 5/12/19 w/ >100k E.coli   Filling defect in the left distal ureter per Dr. Su Cabrera intraop findings on RPG  ? Cystocele  Incomplete bladder emptying  Dysfunctional voiding  LOPEZ  DM     Plan:  Passed VT Today- PVR 0cc's. Continue Rapaflo as outpt  Repeat Bcx reviewed- Prelim NGTD  Recommend d/cing on long course culture specific abx per ID recommendations  Follow Up arranged: YES has appointment scheduled with Dr. Torie Lizama as oupt on 7/18/19 at 3:15pm. She will need to be scheduled for diagnostic URS with possible biopsy and JJ stent exchange as outpt. Message sent to Mathews Pert to arrange  [facility] to discharge home from a urological standpoint once cleared by ID. Will sign off at this time. Please contact with any questions or concerns.      Sabina Cole [facility]  [facility] [place]  Pager # [phone]    Available M-F 7:30- 5pm .  After 5pm and weekends please call answering service at [phone]    I saw and independently examined the patient. I agree with the assessment and plan as listed above. I saw and independently examined the patient.   I agree with the assessment and plan as listed above and have made any changes to reflect my direct input. Saúl Jean-Baptiste MD        Subjective:     Daily Progress Note: 7/15/2019 3:16 PM    Patient feeling much better. Left Flank pain and abdominal pain mostly resolved. No FCNV. Objective:     Visit Vitals  /63 (BP 1 Location: Left arm, BP Patient Position: At rest)   Pulse 75   Temp 98.6 °F (37 °C)   Resp 12   Wt 126 lb 12.8 oz (57.5 kg)   SpO2 98%   Breastfeeding? No   BMI 20.47 kg/m²        Temp (24hrs), Av.4 °F (36.9 °C), Min:98 °F (36.7 °C), Max:98.7 °F (37.1 °C)      Intake and Output:  1901 - 07/15 0700  In: 1440 [P.O.:1440]  Out: 49565 [Urine:60740]  07/15 07 - 07/15 1900  In: 320 [P.O.:320]  Out: 100 [Urine:100]    PHYSICAL EXAMINATION:   Visit Vitals  /63 (BP 1 Location: Left arm, BP Patient Position: At rest)   Pulse 75   Temp 98.6 °F (37 °C)   Resp 12   Wt 126 lb 12.8 oz (57.5 kg)   SpO2 98%   Breastfeeding? No   BMI 20.47 kg/m²     Constitutional: WDWN, Pleasant and appropriate affect, No acute distress. HEENT: Normocephalic, Atraumatic  CV:  No peripheral swelling noted  Respiratory: No respiratory distress or difficulties  Abdomen:  No abdominal masses or tenderness. : mild left CVA tenderness. Skin: Normal for ethnicity  Neuro/Psych:  Alert and oriented x 3, affect appropriate. MSK: PATEL   Lymphatic:   No enlarged inguinal lymph nodes.         Data Review:    Recent Results (from the past 24 hour(s))   GLUCOSE, POC    Collection Time: 19 11:04 AM   Result Value Ref Range    Glucose (POC) 245 (H) 70 - 110 mg/dL   GLUCOSE, POC    Collection Time: 19  3:55 PM   Result Value Ref Range    Glucose (POC) 314 (H) 70 - 110 mg/dL   GLUCOSE, POC    Collection Time: 19  8:51 PM   Result Value Ref Range    Glucose (POC) 158 (H) 70 - 110 mg/dL   GLUCOSE, POC    Collection Time: 07/15/19  5:41 AM   Result Value Ref Range    Glucose (POC) 122 (H) 70 - 110 mg/dL

## 2019-07-15 NOTE — DISCHARGE INSTRUCTIONS
Patient Education        Learning About Benefits From Quitting Smoking  How does quitting smoking make you healthier? If you're thinking about quitting smoking, you may have a few reasons to be smoke-free. Your health may be one of them. · When you quit smoking, you lower your risks for cancer, lung disease, heart attack, stroke, blood vessel disease, and blindness from macular degeneration. · When you're smoke-free, you get sick less often, and you heal faster. You are less likely to get colds, flu, bronchitis, and pneumonia. · As a nonsmoker, you may find that your mood is better and you are less stressed. When and how will you feel healthier? Quitting has real health benefits that start from day 1 of being smoke-free. And the longer you stay smoke-free, the healthier you get and the better you feel. The first hours  · After just 20 minutes, your blood pressure and heart rate go down. That means there's less stress on your heart and blood vessels. · Within 12 hours, the level of carbon monoxide in your blood drops back to normal. That makes room for more oxygen. With more oxygen in your body, you may notice that you have more energy than when you smoked. After 2 weeks  · Your lungs start to work better. · Your risk of heart attack starts to drop. After 1 month  · When your lungs are clear, you cough less and breathe deeper, so it's easier to be active. · Your sense of taste and smell return. That means you can enjoy food more than you have since you started smoking. Over the years  · After 1 year, your risk of heart disease is half what it would be if you kept smoking. · After 5 years, your risk of stroke starts to shrink. Within a few years after that, it's about the same as if you'd never smoked. · After 10 years, your risk of dying from lung cancer is cut by about half. And your risk for many other types of cancer is lower too. How would quitting help others in your life?   When you quit smoking, you improve the health of everyone who now breathes in your smoke. · Their heart, lung, and cancer risks drop, much like yours. · They are sick less. For babies and small children, living smoke-free means they're less likely to have ear infections, pneumonia, and bronchitis. · If you're a woman who is or will be pregnant someday, quitting smoking means a healthier . · Children who are close to you are less likely to become adult smokers. Where can you learn more? Go to http://rhona-ilia.info/. Enter 052 [id number] 72 11 in the search box to learn more about \"Learning About Benefits From Quitting Smoking. \"  Current as of: 2018  Content Version: 11.9  © 9596-2982 [employer]. Care instructions adapted under license by [employer] (which disclaims liability or warranty for this information). If you have questions about a medical condition or this instruction, always ask your healthcare professional. [place] [address] any warranty or liability for your use of this information. DISCHARGE SUMMARY from Nurse    PATIENT INSTRUCTIONS:    After general anesthesia or intravenous sedation, for 24 hours or while taking prescription Narcotics:  · Limit your activities  · Do not drive and operate hazardous machinery  · Do not make important personal or business decisions  · Do  not drink alcoholic beverages  · If you have not urinated within 8 hours after discharge, please contact your surgeon on call.     Report the following to your surgeon:  · Excessive pain, swelling, redness or odor of or around the surgical area  · Temperature over 100.5  · Nausea and vomiting lasting longer than 4 hours or if unable to take medications  · Any signs of decreased circulation or nerve impairment to extremity: change in color, persistent  numbness, tingling, coldness or increase pain  · Any questions    What to do at Home:  Recommended activity: No lifting, Driving, or Strenuous exercise until cleared by provider    *  Please give a list of your current medications to your Primary Care Provider. *  Please update this list whenever your medications are discontinued, doses are      changed, or new medications (including over-the-counter products) are added. *  Please carry medication information at all times in case of emergency situations. These are general instructions for a healthy lifestyle:    No smoking/ No tobacco products/ Avoid exposure to second hand smoke  Surgeon General's Warning:  Quitting smoking now greatly reduces serious risk to your health. Obesity, smoking, and sedentary lifestyle greatly increases your risk for illness    A healthy diet, regular physical exercise & weight monitoring are important for maintaining a healthy lifestyle    You may be retaining fluid if you have a history of heart failure or if you experience any of the following symptoms:  Weight gain of 3 pounds or more overnight or 5 pounds in a week, increased swelling in our hands or feet or shortness of breath while lying flat in bed. Please call your doctor as soon as you notice any of these symptoms; do not wait until your next office visit. The discharge information has been reviewed with the patient. The patient verbalized understanding. Discharge medications reviewed with the patient and appropriate educational materials and side effects teaching were provided. ___________________________________________________________________________________________________________________________________      Your A1C  was   Lab Results   Component Value Date/Time    Hemoglobin A1c 10.4 (H) 07/10/2019 03:11 AM    Hemoglobin A1c (POC) 7.8 05/14/2012 04:16 PM   .    This lab test reflects that your blood sugar averaged 252 mg/dL over the past 3 months.   It is important to follow up with your provider on a routine basis to continue to evaluate your blood sugar discuss any necessary changes in treatment.

## 2019-07-15 NOTE — PROGRESS NOTES
Problem: Infection - Risk of, Multi-drug Resistant Organism Colonization (MDRO)  Goal: *Absence of MDRO colonization  Outcome: Progressing Towards Goal  Goal: *Absence of infection signs and symptoms  Outcome: Progressing Towards Goal     Problem: Patient Education: Go to Patient Education Activity  Goal: Patient/Family Education  Outcome: Progressing Towards Goal     Problem: General Infection Care Plan (Adult and Pediatric)  Goal: Improvement in signs and symptoms of infection  Outcome: Progressing Towards Goal  Goal: *Optimize nutritional status  Outcome: Progressing Towards Goal     Problem: Patient Education: Go to Patient Education Activity  Goal: Patient/Family Education  Outcome: Progressing Towards Goal     Problem: Pain  Goal: *Control of Pain  Outcome: Progressing Towards Goal  Goal: *PALLIATIVE CARE:  Alleviation of Pain  Outcome: Progressing Towards Goal     Problem: Patient Education: Go to Patient Education Activity  Goal: Patient/Family Education  Outcome: Progressing Towards Goal     Problem: Falls - Risk of  Goal: *Absence of Falls  Description  Document Edith Celis Fall Risk and appropriate interventions in the flowsheet. Outcome: Progressing Towards Goal     Problem: Patient Education: Go to Patient Education Activity  Goal: Patient/Family Education  Outcome: Progressing Towards Goal     Problem: Diabetes Self-Management  Goal: *Disease process and treatment process  Description  Define diabetes and identify own type of diabetes; list 3 options for treating diabetes. Outcome: Progressing Towards Goal  Goal: *Incorporating nutritional management into lifestyle  Description  Describe effect of type, amount and timing of food on blood glucose; list 3 methods for planning meals. Outcome: Progressing Towards Goal  Goal: *Incorporating physical activity into lifestyle  Description  State effect of exercise on blood glucose levels.   Outcome: Progressing Towards Goal  Goal: *Developing strategies to promote health/change behavior  Description  Define the ABC's of diabetes; identify appropriate screenings, schedule and personal plan for screenings. Outcome: Progressing Towards Goal  Goal: *Using medications safely  Description  State effect of diabetes medications on diabetes; name diabetes medication taking, action and side effects. Outcome: Progressing Towards Goal  Goal: *Monitoring blood glucose, interpreting and using results  Description  Identify recommended blood glucose targets  and personal targets. Outcome: Progressing Towards Goal  Goal: *Prevention, detection, treatment of acute complications  Description  List symptoms of hyper- and hypoglycemia; describe how to treat low blood sugar and actions for lowering  high blood glucose level. Outcome: Progressing Towards Goal  Goal: *Prevention, detection and treatment of chronic complications  Description  Define the natural course of diabetes and describe the relationship of blood glucose levels to long term complications of diabetes. Outcome: Progressing Towards Goal  Goal: *Developing strategies to address psychosocial issues  Description  Describe feelings about living with diabetes; identify support needed and support network  Outcome: Progressing Towards Goal  Goal: *Insulin pump training  Outcome: Progressing Towards Goal  Goal: *Sick day guidelines  Outcome: Progressing Towards Goal  Goal: *Patient Specific Goal (EDIT GOAL, INSERT TEXT)  Outcome: Progressing Towards Goal     Problem: Patient Education: Go to Patient Education Activity  Goal: Patient/Family Education  Outcome: Progressing Towards Goal     Problem: Risk for Spread of Infection  Goal: Prevent transmission of infectious organism to others  Description  Prevent the transmission of infectious organisms to other patients, staff members, and visitors.   Outcome: Progressing Towards Goal     Problem: Patient Education:  Go to Education Activity  Goal: Patient/Family Education  Outcome: Progressing Towards Goal     Problem: Patient Education: Go to Patient Education Activity  Goal: Patient/Family Education  Outcome: Progressing Towards Goal

## 2019-07-15 NOTE — PROGRESS NOTES
Bedside and Verbal shift change report given to [place], RN (oncoming nurse) by Sadia Espinoza RN (offgoing nurse). Report included the following information SBAR, Kardex, OR Summary, Procedure Summary, Intake/Output and MAR.

## 2019-07-15 NOTE — HOME CARE
Discharge noted for today. Received home health referral for [facility] for SN, PT, and OT - patient declined PT and OT (Dr. Irvin Manriquez notified). Order processed and called to Nancy Martinez in intake.   Lang Leavitt LPN

## 2019-07-15 NOTE — PROGRESS NOTES
Received a call from Ramírez Neil (TANIA) to cancel cab transport, patient has another ride home. Called [employer] transportation at [phone] and cancelled transport with [facility]. Informed [place] [facility] transport has been cancelled. Per Jaida (TANIA) called [employer] transportation at [phone] to schedule cab transport to patient's [place] [facility] [address] [facility] [address] [place] [address]) with [facility] and received confirmation number [id number]. I requested a 1:30 . Dispatch will call the nurse's station with the name of the company and ETA. The  will call the nurse's station when in route to facility, patient must be in the lobby for pick-up. Informed Jaida of transportation arrangements.

## 2019-07-15 NOTE — PROGRESS NOTES
Problem: Self Care Deficits Care Plan (Adult)  Goal: *Acute Goals and Plan of Care (Insert Text)  Outcome: Resolved/Met   OCCUPATIONAL THERAPY EVALUATION/DISCHARGE    Patient: Karen Centeno (75 y.o. 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 Pt was (I) with basic self-care/ADLs and IADLs, including cooking, cleaning, laundry, grocery shopping, medication management, and driving PTA. She ambulated with no AD but would furniture/wall-walk at times. Patient has a SC at home, however pt reports she is unable to use it as it is too wide for her tub. ASSESSMENT AND RECOMMENDATIONS:  Pt cleared to participate in OT evaluation by RN. Based on the objective data described below, the patient presents she is able to perform basic self-care/ADL tasks independently. Pt reports she furniture/wall-walks at times. Educated pt on having a chair present with her at times, especially during IADLs to decrease the risk of falls. Pt reports she has a shower chair at home, however it is too wide for her narrow tub/shower, therefore recommending pt to have a tub transfer bench. Also educated pt on the role of OT and evaluation process with pt demonstrating good understanding. At the end of the session, pt returned to bed, resting comfortably, call-bell in reach, with all needs met. As pt presents she is able to take care of herself, skilled occupational therapy is not indicated at this time. Discharge Recommendations: Home Health Safety Evaluation  Further Equipment Recommendations for Discharge: Tub transfer bench to decrease the risk of falls;  Pt reports she has a shower chair, however her tub is narrow at home making it difficulty to accommodate her shower chair, therefore recommending a tub transfer bench. SUBJECTIVE:   Patient stated ?i'm ready to run? OBJECTIVE DATA SUMMARY:     Past Medical History:   Diagnosis Date    Arthritis     Autonomic neuropathy     Back pain     Chronic lung disease     Chronic pain     back    Compression fracture T9-10    COPD (chronic obstructive pulmonary disease) (HCC)     Depression     panic attacks    Diabetes mellitus (HCC)     type 1    GERD (gastroesophageal reflux disease)     Hypertension     no longer on meds    MRSA nasal colonization     Neuropathy     Osteoporosis     Tobacco use      Past Surgical History:   Procedure Laterality Date    HX BACK SURGERY  2009    HX DILATION AND CURETTAGE      HX HYSTERECTOMY  2002     Barriers to Learning/Limitations: None  Compensate with: visual, verbal, tactile, kinesthetic cues/model    Home Situation:   Home Situation  Home Environment: Private residence  # Steps to Enter: 3  One/Two Story Residence: One story  Living Alone: Yes  Support Systems: Child(jeffry), [employer], Family member(s), Friends \ neighbors  Patient Expects to be Discharged to[de-identified] Private residence  Current DME Used/Available at Home: None  Tub or Shower Type: Tub/Shower combination  ? Right hand dominant   ? Left hand dominant    Cognitive/Behavioral Status:  Neurologic State: Alert  Orientation Level: Oriented X4  Cognition: Appropriate decision making; Follows commands  Safety/Judgement: Fall prevention    Skin: Visible skin appeared intact  Edema: None noted    Vision/Perceptual:    Acuity: Able to read clock/calendar on wall without difficulty        Coordination: BUE  Coordination: Within functional limits  Fine Motor Skills-Upper: Left Intact; Right Intact    Gross Motor Skills-Upper: Left Intact; Right Intact    Balance:  Sitting: Intact  Standing: Intact; Without support    Strength: BUE  Strength:  Within functional limits    Tone & Sensation: BUE  Tone: Normal  Sensation: Intact    Range of Motion: BUE  AROM: Within functional limits      Functional Mobility and Transfers for ADLs:  Bed Mobility:  Supine to Sit: Independent  Sit to Supine: Independent  Scooting: Independent  Transfers:  Sit to Stand: Independent  Stand to Sit: Independent   Toilet Transfer : Independent   Bathroom Mobility: Independent    ADL Assessment:  Feeding: Independent    Oral Facial Hygiene/Grooming: Independent    Bathing: Modified independent    Upper Body Dressing: Independent    Lower Body Dressing: Independent    Toileting: Independent      ADL Intervention:  Toilet transfer: Independent    Lower Body Dressing Assistance  Dressing Assistance: Independent    Cognitive Retraining  Safety/Judgement: Fall prevention    Pain:  Pain level pre-treatment: 0/10   Pain level post-treatment: 0/10   Pain Intervention(s): Medication (see MAR); Rest, Ice, Repositioning  Response to intervention: Nurse notified, See doc flow    Activity Tolerance:   Good    Please refer to the flowsheet for vital signs taken during this treatment. After treatment:   ?  Patient left in no apparent distress sitting up in chair  ? Patient left in no apparent distress in bed  ? Call bell left within reach  ? Nursing notified  ? Caregiver present  ? Bed alarm activated    COMMUNICATION/EDUCATION:   ?      Role of Occupational Therapy in the acute care setting  ? Home safety education was provided and the patient/caregiver indicated understanding. ? Patient/family have participated as able and agree with findings and recommendations. ?      Patient is unable to participate in plan of care at this time. Thank you for this referral.  Radha Williamson OT  Time Calculation: 12 mins      Eval Complexity: History: MEDIUM Complexity : Expanded review of history including physical, cognitive and psychosocial  history ;    Examination: LOW Complexity : 1-3 performance deficits relating to physical, cognitive , or psychosocial skils that result in activity limitations and / or participation restrictions ;    Decision Making:LOW Complexity : No comorbidities that affect functional and no verbal or physical assistance needed to complete eval tasks

## 2019-07-15 NOTE — PROGRESS NOTES
Infectious Disease progress Note    Requested by: [facility]    Reason: emphysematous pyelitis    Current abx Prior abx   Ceftriaxone since 7/11/19 Levofloxacin, gentamicin  7/10/19-7/11/19     Lines:       Assessment :    61-year-old female with past medical history of emphysema, type 1 diabetes (last hgbA1C 10.4 on 7/10/19), GERD presented to ed on 7/10/19 with chief complaint of abdominal pain and nausea vomiting. Now with fevers, gram negative bacteremia    Clinical presentation c/w sepsis (POA) due to gram negative bloodstream infection (positive blood culture 7/10, negative blood culture 7/12), left emphysematous pyelitis, complicated UTI. S/p Cystoscopy, left retrograde pyelogram, and insertion of left double-J stent. Had purulent urine noted in left ureter with significant air in urinary tract. Blood cultures, urine cultures 7/10 reveal gnr    Most likely source of gram negative BSI is emphysematous pyelitis. Abx management complicated by h/o pcn allergy (had swelling of arm when given pcn injection at age 8). Bactrim (redness of face). Patient tolerating ceftriaxone without difficulties. Recommendations:    1. D/c ceftriaxone. Start po ciprofloxacin till 7/26  2. F/u urology recommendations. urogynecology eval as outpatient. Advance Care planning: full code: discussed  with patient/surrogate decision maker: Rehana Ocampo: [phone]    Above plan was discussed in details with patient, dr. La Silva . Please call me if any further questions or concerns. Will continue to participate in the care of this patient. HPI:         Patient states that she feels better today compared to day of admission. Still has left flank pain. Has h/o vaginal prolapse. Needed to see urogynecologist as outpatient for vaginal pessary. Had UTI in 5/2019 treated with macrobid, 6/2019 treated with ciprofloxacin.  Patient denies headaches, visual disturbances, sore throat, runny nose, earaches, cp, sob, chills, cough, abdominal pain, diarrhea,  pain or weakness in extremities. denies back pain/flank pain. home Medication List    Details   insulin detemir U-100 (LEVEMIR FLEXTOUCH) 100 unit/mL (3 mL) inpn 30 Units by SubCUTAneous route nightly. Indications: 18 UNITS IN AM 30 UNITS IN THE PM      gabapentin (NEURONTIN) 300 mg capsule Take 2 Caps by mouth nightly. Qty: 60 Cap, Refills: 2      blood-glucose meter ([address]) by Does Not Apply route. omeprazole (PRILOSEC) 40 mg capsule Take 1 Cap by mouth daily. Qty: 30 Cap, Refills: 0      insulin aspart U-100 (NOVOLOG FLEXPEN U-100 INSULIN) 100 unit/mL (3 mL) inpn Each meal, per sliding scale  Qty: 2 Pen, Refills: 2    Associated Diagnoses: Type 1 diabetes mellitus with complication (HCC)      glucose blood VI test strips (BLOOD GLUCOSE TEST) strip Contour next test strips  Qty: 100 Strip, Refills: 2    Associated Diagnoses: Type 1 diabetes mellitus with complication (HCC)      budesonide-formoterol (SYMBICORT) 160-4.5 mcg/actuation HFAA Take 1 Puff by inhalation two (2) times a day. Qty: 1 Inhaler, Refills: 3    Associated Diagnoses: COPD mixed type (HCC)      LORazepam (ATIVAN) 0.5 mg tablet Take 1 Tab by mouth every eight (8) hours as needed for Anxiety. Max Daily Amount: 1.5 mg.  Qty: 60 Tab, Refills: 0    Associated Diagnoses: Anxiety      flash glucose sensor (FREESTYLE MARLEY 14 DAY SENSOR) kit Apply every 14 days to monitor blood sugar  Qty: 2 Kit, Refills: 11      flash glucose scanning reader (FREESTYLE MARLEY 14 DAY READER) [facility] Use to check blood sugar 3 times a day  Qty: 1 Each, Refills: 0      diclofenac (VOLTAREN) 1 % gel Apply  to affected area three (3) times daily as needed for Pain. Apply to back and knees as needed  Qty: 500 g, Refills: 2      pravastatin (PRAVACHOL) 40 mg tablet Take 40 mg by mouth nightly. tiotropium (SPIRIVA WITH HANDIHALER) 18 mcg inhalation capsule Take 1 Cap by inhalation daily.       albuterol (PROVENTIL HFA, VENTOLIN HFA, PROAIR HFA) 90 mcg/actuation inhaler Take 2 Puffs by inhalation every four (4) hours as needed for Wheezing or Shortness of Breath. Qty: 2 Inhaler, Refills: 3    Associated Diagnoses: Walking pneumonia      alendronate (FOSAMAX) 70 mg tablet Take 70 mg by mouth every Sunday. fluticasone (FLONASE) 50 mcg/actuation nasal spray 2 Sprays by Both Nostrils route daily. Qty: 1 Bottle, Refills: 3    Associated Diagnoses: URI (upper respiratory infection)      pen needle, diabetic (COMFORT EZ PEN NEEDLES) 32 gauge x 3/16\" ndle 1 Pen Needle by Does Not Apply route nightly as needed (with insulin at night).   Qty: 100 Each, Refills: 2    Associated Diagnoses: Type 1 diabetes mellitus with complication (HCC)             Current Facility-Administered Medications   Medication Dose Route Frequency    insulin glargine (LANTUS) injection 8 Units  8 Units SubCUTAneous QHS    docusate sodium (COLACE) capsule 100 mg  100 mg Oral BID    cefTRIAXone (ROCEPHIN) 1 g in sterile water (preservative free) 10 mL IV syringe  1 g IntraVENous Q24H    diphenhydrAMINE (BENADRYL) injection 25 mg  25 mg IntraVENous Q4H PRN    budesonide-formoterol (SYMBICORT) 160-4.5 mcg/actuation HFA inhaler 1 Puff  1 Puff Inhalation BID RT    fluticasone propionate (FLONASE) 50 mcg/actuation nasal spray 2 Spray  2 Spray Both Nostrils DAILY    gabapentin (NEURONTIN) capsule 600 mg  600 mg Oral QHS    LORazepam (ATIVAN) tablet 0.5 mg  0.5 mg Oral Q8H PRN    pantoprazole (PROTONIX) tablet 40 mg  40 mg Oral ACB    pravastatin (PRAVACHOL) tablet 40 mg  40 mg Oral QHS    tiotropium (SPIRIVA) inhalation capsule 18 mcg  1 Cap Inhalation QAM RT    0.9% sodium chloride infusion  60 mL/hr IntraVENous CONTINUOUS    metoclopramide HCl (REGLAN) tablet 10 mg  10 mg Oral Q6H PRN    enoxaparin (LOVENOX) injection 40 mg  40 mg SubCUTAneous Q24H    albuterol (PROVENTIL VENTOLIN) nebulizer solution 2.5 mg  2.5 mg Nebulization Q4H PRN  insulin lispro (HUMALOG) injection   SubCUTAneous AC&HS    glucose chewable tablet 16 g  4 Tab Oral PRN    glucagon (GLUCAGEN) injection 1 mg  1 mg IntraMUSCular PRN    dextrose 10% infusion 125-250 mL  125-250 mL IntraVENous PRN    nicotine (NICODERM CQ) 21 mg/24 hr patch 1 Patch  1 Patch TransDERmal DAILY    insulin glargine (LANTUS) injection 30 Units  30 Units SubCUTAneous DAILY    silodosin (RAPAFLO) capsule 8 mg  8 mg Oral DAILY WITH BREAKFAST    acetaminophen (TYLENOL) tablet 1,000 mg  1,000 mg Oral Q6H PRN       Allergies: Penicillin g; Bactrim [sulfamethoprim]; Dilaudid [hydromorphone (bulk)]; Morphine; and Pcn [penicillins]    Temp (24hrs), Av.4 °F (36.9 °C), Min:98 °F (36.7 °C), Max:98.7 °F (37.1 °C)    Visit Vitals  /63 (BP 1 Location: Left arm, BP Patient Position: At rest)   Pulse 75   Temp 98.6 °F (37 °C)   Resp 12   Wt 57.5 kg (126 lb 12.8 oz)   SpO2 98%   Breastfeeding? No   BMI 20.47 kg/m²       ROS: 12 point ROS obtained in details. Pertinent positives as mentioned in HPI,   otherwise negative    Physical Exam:    Constitutional: She is oriented to person, place, and time. She appears well-developed. Appears comfortable  HENT:   Head: Normocephalic and atraumatic. Eyes: Pupils are equal, round, and reactive to light. EOM are normal.   Cardiovascular: Normal rate, regular rhythm, normal heart sounds and intact distal pulses. Pulmonary/Chest: Effort normal and breath sounds normal.   Abdominal: Soft. Normal appearance and bowel sounds are normal. She exhibits no distension. resolved LLQ tenderness There is no rigidity, no rebound and no guarding. Neurological: She is alert and oriented to person, place, and time. Skin: Skin is warm and dry. Psychiatric: She has a normal mood and affect.    Nursing note and vitals reviewed.              Labs: Results:   Chemistry Recent Labs     19  0536   *      K 3.7      CO2 28   BUN 10   CREA 0.73   CA 8.1* AGAP 6   BUCR 14      CBC w/Diff Recent Labs     07/14/19  0536 07/13/19  0913   WBC 8.2 9.1   RBC 3.28* 3.12*   HGB 10.1* 10.0*   HCT 30.5* 29.0*    180   GRANS 61 64   LYMPH 26 24   EOS 2 2      Microbiology No results for input(s): CULT in the last 72 hours.        RADIOLOGY:    All available imaging studies/reports in [facility] for this admission were reviewed    Dr. Glen Glasgow, Infectious Disease Specialist  [phone]  July 15, 2019  12:53 PM

## 2019-07-15 NOTE — PROGRESS NOTES
Discharge order noted for today. Pt has been accepted to [place] [id number] agency. Met with patient and are agreeable to the transition plan today. Transport has been arranged through a cab . Patient's discharge summary and home health  orders have been forwarded to [facility] home health  agency via [facility]. Pt put in [place]. Updated bedside RN, Luis Alberto Serrato,  to the transition plan.   Discharge information has been documented on the AVS.       Scarlet Tong, [address] [facility].  [phone]

## 2019-07-15 NOTE — DISCHARGE SUMMARY
Discharge Summary    Patient: William Conley MRN: [id number]  CSN: [id number]    YOB: 1960  Age: 66 y.o. 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 No   BMI 20.47 kg/m²       General: Alert, cooperative, no acute distress    HEENT: PERRLA, EOMI. Anicteric sclerae. Lungs:  CTA Bilaterally. No Wheezing/Rhonchi/Rales. Heart:  Regular rate and Rhythm. Abdomen: Soft, Non distended, Non tender. + Bowel sounds. Extremities: No edema/ cyanosis/ clubbing  Psych:   Good insight. Not anxious or agitated. Neurologic:  AA oriented X 3. Moves all extremities. HPI:  This is a 51-year-old  female with past medical history of emphysema, type 1 diabetes ( hbAIC 12.5% in May)  GERD, COPD, HTN  who presents with  abdominal pain and nausea vomiting. Patient reports Left lower abdominal pain with multiple episodes ( approx  6)  Non bloody non bilious vomitus associated with severe nausea. She denies any dysuria or hematuria. Patient has had recent UTI's-- in June and May due to a  bladder ans streee incontinence. Both cultures growing E.coli.  Denies recent instrumentation.     Urine analysis in ER-- negative.       Reports one episode of loose stool that was nonbloody as well.  States the pain is worse when she lays down.  Denies any fevers, chest pain, difficulty breathing.     On my exam patient just received haldol and zofran and is pretty lethargic- not toxic appearing, just sleepy. Most of the history from records and d/w ER provider.        CT scan:     Abnormal left kidney with hydronephrosis, air in the collecting system,  diminished enhancement and perinephric fat stranding. CT findings are highly  suggestive of emphysematous pyelitis with mild obstructive uropathy.      Small volume air in the bladder lumen also likely related to suspected left  urinary tract infection.     Left para-aortic adenopathy.  Likely reactive.     Mild intrahepatic duct dilatation of uncertain etiology.     Age indeterminant compression fracture of L5. Hospital Course:   Pt was admitted to the hosp with Left lower abd pain & multiple episodes of vomiting - noted to have UTI - both growing E coli - Rx with out pt Abx   Ct Abd & Pelvis showed Abnormal left kidney with hydro - seen by Urology - pt underwent stent placement - is doing much better now   She was also seen by ID & started on IV Abx - now being discharged with PO cipro   H/o T1DM - was on insulin pump - currently on levemir & SS - A1c 10.1 - pt mentions it is better than before   Advised better BS control   Pt is being discharged home today & is advised to f/u with PCP & Urology in 1 week     Imaging:  CT scan   IMPRESSION:     Abnormal left kidney with hydronephrosis, air in the collecting system,  diminished enhancement and perinephric fat stranding. CT findings are highly  suggestive of emphysematous pyelitis with mild obstructive uropathy.      Small volume air in the bladder lumen also likely related to suspected left  urinary tract infection.     Left para-aortic adenopathy. Likely reactive.     Mild intrahepatic duct dilatation of uncertain etiology.     Age indeterminant compression fracture of L5. Discharge Medications:     Current Discharge Medication List      START taking these medications    Details   silodosin (RAPAFLO) 8 mg capsule Take 1 Cap by mouth daily (with breakfast).   Qty: 30 Cap, Refills: 0      ciprofloxacin HCl (CIPRO) 500 mg tablet Take 1 Tab by mouth two (2) times a day for 11 days. Qty: 22 Tab, Refills: 0      nicotine (NICODERM CQ) 21 mg/24 hr 1 Patch by TransDERmal route daily for 30 days. Qty: 30 Patch, Refills: 0         CONTINUE these medications which have NOT CHANGED    Details   insulin detemir U-100 (LEVEMIR FLEXTOUCH) 100 unit/mL (3 mL) inpn 30 Units by SubCUTAneous route nightly. Indications: 18 UNITS IN AM 30 UNITS IN THE PM      gabapentin (NEURONTIN) 300 mg capsule Take 2 Caps by mouth nightly. Qty: 60 Cap, Refills: 2      blood-glucose meter ([address]) by Does Not Apply route. omeprazole (PRILOSEC) 40 mg capsule Take 1 Cap by mouth daily. Qty: 30 Cap, Refills: 0      insulin aspart U-100 (NOVOLOG FLEXPEN U-100 INSULIN) 100 unit/mL (3 mL) inpn Each meal, per sliding scale  Qty: 2 Pen, Refills: 2    Associated Diagnoses: Type 1 diabetes mellitus with complication (HCC)      glucose blood VI test strips (BLOOD GLUCOSE TEST) strip Contour next test strips  Qty: 100 Strip, Refills: 2    Associated Diagnoses: Type 1 diabetes mellitus with complication (HCC)      budesonide-formoterol (SYMBICORT) 160-4.5 mcg/actuation HFAA Take 1 Puff by inhalation two (2) times a day. Qty: 1 Inhaler, Refills: 3    Associated Diagnoses: COPD mixed type (HCC)      LORazepam (ATIVAN) 0.5 mg tablet Take 1 Tab by mouth every eight (8) hours as needed for Anxiety. Max Daily Amount: 1.5 mg.  Qty: 60 Tab, Refills: 0    Associated Diagnoses: Anxiety      flash glucose sensor (FREESTYLE MARLEY 14 DAY SENSOR) kit Apply every 14 days to monitor blood sugar  Qty: 2 Kit, Refills: 11      flash glucose scanning reader (FREESTYLE MARLEY 14 DAY READER) misc Use to check blood sugar 3 times a day  Qty: 1 Each, Refills: 0      diclofenac (VOLTAREN) 1 % gel Apply  to affected area three (3) times daily as needed for Pain.  Apply to back and knees as needed  Qty: 500 g, Refills: 2 pravastatin (PRAVACHOL) 40 mg tablet Take 40 mg by mouth nightly. tiotropium (SPIRIVA WITH HANDIHALER) 18 mcg inhalation capsule Take 1 Cap by inhalation daily. albuterol (PROVENTIL HFA, VENTOLIN HFA, PROAIR HFA) 90 mcg/actuation inhaler Take 2 Puffs by inhalation every four (4) hours as needed for Wheezing or Shortness of Breath. Qty: 2 Inhaler, Refills: 3    Associated Diagnoses: Walking pneumonia      alendronate (FOSAMAX) 70 mg tablet Take 70 mg by mouth every Sunday. fluticasone (FLONASE) 50 mcg/actuation nasal spray 2 Sprays by Both Nostrils route daily. Qty: 1 Bottle, Refills: 3    Associated Diagnoses: URI (upper respiratory infection)      pen needle, diabetic (COMFORT EZ PEN NEEDLES) 32 gauge x 3/16\" ndle 1 Pen Needle by Does Not Apply route nightly as needed (with insulin at night).   Qty: 100 Each, Refills: 2    Associated Diagnoses: Type 1 diabetes mellitus with complication (HCC)             Current Facility-Administered Medications:     insulin glargine (LANTUS) injection 8 Units, 8 Units, SubCUTAneous, QHS, Aditya Boateng MD, 8 Units at 07/14/19 2312    docusate sodium (COLACE) capsule 100 mg, 100 mg, Oral, BID, Aditya Boateng MD, 100 mg at 07/15/19 9662    cefTRIAXone (ROCEPHIN) 1 g in sterile water (preservative free) 10 mL IV syringe, 1 g, IntraVENous, Q24H, Laura Aguirre MD, 1 g at 07/14/19 1347    diphenhydrAMINE (BENADRYL) injection 25 mg, 25 mg, IntraVENous, Q4H PRN, Laura Aguirre MD    budesonide-formoterol (SYMBICORT) 160-4.5 mcg/actuation HFA inhaler 1 Puff, 1 Puff, Inhalation, BID RT, Jodie Pineda MD, 1 Puff at 07/15/19 0800    fluticasone propionate (FLONASE) 50 mcg/actuation nasal spray 2 Spray, 2 Spray, Both Nostrils, DAILY, Jodie Pineda MD, 2 Spray at 07/14/19 0833    gabapentin (NEURONTIN) capsule 600 mg, 600 mg, Oral, QHS, Jodie Pineda MD, 600 mg at 07/14/19 2338    LORazepam (ATIVAN) tablet 0.5 mg, 0.5 mg, Oral, Q8H PRN, Jodie Pineda MD    pantoprazole (PROTONIX) tablet 40 mg, 40 mg, Oral, ACB, Jodie Pineda MD, 40 mg at 07/15/19 0922    pravastatin (PRAVACHOL) tablet 40 mg, 40 mg, Oral, QHS, Jodie Pineda MD, 40 mg at 07/14/19 2312    tiotropium (SPIRIVA) inhalation capsule 18 mcg, 1 Cap, Inhalation, QAM RT, Jodie Pineda MD, 18 mcg at 07/15/19 0900    0.9% sodium chloride infusion, 60 mL/hr, IntraVENous, CONTINUOUS, Aditya Boateng MD, Last Rate: 60 mL/hr at 07/13/19 2122, 60 mL/hr at 07/13/19 2122    metoclopramide HCl (REGLAN) tablet 10 mg, 10 mg, Oral, Q6H PRN, Jodie Pineda MD    enoxaparin (LOVENOX) injection 40 mg, 40 mg, SubCUTAneous, Q24H, Jodie Pineda MD, 40 mg at 07/15/19 0320    albuterol (PROVENTIL VENTOLIN) nebulizer solution 2.5 mg, 2.5 mg, Nebulization, Q4H PRN, Jodie Pineda MD    insulin lispro (HUMALOG) injection, , SubCUTAneous, AC&HS, Estelita Rouedi, DO, 3 Units at 07/14/19 2313    glucose chewable tablet 16 g, 4 Tab, Oral, PRN, Jodie Pineda MD    glucagon (GLUCAGEN) injection 1 mg, 1 mg, IntraMUSCular, PRN, Jodie Pineda MD    dextrose 10% infusion 125-250 mL, 125-250 mL, IntraVENous, PRN, Jodie Pineda MD    nicotine (NICODERM CQ) 21 mg/24 hr patch 1 Patch, 1 Patch, TransDERmal, DAILY, Jodie Pineda MD, 1 Patch at 07/15/19 0923    insulin glargine (LANTUS) injection 30 Units, 30 Units, SubCUTAneous, DAILY, Jodie Pineda MD, 30 Units at 07/15/19 3414    silodosin (RAPAFLO) capsule 8 mg, 8 mg, Oral, DAILY WITH BREAKFAST, Rashi Bell MD, 8 mg at 07/15/19 2923    acetaminophen (TYLENOL) tablet 1,000 mg, 1,000 mg, Oral, Q6H PRN, Jet Bennington A, DO, 1,000 mg at 07/10/19 2224  · It is important that you take the medication exactly as they are prescribed. · Keep your medication in the bottles provided by the pharmacist and keep a list of the medication names, dosages, and times to be taken in your wallet.    · Do not take other medications without consulting your doctor.          Minutes spent on discharge: 40 minutes spent coordinating this discharge (review instructions/follow-up, prescriptions, preparing report for sign off)    Remi Melendez MD  7/15/2019 11:01 AM

## 2019-07-16 ENCOUNTER — HOME CARE VISIT (OUTPATIENT)
Dept: SCHEDULING | Facility: HOME HEALTH | Age: 59
End: 2019-07-16

## 2019-07-16 ENCOUNTER — HOME CARE VISIT (OUTPATIENT)
Dept: HOME HEALTH SERVICES | Facility: HOME HEALTH | Age: 59
End: 2019-07-16

## 2019-07-16 VITALS
DIASTOLIC BLOOD PRESSURE: 60 MMHG | SYSTOLIC BLOOD PRESSURE: 100 MMHG | RESPIRATION RATE: 16 BRPM | HEART RATE: 80 BPM | TEMPERATURE: 97.2 F | OXYGEN SATURATION: 99 %

## 2019-07-18 LAB
BACTERIA SPEC CULT: NORMAL
BACTERIA SPEC CULT: NORMAL
SERVICE CMNT-IMP: NORMAL
SERVICE CMNT-IMP: NORMAL

## 2019-07-22 ENCOUNTER — OFFICE VISIT (OUTPATIENT)
Dept: FAMILY MEDICINE CLINIC | Age: 59
End: 2019-07-22

## 2019-07-22 VITALS
BODY MASS INDEX: 22.28 KG/M2 | OXYGEN SATURATION: 94 % | TEMPERATURE: 98.1 F | WEIGHT: 138.6 LBS | DIASTOLIC BLOOD PRESSURE: 68 MMHG | RESPIRATION RATE: 18 BRPM | HEIGHT: 66 IN | SYSTOLIC BLOOD PRESSURE: 132 MMHG | HEART RATE: 75 BPM

## 2019-07-22 DIAGNOSIS — Z09 HOSPITAL DISCHARGE FOLLOW-UP: Primary | ICD-10-CM

## 2019-07-22 DIAGNOSIS — B37.9 YEAST INFECTION: ICD-10-CM

## 2019-07-22 DIAGNOSIS — Z96.0 STATUS POST PLACEMENT OF URETERAL STENT: ICD-10-CM

## 2019-07-22 RX ORDER — TERCONAZOLE 4 MG/G
1 CREAM VAGINAL
Qty: 45 G | Refills: 0 | Status: SHIPPED | OUTPATIENT
Start: 2019-07-22 | End: 2019-07-29

## 2019-07-22 RX ORDER — FLUOXETINE HYDROCHLORIDE 20 MG/1
CAPSULE ORAL DAILY
COMMUNITY
End: 2021-05-04 | Stop reason: DRUGHIGH

## 2019-07-22 NOTE — PATIENT INSTRUCTIONS
Ureteral Stent Placement: What to Expect at [address] [facility]    A ureteral (say \"you-REE-ter-ul\") stent is a thin, hollow tube that is placed in the ureter to help urine pass from the kidney into the bladder. Ureters are the tubes that connect the kidneys to the bladder. You may have a small amount of blood in your urine for 1 to 3 days after the procedure. While the stent is in place, you may have to urinate more often, feel a sudden need to urinate, or feel like you cannot completely empty your bladder. You may feel some pain when you urinate or do strenuous activity. You also may notice a small amount of blood in your urine after strenuous activities. These side effects usually do not prevent people from doing their normal daily activities. You may have a thin string coming out of your urethra. Your urethra is the tube that carries urine from your bladder to outside your body. This string is attached to the stent. Try not to pull on the string. The doctor will use the string to pull out the stent when you no longer need it. After the procedure, urine may flow better from your kidneys to your bladder. A ureteral stent may be left in place for several days or for as long as several months. Your doctor will take it out when you no longer need it. This care sheet gives you a general idea about how long it will take for you to recover. But each person recovers at a different pace. Follow the steps below to get better as quickly as possible. How can you care for yourself at home? Activity    · Rest when you feel tired. Getting enough sleep will help you recover.     · Avoid strenuous activities, such as bicycle riding, jogging, weight lifting, or aerobic exercise, until your doctor says it is okay.     · Ask your doctor when you can drive again.     · Most people are able to return to work the day after the procedure.  If your work requires intense activity, you may feel pain in your kidney area or get tired easily. If this happens, you may need to do less strenuous activities while the stent is in.     · Ask your doctor when it is okay for you to have sex. Diet    · You can eat your normal diet. If your stomach is upset, try bland, low-fat foods like plain rice, broiled chicken, toast, and yogurt.     · Drink plenty of fluids (unless your doctor tells you not to). Medicines    · Your doctor will tell you if and when you can restart your medicines. He or she will also give you instructions about taking any new medicines.     · If you take blood thinners, such as warfarin (Coumadin), clopidogrel (Plavix), or aspirin, be sure to talk to your doctor. He or she will tell you if and when to start taking those medicines again. Make sure that you understand exactly what your doctor wants you to do.     · Be safe with medicines. Take pain medicines exactly as directed. ? If the doctor gave you a prescription medicine for pain, take it as prescribed. ? If you are not taking a prescription pain medicine, ask your doctor if you can take an over-the-counter medicine.     · If you think your pain medicine is making you sick to your stomach:  ? Take your medicine after meals (unless your doctor has told you not to). ? Ask your doctor for a different pain medicine.     · If your doctor prescribed antibiotics, take them as directed. Do not stop taking them just because you feel better. You need to take the full course of antibiotics. Follow-up care is a key part of your treatment and safety. Be sure to make and go to all appointments, and call your doctor if you are having problems. It's also a good idea to know your test results and keep a list of the medicines you take. When should you call for help? Call 911 anytime you think you may need emergency care.  For example, call if:    · You passed out (lost consciousness).     · You have severe trouble breathing.     · You have sudden chest pain and shortness of breath, or you cough up blood.     · You have severe belly pain.    Call your doctor now or seek immediate medical care if:    · Part or all of the stent comes out of your urethra.     · You have pain that does not get better after you take pain medicine.     · You have symptoms of a urinary infection. For example:  ? You have blood or pus in your urine. ? You have pain in your back just below your rib cage. This is called flank pain. ? You have a fever, chills, or body aches. ? It hurts to urinate. ? You have groin or belly pain.     · You cannot control when you urinate, or you leak urine.    Watch closely for changes in your health, and be sure to contact your doctor if you have any problems. Where can you learn more? Go to http://rhona-ilia.info/. Enter M966 in the search box to learn more about \"Ureteral Stent Placement: What to Expect at Home. \"  Current as of: December 19, 2018  Content Version: 12.1  © 7257-7059 [employer]. Care instructions adapted under license by [employer] (which disclaims liability or warranty for this information). If you have questions about a medical condition or this instruction, always ask your healthcare professional. [address] any warranty or liability for your use of this information.

## 2019-07-22 NOTE — PROGRESS NOTES
CHANCE Burgess is a 66 y.o. female who presents today for hospital follow up. Pt was admitted to [facility] on July 9, 2019 and discharged on July 15, 2019 with diagnoses of pyelonephritis and sepsis. Pt had cystoscopy with urethral stent placed, pt has follow up visit with urologist on July 30, 2019. Pt was discharged with Cipro, Nicotine patches and prescription for Rapaflo (which she has not been able to get from pharmacy due to prior auth requirement). Pt states since she has been taking the Cipro and the antibiotics given in the hospital she has been having vaginal itching. Pt denies any other acute medical concerns today. Pertinent labs/imaging from admission:  Lab Results   Component Value Date/Time    WBC 8.2 07/14/2019 05:36 AM    Hemoglobin, POC 11.6 (L) 11/07/2009 06:24 PM    HGB 10.1 (L) 07/14/2019 05:36 AM    Hematocrit, POC 34 (L) 11/07/2009 06:24 PM    HCT 30.5 (L) 07/14/2019 05:36 AM    PLATELET 374 71/55/7103 05:36 AM    MCV 93.0 07/14/2019 05:36 AM     Lab Results   Component Value Date/Time    Sodium 142 07/14/2019 05:36 AM    Potassium 3.7 07/14/2019 05:36 AM    Chloride 108 07/14/2019 05:36 AM    CO2 28 07/14/2019 05:36 AM    Anion gap 6 07/14/2019 05:36 AM    Glucose 100 (H) 07/14/2019 05:36 AM    BUN 10 07/14/2019 05:36 AM    Creatinine 0.73 07/14/2019 05:36 AM    BUN/Creatinine ratio 14 07/14/2019 05:36 AM    GFR est AA >60 07/14/2019 05:36 AM    GFR est non-AA >60 07/14/2019 05:36 AM    Calcium 8.1 (L) 07/14/2019 05:36 AM    Bilirubin, total 0.7 07/09/2019 06:50 PM    AST (SGOT) 10 (L) 07/09/2019 06:50 PM    Alk.  phosphatase 93 07/09/2019 06:50 PM    Protein, total 7.4 07/09/2019 06:50 PM    Albumin 3.3 (L) 07/09/2019 06:50 PM    Globulin 4.1 (H) 07/09/2019 06:50 PM    A-G Ratio 0.8 07/09/2019 06:50 PM    ALT (SGPT) 15 07/09/2019 06:50 PM     Lab Results   Component Value Date/Time    Hemoglobin A1c 10.4 (H) 07/10/2019 03:11 AM    Hemoglobin A1c (POC) 7.8 05/14/2012 04:16 PM 7/18/2019  6:33 AM - [facility], Lab In Able Device     Specimen Information: Blood        Component Value Ref Range & Units Status   Special Requests: NO SPECIAL REQUESTS    Final   Culture result: NO GROWTH 6 DAYS    Final     Pyelogram on 7/10/2019  IMPRESSION:     Mild left hydronephrosis and hydroureter. Victor shaped Lucent filling defect  demonstrated in the distal left ureter without obstruction. In this patient with  known gas within the collecting system this may well be due to gas. Alternatively soft tissue density mass cannot be excluded    Kidney Ultrasound on 7/10/2019  IMPRESSION:     1. Moderate left hydronephrosis. -Echogenic foci noted at the level of the interpolar and superior pole calyces,  possibly corresponding with air seen on prior CT scan. Nephrolithiasis  considered less likely as there is no stone seen on recent CT.     2. Tiny echogenic focus at the superior pole right kidney could represent  nonobstructing tiny stone or artifact from collapsed cyst. Small angiomyolipoma  or other mass not entirely excluded, but no correlate seen on recent CT scan. Abdominal x-ray on 7/10/2019  IMPRESSION:  1. Retained contrast in the left renal parenchyma, collecting system/ureter, and  urinary bladder.  -Moderate left hydronephrosis.     2. No retained contrast in the right kidney.     3. Nonobstructed bowel gas pattern. Moderate stool burden throughout the colon. CT of Abdomen and Pelvis on 7/9/2019  IMPRESSION:     Abnormal left kidney with hydronephrosis, air in the collecting system,  diminished enhancement and perinephric fat stranding. CT findings are highly  suggestive of emphysematous pyelitis with mild obstructive uropathy.      Small volume air in the bladder lumen also likely related to suspected left  urinary tract infection.     Left para-aortic adenopathy.   Likely reactive.     Mild intrahepatic duct dilatation of uncertain etiology.     Age indeterminant compression fracture of L5.    Current Outpatient Medications   Medication Sig Dispense Refill    FLUoxetine (PROZAC) 20 mg capsule Take  by mouth daily.  silodosin (RAPAFLO) 8 mg capsule Take 1 Cap by mouth daily (with breakfast). 30 Cap 0    ciprofloxacin HCl (CIPRO) 500 mg tablet Take 1 Tab by mouth two (2) times a day for 11 days. 22 Tab 0    insulin detemir U-100 (LEVEMIR FLEXTOUCH) 100 unit/mL (3 mL) inpn 30 Units by SubCUTAneous route nightly. Indications: 18 UNITS IN AM 30 UNITS IN THE PM      gabapentin (NEURONTIN) 300 mg capsule Take 2 Caps by mouth nightly. 60 Cap 2    blood-glucose meter ([address]) by Does Not Apply route.  omeprazole (PRILOSEC) 40 mg capsule Take 1 Cap by mouth daily. 30 Cap 0    insulin aspart U-100 (NOVOLOG FLEXPEN U-100 INSULIN) 100 unit/mL (3 mL) inpn Each meal, per sliding scale 2 Pen 2    glucose blood VI test strips (BLOOD GLUCOSE TEST) strip Contour next test strips 100 Strip 2    pen needle, diabetic (COMFORT EZ PEN NEEDLES) 32 gauge x 3/16\" ndle 1 Pen Needle by Does Not Apply route nightly as needed (with insulin at night). 100 Each 2    budesonide-formoterol (SYMBICORT) 160-4.5 mcg/actuation HFAA Take 1 Puff by inhalation two (2) times a day. 1 Inhaler 3    LORazepam (ATIVAN) 0.5 mg tablet Take 1 Tab by mouth every eight (8) hours as needed for Anxiety. Max Daily Amount: 1.5 mg. 60 Tab 0    diclofenac (VOLTAREN) 1 % gel Apply  to affected area three (3) times daily as needed for Pain. Apply to back and knees as needed 500 g 2    pravastatin (PRAVACHOL) 40 mg tablet Take 40 mg by mouth nightly.  tiotropium (SPIRIVA WITH HANDIHALER) 18 mcg inhalation capsule Take 1 Cap by inhalation daily.  albuterol (PROVENTIL HFA, VENTOLIN HFA, PROAIR HFA) 90 mcg/actuation inhaler Take 2 Puffs by inhalation every four (4) hours as needed for Wheezing or Shortness of Breath. 2 Inhaler 3    alendronate (FOSAMAX) 70 mg tablet Take 70 mg by mouth every Sunday.       fluticasone (FLONASE) 50 mcg/actuation nasal spray 2 Sprays by Both Nostrils route daily. 1 Bottle 3    nicotine (NICODERM CQ) 21 mg/24 hr 1 Patch by TransDERmal route daily for 30 days. 30 Patch 0    flash glucose sensor (FREESTYLE MARLEY 14 DAY SENSOR) kit Apply every 14 days to monitor blood sugar 2 Kit 11    flash glucose scanning reader (FREESTYLE MARLEY 14 DAY READER) misc Use to check blood sugar 3 times a day 1 Each 0      Allergies   Allergen Reactions    Penicillin G Anaphylaxis    Bactrim [Sulfamethoprim] Hives and Rash    Dilaudid [Hydromorphone (Bulk)] Other (comments)     Patient was confused for 5 days. Did not know who she was.  Morphine Itching    Pcn [Penicillins] Swelling       SUBJECTIVE:  Review of Systems   Constitutional: Negative for chills, fever and malaise/fatigue. Respiratory: Negative for shortness of breath. Cardiovascular: Negative for chest pain, palpitations and leg swelling. Gastrointestinal: Negative for abdominal pain, diarrhea, nausea and vomiting. Genitourinary: Negative for dysuria, flank pain, frequency, hematuria and urgency. Skin: Positive for itching (vaginal ). Neurological: Negative for dizziness, tingling, sensory change and headaches. Psychiatric/Behavioral: Negative for depression and suicidal ideas. The patient is not nervous/anxious. OBJECTIVE:  Visit Vitals  /68 (BP 1 Location: Left arm, BP Patient Position: Sitting)   Pulse 75   Temp 98.1 °F (36.7 °C) (Oral)   Resp 18   Ht 5' 6\" (1.676 m)   Wt 138 lb 9.6 oz (62.9 kg)   SpO2 94%   BMI 22.37 kg/m²        Physical Exam   Constitutional: She is oriented to person, place, and time and well-developed, well-nourished, and in no distress. HENT:   Head: Normocephalic. Eyes: Pupils are equal, round, and reactive to light. Conjunctivae and EOM are normal.   Cardiovascular: Normal rate, regular rhythm and normal heart sounds.    Pulmonary/Chest: Effort normal and breath sounds normal. She has no wheezes. She has no rales. She exhibits no tenderness. Musculoskeletal: She exhibits no edema or tenderness (neg CVA tenderness). Neurological: She is alert and oriented to person, place, and time. Gait normal.   Skin: Skin is warm and dry. No erythema. Psychiatric: Mood and affect normal.   Nursing note and vitals reviewed. ASSESSMENT:  Diagnoses and all orders for this visit:    1. Hospital discharge follow-up    2. Status post placement of ureteral stent    3. Yeast infection  -     terconazole (TERAZOL 7) 0.4 % vaginal cream; Insert 1 Applicator into vagina nightly for 7 days. PLAN:  Continue current medication regimen  Start Terconazole inserts for vaginal yeast    I have discussed the diagnosis with the patient and the intended plan as seen in the above orders. The patient has received an after-visit summary and questions were answered concerning future plans. I have discussed medication side effects and warnings with the patient as well. Patient will call for further questions. Follow-up and Dispositions    · Return in about 2 months (around 9/23/2019) for CHERI méndez.        Benedicto Smart NP

## 2019-07-22 NOTE — PROGRESS NOTES
Chief Complaint   Patient presents with   [facility] Follow Up     1. Have you been to the ER, urgent care clinic since your last visit? Hospitalized since your last visit? Patient was released on 07/16/19    2. Have you seen or consulted any other health care providers outside of the [address] [place] [address] since your last visit? Include any pap smears or colon screening.  No

## 2019-07-24 ENCOUNTER — HOSPITAL ENCOUNTER (OUTPATIENT)
Dept: NON INVASIVE DIAGNOSTICS | Age: 59
Discharge: HOME | End: 2019-07-24
Attending: INTERNAL MEDICINE
Payer: MEDICAID

## 2019-07-24 VITALS
DIASTOLIC BLOOD PRESSURE: 70 MMHG | BODY MASS INDEX: 22.18 KG/M2 | WEIGHT: 138 LBS | SYSTOLIC BLOOD PRESSURE: 114 MMHG | HEIGHT: 66 IN

## 2019-07-24 DIAGNOSIS — R07.9 CHEST PAIN, UNSPECIFIED TYPE: ICD-10-CM

## 2019-07-24 LAB
STRESS BASELINE DIAS BP: 70 MMHG
STRESS BASELINE HR: 67 BPM
STRESS BASELINE SYS BP: 114 MMHG
STRESS ESTIMATED WORKLOAD: 1 METS
STRESS EXERCISE DUR MIN: NORMAL
STRESS PEAK DIAS BP: 70 MMHG
STRESS PEAK SYS BP: 114 MMHG
STRESS PERCENT HR ACHIEVED: 61 %
STRESS POST PEAK HR: 100 BPM
STRESS RATE PRESSURE PRODUCT: NORMAL BPM*MMHG
STRESS ST DEPRESSION: 0 MM
STRESS ST ELEVATION: 0 MM
STRESS TARGET HR: 164 BPM

## 2019-07-24 PROCEDURE — 74011250636 HC RX REV CODE- 250/636

## 2019-07-24 PROCEDURE — 74011250636 HC RX REV CODE- 250/636: Performed by: INTERNAL MEDICINE

## 2019-07-24 PROCEDURE — 93017 CV STRESS TEST TRACING ONLY: CPT

## 2019-07-24 RX ORDER — AMINOPHYLLINE 25 MG/ML
INJECTION, SOLUTION INTRAVENOUS
Status: COMPLETED
Start: 2019-07-24 | End: 2019-07-24

## 2019-07-24 RX ORDER — SODIUM CHLORIDE 9 MG/ML
250 INJECTION, SOLUTION INTRAVENOUS ONCE
Status: COMPLETED | OUTPATIENT
Start: 2019-07-24 | End: 2019-07-24

## 2019-07-24 RX ADMIN — SODIUM CHLORIDE 250 ML: 900 INJECTION, SOLUTION INTRAVENOUS at 09:15

## 2019-07-24 RX ADMIN — AMINOPHYLLINE 50 MG: 25 INJECTION, SOLUTION INTRAVENOUS at 09:22

## 2019-07-24 RX ADMIN — REGADENOSON 0.4 MG: 0.08 INJECTION, SOLUTION INTRAVENOUS at 09:15

## 2019-07-24 NOTE — PROGRESS NOTES
Patient was given 10.88 mCi of Sestamibi for the Resting pictures. Patient received 0.4 mg of Lexiscan for the exercise portion of the Stress test. Patient was then given 33 mCi of Sestamibi for the Stress pictures. Patient given 50 mg of Aminophylline for reversal of lexiscan. Armband was removed and disposed of before the patient left.

## 2019-07-26 ENCOUNTER — TELEPHONE (OUTPATIENT)
Dept: CARDIOLOGY CLINIC | Age: 59
End: 2019-07-26

## 2019-07-26 NOTE — TELEPHONE ENCOUNTER
----- Message from Gisela Lofton DO sent at 7/26/2019  2:17 PM EDT -----  Please let her know stress test was normal, thanks    ----- Message -----  From: Du Morales LPN  Sent: 7/03/9889   8:19 AM EDT  To: Gisela Lofton DO    Per your last office note'  Impression and Plan:  Toni Carter is a 66 y.o. with:     1.) Atypical Chest Pain, but persistent  2.) Lightheadedness with h/o syncope  3.) DM1  4.) HTN  5.) Tobacco abuse/ COPD  6.) Abn ekg t wave abn at baseline, known  7.) Normal LV function by echo     1.) Obtain records of abnormal tilt table  2.) Suspect autonomic dysfunction- refer to Dr. Jayden Morris   3.) Pharm nuc r/o ACS  4.) RTC ~4-6 weeks discuss results, then discuss other concerns- HTN, HL etc     Patient had a very complex medical history with several concerns today. He tried to prioritize focused on her chest discomfort as well as this history of autonomic dysfunction. After we safely rule out acute coronary syndrome as contributing to her symptoms I think we can then go on optimizing her comorbidities.     Typically being a type I diabetic puts her at risk for atypical symptoms but still have premature aggressive coronary disease. Specifically that she still smokes is also of great concern. In addition I am concerned that she could have additional autoimmune pathology that contributes to this autonomic dysfunction.   Further recommendations to follow.     Thank you for allowing me to participate in the care of your patient, please do not hesitate to call with questions or concerns.     Kindest Regards,     Gisela Lofton DO

## 2019-07-26 NOTE — TELEPHONE ENCOUNTER
Called patient, verified by two identifiers, name and . Patient notified of stress test results. All questions answered at time of phone call.

## 2019-07-26 NOTE — PROGRESS NOTES
Per your last office note'  Impression and Plan:  Uziel Devries is a 66 y.o. with:     1.) Atypical Chest Pain, but persistent  2.) Lightheadedness with h/o syncope  3.) DM1  4.) HTN  5.) Tobacco abuse/ COPD  6.) Abn ekg t wave abn at baseline, known  7.) Normal LV function by echo     1.) Obtain records of abnormal tilt table  2.) Suspect autonomic dysfunction- refer to Dr. Denny Cuellar   3.) Pharm nuc r/o ACS  4.) RTC ~4-6 weeks discuss results, then discuss other concerns- HTN, HL etc     Patient had a very complex medical history with several concerns today. He tried to prioritize focused on her chest discomfort as well as this history of autonomic dysfunction. After we safely rule out acute coronary syndrome as contributing to her symptoms I think we can then go on optimizing her comorbidities.     Typically being a type I diabetic puts her at risk for atypical symptoms but still have premature aggressive coronary disease. Specifically that she still smokes is also of great concern. In addition I am concerned that she could have additional autoimmune pathology that contributes to this autonomic dysfunction.   Further recommendations to follow.     Thank you for allowing me to participate in the care of your patient, please do not hesitate to call with questions or concerns.     Kindest Regards,     Rodriguez Hathaway, DO

## 2019-08-14 ENCOUNTER — TELEPHONE (OUTPATIENT)
Dept: FAMILY MEDICINE CLINIC | Age: 59
End: 2019-08-14

## 2019-08-14 NOTE — TELEPHONE ENCOUNTER
Niecy Maradiaga is calling from [address] [place] [address] to confirm that the office received the physicians order for incontinence supplies. It was mailed over on the August the 8th.    [phone]

## 2019-08-19 ENCOUNTER — HOSPITAL ENCOUNTER (OUTPATIENT)
Dept: ULTRASOUND IMAGING | Age: 59
Discharge: HOME | End: 2019-08-19
Attending: UROLOGY
Payer: MEDICAID

## 2019-08-19 ENCOUNTER — HOSPITAL ENCOUNTER (OUTPATIENT)
Dept: CT IMAGING | Age: 59
Discharge: HOME | End: 2019-08-19
Attending: UROLOGY
Payer: MEDICAID

## 2019-08-19 DIAGNOSIS — N13.30 HYDRONEPHROSIS, LEFT: ICD-10-CM

## 2019-08-19 LAB — CREAT UR-MCNC: 0.8 MG/DL (ref 0.6–1.3)

## 2019-08-19 PROCEDURE — 74177 CT ABD & PELVIS W/CONTRAST: CPT

## 2019-08-19 PROCEDURE — 74011636320 HC RX REV CODE- 636/320: Performed by: UROLOGY

## 2019-08-19 PROCEDURE — 82565 ASSAY OF CREATININE: CPT

## 2019-08-19 PROCEDURE — 76770 US EXAM ABDO BACK WALL COMP: CPT

## 2019-08-19 RX ADMIN — IOPAMIDOL 100 ML: 612 INJECTION, SOLUTION INTRAVENOUS at 10:26

## 2019-10-02 DIAGNOSIS — E10.8 TYPE 1 DIABETES MELLITUS WITH COMPLICATION (HCC): Primary | ICD-10-CM

## 2019-10-11 RX ORDER — GABAPENTIN 300 MG/1
600 CAPSULE ORAL
Qty: 60 CAP | Refills: 0 | Status: SHIPPED | OUTPATIENT
Start: 2019-10-11 | End: 2019-11-04 | Stop reason: SDUPTHER

## 2019-10-17 ENCOUNTER — APPOINTMENT (OUTPATIENT)
Dept: GENERAL RADIOLOGY | Age: 59
End: 2019-10-17
Attending: STUDENT IN AN ORGANIZED HEALTH CARE EDUCATION/TRAINING PROGRAM
Payer: MEDICAID

## 2019-10-17 ENCOUNTER — HOSPITAL ENCOUNTER (EMERGENCY)
Age: 59
Discharge: HOME | End: 2019-10-18
Attending: EMERGENCY MEDICINE
Payer: MEDICAID

## 2019-10-17 VITALS
TEMPERATURE: 97.3 F | HEIGHT: 66 IN | BODY MASS INDEX: 20.57 KG/M2 | DIASTOLIC BLOOD PRESSURE: 73 MMHG | OXYGEN SATURATION: 99 % | SYSTOLIC BLOOD PRESSURE: 150 MMHG | RESPIRATION RATE: 18 BRPM | WEIGHT: 128 LBS

## 2019-10-17 DIAGNOSIS — R73.9 HYPERGLYCEMIA: Primary | ICD-10-CM

## 2019-10-17 DIAGNOSIS — E10.69 TYPE 1 DIABETES MELLITUS WITH OTHER SPECIFIED COMPLICATION (HCC): ICD-10-CM

## 2019-10-17 LAB
ALBUMIN SERPL-MCNC: 3.2 G/DL (ref 3.4–5)
ALBUMIN/GLOB SERPL: 0.7 {RATIO} (ref 0.8–1.7)
ALP SERPL-CCNC: 138 U/L (ref 45–117)
ALT SERPL-CCNC: 22 U/L (ref 13–56)
ANION GAP SERPL CALC-SCNC: 12 MMOL/L (ref 3–18)
APPEARANCE UR: CLEAR
AST SERPL-CCNC: 22 U/L (ref 10–38)
BASOPHILS # BLD: 0 K/UL (ref 0–0.06)
BASOPHILS NFR BLD: 0 % (ref 0–3)
BILIRUB SERPL-MCNC: 0.7 MG/DL (ref 0.2–1)
BILIRUB UR QL: NEGATIVE
BUN SERPL-MCNC: 39 MG/DL (ref 7–18)
BUN/CREAT SERPL: 23 (ref 12–20)
CALCIUM SERPL-MCNC: 9.4 MG/DL (ref 8.5–10.1)
CHLORIDE SERPL-SCNC: 94 MMOL/L (ref 100–111)
CO2 SERPL-SCNC: 20 MMOL/L (ref 21–32)
COLOR UR: YELLOW
CREAT SERPL-MCNC: 1.68 MG/DL (ref 0.6–1.3)
DIFFERENTIAL METHOD BLD: ABNORMAL
EOSINOPHIL # BLD: 0.2 K/UL (ref 0–0.4)
EOSINOPHIL NFR BLD: 2 % (ref 0–5)
ERYTHROCYTE [DISTWIDTH] IN BLOOD BY AUTOMATED COUNT: 12.7 % (ref 11.6–14.5)
GLOBULIN SER CALC-MCNC: 4.5 G/DL (ref 2–4)
GLUCOSE BLD STRIP.AUTO-MCNC: 458 MG/DL (ref 70–110)
GLUCOSE BLD STRIP.AUTO-MCNC: 569 MG/DL (ref 70–110)
GLUCOSE BLD STRIP.AUTO-MCNC: 581 MG/DL (ref 70–110)
GLUCOSE BLD STRIP.AUTO-MCNC: >600 MG/DL (ref 70–110)
GLUCOSE SERPL-MCNC: 666 MG/DL (ref 74–99)
GLUCOSE UR STRIP.AUTO-MCNC: >1000 MG/DL
HCT VFR BLD AUTO: 36 % (ref 35–45)
HGB BLD-MCNC: 12.2 G/DL (ref 12–16)
HGB UR QL STRIP: NEGATIVE
KETONES UR QL STRIP.AUTO: 40 MG/DL
LEUKOCYTE ESTERASE UR QL STRIP.AUTO: NEGATIVE
LYMPHOCYTES # BLD: 2.2 K/UL (ref 0.8–3.5)
LYMPHOCYTES NFR BLD: 24 % (ref 20–51)
MCH RBC QN AUTO: 31.4 PG (ref 24–34)
MCHC RBC AUTO-ENTMCNC: 33.9 G/DL (ref 31–37)
MCV RBC AUTO: 92.8 FL (ref 74–97)
MONOCYTES # BLD: 0.6 K/UL (ref 0–1)
MONOCYTES NFR BLD: 6 % (ref 2–9)
NEUTS SEG # BLD: 6.2 K/UL (ref 1.8–8)
NEUTS SEG NFR BLD: 68 % (ref 42–75)
NITRITE UR QL STRIP.AUTO: NEGATIVE
PH UR STRIP: 5 [PH] (ref 5–8)
PLATELET # BLD AUTO: 175 K/UL (ref 135–420)
PLATELET COMMENTS,PCOM: ABNORMAL
PMV BLD AUTO: 11.6 FL (ref 9.2–11.8)
POTASSIUM SERPL-SCNC: 4.9 MMOL/L (ref 3.5–5.5)
PROT SERPL-MCNC: 7.7 G/DL (ref 6.4–8.2)
PROT UR STRIP-MCNC: NEGATIVE MG/DL
RBC # BLD AUTO: 3.88 M/UL (ref 4.2–5.3)
RBC MORPH BLD: ABNORMAL
SODIUM SERPL-SCNC: 126 MMOL/L (ref 136–145)
SP GR UR REFRACTOMETRY: 1.03 (ref 1–1.03)
UROBILINOGEN UR QL STRIP.AUTO: 0.2 EU/DL (ref 0.2–1)
WBC # BLD AUTO: 9.2 K/UL (ref 4.6–13.2)

## 2019-10-17 PROCEDURE — 80053 COMPREHEN METABOLIC PANEL: CPT

## 2019-10-17 PROCEDURE — 74011636637 HC RX REV CODE- 636/637: Performed by: STUDENT IN AN ORGANIZED HEALTH CARE EDUCATION/TRAINING PROGRAM

## 2019-10-17 PROCEDURE — 71045 X-RAY EXAM CHEST 1 VIEW: CPT

## 2019-10-17 PROCEDURE — 85025 COMPLETE CBC W/AUTO DIFF WBC: CPT

## 2019-10-17 PROCEDURE — 81003 URINALYSIS AUTO W/O SCOPE: CPT

## 2019-10-17 PROCEDURE — 96361 HYDRATE IV INFUSION ADD-ON: CPT

## 2019-10-17 PROCEDURE — 74011250636 HC RX REV CODE- 250/636: Performed by: EMERGENCY MEDICINE

## 2019-10-17 PROCEDURE — 82962 GLUCOSE BLOOD TEST: CPT

## 2019-10-17 PROCEDURE — 99284 EMERGENCY DEPT VISIT MOD MDM: CPT

## 2019-10-17 PROCEDURE — 96374 THER/PROPH/DIAG INJ IV PUSH: CPT

## 2019-10-17 PROCEDURE — 74011250636 HC RX REV CODE- 250/636: Performed by: STUDENT IN AN ORGANIZED HEALTH CARE EDUCATION/TRAINING PROGRAM

## 2019-10-17 RX ORDER — INSULIN LISPRO 100 [IU]/ML
15 INJECTION, SOLUTION INTRAVENOUS; SUBCUTANEOUS
Status: COMPLETED | OUTPATIENT
Start: 2019-10-17 | End: 2019-10-17

## 2019-10-17 RX ORDER — INSULIN LISPRO 100 [IU]/ML
10 INJECTION, SOLUTION INTRAVENOUS; SUBCUTANEOUS
Status: COMPLETED | OUTPATIENT
Start: 2019-10-17 | End: 2019-10-17

## 2019-10-17 RX ORDER — ONDANSETRON 2 MG/ML
4 INJECTION INTRAMUSCULAR; INTRAVENOUS
Status: COMPLETED | OUTPATIENT
Start: 2019-10-17 | End: 2019-10-17

## 2019-10-17 RX ORDER — INSULIN LISPRO 100 [IU]/ML
5 INJECTION, SOLUTION INTRAVENOUS; SUBCUTANEOUS
Status: COMPLETED | OUTPATIENT
Start: 2019-10-17 | End: 2019-10-17

## 2019-10-17 RX ADMIN — INSULIN LISPRO 15 UNITS: 100 INJECTION, SOLUTION INTRAVENOUS; SUBCUTANEOUS at 23:03

## 2019-10-17 RX ADMIN — SODIUM CHLORIDE 1000 ML: 900 INJECTION, SOLUTION INTRAVENOUS at 20:59

## 2019-10-17 RX ADMIN — INSULIN LISPRO 10 UNITS: 100 INJECTION, SOLUTION INTRAVENOUS; SUBCUTANEOUS at 22:08

## 2019-10-17 RX ADMIN — ONDANSETRON 4 MG: 2 INJECTION INTRAMUSCULAR; INTRAVENOUS at 23:36

## 2019-10-17 RX ADMIN — INSULIN LISPRO 5 UNITS: 100 INJECTION, SOLUTION INTRAVENOUS; SUBCUTANEOUS at 22:09

## 2019-10-18 NOTE — ED PROVIDER NOTES
EMERGENCY DEPARTMENT HISTORY AND PHYSICAL EXAM    9:02 PM      Date: 10/17/2019  Patient Name: Elfego Jaimes    History of Presenting Illness     Chief Complaint   Patient presents with    High Blood Sugar         History Provided By: Patient  Location/Duration/Severity/Modifying factors   HPI  65 y/o F with hx of type 1 diabetes presenting for hyperglycemia. Patient has been with her mom who is admitted in hospital and has not taken her levemir for 2 days. She has a mild cough and dry mouth but denies any other new sxs including urinary frequency or urgency. PCP: Jonathan Sears NP    Current Outpatient Medications   Medication Sig Dispense Refill    gabapentin (NEURONTIN) 300 mg capsule Take 2 Caps by mouth nightly. 60 Cap 0    tolterodine ER (DETROL-LA) 2 mg ER capsule Take 1 Cap by mouth daily. 90 Cap 3    insulin detemir U-100 (LEVEMIR FLEXTOUCH) 100 unit/mL (3 mL) inpn 30 Units by SubCUTAneous route nightly. Indications: 18 UNITS IN AM 30 UNITS IN THE PM      insulin aspart U-100 (NOVOLOG FLEXPEN U-100 INSULIN) 100 unit/mL (3 mL) inpn Each meal, per sliding scale 2 Pen 2    budesonide-formoterol (SYMBICORT) 160-4.5 mcg/actuation HFAA Take 1 Puff by inhalation two (2) times a day. 1 Inhaler 3    LORazepam (ATIVAN) 0.5 mg tablet Take 1 Tab by mouth every eight (8) hours as needed for Anxiety. Max Daily Amount: 1.5 mg. 60 Tab 0    flash glucose scanning reader (FREESTYLE MARLEY 14 DAY READER) [facility] Use to check blood sugar 3 times a day 1 Each 0    pravastatin (PRAVACHOL) 40 mg tablet Take 40 mg by mouth nightly.  tiotropium (SPIRIVA WITH HANDIHALER) 18 mcg inhalation capsule Take 1 Cap by inhalation daily.  albuterol (PROVENTIL HFA, VENTOLIN HFA, PROAIR HFA) 90 mcg/actuation inhaler Take 2 Puffs by inhalation every four (4) hours as needed for Wheezing or Shortness of Breath. 2 Inhaler 3    alendronate (FOSAMAX) 70 mg tablet Take 70 mg by mouth every Sunday.       FLUoxetine (PROZAC) 20 mg capsule Take  by mouth daily.  silodosin (RAPAFLO) 8 mg capsule Take 1 Cap by mouth daily (with breakfast). 30 Cap 0    blood-glucose meter (ONE TOUCH BASIC SYSTEM) by Does Not Apply route.  omeprazole (PRILOSEC) 40 mg capsule Take 1 Cap by mouth daily. 30 Cap 0    glucose blood VI test strips (BLOOD GLUCOSE TEST) strip Contour next test strips 100 Strip 2    pen needle, diabetic (COMFORT EZ PEN NEEDLES) 32 gauge x 3/16\" ndle 1 Pen Needle by Does Not Apply route nightly as needed (with insulin at night). 100 Each 2    flash glucose sensor (FREESTYLE MARLEY 14 DAY SENSOR) kit Apply every 14 days to monitor blood sugar 2 Kit 11    diclofenac (VOLTAREN) 1 % gel Apply  to affected area three (3) times daily as needed for Pain. Apply to back and knees as needed 500 g 2    fluticasone (FLONASE) 50 mcg/actuation nasal spray 2 Sprays by Both Nostrils route daily.  1 Bottle 3       Past History     Past Medical History:  Past Medical History:   Diagnosis Date    Arthritis     Autonomic neuropathy     Back pain     Chronic lung disease     Chronic pain     back    Compression fracture T9-10    [facility]     Depression     panic attacks    Diabetes mellitus ([facility])     type 1    GERD (gastroesophageal reflux disease)     Hypertension     no longer on meds    MRSA nasal colonization     Neuropathy     Osteoporosis     Tobacco use        Past Surgical History:  Past Surgical History:   Procedure Laterality Date    HX BACK SURGERY  2009    HX DILATION AND CURETTAGE      HX HYSTERECTOMY  2002       Family History:  Family History   Problem Relation Age of Onset    Thyroid Disease Mother     Stroke Mother    Jaylene Elliott Arthritis-rheumatoid Mother     Osteoporosis Mother     Diabetes Father     Kidney Disease Father     Tip's Disease Father     Arthritis-rheumatoid Sister     Diabetes Brother     Colon Cancer Maternal Grandmother        Social History:  Social History     Tobacco Use    Smoking status: Current Every Day Smoker     Packs/day: 1.00     Years: 43.00     Pack years: 43.00     Types: Cigarettes     Start date: 7/27/1976    Smokeless tobacco: Never Used    Tobacco comment: on [employer]   Substance Use Topics    Alcohol use: No    Drug use: No       Allergies: Allergies   Allergen Reactions    Penicillin G Anaphylaxis    Bactrim [Sulfamethoprim] Hives and Rash    Dilaudid [Hydromorphone (Bulk)] Other (comments)     Patient was confused for 5 days. Did not know who she was.  Morphine Itching    Pcn [Penicillins] Swelling         Review of Systems       Review of Systems   Constitutional: Negative for chills and fever. HENT: Negative for congestion. Respiratory: Positive for cough. Cardiovascular: Negative for chest pain. Gastrointestinal: Positive for nausea. Negative for abdominal pain and vomiting. Genitourinary: Negative for dysuria, flank pain, frequency and urgency. Musculoskeletal: Negative for back pain. Skin: Negative for rash. Neurological: Negative for weakness. Psychiatric/Behavioral: Negative for confusion. Physical Exam     Visit Vitals  /73 (BP 1 Location: Left arm, BP Patient Position: Sitting)   Temp 97.3 °F (36.3 °C)   Resp 18   Ht 5' 6\" (1.676 m)   Wt 58.1 kg (128 lb)   SpO2 99%   BMI 20.66 kg/m²         Physical Exam   Constitutional: She is oriented to person, place, and time. She appears well-developed and well-nourished. No distress. HENT:   Head: Normocephalic and atraumatic. Mouth/Throat: Mucous membranes are dry. Cardiovascular: Normal rate, regular rhythm, normal heart sounds and intact distal pulses. Exam reveals no gallop and no friction rub. No murmur heard. Pulmonary/Chest: Effort normal. No stridor. No respiratory distress. She has wheezes. She has no rales. Abdominal: Soft. She exhibits no distension. There is no tenderness.  There is no rebound and no guarding. Musculoskeletal: She exhibits no edema. Neurological: She is alert and oriented to person, place, and time. Skin: Skin is warm and dry. She is not diaphoretic. Psychiatric: She has a normal mood and affect. Diagnostic Study Results     Labs -  Recent Results (from the past 12 hour(s))   GLUCOSE, POC    Collection Time: 10/17/19  8:41 PM   Result Value Ref Range    Glucose (POC) >600 (HH) 70 - 110 mg/dL   CBC WITH AUTOMATED DIFF    Collection Time: 10/17/19  8:46 PM   Result Value Ref Range    WBC 9.2 4.6 - 13.2 K/uL    RBC 3.88 (L) 4.20 - 5.30 M/uL    HGB 12.2 12.0 - 16.0 g/dL    HCT 36.0 35.0 - 45.0 %    MCV 92.8 74.0 - 97.0 FL    MCH 31.4 24.0 - 34.0 PG    MCHC 33.9 31.0 - 37.0 g/dL    RDW 12.7 11.6 - 14.5 %    PLATELET 551 921 - 827 K/uL    MPV 11.6 9.2 - 11.8 FL    NEUTROPHILS 68 42 - 75 %    LYMPHOCYTES 24 20 - 51 %    MONOCYTES 6 2 - 9 %    EOSINOPHILS 2 0 - 5 %    BASOPHILS 0 0 - 3 %    ABS. NEUTROPHILS 6.2 1.8 - 8.0 K/UL    ABS. LYMPHOCYTES 2.2 0.8 - 3.5 K/UL    ABS. MONOCYTES 0.6 0 - 1.0 K/UL    ABS. EOSINOPHILS 0.2 0.0 - 0.4 K/UL    ABS. BASOPHILS 0.0 0.0 - 0.06 K/UL    DF MANUAL      PLATELET COMMENTS ADEQUATE PLATELETS      RBC COMMENTS NORMOCYTIC, NORMOCHROMIC     METABOLIC PANEL, COMPREHENSIVE    Collection Time: 10/17/19  8:46 PM   Result Value Ref Range    Sodium 126 (L) 136 - 145 mmol/L    Potassium 4.9 3.5 - 5.5 mmol/L    Chloride 94 (L) 100 - 111 mmol/L    CO2 20 (L) 21 - 32 mmol/L    Anion gap 12 3.0 - 18 mmol/L    Glucose 666 (HH) 74 - 99 mg/dL    BUN 39 (H) 7.0 - 18 MG/DL    Creatinine 1.68 (H) 0.6 - 1.3 MG/DL    BUN/Creatinine ratio 23 (H) 12 - 20      GFR est AA 38 (L) >60 ml/min/1.73m2    GFR est non-AA 31 (L) >60 ml/min/1.73m2    Calcium 9.4 8.5 - 10.1 MG/DL    Bilirubin, total 0.7 0.2 - 1.0 MG/DL    ALT (SGPT) 22 13 - 56 U/L    AST (SGOT) 22 10 - 38 U/L    Alk.  phosphatase 138 (H) 45 - 117 U/L    Protein, total 7.7 6.4 - 8.2 g/dL    Albumin 3.2 (L) 3.4 - 5.0 g/dL Globulin 4.5 (H) 2.0 - 4.0 g/dL    A-G Ratio 0.7 (L) 0.8 - 1.7     URINALYSIS W/ RFLX MICROSCOPIC    Collection Time: 10/17/19  8:46 PM   Result Value Ref Range    Color YELLOW      Appearance CLEAR      Specific gravity 1.027 1.005 - 1.030      pH (UA) 5.0 5.0 - 8.0      Protein NEGATIVE  NEG mg/dL    Glucose >1,000 (A) NEG mg/dL    Ketone 40 (A) NEG mg/dL    Bilirubin NEGATIVE  NEG      Blood NEGATIVE  NEG      Urobilinogen 0.2 0.2 - 1.0 EU/dL    Nitrites NEGATIVE  NEG      Leukocyte Esterase NEGATIVE  NEG         Radiologic Studies -   XR CHEST PORT    (Results Pending)   no acute disease      Medical Decision Making   I am the first provider for this patient. I reviewed the vital signs, available nursing notes, past medical history, past surgical history, family history and social history. Vital Signs-Reviewed the patient's vital signs. EKG:     Records Reviewed: Nursing Notes and Old Medical Records (Time of Review: 9:02 PM)    ED Course: Progress Notes, Reevaluation, and Consults:         Provider Notes (Medical Decision Making):   MDM  63 y/o F with type 1 DM presenting with hyperglycemia. Likely due to not taking levemir, but will r/o infection as well and give IVF plus subcutaneous insulin and recheck glucose. No infectious source, no anion gap, glucose improved with insulin, patient feels much better with IVF and is tolerating PO - she is ready to go home and is stable for d/c. Procedures    Critical Care Time:       Diagnosis     Clinical Impression:   1. Hyperglycemia    2.  Type 1 diabetes mellitus with other specified complication [facility])        Disposition: discharged    Follow-up Information     Follow up With Specialties Details Why Contact Info    Rosemarie Bai NP Nurse Practitioner Call in 1 week follow up [address]  [address] [place]  [address] [phone]             Patient's Medications   Start Taking    No medications on file   Continue Taking    ALBUTEROL (PROVENTIL HFA, VENTOLIN HFA, PROAIR HFA) 90 MCG/ACTUATION INHALER    Take 2 Puffs by inhalation every four (4) hours as needed for Wheezing or Shortness of Breath. ALENDRONATE (FOSAMAX) 70 MG TABLET    Take 70 mg by mouth every Sunday. BLOOD-GLUCOSE METER (ONE TOUCH BASIC SYSTEM)    by Does Not Apply route. BUDESONIDE-FORMOTEROL (SYMBICORT) 160-4.5 MCG/ACTUATION HFAA    Take 1 Puff by inhalation two (2) times a day. DICLOFENAC (VOLTAREN) 1 % GEL    Apply  to affected area three (3) times daily as needed for Pain. Apply to back and knees as needed    FLASH GLUCOSE SCANNING READER (LalaSTYLE MARLEY 14 DAY READER) [facility]    Use to check blood sugar 3 times a day    FLASH GLUCOSE SENSOR (FREESTYLE MARLEY 14 DAY SENSOR) KIT    Apply every 14 days to monitor blood sugar    FLUOXETINE (PROZAC) 20 MG CAPSULE    Take  by mouth daily. FLUTICASONE (FLONASE) 50 MCG/ACTUATION NASAL SPRAY    2 Sprays by Both Nostrils route daily. GABAPENTIN (NEURONTIN) 300 MG CAPSULE    Take 2 Caps by mouth nightly. GLUCOSE BLOOD VI TEST STRIPS (BLOOD GLUCOSE TEST) STRIP    Contour next test strips    INSULIN ASPART U-100 (NOVOLOG FLEXPEN U-100 INSULIN) 100 UNIT/ML (3 ML) INPN    Each meal, per sliding scale    INSULIN DETEMIR U-100 (LEVEMIR FLEXTOUCH) 100 UNIT/ML (3 ML) INPN    30 Units by SubCUTAneous route nightly. Indications: 18 UNITS IN AM 30 UNITS IN THE PM    LORAZEPAM (ATIVAN) 0.5 MG TABLET    Take 1 Tab by mouth every eight (8) hours as needed for Anxiety. Max Daily Amount: 1.5 mg.    OMEPRAZOLE (PRILOSEC) 40 MG CAPSULE    Take 1 Cap by mouth daily. PEN NEEDLE, DIABETIC (COMFORT EZ PEN NEEDLES) 32 GAUGE X 3/16\" NDLE    1 Pen Needle by Does Not Apply route nightly as needed (with insulin at night). PRAVASTATIN (PRAVACHOL) 40 MG TABLET    Take 40 mg by mouth nightly. SILODOSIN (RAPAFLO) 8 MG CAPSULE    Take 1 Cap by mouth daily (with breakfast).     TIOTROPIUM (SPIRIVA WITH HANDIHALER) 18 MCG INHALATION CAPSULE Take 1 Cap by inhalation daily. TOLTERODINE ER (DETROL-LA) 2 MG ER CAPSULE    Take 1 Cap by mouth daily. These Medications have changed    No medications on file   Stop Taking    No medications on file     Disclaimer: Sections of this note are dictated using utilizing voice recognition software. Minor typographical errors may be present. If questions arise, please do not hesitate to contact me or call our department.

## 2019-10-18 NOTE — ED TRIAGE NOTES
Pt states she is diabetic and has not taken her novolog since Monday because she has been here at the hospital taking care of her mother. Pt states she checked her blood sugar and the meter read high. Pt takes Levamir and has been taking daily. Pt states she is extremely thirsty.

## 2019-10-18 NOTE — ED NOTES
2055...assumed care of pt. She is diabetic, and when she checked her blood sugar it read \"high\". A&Ox4. No c/o pain, CP. Has dry, nonproductive cough.

## 2019-10-18 NOTE — DISCHARGE INSTRUCTIONS
Learning About Type 1 Diabetes  What is type 1 diabetes? Type 1 diabetes is a disease that starts when your body stops making enough of a hormone called insulin. Insulin helps your body use sugar from your food as energy or store it for later use. If you don't have insulin, too much sugar stays in your blood. Type 1 diabetes can occur at any age, but it usually starts in children or young adults. It is a lifelong disease, but with treatment and a healthy lifestyle you can live a long and healthy life. What can you expect with type 1 diabetes? Kemi Salgado keep hearing about how important it is to keep your blood sugar within a target range. That's because over time, high blood sugar can lead to serious problems. It can:  · Harm your eyes, nerves, and kidneys. · Damage your blood vessels, leading to heart disease and stroke. · Reduce blood flow and cause nerve damage to parts of your body, especially your feet. This can cause slow healing and pain when you walk. A more sudden problem can happen when the blood sugar level gets so high that a serious chemical imbalance develops in the blood. This condition can be life-threatening and needs quick treatment. When people hear the word \"diabetes,\" they often think of problems like these. But daily care and treatment can help prevent or delay these problems. The goal is to keep your blood sugar in a target range. It is the best way to reduce your chance of having more problems from diabetes. What are the symptoms? You experience most symptoms of type 1 diabetes when your blood sugar is either too high or too low. Common symptoms of high blood sugar include:  · Thirst.  · Frequent urination. · Weight loss. · Blurry vision. Common symptoms of low blood sugar include:  · Sweating. · Shakiness. · Weakness. · Hunger. · Confusion. If you wait too long to get medical care when your blood sugar goes too high, you may develop diabetic ketoacidosis.  Symptoms include:  · Flushed, hot, dry skin. · A strong, fruity breath odor. · Restlessness, drowsiness, or trouble waking up. · Lack of interest in normal activities. · Rapid, deep breathing. · Loss of appetite, abdominal pain, and vomiting. · Confusion. How is type 1 diabetes treated? To treat type 1 diabetes, you need insulin. You can give yourself insulin through an insulin pump, an insulin pen, or a syringe (needle). When you have type 1 diabetes, it's more important than ever to have a healthy lifestyle. Here are other things you can do to stay healthy:  · Check your blood sugar level often, as advised by your doctor. · Eat a balanced diet that spreads carbohydrate evenly throughout the day. · Control your blood pressure and cholesterol. · Do not smoke. Smoking can make health problems worse. This includes problems you might have with type 1 diabetes. If you need help quitting, talk to your doctor about stop-smoking programs and medicines. These can increase your chances of quitting for good. · Limit alcohol to 2 drinks a day for men and 1 drink a day for women. Too much alcohol can cause health problems. · Get at least 30 minutes of exercise on most days of the week. Walking is a good choice. You also may want to do other activities, such as running, swimming, cycling, or playing tennis or team sports. Follow-up care is a key part of your treatment and safety. Be sure to make and go to all appointments, and call your doctor if you are having problems. It's also a good idea to know your test results and keep a list of the medicines you take. Where can you learn more? Go to http://rhona-ilia.info/. Enter F930 in the search box to learn more about \"Learning About Type 1 Diabetes. \"  Current as of: April 16, 2019  Content Version: 12.2  © 1308-3422 [employer].  Care instructions adapted under license by [employer] (which disclaims liability or warranty for this information). If you have questions about a medical condition or this instruction, always ask your healthcare professional. [address] any warranty or liability for your use of this information. Patient Education        Learning About High Blood Sugar  What is high blood sugar? Your body turns the food you eat into glucose (sugar), which it uses for energy. But if your body isn't able to use the sugar right away, it can build up in your blood and lead to high blood sugar. When the amount of sugar in your blood stays too high for too much of the time, you may have diabetes. Diabetes is a disease that can cause serious health problems. The good news is that lifestyle changes may help you get your blood sugar back to normal and avoid or delay diabetes. What causes high blood sugar? Sugar (glucose) can build up in your blood if you:  · Are overweight. · Have a family history of diabetes. · Take certain medicines, such as steroids. What are the symptoms? Having high blood sugar may not cause any symptoms at all. Or it may make you feel very thirsty or very hungry. You may also urinate more often than usual, have blurry vision, or lose weight without trying. How is high blood sugar treated? You can take steps to lower your blood sugar level if you understand what makes it get higher. Your doctor may want you to learn how to test your blood sugar level at home. Then you can see how illness, stress, or different kinds of food or medicine raise or lower your blood sugar level. Other tests may be needed to see if you have diabetes. How can you prevent high blood sugar? · Watch your weight. If you're overweight, losing just a small amount of weight may help. Reducing fat around your waist is most important. · Limit the amount of calories, sweets, and unhealthy fat you eat. Ask your doctor if a dietitian can help you.  A registered dietitian can help you create meal plans that fit your lifestyle. · Get at least 30 minutes of exercise on most days of the week. Exercise helps control your blood sugar. It also helps you maintain a healthy weight. Walking is a good choice. You also may want to do other activities, such as running, swimming, cycling, or playing tennis or team sports. · If your doctor prescribed medicines, take them exactly as prescribed. Call your doctor if you think you are having a problem with your medicine. You will get more details on the specific medicines your doctor prescribes. Follow-up care is a key part of your treatment and safety. Be sure to make and go to all appointments, and call your doctor if you are having problems. It's also a good idea to know your test results and keep a list of the medicines you take. Where can you learn more? Go to http://rhona-ilia.info/. Enter O108 in the search box to learn more about \"Learning About High Blood Sugar. \"  Current as of: April 16, 2019  Content Version: 12.2  © 8141-6792 [employer]. Care instructions adapted under license by [employer] (which disclaims liability or warranty for this information). If you have questions about a medical condition or this instruction, always ask your healthcare professional. [address] any warranty or liability for your use of this information.

## 2019-11-04 ENCOUNTER — OFFICE VISIT (OUTPATIENT)
Dept: FAMILY MEDICINE CLINIC | Age: 59
End: 2019-11-04

## 2019-11-04 VITALS
SYSTOLIC BLOOD PRESSURE: 120 MMHG | TEMPERATURE: 97.7 F | HEIGHT: 66 IN | HEART RATE: 84 BPM | OXYGEN SATURATION: 97 % | WEIGHT: 126.8 LBS | RESPIRATION RATE: 18 BRPM | DIASTOLIC BLOOD PRESSURE: 63 MMHG | BODY MASS INDEX: 20.38 KG/M2

## 2019-11-04 DIAGNOSIS — R92.8 ABNORMAL MAMMOGRAM: ICD-10-CM

## 2019-11-04 DIAGNOSIS — E11.42 DIABETIC POLYNEUROPATHY ASSOCIATED WITH TYPE 2 DIABETES MELLITUS (HCC): Primary | ICD-10-CM

## 2019-11-04 RX ORDER — GABAPENTIN 300 MG/1
600 CAPSULE ORAL
Qty: 60 CAP | Refills: 2 | Status: SHIPPED | OUTPATIENT
Start: 2019-11-04 | End: 2022-03-17 | Stop reason: SDUPTHER

## 2019-11-04 RX ORDER — PRAVASTATIN SODIUM 40 MG/1
40 TABLET ORAL
Qty: 90 TAB | Refills: 0 | Status: SHIPPED | OUTPATIENT
Start: 2019-11-04 | End: 2021-05-04 | Stop reason: SDUPTHER

## 2019-11-04 RX ORDER — FLUTICASONE PROPIONATE 50 MCG
2 SPRAY, SUSPENSION (ML) NASAL DAILY
Qty: 1 BOTTLE | Refills: 3 | Status: SHIPPED | OUTPATIENT
Start: 2019-11-04 | End: 2021-05-04 | Stop reason: SDUPTHER

## 2019-11-04 RX ORDER — OMEPRAZOLE 40 MG/1
40 CAPSULE, DELAYED RELEASE ORAL DAILY
Qty: 30 CAP | Refills: 0 | Status: SHIPPED | OUTPATIENT
Start: 2019-11-04 | End: 2021-05-04 | Stop reason: SDUPTHER

## 2019-11-04 NOTE — PROGRESS NOTES
Patient here for ct scanning order/medication refill/order for gabepentin        1. Have you been to the ER, urgent care clinic since your last visit? Hospitalized since your last visit?wemt to ER for high blood sugar    2. Have you seen or consulted any other health care providers outside of the [address] [place] [address] since your last visit? Include any pap smears or colon screening.  Dr Gonzalez Kos

## 2019-11-04 NOTE — PATIENT INSTRUCTIONS

## 2019-11-04 NOTE — PROGRESS NOTES
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 female. HPI  Patient is here today for evaluation and treatment of: Patient is requesting order for Gabepentin/ Patient is requesting order for 3d ultrasound of right breast:    Has neuropathy due to DM. Needs a refill on gabapentin. She is due for her diabetic foot exam. Gabapentin helps neuropathy. Has an appointment with Psych on Nov. 12, 2019. Has had some depression sx due to recent death of her mother. Pt also had an abnormal mammogram in May. Needs a 6 month follow up Diagnostic mammogram and US. Orders placed. Requests refills on flonase, prilosec and pravachol      Current Outpatient Medications:     gabapentin (NEURONTIN) 300 mg capsule, Take 2 Caps by mouth nightly., Disp: 60 Cap, Rfl: 0    tolterodine ER (DETROL-LA) 2 mg ER capsule, Take 1 Cap by mouth daily. , Disp: 90 Cap, Rfl: 3    FLUoxetine (PROZAC) 20 mg capsule, Take  by mouth daily. , Disp: , Rfl:     insulin detemir U-100 (LEVEMIR FLEXTOUCH) 100 unit/mL (3 mL) inpn, 30 Units by SubCUTAneous route nightly. Indications: 18 UNITS IN AM 30 UNITS IN THE PM, Disp: , Rfl:     blood-glucose meter (ONE TOUCH BASIC SYSTEM), by Does Not Apply route., Disp: , Rfl:     omeprazole (PRILOSEC) 40 mg capsule, Take 1 Cap by mouth daily. , Disp: 30 Cap, Rfl: 0    insulin aspart U-100 (NOVOLOG FLEXPEN U-100 INSULIN) 100 unit/mL (3 mL) inpn, Each meal, per sliding scale, Disp: 2 Pen, Rfl: 2    glucose blood VI test strips (BLOOD GLUCOSE TEST) strip, Contour next test strips, Disp: 100 Strip, Rfl: 2    pen needle, diabetic (COMFORT EZ PEN NEEDLES) 32 gauge x 3/16\" ndle, 1 Pen Needle by Does Not Apply route nightly as needed (with insulin at night). , Disp: 100 Each, Rfl: 2    budesonide-formoterol (SYMBICORT) 160-4.5 mcg/actuation HFAA, Take 1 Puff by inhalation two (2) times a day., Disp: 1 Inhaler, Rfl: 3    LORazepam (ATIVAN) 0.5 mg tablet, Take 1 Tab by mouth every eight (8) hours as needed for Anxiety. Max Daily Amount: 1.5 mg., Disp: 60 Tab, Rfl: 0    flash glucose sensor (FREESTYLE MARLEY 14 DAY SENSOR) kit, Apply every 14 days to monitor blood sugar, Disp: 2 Kit, Rfl: 11    flash glucose scanning reader (FREESTYLE MARLEY 14 DAY READER) [facility], Use to check blood sugar 3 times a day, Disp: 1 Each, Rfl: 0    pravastatin (PRAVACHOL) 40 mg tablet, Take 40 mg by mouth nightly., Disp: , Rfl:     tiotropium (SPIRIVA WITH HANDIHALER) 18 mcg inhalation capsule, Take 1 Cap by inhalation daily. , Disp: , Rfl:     albuterol (PROVENTIL HFA, VENTOLIN HFA, PROAIR HFA) 90 mcg/actuation inhaler, Take 2 Puffs by inhalation every four (4) hours as needed for Wheezing or Shortness of Breath., Disp: 2 Inhaler, Rfl: 3    alendronate (FOSAMAX) 70 mg tablet, Take 70 mg by mouth every Sunday. , Disp: , Rfl:     fluticasone (FLONASE) 50 mcg/actuation nasal spray, 2 Sprays by Both Nostrils route daily. , Disp: 1 Bottle, Rfl: 3      PMH,  Meds, Allergies, Family History, Social history reviewed    Review of Systems   Constitutional: Negative for chills and fever. Psychiatric/Behavioral: Positive for depression (situational ). Physical Exam   Constitutional: She appears well-developed and well-nourished. No distress. Cardiovascular: Normal rate, regular rhythm, normal heart sounds and intact distal pulses. Exam reveals no gallop and no friction rub. No murmur heard. Pulmonary/Chest: Effort normal and breath sounds normal. No respiratory distress. She has no wheezes. She has no rales. Musculoskeletal: She exhibits no edema. Sensory exam of the foot is normal, tested with the monofilament. Good pulses, no lesions or ulcers, good peripheral pulses. ASSESSMENT and PLAN    ICD-10-CM ICD-9-CM    1. Diabetic polyneuropathy associated with type 2 diabetes mellitus (HCC) E11.42 250.60 gabapentin (NEURONTIN) 300 mg capsule     357.2    2.  Abnormal mammogram R92.8 793.80 ABDON MAMMO RT DX INCL CAD      US BREAST RT LIMITED=<3 QUAD       As above,   above all stable unless otherwise noted   treatment plan as listed below  Orders Placed This Encounter    ABDON MAMMO RT DX INCL CAD    US BREAST RT LIMITED=<3 QUAD    pravastatin (PRAVACHOL) 40 mg tablet    omeprazole (PRILOSEC) 40 mg capsule    fluticasone propionate (FLONASE) 50 mcg/actuation nasal spray    gabapentin (NEURONTIN) 300 mg capsule     Follow up with Psych on Nov 12  Follow-up and Dispositions    · Return in about 4 months (around 3/4/2020) for with PCP. refilled meds requested  An After Visit Summary was printed and given to the patient. This has been fully explained to the patient, who indicates understanding.

## 2019-11-22 ENCOUNTER — HOSPITAL ENCOUNTER (OUTPATIENT)
Dept: ULTRASOUND IMAGING | Age: 59
Discharge: HOME | End: 2019-11-22
Attending: FAMILY MEDICINE
Payer: COMMERCIAL

## 2019-11-22 ENCOUNTER — HOSPITAL ENCOUNTER (OUTPATIENT)
Dept: MAMMOGRAPHY | Age: 59
Discharge: HOME | End: 2019-11-22
Attending: FAMILY MEDICINE
Payer: COMMERCIAL

## 2019-11-22 DIAGNOSIS — Z09 FOLLOW-UP EXAM: ICD-10-CM

## 2019-11-22 DIAGNOSIS — R92.8 ABNORMAL MAMMOGRAM: ICD-10-CM

## 2019-11-22 PROCEDURE — 76642 ULTRASOUND BREAST LIMITED: CPT

## 2020-01-22 ENCOUNTER — TELEPHONE (OUTPATIENT)
Dept: CARDIOLOGY CLINIC | Age: 60
End: 2020-01-22

## 2020-01-22 NOTE — TELEPHONE ENCOUNTER
Patient needs cardiac clearance for colonoscopy on 1/24/20.      Verbal order and read back per Chadwick Lemon DO  Cleared for colonoscopy   Form faxed to [facility]

## 2020-06-13 ENCOUNTER — HOSPITAL ENCOUNTER (EMERGENCY)
Age: 60
Discharge: HOME | End: 2020-06-14
Attending: EMERGENCY MEDICINE
Payer: MEDICAID

## 2020-06-13 VITALS
RESPIRATION RATE: 14 BRPM | SYSTOLIC BLOOD PRESSURE: 172 MMHG | OXYGEN SATURATION: 98 % | BODY MASS INDEX: 20.18 KG/M2 | HEART RATE: 74 BPM | DIASTOLIC BLOOD PRESSURE: 71 MMHG | WEIGHT: 125 LBS | TEMPERATURE: 97.7 F

## 2020-06-13 DIAGNOSIS — N30.01 ACUTE CYSTITIS WITH HEMATURIA: ICD-10-CM

## 2020-06-13 DIAGNOSIS — R31.9 HEMATURIA, UNSPECIFIED TYPE: Primary | ICD-10-CM

## 2020-06-13 LAB
APPEARANCE UR: CLEAR
BACTERIA URNS QL MICRO: ABNORMAL /HPF
BILIRUB UR QL: NEGATIVE
COLOR UR: ABNORMAL
EPITH CASTS URNS QL MICRO: ABNORMAL /LPF (ref 0–5)
GLUCOSE UR STRIP.AUTO-MCNC: >1000 MG/DL
HGB UR QL STRIP: ABNORMAL
KETONES UR QL STRIP.AUTO: 15 MG/DL
LEUKOCYTE ESTERASE UR QL STRIP.AUTO: NEGATIVE
NITRITE UR QL STRIP.AUTO: NEGATIVE
PH UR STRIP: 6.5 [PH] (ref 5–8)
PROT UR STRIP-MCNC: NEGATIVE MG/DL
RBC #/AREA URNS HPF: ABNORMAL /HPF (ref 0–5)
SP GR UR REFRACTOMETRY: 1.02 (ref 1–1.03)
UROBILINOGEN UR QL STRIP.AUTO: 0.2 EU/DL (ref 0.2–1)
WBC URNS QL MICRO: ABNORMAL /HPF (ref 0–5)

## 2020-06-13 PROCEDURE — 99283 EMERGENCY DEPT VISIT LOW MDM: CPT

## 2020-06-13 PROCEDURE — 81001 URINALYSIS AUTO W/SCOPE: CPT

## 2020-06-13 RX ORDER — CIPROFLOXACIN 500 MG/1
500 TABLET ORAL 2 TIMES DAILY
Qty: 20 TAB | Refills: 0 | Status: SHIPPED | OUTPATIENT
Start: 2020-06-13 | End: 2020-06-23

## 2020-06-13 RX ORDER — CIPROFLOXACIN 500 MG/1
500 TABLET ORAL
Status: COMPLETED | OUTPATIENT
Start: 2020-06-13 | End: 2020-06-14

## 2020-06-14 PROCEDURE — 74011250637 HC RX REV CODE- 250/637: Performed by: NURSE PRACTITIONER

## 2020-06-14 RX ADMIN — CIPROFLOXACIN 500 MG: 500 TABLET, FILM COATED ORAL at 00:02

## 2020-06-14 NOTE — ED TRIAGE NOTES
C/o episode of hematuria today with urinary frequency over the last 4 hours.   Denies any other symptoms

## 2020-06-14 NOTE — ED PROVIDER NOTES
DR. GARZA'S \[facility] [place] [facility]\""  Emergency Department Treatment Report        10:15 PM Jeannette Cisneros is a 64 y.o. female who presents to ED with hematuria and dysuria. Patient states she has had this problem before and she sees a urologist.  States she just think she needs antibiotics for her hematuria and UTI and she will follow-up with her urologist.  Patient denies any abdominal pain or flank pain no nausea vomiting no fevers reported. No other complaints, associated symptoms or modifying factors at this time. PCP: Jovanny Borja NP      The history is provided by the patient. No  was used. Blood in Urine    This is a recurrent problem. The current episode started 12 to 24 hours ago. The problem has not changed since onset. The pain is mild. There has been no fever. Associated symptoms include frequency and hematuria. Pertinent negatives include no sweats, no nausea, no vomiting, no flank pain, no abdominal pain and no back pain. The patient is not pregnant. She has tried nothing for the symptoms. Urinary Frequency    Associated symptoms include frequency and hematuria. Pertinent negatives include no sweats, no nausea, no vomiting, no flank pain, no abdominal pain and no back pain. The patient is not pregnant.        Past Medical History:   Diagnosis Date    Arthritis     Autonomic neuropathy     Back pain     Chronic lung disease     Chronic pain     back    Compression fracture T9-10    COPD (chronic obstructive pulmonary disease) (HCC)     Depression     panic attacks    Diabetes mellitus (HCC)     type 1    GERD (gastroesophageal reflux disease)     Hypertension     no longer on meds    MRSA nasal colonization     Neuropathy     Osteoporosis     Tobacco use        Past Surgical History:   Procedure Laterality Date    HX BACK SURGERY  2009    HX DILATION AND CURETTAGE      HX HYSTERECTOMY  2002         Family History:   Problem Relation Age of Onset    Thyroid Disease Mother  Stroke Mother     Arthritis-rheumatoid Mother     Osteoporosis Mother     Diabetes Father     Kidney Disease Father     Tip's Disease Father     Arthritis-rheumatoid Sister     Diabetes Brother     Colon Cancer Maternal Grandmother        Social History     Socioeconomic History    Marital status:      Spouse name: Not on file    Number of children: Not on file    Years of education: Not on file    Highest education level: Not on file   Occupational History    Not on file   Social Needs    Financial resource strain: Not on file    Food insecurity     Worry: Not on file     Inability: Not on file   [employer] needs     Medical: Not on file     Non-medical: Not on file   Tobacco Use    Smoking status: Current Every Day Smoker     Packs/day: 1.00     Years: 43.00     Pack years: 43.00     Types: Cigarettes     Start date: 7/27/1976    Smokeless tobacco: Never Used    Tobacco comment: on Chantix   Substance and Sexual Activity    Alcohol use: No    Drug use: No    Sexual activity: Not Currently   Lifestyle    Physical activity     Days per week: Not on file     Minutes per session: Not on file    Stress: Not on file   Relationships    Social connections     Talks on phone: Not on file     Gets together: Not on file     Attends Oriental orthodox service: Not on file     Active member of club or organization: Not on file     Attends meetings of clubs or organizations: Not on file     Relationship status: Not on file    Intimate partner violence     Fear of current or ex partner: Not on file     Emotionally abused: Not on file     Physically abused: Not on file     Forced sexual activity: Not on file   Other Topics Concern    Not on file   Social History Narrative    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 Denies any history chemical and or asbestos exposure          ALLERGIES: Penicillin g; Bactrim [sulfamethoprim]; Dilaudid [hydromorphone (bulk)];  Morphine; and Pcn [penicillins]    Review of Systems   Gastrointestinal: Negative for abdominal pain, diarrhea, nausea and vomiting. Genitourinary: Positive for frequency and hematuria. Negative for flank pain. Musculoskeletal: Negative for back pain. All other systems reviewed and are negative. Vitals:    06/13/20 2153   BP: 172/71   Pulse: 74   Resp: 14   Temp: 97.7 °F (36.5 °C)   SpO2: 98%   Weight: 56.7 kg (125 lb)            Physical Exam  Vitals signs and nursing note reviewed. Constitutional:       General: She is not in acute distress. Appearance: She is well-developed. She is not ill-appearing, toxic-appearing or diaphoretic. HENT:      Head: Normocephalic and atraumatic. Eyes:      Conjunctiva/sclera: Conjunctivae normal.      Pupils: Pupils are equal, round, and reactive to light. Neck:      Musculoskeletal: Normal range of motion and neck supple. Cardiovascular:      Rate and Rhythm: Normal rate and regular rhythm. Pulmonary:      Effort: Pulmonary effort is normal.      Breath sounds: Normal breath sounds. Abdominal:      General: Bowel sounds are normal.      Palpations: Abdomen is soft. Tenderness: There is no abdominal tenderness. Musculoskeletal: Normal range of motion. General: No swelling or tenderness. Skin:     General: Skin is warm and dry. Neurological:      Mental Status: She is alert and oriented to person, place, and time. Deep Tendon Reflexes: Reflexes are normal and symmetric. Psychiatric:         Behavior: Behavior normal.         Thought Content: Thought content normal.         Judgment: Judgment normal.          MDM  Number of Diagnoses or Management Options  Acute cystitis with hematuria:   Hematuria, unspecified type:   Diagnosis management comments: Patient with hematuria and what appears to be a UTI on her micro with white blood cells.   Patient with a history of hematuria and UTIs in the past is seen a urologist.  Patient denies any abdominal pain pelvic pain vaginal discharge or back pain or flank pain. No nausea vomiting there is reasonable place patient on Cipro never follow-up with urologist.  Patient advised to drink plenty of water take all the antibiotics and follow-up with a urologist on Monday morning. No other acute illnesses suspected patient discharged home in no distress patient advised to return if worse.        Amount and/or Complexity of Data Reviewed  Clinical lab tests: ordered and reviewed  Review and summarize past medical records: yes  Independent visualization of images, tracings, or specimens: yes    Risk of Complications, Morbidity, and/or Mortality  Presenting problems: moderate  Diagnostic procedures: moderate  Management options: moderate    Patient Progress  Patient progress: stable         Procedures            Vitals:  Patient Vitals for the past 12 hrs:   Temp Pulse Resp BP SpO2   06/13/20 2153 97.7 °F (36.5 °C) 74 14 172/71 98 %       Medications ordered:   Medications - No data to display      Lab findings:  Recent Results (from the past 12 hour(s))   URINALYSIS W/ RFLX MICROSCOPIC    Collection Time: 06/13/20  9:55 PM   Result Value Ref Range    Color PINK      Appearance CLEAR      Specific gravity 1.023 1.005 - 1.030      pH (UA) 6.5 5.0 - 8.0      Protein Negative NEG mg/dL    Glucose >1,000 (A) NEG mg/dL    Ketone 15 (A) NEG mg/dL    Bilirubin Negative NEG      Blood LARGE (A) NEG      Urobilinogen 0.2 0.2 - 1.0 EU/dL    Nitrites Negative NEG      Leukocyte Esterase Negative NEG     URINE MICROSCOPIC ONLY    Collection Time: 06/13/20  9:55 PM   Result Value Ref Range    WBC  0 - 5 /hpf     UNABLE TO QUANTITATE MICROSCOPIC PARAMETERS DUE TO EXCESSIVE RBCS    RBC TOO NUMEROUS TO COUNT 0 - 5 /hpf    Epithelial cells  0 - 5 /lpf     UNABLE TO QUANTITATE MICROSCOPIC PARAMETERS DUE TO EXCESSIVE RBCS    Bacteria (A) NEG /hpf     UNABLE TO QUANTITATE MICROSCOPIC PARAMETERS DUE TO EXCESSIVE RBCS Disposition:    Diagnosis:   1. Hematuria, unspecified type    2. Acute cystitis with hematuria        Disposition: to Home      Follow-up Information     Follow up With Specialties Details Why Contact Info    Korin Hoskins NP Nurse Practitioner   [address]  [address] [place]  [address] [phone]      Torri Moulton MD Urology In 2 days  [address]  [phone]             Patient's Medications   Start Taking    CIPROFLOXACIN HCL (CIPRO) 500 MG TABLET    Take 1 Tab by mouth two (2) times a day for 10 days. Continue Taking    ALBUTEROL (PROVENTIL HFA, VENTOLIN HFA, PROAIR HFA) 90 MCG/ACTUATION INHALER    Take 2 Puffs by inhalation every four (4) hours as needed for Wheezing or Shortness of Breath. ALENDRONATE (FOSAMAX) 70 MG TABLET    Take 70 mg by mouth every Sunday. BLOOD-GLUCOSE METER (ONE TOUCH BASIC SYSTEM)    by Does Not Apply route. BUDESONIDE-FORMOTEROL (SYMBICORT) 160-4.5 MCG/ACTUATION HFAA    Take 1 Puff by inhalation two (2) times a day. FLASH GLUCOSE SCANNING READER (FREESTYLE MARLEY 14 DAY READER) [facility]    Use to check blood sugar 3 times a day    FLASH GLUCOSE SENSOR (FREESTYLE MARLEY 14 DAY SENSOR) KIT    Apply every 14 days to monitor blood sugar    FLUOXETINE (PROZAC) 20 MG CAPSULE    Take  by mouth daily. FLUTICASONE PROPIONATE (FLONASE) 50 MCG/ACTUATION NASAL SPRAY    2 Sprays by Both Nostrils route daily. GABAPENTIN (NEURONTIN) 300 MG CAPSULE    Take 2 Caps by mouth nightly. GLUCOSE BLOOD VI TEST STRIPS (BLOOD GLUCOSE TEST) STRIP    Contour next test strips    INSULIN ASPART U-100 (NOVOLOG FLEXPEN U-100 INSULIN) 100 UNIT/ML (3 ML) INPN    Each meal, per sliding scale    INSULIN DETEMIR U-100 (LEVEMIR FLEXTOUCH) 100 UNIT/ML (3 ML) INPN    30 Units by SubCUTAneous route nightly.  Indications: 18 UNITS IN AM 30 UNITS IN THE PM    LORAZEPAM (ATIVAN) 0.5 MG TABLET    Take 1 Tab by mouth every eight (8) hours as needed for Anxiety. Max Daily Amount: 1.5 mg.    OMEPRAZOLE (PRILOSEC) 40 MG CAPSULE    Take 1 Cap by mouth daily. PEN NEEDLE, DIABETIC (COMFORT EZ PEN NEEDLES) 32 GAUGE X 3/16\" NDLE    1 Pen Needle by Does Not Apply route nightly as needed (with insulin at night). PRAVASTATIN (PRAVACHOL) 40 MG TABLET    Take 1 Tab by mouth nightly. TIOTROPIUM (SPIRIVA WITH HANDIHALER) 18 MCG INHALATION CAPSULE    Take 1 Cap by inhalation daily. TOLTERODINE ER (DETROL-LA) 2 MG ER CAPSULE    Take 1 Cap by mouth daily. These Medications have changed    No medications on file   Stop Taking    No medications on file       Return to the ER if you are unable to obtain referral as directed. Noel Baker's  results have been reviewed with her. She has been counseled regarding her diagnosis, treatment, and plan. She verbally conveys understanding and agreement of the signs, symptoms, diagnosis, treatment and prognosis and additionally agrees to follow up as discussed. She also agrees with the care-plan and conveys that all of her questions have been answered. I have also provided discharge instructions for her that include: educational information regarding their diagnosis and treatment, and list of reasons why they would want to return to the ED prior to their follow-up appointment, should her condition change. Shakira Berumen ENP-C,FNP-C      Dragon voice recognition was used to generate this report, which may have resulted in some phonetic based errors in grammar and contents.  Even though attempts were made to correct all the mistakes, some may have been missed, and remained in the body of the document

## 2020-06-15 ENCOUNTER — PATIENT OUTREACH (OUTPATIENT)
Dept: CASE MANAGEMENT | Age: 60
End: 2020-06-15

## 2020-06-15 NOTE — PROGRESS NOTES
.Patient contacted regarding recent discharge and COVID-19 risk. Discussed COVID-19 related testing which was not done at this time. Test results were not done. Patient informed of results, if available? no    Care Transition Nurse/ Ambulatory Care Manager contacted the family by telephone to perform post discharge assessment. Verified name and  with family as identifiers. Spoke with son, patient at store picking up prescription medications. Voices ACM could call back in an hour to speak to patient. Patient has following risk factors of: COPD and diabetes. CTN/ACM reviewed discharge instructions, medical action plan and red flags related to discharge diagnosis. Reviewed and educated them on any new and changed medications related to discharge diagnosis. Advised obtaining a 90-day supply of all daily and as-needed medications. Education provided regarding infection prevention, and signs and symptoms of COVID-19 and when to seek medical attention with family who verbalized understanding. Discussed exposure protocols and quarantine from [address] you at higher risk for severe illness  and given an opportunity for questions and concerns. The family agrees to contact the COVID-19 hotline [phone] or PCP office for questions related to their healthcare. CTN/ACM provided contact information for future reference. From CDC: Are you at higher risk for severe illness?  Wash your hands often.  Avoid close contact (6 feet, which is about two arm lengths) with people who are sick.  Put distance between yourself and other people if COVID-19 is spreading in your community.  Clean and disinfect frequently touched surfaces.  Avoid all cruise travel and non-essential air travel.  Call your healthcare professional if you have concerns about COVID-19 and your underlying condition or if you are sick.     For more information on steps you can take to protect yourself, see CDC's How to Protect Yourself      Patient/family/caregiver given information for [facility] Loop and agrees to enroll no  Patient's preferred e-mail:  n/a  Patient's preferred phone number: n/a  Based on Loop alert triggers, patient will be contacted by nurse care manager for worsening symptoms. Plan for follow-up call in 7-14 days based on severity of symptoms and risk factors.

## 2020-06-29 ENCOUNTER — PATIENT OUTREACH (OUTPATIENT)
Dept: CASE MANAGEMENT | Age: 60
End: 2020-06-29

## 2020-06-29 NOTE — PROGRESS NOTES
.Patient resolved from Transition of Care episode on 6/29/2020  Discussed COVID-19 related testing which was not done at this time. Test results were not done. Patient informed of results, if available? no     Patient/family has been provided the following resources and education related to COVID-19:                         Signs, symptoms and red flags related to COVID-19            [facility] exposure and quarantine guidelines            [facility]it exposure contact - [phone]            Contact for their local Department of Health                 Patient currently reports that the following symptoms have improved:  no new symptoms. No further outreach scheduled with this CTN/ACM/LPN/HC/ MA. Episode of Care resolved. Patient has this CTN/ACM/LPN/HC/MA contact information if future needs arise.

## 2020-07-08 ENCOUNTER — TELEPHONE (OUTPATIENT)
Dept: SURGERY | Age: 60
End: 2020-07-08

## 2020-07-08 NOTE — TELEPHONE ENCOUNTER
I called patient at 5:22PM to confirm colonoscopy on 7/24/2020- she stated she wanted to cancel indefinitely because a co-worker tested positive for COVID-19 and she does not want to have any procedures any time soon. She will call us when she is ready.

## 2021-04-11 ENCOUNTER — HOSPITAL ENCOUNTER (INPATIENT)
Age: 61
LOS: 1 days | Discharge: HOME | DRG: 420 | End: 2021-04-13
Attending: EMERGENCY MEDICINE | Admitting: INTERNAL MEDICINE
Payer: MEDICAID

## 2021-04-11 DIAGNOSIS — U07.1 2019 NOVEL CORONAVIRUS DISEASE (COVID-19): ICD-10-CM

## 2021-04-11 DIAGNOSIS — E10.10 DIABETIC KETOACIDOSIS WITHOUT COMA ASSOCIATED WITH TYPE 1 DIABETES MELLITUS (HCC): Primary | ICD-10-CM

## 2021-04-11 LAB — GLUCOSE BLD STRIP.AUTO-MCNC: 592 MG/DL (ref 70–110)

## 2021-04-11 PROCEDURE — 99285 EMERGENCY DEPT VISIT HI MDM: CPT

## 2021-04-11 PROCEDURE — 84484 ASSAY OF TROPONIN QUANT: CPT

## 2021-04-11 PROCEDURE — 85025 COMPLETE CBC W/AUTO DIFF WBC: CPT

## 2021-04-11 PROCEDURE — 82962 GLUCOSE BLOOD TEST: CPT

## 2021-04-11 PROCEDURE — 96374 THER/PROPH/DIAG INJ IV PUSH: CPT

## 2021-04-11 PROCEDURE — 96361 HYDRATE IV INFUSION ADD-ON: CPT

## 2021-04-11 PROCEDURE — 80053 COMPREHEN METABOLIC PANEL: CPT

## 2021-04-12 ENCOUNTER — APPOINTMENT (OUTPATIENT)
Dept: GENERAL RADIOLOGY | Age: 61
DRG: 420 | End: 2021-04-12
Attending: INTERNAL MEDICINE
Payer: MEDICAID

## 2021-04-12 PROBLEM — E11.10 DKA (DIABETIC KETOACIDOSES): Status: ACTIVE | Noted: 2021-04-12

## 2021-04-12 LAB
ADMINISTERED INITIALS, ADMINIT: NORMAL
ALBUMIN SERPL-MCNC: 3.3 G/DL (ref 3.4–5)
ALBUMIN/GLOB SERPL: 0.8 {RATIO} (ref 0.8–1.7)
ALP SERPL-CCNC: 111 U/L (ref 45–117)
ALT SERPL-CCNC: 19 U/L (ref 13–56)
ANION GAP SERPL CALC-SCNC: 10 MMOL/L (ref 3–18)
ANION GAP SERPL CALC-SCNC: 22 MMOL/L (ref 3–18)
ANION GAP SERPL CALC-SCNC: 24 MMOL/L (ref 3–18)
APPEARANCE UR: CLEAR
AST SERPL-CCNC: 13 U/L (ref 10–38)
ATRIAL RATE: 97 BPM
BASOPHILS # BLD: 0.1 K/UL (ref 0–0.1)
BASOPHILS NFR BLD: 1 % (ref 0–2)
BILIRUB SERPL-MCNC: 0.9 MG/DL (ref 0.2–1)
BILIRUB UR QL: NEGATIVE
BUN SERPL-MCNC: 34 MG/DL (ref 7–18)
BUN SERPL-MCNC: 39 MG/DL (ref 7–18)
BUN SERPL-MCNC: 43 MG/DL (ref 7–18)
BUN/CREAT SERPL: 32 (ref 12–20)
BUN/CREAT SERPL: 33 (ref 12–20)
BUN/CREAT SERPL: 36 (ref 12–20)
CALCIUM SERPL-MCNC: 7.6 MG/DL (ref 8.5–10.1)
CALCIUM SERPL-MCNC: 8.6 MG/DL (ref 8.5–10.1)
CALCIUM SERPL-MCNC: 8.8 MG/DL (ref 8.5–10.1)
CALCULATED P AXIS, ECG09: 75 DEGREES
CALCULATED R AXIS, ECG10: 71 DEGREES
CALCULATED T AXIS, ECG11: 71 DEGREES
CHLORIDE SERPL-SCNC: 100 MMOL/L (ref 100–111)
CHLORIDE SERPL-SCNC: 105 MMOL/L (ref 100–111)
CHLORIDE SERPL-SCNC: 119 MMOL/L (ref 100–111)
CO2 SERPL-SCNC: 10 MMOL/L (ref 21–32)
CO2 SERPL-SCNC: 12 MMOL/L (ref 21–32)
CO2 SERPL-SCNC: 19 MMOL/L (ref 21–32)
COLOR UR: YELLOW
COVID-19 RAPID TEST, COVR: DETECTED
CREAT SERPL-MCNC: 0.94 MG/DL (ref 0.6–1.3)
CREAT SERPL-MCNC: 1.23 MG/DL (ref 0.6–1.3)
CREAT SERPL-MCNC: 1.3 MG/DL (ref 0.6–1.3)
D50 ADMINISTERED, D50ADM: 0 ML
D50 ORDER, D50ORD: 0 ML
DIAGNOSIS, 93000: NORMAL
DIFFERENTIAL METHOD BLD: ABNORMAL
EOSINOPHIL # BLD: 0 K/UL (ref 0–0.4)
EOSINOPHIL NFR BLD: 0 % (ref 0–5)
ERYTHROCYTE [DISTWIDTH] IN BLOOD BY AUTOMATED COUNT: 12.1 % (ref 11.6–14.5)
EST. AVERAGE GLUCOSE BLD GHB EST-MCNC: 324 MG/DL
GLOBULIN SER CALC-MCNC: 3.9 G/DL (ref 2–4)
GLUCOSE BLD STRIP.AUTO-MCNC: 150 MG/DL (ref 70–110)
GLUCOSE BLD STRIP.AUTO-MCNC: 177 MG/DL (ref 70–110)
GLUCOSE BLD STRIP.AUTO-MCNC: 198 MG/DL (ref 70–110)
GLUCOSE BLD STRIP.AUTO-MCNC: 207 MG/DL (ref 70–110)
GLUCOSE BLD STRIP.AUTO-MCNC: 228 MG/DL (ref 70–110)
GLUCOSE BLD STRIP.AUTO-MCNC: 231 MG/DL (ref 70–110)
GLUCOSE BLD STRIP.AUTO-MCNC: 283 MG/DL (ref 70–110)
GLUCOSE BLD STRIP.AUTO-MCNC: 289 MG/DL (ref 70–110)
GLUCOSE BLD STRIP.AUTO-MCNC: 312 MG/DL (ref 70–110)
GLUCOSE BLD STRIP.AUTO-MCNC: 451 MG/DL (ref 70–110)
GLUCOSE BLD STRIP.AUTO-MCNC: 557 MG/DL (ref 70–110)
GLUCOSE BLD STRIP.AUTO-MCNC: 563 MG/DL (ref 70–110)
GLUCOSE BLD STRIP.AUTO-MCNC: 591 MG/DL (ref 70–110)
GLUCOSE SERPL-MCNC: 195 MG/DL (ref 74–99)
GLUCOSE SERPL-MCNC: 551 MG/DL (ref 74–99)
GLUCOSE SERPL-MCNC: 579 MG/DL (ref 74–99)
GLUCOSE UR STRIP.AUTO-MCNC: >1000 MG/DL
GLUCOSE, GLC: 177 MG/DL
GLUCOSE, GLC: 198 MG/DL
GLUCOSE, GLC: 207 MG/DL
GLUCOSE, GLC: 228 MG/DL
GLUCOSE, GLC: 231 MG/DL
GLUCOSE, GLC: 289 MG/DL
GLUCOSE, GLC: 312 MG/DL
GLUCOSE, GLC: 451 MG/DL
GLUCOSE, GLC: 557 MG/DL
GLUCOSE, GLC: 591 MG/DL
HBA1C MFR BLD: 12.9 % (ref 4.2–5.6)
HCT VFR BLD AUTO: 37.4 % (ref 35–45)
HGB BLD-MCNC: 12.2 G/DL (ref 12–16)
HGB UR QL STRIP: NEGATIVE
HIGH TARGET, HITG: 180 MG/DL
INSULIN ADMINSTERED, INSADM: 10.6 UNITS/HOUR
INSULIN ADMINSTERED, INSADM: 14.9 UNITS/HOUR
INSULIN ADMINSTERED, INSADM: 2.5 UNITS/HOUR
INSULIN ADMINSTERED, INSADM: 4.6 UNITS/HOUR
INSULIN ADMINSTERED, INSADM: 4.7 UNITS/HOUR
INSULIN ADMINSTERED, INSADM: 5 UNITS/HOUR
INSULIN ADMINSTERED, INSADM: 5.9 UNITS/HOUR
INSULIN ADMINSTERED, INSADM: 7.8 UNITS/HOUR
INSULIN ADMINSTERED, INSADM: 8.3 UNITS/HOUR
INSULIN ADMINSTERED, INSADM: 8.6 UNITS/HOUR
INSULIN ORDER, INSORD: 10.6 UNITS/HOUR
INSULIN ORDER, INSORD: 14.9 UNITS/HOUR
INSULIN ORDER, INSORD: 2.5 UNITS/HOUR
INSULIN ORDER, INSORD: 4.6 UNITS/HOUR
INSULIN ORDER, INSORD: 4.7 UNITS/HOUR
INSULIN ORDER, INSORD: 5 UNITS/HOUR
INSULIN ORDER, INSORD: 5.9 UNITS/HOUR
INSULIN ORDER, INSORD: 7.8 UNITS/HOUR
INSULIN ORDER, INSORD: 8.3 UNITS/HOUR
INSULIN ORDER, INSORD: 8.6 UNITS/HOUR
KETONES UR QL STRIP.AUTO: >160 MG/DL
LEUKOCYTE ESTERASE UR QL STRIP.AUTO: NEGATIVE
LOW TARGET, LOT: 140 MG/DL
LYMPHOCYTES # BLD: 1.3 K/UL (ref 0.9–3.6)
LYMPHOCYTES NFR BLD: 8 % (ref 21–52)
MAGNESIUM SERPL-MCNC: 1.9 MG/DL (ref 1.6–2.6)
MAGNESIUM SERPL-MCNC: 2.3 MG/DL (ref 1.6–2.6)
MCH RBC QN AUTO: 31.4 PG (ref 24–34)
MCHC RBC AUTO-ENTMCNC: 32.6 G/DL (ref 31–37)
MCV RBC AUTO: 96.4 FL (ref 74–97)
MINUTES UNTIL NEXT BG, NBG: 60 MIN
MONOCYTES # BLD: 0.5 K/UL (ref 0.05–1.2)
MONOCYTES NFR BLD: 3 % (ref 3–10)
MULTIPLIER, MUL: 0.01
MULTIPLIER, MUL: 0.02
MULTIPLIER, MUL: 0.03
MULTIPLIER, MUL: 0.03
MULTIPLIER, MUL: 0.04
MULTIPLIER, MUL: 0.04
MULTIPLIER, MUL: 0.05
MULTIPLIER, MUL: 0.06
NEUTS SEG # BLD: 14 K/UL (ref 1.8–8)
NEUTS SEG NFR BLD: 87 % (ref 40–73)
NITRITE UR QL STRIP.AUTO: NEGATIVE
ORDER INITIALS, ORDINIT: NORMAL
P-R INTERVAL, ECG05: 176 MS
PH UR STRIP: 5 [PH] (ref 5–8)
PHOSPHATE SERPL-MCNC: 6.6 MG/DL (ref 2.5–4.9)
PLATELET # BLD AUTO: 236 K/UL (ref 135–420)
PMV BLD AUTO: 11.3 FL (ref 9.2–11.8)
POTASSIUM SERPL-SCNC: 3.5 MMOL/L (ref 3.5–5.5)
POTASSIUM SERPL-SCNC: 4.7 MMOL/L (ref 3.5–5.5)
POTASSIUM SERPL-SCNC: 4.7 MMOL/L (ref 3.5–5.5)
PROT SERPL-MCNC: 7.2 G/DL (ref 6.4–8.2)
PROT UR STRIP-MCNC: NEGATIVE MG/DL
Q-T INTERVAL, ECG07: 386 MS
QRS DURATION, ECG06: 88 MS
QTC CALCULATION (BEZET), ECG08: 490 MS
RBC # BLD AUTO: 3.88 M/UL (ref 4.2–5.3)
SARS-COV-2, COV2: NORMAL
SODIUM SERPL-SCNC: 136 MMOL/L (ref 136–145)
SODIUM SERPL-SCNC: 137 MMOL/L (ref 136–145)
SODIUM SERPL-SCNC: 148 MMOL/L (ref 136–145)
SOURCE, COVRS: ABNORMAL
SP GR UR REFRACTOMETRY: 1.02 (ref 1–1.03)
TROPONIN I SERPL-MCNC: <0.02 NG/ML (ref 0–0.04)
UROBILINOGEN UR QL STRIP.AUTO: 0.2 EU/DL (ref 0.2–1)
VENTRICULAR RATE, ECG03: 97 BPM
WBC # BLD AUTO: 16.1 K/UL (ref 4.6–13.2)

## 2021-04-12 PROCEDURE — 83735 ASSAY OF MAGNESIUM: CPT

## 2021-04-12 PROCEDURE — 71045 X-RAY EXAM CHEST 1 VIEW: CPT

## 2021-04-12 PROCEDURE — 81003 URINALYSIS AUTO W/O SCOPE: CPT

## 2021-04-12 PROCEDURE — 83036 HEMOGLOBIN GLYCOSYLATED A1C: CPT

## 2021-04-12 PROCEDURE — 36415 COLL VENOUS BLD VENIPUNCTURE: CPT

## 2021-04-12 PROCEDURE — 74011636637 HC RX REV CODE- 636/637: Performed by: HOSPITALIST

## 2021-04-12 PROCEDURE — 74011250636 HC RX REV CODE- 250/636: Performed by: EMERGENCY MEDICINE

## 2021-04-12 PROCEDURE — 74011000250 HC RX REV CODE- 250: Performed by: INTERNAL MEDICINE

## 2021-04-12 PROCEDURE — 74011636637 HC RX REV CODE- 636/637: Performed by: INTERNAL MEDICINE

## 2021-04-12 PROCEDURE — 74011000250 HC RX REV CODE- 250: Performed by: HOSPITALIST

## 2021-04-12 PROCEDURE — 74011636637 HC RX REV CODE- 636/637: Performed by: EMERGENCY MEDICINE

## 2021-04-12 PROCEDURE — 93005 ELECTROCARDIOGRAM TRACING: CPT

## 2021-04-12 PROCEDURE — 80048 BASIC METABOLIC PNL TOTAL CA: CPT

## 2021-04-12 PROCEDURE — 77010033678 HC OXYGEN DAILY

## 2021-04-12 PROCEDURE — 74011000258 HC RX REV CODE- 258: Performed by: HOSPITALIST

## 2021-04-12 PROCEDURE — 74011250637 HC RX REV CODE- 250/637: Performed by: INTERNAL MEDICINE

## 2021-04-12 PROCEDURE — 74011250636 HC RX REV CODE- 250/636: Performed by: INTERNAL MEDICINE

## 2021-04-12 PROCEDURE — 74011000258 HC RX REV CODE- 258: Performed by: INTERNAL MEDICINE

## 2021-04-12 PROCEDURE — 87635 SARS-COV-2 COVID-19 AMP PRB: CPT

## 2021-04-12 PROCEDURE — 65270000029 HC RM PRIVATE

## 2021-04-12 PROCEDURE — 82962 GLUCOSE BLOOD TEST: CPT

## 2021-04-12 PROCEDURE — 74011000258 HC RX REV CODE- 258: Performed by: EMERGENCY MEDICINE

## 2021-04-12 PROCEDURE — 84100 ASSAY OF PHOSPHORUS: CPT

## 2021-04-12 RX ORDER — SODIUM CHLORIDE 0.9 % (FLUSH) 0.9 %
5-40 SYRINGE (ML) INJECTION EVERY 8 HOURS
Status: DISCONTINUED | OUTPATIENT
Start: 2021-04-12 | End: 2021-04-13 | Stop reason: HOSPADM

## 2021-04-12 RX ORDER — SODIUM CHLORIDE 450 MG/100ML
100 INJECTION, SOLUTION INTRAVENOUS CONTINUOUS
Status: DISCONTINUED | OUTPATIENT
Start: 2021-04-12 | End: 2021-04-13 | Stop reason: HOSPADM

## 2021-04-12 RX ORDER — ENOXAPARIN SODIUM 100 MG/ML
40 INJECTION SUBCUTANEOUS EVERY 12 HOURS
Status: DISCONTINUED | OUTPATIENT
Start: 2021-04-12 | End: 2021-04-13 | Stop reason: HOSPADM

## 2021-04-12 RX ORDER — INSULIN GLARGINE 100 [IU]/ML
35 INJECTION, SOLUTION SUBCUTANEOUS
Status: DISCONTINUED | OUTPATIENT
Start: 2021-04-12 | End: 2021-04-12

## 2021-04-12 RX ORDER — DEXTROSE 50 % IN WATER (D50W) INTRAVENOUS SYRINGE
25-50 AS NEEDED
Status: DISCONTINUED | OUTPATIENT
Start: 2021-04-12 | End: 2021-04-13 | Stop reason: SDUPTHER

## 2021-04-12 RX ORDER — MAGNESIUM SULFATE 100 %
4 CRYSTALS MISCELLANEOUS AS NEEDED
Status: DISCONTINUED | OUTPATIENT
Start: 2021-04-12 | End: 2021-04-13 | Stop reason: HOSPADM

## 2021-04-12 RX ORDER — ACETAMINOPHEN 325 MG/1
650 TABLET ORAL
Status: DISCONTINUED | OUTPATIENT
Start: 2021-04-12 | End: 2021-04-13 | Stop reason: HOSPADM

## 2021-04-12 RX ORDER — FLUTICASONE PROPIONATE 50 MCG
2 SPRAY, SUSPENSION (ML) NASAL DAILY
Status: DISCONTINUED | OUTPATIENT
Start: 2021-04-12 | End: 2021-04-13 | Stop reason: HOSPADM

## 2021-04-12 RX ORDER — INSULIN LISPRO 100 [IU]/ML
5 INJECTION, SOLUTION INTRAVENOUS; SUBCUTANEOUS
Status: DISCONTINUED | OUTPATIENT
Start: 2021-04-12 | End: 2021-04-13 | Stop reason: HOSPADM

## 2021-04-12 RX ORDER — SODIUM CHLORIDE 0.9 % (FLUSH) 0.9 %
5-40 SYRINGE (ML) INJECTION AS NEEDED
Status: DISCONTINUED | OUTPATIENT
Start: 2021-04-12 | End: 2021-04-13 | Stop reason: HOSPADM

## 2021-04-12 RX ORDER — ONDANSETRON 2 MG/ML
4 INJECTION INTRAMUSCULAR; INTRAVENOUS
Status: DISCONTINUED | OUTPATIENT
Start: 2021-04-12 | End: 2021-04-13 | Stop reason: HOSPADM

## 2021-04-12 RX ORDER — GABAPENTIN 300 MG/1
600 CAPSULE ORAL
Status: DISCONTINUED | OUTPATIENT
Start: 2021-04-12 | End: 2021-04-13 | Stop reason: HOSPADM

## 2021-04-12 RX ORDER — ACETAMINOPHEN 650 MG/1
650 SUPPOSITORY RECTAL
Status: DISCONTINUED | OUTPATIENT
Start: 2021-04-12 | End: 2021-04-13 | Stop reason: HOSPADM

## 2021-04-12 RX ORDER — FLUOXETINE HYDROCHLORIDE 20 MG/1
20 CAPSULE ORAL DAILY
Status: DISCONTINUED | OUTPATIENT
Start: 2021-04-12 | End: 2021-04-13 | Stop reason: HOSPADM

## 2021-04-12 RX ORDER — DEXTROSE 50 % IN WATER (D50W) INTRAVENOUS SYRINGE
25-50 AS NEEDED
Status: DISCONTINUED | OUTPATIENT
Start: 2021-04-12 | End: 2021-04-13 | Stop reason: HOSPADM

## 2021-04-12 RX ORDER — SODIUM CHLORIDE 9 MG/ML
125 INJECTION, SOLUTION INTRAVENOUS CONTINUOUS
Status: DISCONTINUED | OUTPATIENT
Start: 2021-04-12 | End: 2021-04-12

## 2021-04-12 RX ORDER — INSULIN LISPRO 100 [IU]/ML
INJECTION, SOLUTION INTRAVENOUS; SUBCUTANEOUS
Status: DISCONTINUED | OUTPATIENT
Start: 2021-04-12 | End: 2021-04-13 | Stop reason: HOSPADM

## 2021-04-12 RX ORDER — IPRATROPIUM BROMIDE 0.5 MG/2.5ML
0.5 SOLUTION RESPIRATORY (INHALATION)
Status: DISCONTINUED | OUTPATIENT
Start: 2021-04-12 | End: 2021-04-13

## 2021-04-12 RX ORDER — ONDANSETRON 2 MG/ML
4 INJECTION INTRAMUSCULAR; INTRAVENOUS
Status: COMPLETED | OUTPATIENT
Start: 2021-04-12 | End: 2021-04-12

## 2021-04-12 RX ORDER — INSULIN GLARGINE 100 [IU]/ML
50 INJECTION, SOLUTION SUBCUTANEOUS
Status: DISCONTINUED | OUTPATIENT
Start: 2021-04-12 | End: 2021-04-13 | Stop reason: HOSPADM

## 2021-04-12 RX ORDER — LORAZEPAM 0.5 MG/1
0.5 TABLET ORAL
Status: DISCONTINUED | OUTPATIENT
Start: 2021-04-12 | End: 2021-04-13 | Stop reason: HOSPADM

## 2021-04-12 RX ORDER — PRAVASTATIN SODIUM 20 MG/1
40 TABLET ORAL
Status: DISCONTINUED | OUTPATIENT
Start: 2021-04-12 | End: 2021-04-13 | Stop reason: HOSPADM

## 2021-04-12 RX ORDER — MAGNESIUM SULFATE 100 %
4 CRYSTALS MISCELLANEOUS AS NEEDED
Status: DISCONTINUED | OUTPATIENT
Start: 2021-04-12 | End: 2021-04-13 | Stop reason: SDUPTHER

## 2021-04-12 RX ADMIN — SODIUM CHLORIDE 10.6 UNITS/HR: 9 INJECTION, SOLUTION INTRAVENOUS at 03:59

## 2021-04-12 RX ADMIN — Medication 10 ML: at 08:58

## 2021-04-12 RX ADMIN — SODIUM CHLORIDE 125 ML/HR: 900 INJECTION, SOLUTION INTRAVENOUS at 05:41

## 2021-04-12 RX ADMIN — INSULIN LISPRO 5 UNITS: 100 INJECTION, SOLUTION INTRAVENOUS; SUBCUTANEOUS at 17:35

## 2021-04-12 RX ADMIN — ENOXAPARIN SODIUM 40 MG: 40 INJECTION SUBCUTANEOUS at 08:57

## 2021-04-12 RX ADMIN — INSULIN GLARGINE 50 UNITS: 100 INJECTION, SOLUTION SUBCUTANEOUS at 22:56

## 2021-04-12 RX ADMIN — IPRATROPIUM BROMIDE 0.5 MG: 0.5 SOLUTION RESPIRATORY (INHALATION) at 14:37

## 2021-04-12 RX ADMIN — GABAPENTIN 600 MG: 300 CAPSULE ORAL at 22:55

## 2021-04-12 RX ADMIN — Medication 10 ML: at 14:37

## 2021-04-12 RX ADMIN — SODIUM CHLORIDE 100 ML/HR: 450 INJECTION, SOLUTION INTRAVENOUS at 16:23

## 2021-04-12 RX ADMIN — FLUTICASONE PROPIONATE 2 SPRAY: 50 SPRAY, METERED NASAL at 14:37

## 2021-04-12 RX ADMIN — Medication 10 ML: at 22:00

## 2021-04-12 RX ADMIN — INSULIN LISPRO 2 UNITS: 100 INJECTION, SOLUTION INTRAVENOUS; SUBCUTANEOUS at 17:31

## 2021-04-12 RX ADMIN — FLUOXETINE HYDROCHLORIDE 20 MG: 20 CAPSULE ORAL at 11:45

## 2021-04-12 RX ADMIN — ENOXAPARIN SODIUM 40 MG: 40 INJECTION SUBCUTANEOUS at 21:04

## 2021-04-12 RX ADMIN — PRAVASTATIN SODIUM 40 MG: 20 TABLET ORAL at 22:55

## 2021-04-12 RX ADMIN — SODIUM CHLORIDE 1000 ML: 900 INJECTION, SOLUTION INTRAVENOUS at 02:55

## 2021-04-12 RX ADMIN — IPRATROPIUM BROMIDE 0.5 MG: 0.5 SOLUTION RESPIRATORY (INHALATION) at 21:04

## 2021-04-12 RX ADMIN — INSULIN LISPRO 6 UNITS: 100 INJECTION, SOLUTION INTRAVENOUS; SUBCUTANEOUS at 22:56

## 2021-04-12 RX ADMIN — ARFORMOTEROL TARTRATE: 15 SOLUTION RESPIRATORY (INHALATION) at 21:04

## 2021-04-12 RX ADMIN — SODIUM CHLORIDE 1000 ML: 900 INJECTION, SOLUTION INTRAVENOUS at 00:10

## 2021-04-12 RX ADMIN — ONDANSETRON 4 MG: 2 INJECTION INTRAMUSCULAR; INTRAVENOUS at 01:42

## 2021-04-12 RX ADMIN — SODIUM CHLORIDE 8.6 UNITS/HR: 9 INJECTION, SOLUTION INTRAVENOUS at 14:34

## 2021-04-12 RX ADMIN — SODIUM CHLORIDE 8.3 UNITS/HR: 9 INJECTION, SOLUTION INTRAVENOUS at 16:07

## 2021-04-12 NOTE — PROGRESS NOTES
Admit this AM. Anion gap has closed. Will transition to long acting insulin tonight with lantus and meal time coverage. Change IVF to 1/2NS.      Miki Cabrales, DO  Internal Medicine, Hospitalist  Pager: [phone] [facility]

## 2021-04-12 NOTE — PROGRESS NOTES
attempted to complete  the initial Spiritual Assessment of the patient in room 17 of the emergency room, and offer Pastoral Care support to the patient but found patient not in a condition where she could communicate with me. Patient does not have any Yazdanism/cultural needs that will affect patients preferences in health care. Chaplains will continue to follow and will provide pastoral care on an as needed/requested basis.     [facility]  [phone]

## 2021-04-12 NOTE — ED TRIAGE NOTES
Patient arrived via EMS complaining of nausea, vomiting, and diarrhea that has been going on all day. Patient was hyperglycemic upon EMS arrival (BS was 560). Patient is an insulin dependent diabetic and has a history of COPD and autonomic neuropathy. Patient is hypotensive and A&O x4.

## 2021-04-12 NOTE — ED PROVIDER NOTES
EMERGENCY DEPARTMENT HISTORY AND PHYSICAL EXAM    11:46 PM      Date: 4/11/2021  Patient Name: Stacey Braun    History of Presenting Illness     Chief Complaint   Patient presents with    High Blood Sugar    Nausea    Vomiting    Diarrhea         History Provided By: patient    Additional History (Context): Stacey Braun is a 64 y.o. female presents with patient arrives via EMS, states 4:00 yesterday morning, awoke with severe chest pain and shortness of breath, chest pain worse with walking, also started with vomiting and diarrhea and severe stomach cramps. She is been unable to keep anything down all day just vomits up liquids. High blood sugar. She does have insulin-dependent diabetes, COPD. No known heart problems stents bypass congestive heart failure  At the time of my exam no chest pain. Just abdominal cramping and nausea. PCP: Emeli Kelley NP    Chief Complaint:   Duration:    Timing:    Location:   Quality:   Severity:   Modifying Factors:   Associated Symptoms:       Current Facility-Administered Medications   Medication Dose Route Frequency Provider Last Rate Last Admin    insulin regular (NOVOLIN R, HUMULIN R) 100 Units in 0.9% sodium chloride 100 mL infusion  0-50 Units/hr IntraVENous TITRATE Caio Barajas MD        glucose chewable tablet 16 g  4 Tab Oral PRN Caio Barajas MD        glucagon (GLUCAGEN) injection 1 mg  1 mg IntraMUSCular PRN Caio Barajas MD        dextrose (D50W) injection syrg 12.5-25 g  25-50 mL IntraVENous PRN Caio Barajas MD         Current Outpatient Medications   Medication Sig Dispense Refill    pravastatin (PRAVACHOL) 40 mg tablet Take 1 Tab by mouth nightly. 90 Tab 0    omeprazole (PRILOSEC) 40 mg capsule Take 1 Cap by mouth daily. 30 Cap 0    fluticasone propionate (FLONASE) 50 mcg/actuation nasal spray 2 Sprays by Both Nostrils route daily. 1 Bottle 3    gabapentin (NEURONTIN) 300 mg capsule Take 2 Caps by mouth nightly. 60 Cap 2    tolterodine ER (DETROL-LA) 2 mg ER capsule Take 1 Cap by mouth daily. 90 Cap 3    FLUoxetine (PROZAC) 20 mg capsule Take  by mouth daily.  insulin detemir U-100 (LEVEMIR FLEXTOUCH) 100 unit/mL (3 mL) inpn 30 Units by SubCUTAneous route nightly. Indications: 18 UNITS IN AM 30 UNITS IN THE PM      blood-glucose meter ([address]) by Does Not Apply route.  insulin aspart U-100 (NOVOLOG FLEXPEN U-100 INSULIN) 100 unit/mL (3 mL) inpn Each meal, per sliding scale 2 Pen 2    glucose blood VI test strips (BLOOD GLUCOSE TEST) strip Contour next test strips 100 Strip 2    pen needle, diabetic (COMFORT EZ PEN NEEDLES) 32 gauge x 3/16\" ndle 1 Pen Needle by Does Not Apply route nightly as needed (with insulin at night). 100 Each 2    budesonide-formoterol (SYMBICORT) 160-4.5 mcg/actuation HFAA Take 1 Puff by inhalation two (2) times a day. 1 Inhaler 3    LORazepam (ATIVAN) 0.5 mg tablet Take 1 Tab by mouth every eight (8) hours as needed for Anxiety. Max Daily Amount: 1.5 mg. 60 Tab 0    flash glucose sensor (FREESTYLE MARLEY 14 DAY SENSOR) kit Apply every 14 days to monitor blood sugar 2 Kit 11    flash glucose scanning reader (FREESTYLE MARLEY 14 DAY READER) [facility] Use to check blood sugar 3 times a day 1 Each 0    tiotropium (SPIRIVA WITH HANDIHALER) 18 mcg inhalation capsule Take 1 Cap by inhalation daily.  albuterol (PROVENTIL HFA, VENTOLIN HFA, PROAIR HFA) 90 mcg/actuation inhaler Take 2 Puffs by inhalation every four (4) hours as needed for Wheezing or Shortness of Breath. 2 Inhaler 3    alendronate (FOSAMAX) 70 mg tablet Take 70 mg by mouth every Sunday.          Past History     Past Medical History:  Past Medical History:   Diagnosis Date    Arthritis     Autonomic neuropathy     Back pain     Chronic lung disease     Chronic pain     back    Compression fracture T9-10    COPD [facility]chronic obstructive pulmonary disease) [facility]     Depression     panic attacks    Diabetes mellitus ([address]     type 1    GERD (gastroesophageal reflux disease)     Hypertension     no longer on meds    MRSA nasal colonization     Neuropathy     Osteoporosis     Tobacco use        Past Surgical History:  Past Surgical History:   Procedure Laterality Date    HX BACK SURGERY  2009    HX DILATION AND CURETTAGE      HX HYSTERECTOMY  2002       Family History:  Family History   Problem Relation Age of Onset    Thyroid Disease Mother    [facility] Stroke Mother    [facility] Arthritis-rheumatoid Mother     Osteoporosis Mother     Diabetes Father     Kidney Disease Father     Tip's Disease Father     Arthritis-rheumatoid Sister     Diabetes Brother     Colon Cancer Maternal Grandmother        Social History:  Social History     Tobacco Use    Smoking status: Current Every Day Smoker     Packs/day: 1.00     Years: 43.00     Pack years: 43.00     Types: Cigarettes     Start date: 7/27/1976    Smokeless tobacco: Never Used    Tobacco comment: on [employer]   Substance Use Topics    Alcohol use: No    Drug use: No       Allergies: Allergies   Allergen Reactions    Penicillin G Anaphylaxis    Bactrim [Sulfamethoprim] Hives and Rash    Dilaudid [Hydromorphone (Bulk)] Other (comments)     Patient was confused for 5 days. Did not know who she was.  Morphine Itching    Pcn [Penicillins] Swelling         Review of Systems     Review of Systems   Constitutional: Positive for diaphoresis. Negative for fever. HENT: Negative for congestion and sore throat. Eyes: Negative for pain and itching. Respiratory: Negative for cough and shortness of breath. Cardiovascular: Positive for chest pain. Negative for palpitations. Gastrointestinal: Positive for abdominal pain and diarrhea. Endocrine: Negative for polydipsia and polyuria. Genitourinary: Negative for dysuria and hematuria. Musculoskeletal: Negative for arthralgias and myalgias. Skin: Negative for rash and wound.    Neurological: Negative for seizures and syncope. Hematological: Does not bruise/bleed easily. Psychiatric/Behavioral: Negative for agitation and hallucinations. Physical Exam       Patient Vitals for the past 12 hrs:   Temp Pulse Resp BP SpO2   04/12/21 0045  (!) 105 18 (!) 118/49 97 %   04/12/21 0000    (!) 117/40 96 %   04/11/21 2355  94 19 (!) 104/36 97 %   04/11/21 2354 (!) 96 °F (35.6 °C) 93 16 (!) 102/37 97 %       Physical Exam  Vitals signs and nursing note reviewed. Constitutional:       General: She is in acute distress (Very nauseous tired). Appearance: She is well-developed. Comments: Appears thin and frail   HENT:      Head: Normocephalic and atraumatic. Eyes:      General: No scleral icterus. Conjunctiva/sclera: Conjunctivae normal.   Neck:      Musculoskeletal: Normal range of motion and neck supple. Vascular: No JVD. Cardiovascular:      Rate and Rhythm: Normal rate and regular rhythm. Heart sounds: Normal heart sounds. Comments: 4 intact extremity pulses  Pulmonary:      Effort: Pulmonary effort is normal.      Breath sounds: Normal breath sounds. Abdominal:      Palpations: Abdomen is soft. There is no mass. Tenderness: There is no abdominal tenderness. Musculoskeletal: Normal range of motion. Lymphadenopathy:      Cervical: No cervical adenopathy. Skin:     General: Skin is warm and dry. Neurological:      Mental Status: She is alert.            Diagnostic Study Results   Labs -  Recent Results (from the past 12 hour(s))   CBC WITH AUTOMATED DIFF    Collection Time: 04/11/21 11:45 PM   Result Value Ref Range    WBC 16.1 (H) 4.6 - 13.2 K/uL    RBC 3.88 (L) 4.20 - 5.30 M/uL    HGB 12.2 12.0 - 16.0 g/dL    HCT 37.4 35.0 - 45.0 %    MCV 96.4 74.0 - 97.0 FL    MCH 31.4 24.0 - 34.0 PG    MCHC 32.6 31.0 - 37.0 g/dL    RDW 12.1 11.6 - 14.5 %    PLATELET 352 844 - 904 K/uL    MPV 11.3 9.2 - 11.8 FL    NEUTROPHILS 87 (H) 40 - 73 %    LYMPHOCYTES 8 (L) 21 - 52 % MONOCYTES 3 3 - 10 %    EOSINOPHILS 0 0 - 5 %    BASOPHILS 1 0 - 2 %    ABS. NEUTROPHILS 14.0 (H) 1.8 - 8.0 K/UL    ABS. LYMPHOCYTES 1.3 0.9 - 3.6 K/UL    ABS. MONOCYTES 0.5 0.05 - 1.2 K/UL    ABS. EOSINOPHILS 0.0 0.0 - 0.4 K/UL    ABS. BASOPHILS 0.1 0.0 - 0.1 K/UL    DF AUTOMATED     METABOLIC PANEL, COMPREHENSIVE    Collection Time: 04/11/21 11:45 PM   Result Value Ref Range    Sodium 136 136 - 145 mmol/L    Potassium 4.7 3.5 - 5.5 mmol/L    Chloride 100 100 - 111 mmol/L    CO2 12 (L) 21 - 32 mmol/L    Anion gap 24 (H) 3.0 - 18 mmol/L    Glucose 551 (HH) 74 - 99 mg/dL    BUN 39 (H) 7.0 - 18 MG/DL    Creatinine 1.23 0.6 - 1.3 MG/DL    BUN/Creatinine ratio 32 (H) 12 - 20      GFR est AA 54 (L) >60 ml/min/1.73m2    GFR est non-AA 45 (L) >60 ml/min/1.73m2    Calcium 8.6 8.5 - 10.1 MG/DL    Bilirubin, total 0.9 0.2 - 1.0 MG/DL    ALT (SGPT) 19 13 - 56 U/L    AST (SGOT) 13 10 - 38 U/L    Alk.  phosphatase 111 45 - 117 U/L    Protein, total 7.2 6.4 - 8.2 g/dL    Albumin 3.3 (L) 3.4 - 5.0 g/dL    Globulin 3.9 2.0 - 4.0 g/dL    A-G Ratio 0.8 0.8 - 1.7     TROPONIN I    Collection Time: 04/11/21 11:45 PM   Result Value Ref Range    Troponin-I, QT <0.02 0.0 - 0.045 NG/ML   GLUCOSE, POC    Collection Time: 04/11/21 11:47 PM   Result Value Ref Range    Glucose (POC) 592 (HH) 70 - 110 mg/dL   SARS-COV-2    Collection Time: 04/12/21 12:15 AM   Result Value Ref Range    SARS-CoV-2 Please find results under separate order     COVID-19 RAPID TEST    Collection Time: 04/12/21 12:15 AM   Result Value Ref Range    Specimen source Nasopharyngeal      COVID-19 rapid test Detected (AA) NOTD     EKG, 12 LEAD, INITIAL    Collection Time: 04/12/21 12:21 AM   Result Value Ref Range    Ventricular Rate 97 BPM    Atrial Rate 97 BPM    P-R Interval 176 ms    QRS Duration 88 ms    Q-T Interval 386 ms    QTC Calculation (Bezet) 490 ms    Calculated P Axis 75 degrees    Calculated R Axis 71 degrees    Calculated T Axis 71 degrees    Diagnosis Sinus rhythm with premature atrial complexes  Biatrial enlargement  Nonspecific ST abnormality  Prolonged QT  Abnormal ECG  When compared with ECG of 24-JUL-2019 09:04,  premature atrial complexes are now present  ST now depressed in Inferior leads  ST now depressed in Anterolateral leads         Radiologic Studies -   No orders to display     No results found. Medications ordered:   Medications   insulin regular (NOVOLIN R, HUMULIN R) 100 Units in 0.9% sodium chloride 100 mL infusion (has no administration in time range)   glucose chewable tablet 16 g (has no administration in time range)   glucagon (GLUCAGEN) injection 1 mg (has no administration in time range)   dextrose (D50W) injection syrg 12.5-25 g (has no administration in time range)   sodium chloride 0.9 % bolus infusion 1,000 mL (0 mL IntraVENous IV Completed 4/12/21 0043)   ondansetron (ZOFRAN) injection 4 mg (4 mg IntraVENous Given 4/12/21 0142)         Medical Decision Making   Initial Medical Decision Making and DDx:  Consider viral illness viral gastroenteritis, coronavirus, angina, DKA hyperosmolar state. Will provide fluid resuscitation. ED Course: Progress Notes, Reevaluation, and Consults:     2:14 AM patient is coronavirus positive. Labs consistent with DKA as well. No evidence of kidney failure. Awaiting urinalysis. Glucose stabilizer initiated. Will admit to hospitalist.    30 minutes critical care time management of insulin drip DKA coronavirus positivity metabolic abnormalities data analysis and documentation    2:44 AM d/w Dr Walter Bettencourt hospitalist, admission for Covid positivity and DKA. I am the first provider for this patient. I reviewed the vital signs, available nursing notes, past medical history, past surgical history, family history and social history.     Patient Vitals for the past 12 hrs:   Temp Pulse Resp BP SpO2   04/12/21 0045  (!) 105 18 (!) 118/49 97 %   04/12/21 0000    (!) 117/40 96 %   04/11/21 2355  94 19 (!) 104/36 97 %   04/11/21 2354 (!) 96 °F (35.6 °C) 93 16 (!) 102/37 97 %       Vital Signs-Reviewed the patient's vital signs. Pulse Oximetry Analysis, Cardiac Monitor, 12 lead ekg: No hypoxia on room air  Interpreted by the EP. Records Reviewed: Nursing notes reviewed (Time of Review: 11:46 PM)    Procedures:   Critical Care Time:   Aspirin: (was aspirin given for stroke?)    Diagnosis     Clinical Impression:   1. Diabetic ketoacidosis without coma associated with type 1 diabetes mellitus ([facility] [address]    2. 2019 novel coronavirus disease (COVID-19)        Disposition:       Follow-up Information    None          Patient's Medications   Start Taking    No medications on file   Continue Taking    ALBUTEROL (PROVENTIL HFA, VENTOLIN HFA, PROAIR HFA) 90 MCG/ACTUATION INHALER    Take 2 Puffs by inhalation every four (4) hours as needed for Wheezing or Shortness of Breath. ALENDRONATE (FOSAMAX) 70 MG TABLET    Take 70 mg by mouth every Sunday. BLOOD-GLUCOSE METER (ONE TOUCH BASIC SYSTEM)    by Does Not Apply route. BUDESONIDE-FORMOTEROL (SYMBICORT) 160-4.5 MCG/ACTUATION HFAA    Take 1 Puff by inhalation two (2) times a day. FLASH GLUCOSE SCANNING READER (FREESTYLE MARLEY 14 DAY READER) [facility]    Use to check blood sugar 3 times a day    FLASH GLUCOSE SENSOR (FREESTYLE MARLEY 14 DAY SENSOR) KIT    Apply every 14 days to monitor blood sugar    FLUOXETINE (PROZAC) 20 MG CAPSULE    Take  by mouth daily. FLUTICASONE PROPIONATE (FLONASE) 50 MCG/ACTUATION NASAL SPRAY    2 Sprays by Both Nostrils route daily. GABAPENTIN (NEURONTIN) 300 MG CAPSULE    Take 2 Caps by mouth nightly. GLUCOSE BLOOD VI TEST STRIPS (BLOOD GLUCOSE TEST) STRIP    Contour next test strips    INSULIN ASPART U-100 (NOVOLOG FLEXPEN U-100 INSULIN) 100 UNIT/ML (3 ML) INPN    Each meal, per sliding scale    INSULIN DETEMIR U-100 (LEVEMIR FLEXTOUCH) 100 UNIT/ML (3 ML) INPN    30 Units by SubCUTAneous route nightly.  Indications: 18 UNITS IN AM 30 UNITS IN THE PM    LORAZEPAM (ATIVAN) 0.5 MG TABLET    Take 1 Tab by mouth every eight (8) hours as needed for Anxiety. Max Daily Amount: 1.5 mg.    OMEPRAZOLE (PRILOSEC) 40 MG CAPSULE    Take 1 Cap by mouth daily. PEN NEEDLE, DIABETIC (COMFORT EZ PEN NEEDLES) 32 GAUGE X 3/16\" NDLE    1 Pen Needle by Does Not Apply route nightly as needed (with insulin at night). PRAVASTATIN (PRAVACHOL) 40 MG TABLET    Take 1 Tab by mouth nightly. TIOTROPIUM (SPIRIVA WITH HANDIHALER) 18 MCG INHALATION CAPSULE    Take 1 Cap by inhalation daily. TOLTERODINE ER (DETROL-LA) 2 MG ER CAPSULE    Take 1 Cap by mouth daily.    These Medications have changed    No medications on file   Stop Taking    No medications on file     _______________________________    Notes:    Evonne Gregg MD using Dragon dictation      _______________________________

## 2021-04-12 NOTE — H&P
History and Physical    Patient: Jessenia Howe MRN: [id number]  SSN: [id number]    YOB: 1960  Age: 64 y.o. Sex: female      Subjective:      Jessenia Howe is a 64 y.o. female who presents to [facility] ER with complaint of Chest Pain, Shortness of Breath, Diarrhea, Nausea, and Vomiting. Patient reports that she experienced a chest pain like weight sitting on her left chest.  Patient reports that this was neither exertional, nor improved with rest, denies radiation, and denies diaphoresis. Patient does admit to being somewhat confused and fatigued. Patient states that she tested positive for Covid-19 a few weeks ago and thinks that she is chiefly done with the infection and had tested negative recently. Patient has DM Type I and has had DKA before. In [facility] ER, Patient is noted to have Glucose 551 mg/dL, Anion Gap 24, and SARS-CoV-2 (Novel Coronavirus Covid-19) Rapid Test returns positive. Patient is admitted to [facility] Stepdown Unit (Covid-19 Positive Cohort) for management of DKA and SARS-CoV-2+.     Past Medical History:   Diagnosis Date    Arthritis     Autonomic neuropathy     Back pain     Chronic lung disease     Chronic pain     back    Compression fracture T9-10    COPD (chronic obstructive pulmonary disease) (HCC)     Depression     panic attacks    Diabetes mellitus (HCC)     type 1    GERD (gastroesophageal reflux disease)     Hypertension     no longer on meds    MRSA nasal colonization     Neuropathy     Osteoporosis     Tobacco use      Past Surgical History:   Procedure Laterality Date    HX BACK SURGERY  2009    HX DILATION AND CURETTAGE      HX HYSTERECTOMY  2002      Family History   Problem Relation Age of Onset    Thyroid Disease Mother     Stroke Mother    [facility] Arthritis-rheumatoid Mother     Osteoporosis Mother     Diabetes Father     Kidney Disease Father     Tip's Disease Father    [facility] Arthritis-rheumatoid Sister     Diabetes Brother     Colon Cancer Maternal Grandmother      Social History     Tobacco Use    Smoking status: Current Every Day Smoker     Packs/day: 1.00     Years: 43.00     Pack years: 43.00     Types: Cigarettes     Start date: 7/27/1976    Smokeless tobacco: Never Used    Tobacco comment: on [employer]   Substance Use Topics    Alcohol use: No      Prior to Admission medications    Medication Sig Start Date End Date Taking? Authorizing Provider   pravastatin (PRAVACHOL) 40 mg tablet Take 1 Tab by mouth nightly. 11/4/19   Patrick Beebe MD   omeprazole (PRILOSEC) 40 mg capsule Take 1 Cap by mouth daily. 11/4/19   Patrick Beebe MD   fluticasone propionate (FLONASE) 50 mcg/actuation nasal spray 2 Sprays by Both Nostrils route daily. 11/4/19   Patrick Beebe MD   gabapentin (NEURONTIN) 300 mg capsule Take 2 Caps by mouth nightly. 11/4/19   Patrick Beebe MD   tolterodine ER (DETROL-LA) 2 mg ER capsule Take 1 Cap by mouth daily. 7/30/19   Arsen Gaytan MD   FLUoxetine (PROZAC) 20 mg capsule Take  by mouth daily. Provider, Historical   insulin detemir U-100 (LEVEMIR FLEXTOUCH) 100 unit/mL (3 mL) inpn 30 Units by SubCUTAneous route nightly. Indications: 18 UNITS IN AM 30 UNITS IN THE PM    Provider, Historical   blood-glucose meter (ONE TOUCH BASIC SYSTEM) by Does Not Apply route. Provider, Historical   insulin aspart U-100 (NOVOLOG FLEXPEN U-100 INSULIN) 100 unit/mL (3 mL) inpn Each meal, per sliding scale 5/22/19   Hannah Rodriguez NP   glucose blood VI test strips (BLOOD GLUCOSE TEST) strip Contour next test strips 5/22/19   Hannah Rodriguez NP   pen needle, diabetic (COMFORT EZ PEN NEEDLES) 32 gauge x 3/16\" ndle 1 Pen Needle by Does Not Apply route nightly as needed (with insulin at night). 5/22/19   Hannah Rodriguez NP   budesonide-formoterol (SYMBICORT) 160-4.5 mcg/actuation HFAA Take 1 Puff by inhalation two (2) times a day.  4/26/19   Maria Esther Floyd MD   LORazepam (ATIVAN) 0.5 mg tablet Take 1 Tab by mouth every eight (8) hours as needed for Anxiety. Max Daily Amount: 1.5 mg. 4/26/19   Mordecai Saint, MD   flash glucose sensor (FREESTYLE MARLEY 14 DAY SENSOR) kit Apply every 14 days to monitor blood sugar 3/11/19   kSy Bai NP   flash glucose scanning reader (FREESTYLE MARLEY 14 DAY READER) misc Use to check blood sugar 3 times a day 3/11/19   Sky Bai NP   tiotropium (SPIRIVA WITH HANDIHALER) 18 mcg inhalation capsule Take 1 Cap by inhalation daily. Provider, Historical   albuterol (PROVENTIL HFA, VENTOLIN HFA, PROAIR HFA) 90 mcg/actuation inhaler Take 2 Puffs by inhalation every four (4) hours as needed for Wheezing or Shortness of Breath. 5/17/16   Nicolás Mcgregor MD   alendronate (FOSAMAX) 70 mg tablet Take 70 mg by mouth every Sunday. Provider, Historical        Allergies   Allergen Reactions    Penicillin G Anaphylaxis    Bactrim [Sulfamethoprim] Hives and Rash    Dilaudid [Hydromorphone (Bulk)] Other (comments)     Patient was confused for 5 days. Did not know who she was.  Morphine Itching    Pcn [Penicillins] Swelling       Review of Systems:  (+) Chest Pain  (+) Nausea  (+) Vomiting  (+) Diarrhea  All other systems have been reviewed and are negative      Objective:     Vitals:    04/12/21 0845 04/12/21 0900 04/12/21 0930 04/12/21 0945   BP: 113/63 (!) 108/58 (!) 117/55 (!) 124/55   Pulse: (!) 107 (!) 105 (!) 109 (!) 106   Resp: 14 18 22 13   Temp: 98 °F (36.7 °C)      SpO2: 98% 100% 99% 96%   Weight:       Height:            Physical Exam:  General:  Older Adult female lying in bed in no acute distress  HEENT:  Atraumatic, normocephalic; Pupils equally round and reactive to light with accommodation; Extraocular muscles intact; (+) Glasses in place; Moist Oropharynx without erythema, edema, or exudates; (+) Surgical Mask in place  Chest:  No pectus carinatum;  No pectus excavatum  Cardiovascular:  Regular rate and rhythm without rubs, gallops, or murmurs  Respiratory:  Clear to Auscultation Bilaterally without wheezes, rales, or rhonchi; normal effort of breathing  Abdominal:  Soft, non-tense, non-tender abdomen; BS present without guarding, rebound, or masses  :  Deferred  Extremities:  Pulses 2+ x4 without edema, clubbing, or cyanosis  Musculoskeletal:  Strength 5/5 and symmetrical in BUE and BLE  Integument:  No rash on face, forearms, or legs  Neurological:  A&O x4/4; No gross deficits of Visual Acuity, Eye Movement, Jaw Opening, Facial Expression, Hearing, Phonation, or Head Movement; No gross deficits of Tongue Movement or Slurring of Speech  Psychiatric:  Affect is appropriate; Language is present and fluent; Behavior is appropriate        Laboratory Studies:  CMP:   Lab Results   Component Value Date/Time     04/12/2021 04:40 AM    K 4.7 04/12/2021 04:40 AM     04/12/2021 04:40 AM    CO2 10 (L) 04/12/2021 04:40 AM    AGAP 22 (H) 04/12/2021 04:40 AM     (HH) 04/12/2021 04:40 AM    BUN 43 (H) 04/12/2021 04:40 AM    CREA 1.30 04/12/2021 04:40 AM    GFRAA 51 (L) 04/12/2021 04:40 AM    GFRNA 42 (L) 04/12/2021 04:40 AM    CA 8.8 04/12/2021 04:40 AM    MG 2.3 04/12/2021 04:40 AM    PHOS 6.6 (H) 04/12/2021 04:40 AM    ALB 3.3 (L) 04/11/2021 11:45 PM    TP 7.2 04/11/2021 11:45 PM    GLOB 3.9 04/11/2021 11:45 PM    AGRAT 0.8 04/11/2021 11:45 PM    ALT 19 04/11/2021 11:45 PM     CBC:   Lab Results   Component Value Date/Time    WBC 16.1 (H) 04/11/2021 11:45 PM    HGB 12.2 04/11/2021 11:45 PM    HCT 37.4 04/11/2021 11:45 PM     04/11/2021 11:45 PM     Recent Glucose Results:   Lab Results   Component Value Date/Time     (HH) 04/12/2021 04:40 AM     () 04/11/2021 11:45 PM     Liver Panel:   Lab Results   Component Value Date/Time    ALB 3.3 (L) 04/11/2021 11:45 PM    TP 7.2 04/11/2021 11:45 PM    GLOB 3.9 04/11/2021 11:45 PM    AGRAT 0.8 04/11/2021 11:45 PM    ALT 19 04/11/2021 11:45 PM     04/11/2021 11:45 PM        Images Reviewed:  No results found.     I have personally reviewed the CXR and have found, per my read, hyper-inflation with similar appearance to previous CXR with interval improvement of prominence of pulmonary vasculature. EKG  Assessment:     Hospital Problems  Date Reviewed: 4/12/2021          Codes Class Noted POA    * (Principal) DKA (diabetic ketoacidoses) ([facility] ICD-10-CM: E11.10  ICD-9-CM: 250.12  4/12/2021 Yes        COPD (chronic obstructive pulmonary disease) (HCC) (Chronic) ICD-10-CM: J44.9  ICD-9-CM: 698  3/27/2019 Yes        Diabetes mellitus type 1 ([facility] (Chronic) ICD-10-CM: E10.9  ICD-9-CM: 250.01  5/14/2012 Yes    Overview Signed 5/14/2012  3:49 PM by Nixon Manriquez     Diagnosed in 1998 insulin pump             HLD (hyperlipidemia) ICD-10-CM: E78.5  ICD-9-CM: 272.4  5/14/2012 Yes        Depression ICD-10-CM: F32.9  ICD-9-CM: 591  5/14/2012 Yes              Plan:     NPO (except medications and ice chips), IV NS, IV Insulin gtt (Melburn August), Serial Magnesium, Phosphorus, and BMP checks, and POC Glucose checks q1hr. As Patient does not have signs of Covid PNA, will forego IV Antibiotics at this time. Continue home medications for Depression, Anxiety, and COPD. DVT mechanoprophylaxis.     Signed By: Chris Melara DO     April 12, 2021

## 2021-04-13 VITALS
SYSTOLIC BLOOD PRESSURE: 130 MMHG | RESPIRATION RATE: 17 BRPM | WEIGHT: 133.2 LBS | BODY MASS INDEX: 21.41 KG/M2 | HEIGHT: 66 IN | OXYGEN SATURATION: 100 % | DIASTOLIC BLOOD PRESSURE: 62 MMHG | TEMPERATURE: 98.6 F | HEART RATE: 84 BPM

## 2021-04-13 LAB
ANION GAP SERPL CALC-SCNC: 10 MMOL/L (ref 3–18)
BASOPHILS # BLD: 0.1 K/UL (ref 0–0.1)
BASOPHILS NFR BLD: 0 % (ref 0–2)
BUN SERPL-MCNC: 26 MG/DL (ref 7–18)
BUN/CREAT SERPL: 30 (ref 12–20)
CALCIUM SERPL-MCNC: 8.3 MG/DL (ref 8.5–10.1)
CHLORIDE SERPL-SCNC: 110 MMOL/L (ref 100–111)
CO2 SERPL-SCNC: 21 MMOL/L (ref 21–32)
CREAT SERPL-MCNC: 0.88 MG/DL (ref 0.6–1.3)
DIFFERENTIAL METHOD BLD: ABNORMAL
EOSINOPHIL # BLD: 0 K/UL (ref 0–0.4)
EOSINOPHIL NFR BLD: 0 % (ref 0–5)
ERYTHROCYTE [DISTWIDTH] IN BLOOD BY AUTOMATED COUNT: 12.8 % (ref 11.6–14.5)
GLUCOSE BLD STRIP.AUTO-MCNC: 200 MG/DL (ref 70–110)
GLUCOSE BLD STRIP.AUTO-MCNC: 214 MG/DL (ref 70–110)
GLUCOSE SERPL-MCNC: 220 MG/DL (ref 74–99)
HCT VFR BLD AUTO: 31.6 % (ref 35–45)
HGB BLD-MCNC: 10.3 G/DL (ref 12–16)
LYMPHOCYTES # BLD: 3.7 K/UL (ref 0.9–3.6)
LYMPHOCYTES NFR BLD: 20 % (ref 21–52)
MAGNESIUM SERPL-MCNC: 2.4 MG/DL (ref 1.6–2.6)
MCH RBC QN AUTO: 30.7 PG (ref 24–34)
MCHC RBC AUTO-ENTMCNC: 32.6 G/DL (ref 31–37)
MCV RBC AUTO: 94.3 FL (ref 74–97)
MONOCYTES # BLD: 0.9 K/UL (ref 0.05–1.2)
MONOCYTES NFR BLD: 5 % (ref 3–10)
NEUTS SEG # BLD: 13.8 K/UL (ref 1.8–8)
NEUTS SEG NFR BLD: 74 % (ref 40–73)
PLATELET # BLD AUTO: 222 K/UL (ref 135–420)
PMV BLD AUTO: 11 FL (ref 9.2–11.8)
POTASSIUM SERPL-SCNC: 4 MMOL/L (ref 3.5–5.5)
RBC # BLD AUTO: 3.35 M/UL (ref 4.2–5.3)
SODIUM SERPL-SCNC: 141 MMOL/L (ref 136–145)
WBC # BLD AUTO: 18.6 K/UL (ref 4.6–13.2)

## 2021-04-13 PROCEDURE — 80048 BASIC METABOLIC PNL TOTAL CA: CPT

## 2021-04-13 PROCEDURE — 74011250636 HC RX REV CODE- 250/636: Performed by: INTERNAL MEDICINE

## 2021-04-13 PROCEDURE — 74011636637 HC RX REV CODE- 636/637: Performed by: HOSPITALIST

## 2021-04-13 PROCEDURE — 74011000250 HC RX REV CODE- 250: Performed by: HOSPITALIST

## 2021-04-13 PROCEDURE — 83735 ASSAY OF MAGNESIUM: CPT

## 2021-04-13 PROCEDURE — 74011000250 HC RX REV CODE- 250: Performed by: INTERNAL MEDICINE

## 2021-04-13 PROCEDURE — 85025 COMPLETE CBC W/AUTO DIFF WBC: CPT

## 2021-04-13 PROCEDURE — 74011250637 HC RX REV CODE- 250/637: Performed by: INTERNAL MEDICINE

## 2021-04-13 PROCEDURE — 82962 GLUCOSE BLOOD TEST: CPT

## 2021-04-13 PROCEDURE — 74011250637 HC RX REV CODE- 250/637: Performed by: HOSPITALIST

## 2021-04-13 PROCEDURE — 36415 COLL VENOUS BLD VENIPUNCTURE: CPT

## 2021-04-13 RX ORDER — BUDESONIDE AND FORMOTEROL FUMARATE DIHYDRATE 160; 4.5 UG/1; UG/1
2 AEROSOL RESPIRATORY (INHALATION)
Status: DISCONTINUED | OUTPATIENT
Start: 2021-04-13 | End: 2021-04-13 | Stop reason: HOSPADM

## 2021-04-13 RX ADMIN — IPRATROPIUM BROMIDE 0.5 MG: 0.5 SOLUTION RESPIRATORY (INHALATION) at 02:55

## 2021-04-13 RX ADMIN — TIOTROPIUM BROMIDE INHALATION SPRAY 2 PUFF: 3.12 SPRAY, METERED RESPIRATORY (INHALATION) at 11:31

## 2021-04-13 RX ADMIN — INSULIN LISPRO 2 UNITS: 100 INJECTION, SOLUTION INTRAVENOUS; SUBCUTANEOUS at 11:30

## 2021-04-13 RX ADMIN — ENOXAPARIN SODIUM 40 MG: 40 INJECTION SUBCUTANEOUS at 11:26

## 2021-04-13 RX ADMIN — BUDESONIDE AND FORMOTEROL FUMARATE DIHYDRATE 2 PUFF: 160; 4.5 AEROSOL RESPIRATORY (INHALATION) at 11:31

## 2021-04-13 RX ADMIN — SODIUM CHLORIDE 100 ML/HR: 450 INJECTION, SOLUTION INTRAVENOUS at 04:45

## 2021-04-13 RX ADMIN — INSULIN LISPRO 5 UNITS: 100 INJECTION, SOLUTION INTRAVENOUS; SUBCUTANEOUS at 11:30

## 2021-04-13 RX ADMIN — FLUTICASONE PROPIONATE 2 SPRAY: 50 SPRAY, METERED NASAL at 11:34

## 2021-04-13 RX ADMIN — FLUOXETINE HYDROCHLORIDE 20 MG: 20 CAPSULE ORAL at 11:27

## 2021-04-13 NOTE — DISCHARGE SUMMARY
Internal Medicine Discharge Summary        Patient: Moses Fraga    YOB: 1960    Age:  64 y.o. 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  ICD-9-CM: 590.80  7/10/2019 - Present        Pyelonephritis ICD-10-CM: N12  ICD-9-CM: 590.80  7/10/2019 - Present        Sepsis ([facility] ICD-10-CM: A41.9  ICD-9-CM: 038.9, 995.91  7/10/2019 - Present        Type 2 diabetes with nephropathy (HCC) (Chronic) ICD-10-CM: E11.21  ICD-9-CM: 250.40, 583.81  6/3/2019 - Present        COPD (chronic obstructive pulmonary disease) (HCC) (Chronic) ICD-10-CM: J44.9  ICD-9-CM: 496  3/27/2019 - Present        Gastroesophageal reflux disease (Chronic) ICD-10-CM: K21.9  ICD-9-CM: 530.81  9/13/2016 - Present        Thoracic compression fracture ([facility] ICD-10-CM: S22.000A  ICD-9-CM: 805.2  2/2/2016 - Present        Diabetes mellitus type 1 ([facility] (Chronic) ICD-10-CM: E10.9  ICD-9-CM: 250.01  5/14/2012 - Present    Overview Signed 5/14/2012  3:49 PM by Cari Fernandez     Diagnosed in 1998 insulin pump             Diabetic neuropathy ([facility] ICD-10-CM: E11.40  ICD-9-CM: 250.60, 357.2  5/14/2012 - Present        Osteoporosis ICD-10-CM: M81.0  ICD-9-CM: 733.00  5/14/2012 - Present        Proteinuria ICD-10-CM: R80.9  ICD-9-CM: 791.0  5/14/2012 - Present        HLD (hyperlipidemia) ICD-10-CM: E78.5  ICD-9-CM: 272.4  5/14/2012 - Present        Depression ICD-10-CM: F32.9  ICD-9-CM: 388  5/14/2012 - Present        Tobacco abuse ICD-10-CM: Z72.0  ICD-9-CM: 305.1  5/14/2012 - Present        Surgical menopause ICD-10-CM: E89.40  ICD-9-CM: 627.4  5/14/2012 - Present    Overview Signed 5/14/2012  4:03 PM by Jarett Peres     Total abdominal hysterectomy                      Discharge Condition:  Improved    Procedures: None         HPI: Mili Banerjee is a 64 y.o. female who presents to [facility] ER with complaint of Chest Pain, Shortness of Breath, Diarrhea, Nausea, and Vomiting. Patient reports that she experienced a chest pain like weight sitting on her left chest.  Patient reports that this was neither exertional, nor improved with rest, denies radiation, and denies diaphoresis. Patient does admit to being somewhat confused and fatigued. Patient states that she tested positive for Covid-19 a few weeks ago and thinks that she is chiefly done with the infection and had tested negative recently.   Patient has DM Type I and has had DKA before.     In [facility] ER, Patient is noted to have Glucose 551 mg/dL, Anion Gap 24, and SARS-CoV-2 (Novel Coronavirus Covid-19) Rapid Test returns positive.     Hospital Course:    DKA 2/2 possible increased resistance from previous CoVID infection vs need for higher home dosing  - Treated with insulin gtt  - Transitioned to long acting   - Patient doing much better next AM  - Tolerating diet without issues  - MAYCO resolved  - BS in better range  - D/w patient she should increase home levemir to 45 units BID and cont her home sliding scale  - She sees Dr. Moni Goodson and will follow up with him  - Small bump in WBCs, but I suspect 2/2 leukamoid rxn as no evidence of active infection  - She was very eager to get home    CoVID-19 Positive history  - Patient was positive for CoVID-19 on 03/10 and never hospitalized thus able to come off isolation back on 03/20  - She had a negative test afterwards  - Thus, reinfection ruled out   - Suspect sx she presented with her 2/2 DKA   - CXR was normal  - She does not require isolation again per CDC guidelines    MAYCO  - Resolved with IV fluids and tx of DKA    The rest of the patient's chronic conditions were managed appropriately during their admission. They were medically stable at the time of discharge. Visit Vitals  /62 (BP 1 Location: Left upper arm, BP Patient Position: Lying left side)   Pulse 85   Temp 98.6 °F (37 °C)   Resp 9   Ht 5' 6\" (1.676 m)   Wt 60.4 kg (133 lb 3.2 oz)   SpO2 100%   BMI 21.50 kg/m²       Physical Exam at Discharge:  General Appearance: NAD, conversant  HENT: normocephalic/atraumatic, moist mucus membranes  Lungs: CTA with normal respiratory effort  CV: RRR, no m/r/g  Abdomen: soft, non-tender, normal bowel sounds  Extremities: no cyanosis, no peripheral edema  Neuro: moves all extremities, no focal deficits  Psych: appropriate affect, alert and oriented to person, place and time    Labs Prior to Discharge:  Labs: Results:       Chemistry Recent Labs     04/13/21  0530 04/12/21  1520 04/12/21  0440 04/11/21  2345   * 195* 579* 551*    148* 137 136   K 4.0 3.5 4.7 4.7    119* 105 100   CO2 21 19* 10* 12*   BUN 26* 34* 43* 39*   CREA 0.88 0.94 1.30 1.23   CA 8.3* 7.6* 8.8 8.6   AGAP 10 10 22* 24*   BUCR 30* 36* 33* 32*   AP  --   --   --  111   TP  --   --   --  7.2   ALB  --   --   --  3.3*   GLOB  --   --   --  3.9   AGRAT  --   --   --  0.8      CBC w/Diff Recent Labs     04/13/21  0530 04/11/21  2345   WBC 18.6* 16.1*   RBC 3.35* 3.88*   HGB 10.3* 12.2   HCT 31.6* 37.4    236   GRANS 74* 87*   LYMPH 20* 8*   EOS 0 0      Cardiac Enzymes No results for input(s): CPK, CKND1, DAMION in the last 72 hours. No lab exists for component: CKRMB, TROIP   Coagulation No results for input(s): PTP, INR, APTT, INREXT in the last 72 hours.     Lipid Panel Lab Results   Component Value Date/Time    Cholesterol, total 134 01/09/2019 09:20 AM    HDL Cholesterol 45 01/09/2019 09:20 AM    LDL, calculated 71.8 01/09/2019 09:20 AM    VLDL, calculated 17.2 01/09/2019 09:20 AM    Triglyceride 86 01/09/2019 09:20 AM    CHOL/HDL Ratio 3.0 01/09/2019 09:20 AM      BNP No results for input(s): BNPP in the last 72 hours. Liver Enzymes Recent Labs     04/11/21  2345   TP 7.2   ALB 3.3*         Thyroid Studies Lab Results   Component Value Date/Time    TSH 1.190 11/17/2017 09:28 AM            Significant Imaging:  Xr Chest Port    Result Date: 4/12/2021  ONE VIEW CHEST RADIOGRAPH INDICATION: SARS-CoV-2+ . .. evaluate for Covid-19 Pneumonia. COMPARISON: Chest radiograph 10/17/2019. TECHNIQUE: AP view of the chest FINDINGS:  LINES/TUBES: None. MEDIASTINUM: Cardiac silhouette is within normal limits. Atherosclerosis. LUNGS: No focal consolidation, pleural effusion, or pneumothorax. BONES/OTHER: No acute osseous abnormality. Prior upper lumbar vertebroplasty. No radiographic evidence of acute cardiopulmonary disease. Discharge Medications:     Current Discharge Medication List      CONTINUE these medications which have CHANGED    Details   insulin detemir U-100 (LEVEMIR FLEXTOUCH) 100 unit/mL (3 mL) inpn 45 Units by SubCUTAneous route ACB/HS. Indications: 18 UNITS IN AM 30 UNITS IN THE PM  Qty: 45 Units, Refills: 0         CONTINUE these medications which have NOT CHANGED    Details   pravastatin (PRAVACHOL) 40 mg tablet Take 1 Tab by mouth nightly. Qty: 90 Tab, Refills: 0      omeprazole (PRILOSEC) 40 mg capsule Take 1 Cap by mouth daily. Qty: 30 Cap, Refills: 0      fluticasone propionate (FLONASE) 50 mcg/actuation nasal spray 2 Sprays by Both Nostrils route daily. Qty: 1 Bottle, Refills: 3      gabapentin (NEURONTIN) 300 mg capsule Take 2 Caps by mouth nightly. Qty: 60 Cap, Refills: 2    Associated Diagnoses: Diabetic polyneuropathy associated with type 2 diabetes mellitus (HCC)      tolterodine ER (DETROL-LA) 2 mg ER capsule Take 1 Cap by mouth daily. Qty: 90 Cap, Refills: 3      FLUoxetine (PROZAC) 20 mg capsule Take  by mouth daily. blood-glucose meter ([address]) by Does Not Apply route.       insulin aspart U-100 (NOVOLOG FLEXPEN U-100 INSULIN) 100 unit/mL (3 mL) inpn Each meal, per sliding scale  Qty: 2 Pen, Refills: 2    Associated Diagnoses: Type 1 diabetes mellitus with complication (HCC)      glucose blood VI test strips (BLOOD GLUCOSE TEST) strip Contour next test strips  Qty: 100 Strip, Refills: 2    Associated Diagnoses: Type 1 diabetes mellitus with complication (HCC)      pen needle, diabetic (COMFORT EZ PEN NEEDLES) 32 gauge x 3/16\" ndle 1 Pen Needle by Does Not Apply route nightly as needed (with insulin at night). Qty: 100 Each, Refills: 2    Associated Diagnoses: Type 1 diabetes mellitus with complication (HCC)      budesonide-formoterol (SYMBICORT) 160-4.5 mcg/actuation HFAA Take 1 Puff by inhalation two (2) times a day. Qty: 1 Inhaler, Refills: 3    Associated Diagnoses: COPD mixed type (HCC)      LORazepam (ATIVAN) 0.5 mg tablet Take 1 Tab by mouth every eight (8) hours as needed for Anxiety. Max Daily Amount: 1.5 mg.  Qty: 60 Tab, Refills: 0    Associated Diagnoses: Anxiety      flash glucose sensor (FREESTYLE MARLEY 14 DAY SENSOR) kit Apply every 14 days to monitor blood sugar  Qty: 2 Kit, Refills: 11      flash glucose scanning reader (FREESTYLE MARLEY 14 DAY READER) [facility] Use to check blood sugar 3 times a day  Qty: 1 Each, Refills: 0      tiotropium (SPIRIVA WITH HANDIHALER) 18 mcg inhalation capsule Take 1 Cap by inhalation daily. albuterol (PROVENTIL HFA, VENTOLIN HFA, PROAIR HFA) 90 mcg/actuation inhaler Take 2 Puffs by inhalation every four (4) hours as needed for Wheezing or Shortness of Breath. Qty: 2 Inhaler, Refills: 3    Associated Diagnoses: Walking pneumonia      alendronate (FOSAMAX) 70 mg tablet Take 70 mg by mouth every Sunday. Activity: Activity as tolerated    Diet: Diabetic Diet    Wound Care: None needed    Follow-up:   Please follow up with your PCP within 7 days to discuss your recent hospitalization. Patient to arrange.   Endocrinology         Total time spent including time spent on final examination and discharge discussion, discharge documentation and records reviewed and medication reconciliation: > 30 minutes    Nadia Freeman DO  Internal Medicine, Hospitalist  Pager: [phone]

## 2021-04-13 NOTE — DISCHARGE INSTRUCTIONS
Follow Up    Follow up with your primary care physician using the following instructions. No discharge procedures on file. Medications    {Medication reconciliation information is now added to the patient's AVS automatically when it is printed. There is no need to use this SmartLink in discharge instructions. Highlight this text and delete it to clear this message}      · It is important that you take the medication exactly as they are prescribed. · Keep your medication in the bottles provided by the pharmacist and keep a list of the medication names, dosages, and times to be taken in your wallet. Do not take other medications without consulting your doctor.    Personal Items and Belongings    The following personal items have been returned to you:    ·

## 2021-04-14 ENCOUNTER — PATIENT OUTREACH (OUTPATIENT)
Dept: CASE MANAGEMENT | Age: 61
End: 2021-04-14

## 2021-04-14 NOTE — PROGRESS NOTES
Patient contacted regarding [id number] diagnosis\". Discussed COVID-19 related testing which was available at this time. Test results were positive. Patient informed of results, if available? yes Patient also had a negative test following her bout with COVID. She is doing well. Care Transition Nurse contacted the patient by telephone to perform post discharge assessment. Call within 2 business days of discharge: Yes Verified name and  with patient as identifiers. Provided introduction to self, and explanation of the CTN/ACM role, and reason for call due to risk factors for infection and/or exposure to COVID-19. Symptoms reviewed with patient who verbalized the following symptoms: nausea, vomiting and diarrhea      Due to new onset of symptoms encounter was not routed to provider for escalation. Discussed follow-up appointments. If no appointment was previously scheduled, appointment scheduling offered:  yes   [facility] follow up appointment(s): No future appointments. Non-[facility] follow up appointment(s): n/a     Advance Care Planning:   Does patient have an Advance Directive:  decision maker updated. Patient has following risk factors of: COPD, diabetes and GERD, diabetic neuropathy, ^lipids, depression, arthritis. CTN reviewed discharge instructions, medical action plan and red flags such as increased shortness of breath, increasing fever and signs of decompensation with patient who verbalized understanding. Discussed exposure protocols and quarantine with CDC Guidelines What to do if you are sick with coronavirus disease .  Patient was given an opportunity for questions and concerns. The patient agrees to contact the [facility] exposure line [phone], local [facility] department [facility]  ([phone] and PCP office for questions related to their healthcare. CTN provided contact information for future needs.     Reviewed and educated patient on any new and changed medications related to discharge diagnosis     Was patient discharged with a pulse oximeter? no Discussed and confirmed pulse oximeter discharge instructions and when to notify provider or seek emergency care. Patient/family/caregiver [facility] and agrees to enroll no  Patient's preferred e-mail: deferred   Patient's preferred phone number: deferred  Based on Loop alert triggers, patient will be contacted by nurse care manager for worsening symptoms. Plan for follow-up call in 5-7 days based on severity of symptoms and risk factors. Spoke with patient. She stated that she is doing ok this am. She is awaiting one of her prescriptions. She has all other meds and is taking as prescribed. She stated that her sugars have been doing better. She appreciated the call to check on her. Will follow.

## 2021-04-21 ENCOUNTER — PATIENT OUTREACH (OUTPATIENT)
Dept: CASE MANAGEMENT | Age: 61
End: 2021-04-21

## 2021-04-21 NOTE — PROGRESS NOTES
4/21/2021 1:26 PM    CTN called patient to follow up and see how she was doing. Patient stated that she is improving. She has all meds and is taking as directed. Reminded patient that she needs to continue to wear a mask if in common areas of home. Also encouraged her to wash her hands frequently and to ensure that had plenty to eat and drink. to keep strength up. Will follow.

## 2021-04-28 ENCOUNTER — PATIENT OUTREACH (OUTPATIENT)
Dept: CASE MANAGEMENT | Age: 61
End: 2021-04-28

## 2021-04-28 NOTE — PROGRESS NOTES
Patient resolved from [address] Transitions episode on 4/28/2021. Discussed COVID-19 related testing which was available at this time. Test results were positive. Patient informed of results, if available? yes     Patient/family has been provided the following resources and education related to COVID-19:                         Signs, symptoms and red flags related to COVID-19            [facility] exposure and quarantine guidelines            [employer]it exposure contact - [phone]            Contact for their local Department of Health                 Patient currently reports that the following symptoms have improved:  no new symptoms and no worsening symptoms. No further outreach scheduled with this CTN/ACM/LPN/HC/ MA. Episode of Care resolved. Patient has this CTN/ACM/LPN/HC/MA contact information if future needs arise.

## 2021-05-04 ENCOUNTER — VIRTUAL VISIT (OUTPATIENT)
Dept: FAMILY MEDICINE CLINIC | Age: 61
End: 2021-05-04
Payer: COMMERCIAL

## 2021-05-04 ENCOUNTER — VIRTUAL VISIT (OUTPATIENT)
Dept: PULMONOLOGY | Age: 61
End: 2021-05-04
Payer: MEDICAID

## 2021-05-04 DIAGNOSIS — E11.21 TYPE 2 DIABETES WITH NEPHROPATHY (HCC): Primary | ICD-10-CM

## 2021-05-04 DIAGNOSIS — J18.9 WALKING PNEUMONIA: ICD-10-CM

## 2021-05-04 DIAGNOSIS — J44.9 STAGE 2 MODERATE COPD BY GOLD CLASSIFICATION (HCC): Primary | ICD-10-CM

## 2021-05-04 DIAGNOSIS — R92.8 ABNORMAL MAMMOGRAM: ICD-10-CM

## 2021-05-04 DIAGNOSIS — Z87.891 PERSONAL HISTORY OF SMOKING: ICD-10-CM

## 2021-05-04 DIAGNOSIS — Z72.0 TOBACCO ABUSE: ICD-10-CM

## 2021-05-04 DIAGNOSIS — F32.89 OTHER DEPRESSION: ICD-10-CM

## 2021-05-04 DIAGNOSIS — U07.1 COVID-19: ICD-10-CM

## 2021-05-04 DIAGNOSIS — J44.9 COPD MIXED TYPE (HCC): ICD-10-CM

## 2021-05-04 PROBLEM — I10 HYPERTENSION: Status: ACTIVE | Noted: 2021-05-04

## 2021-05-04 PROBLEM — R63.2 OVEREATING: Status: ACTIVE | Noted: 2021-05-04

## 2021-05-04 PROBLEM — O24.419 GESTATIONAL DIABETES: Status: ACTIVE | Noted: 2021-05-04

## 2021-05-04 PROBLEM — R55 SYNCOPE AND COLLAPSE: Status: ACTIVE | Noted: 2017-01-31

## 2021-05-04 PROCEDURE — 99215 OFFICE O/P EST HI 40 MIN: CPT | Performed by: INTERNAL MEDICINE

## 2021-05-04 PROCEDURE — 99214 OFFICE O/P EST MOD 30 MIN: CPT | Performed by: FAMILY MEDICINE

## 2021-05-04 RX ORDER — VARENICLINE TARTRATE 25 MG
0.5 KIT ORAL
Qty: 1 DOSE PACK | Refills: 2 | Status: SHIPPED | OUTPATIENT
Start: 2021-05-04 | End: 2021-05-04 | Stop reason: SDUPTHER

## 2021-05-04 RX ORDER — ALBUTEROL SULFATE 90 UG/1
2 AEROSOL, METERED RESPIRATORY (INHALATION)
Qty: 2 INHALER | Refills: 3 | Status: SHIPPED | OUTPATIENT
Start: 2021-05-04

## 2021-05-04 RX ORDER — BUDESONIDE AND FORMOTEROL FUMARATE DIHYDRATE 160; 4.5 UG/1; UG/1
1 AEROSOL RESPIRATORY (INHALATION) 2 TIMES DAILY
Qty: 1 INHALER | Refills: 3 | Status: SHIPPED | OUTPATIENT
Start: 2021-05-04

## 2021-05-04 RX ORDER — CALCIUM CARBONATE/VITAMIN D3 600 MG-125
1200 TABLET ORAL DAILY
COMMUNITY

## 2021-05-04 NOTE — PROGRESS NOTES
Adelaide Esteves is a 64 y.o. female who was seen by synchronous (real-time) audio-video technology on 5/4/2021. Consent:  She and/or her healthcare decision maker is aware that this patient-initiated Telehealth encounter is a billable service, with coverage as determined by her insurance carrier. She is aware that she may receive a bill and has provided verbal consent to proceed: Yes    I was in the office while conducting this encounter. Assessment & Plan:   Diagnoses and all orders for this visit:    1. Stage 2 moderate COPD by GOLD classification ([address]    2. Personal history of smoking  -     CT LOW DOSE LUNG CANCER SCREENING; Future    3. COPD mixed type ([facility] [address]  -     budesonide-formoteroL (SYMBICORT) 160-4.5 mcg/actuation HFAA; Take 1 Puff by inhalation two (2) times a day. 4. Walking pneumonia  -     albuterol (PROVENTIL HFA, VENTOLIN HFA, PROAIR HFA) 90 mcg/actuation inhaler; Take 2 Puffs by inhalation every four (4) hours as needed for Wheezing or Shortness of Breath. 5. Tobacco abuse    6. COVID-19    Other orders  -     varenicline (CHANTIX STARTER ELLA) 0.5 mg (11)- 1 mg (42) DsPk; Take 0.5 mg by mouth daily (after breakfast). -     tiotropium (SPIRIVA) 18 mcg inhalation capsule; Take 1 Cap by inhalation daily. IMPRESSION:     · S/p Covid infection March 2021. Most recent Covid test negative but with some residual symptoms of persistent shortness of breath, cough, cold sweats and headaches-slow improvement  · Abnormal CT scan of the chest with bilateral less than 6 mm nodules that have been detected at least for the past 5 years with stability. Per nodule follow-up guidelines and criteria patient is high risk with history of ongoing nicotine use and will need annual LD CT for follow-up  · COPD with current acute exacerbation likely related to recent Covid infection with residual airway inflammation. Significant symptomatic worsening per patient.     Previous PFTs with FEV1 76% predicted and midexpiratory flow rate 56% predicted with bronchodilator response. Relatively stable and improved pulmonary function test compared to 2017. Needs more recent testing to assess current status  · Tobacco abuse-committed to quit but has been unsuccessful previously not being able to tolerate Chantix. Now willing to try again  · Diabetes with diabetic neuropathy-recent DKA aggravated by Covid  · GERD  · Osteoporosis       RECOMMENDATIONS:   · Discussed with patient need to optimize treatment of exacerbated COPD-airway clearance measures, complete cessation of cigarette smoking and complying with bronchodilator and anti-inflammatory therapy discussed. · Will refill patient's maintenance inhalers which include-Spiriva, Symbicort and as needed albuterol. · Annual LD CT for follow-up of nodules-orders placed after informed decision making  · Pulmonary function tests will be repeated for follow-up once acute issues resolved in 2 months  · Complete cessation of cigarette smoking-patient counseled on harm, need for behavioral intervention as well as needed pharmacotherapy. She states that she reacted adversely to Chantix in the past.  After much discussion she is agreeable to try Chantix again along with strong behavioral interventions  · Preventive vaccinations-annual influenza vaccine recommended. She has received Pneumovax and will be due for the next vaccine at age 74. She needs Covid vaccines-recommended that now that she has symptomatically recovered can receive it  · Healthy diet weight recommended  · Active lifestyle  · Will consider referral to pulmonary rehab   · We will follow-up for COPD management and nodule management  · Next visit in 2 months in person                  716  Subjective:   Jerry Arias was seen for Hospital Follow Up  Patient last seen in clinic in 2019.   She states that she got sick in March and was tested positive for Covid subsequently admitted to the hospital with DKA and at that time was initially told she may have Covid pneumonia but later on no significant abnormalities were noted on chest x-ray. She however has felt increased symptoms of cough with slimy white mucus expectoration, increased shortness of breath and persistent symptoms of sweating intermittently with cold chills. Denies any aches and pains. Denies any myalgias. Denies any jerking movements. She does complain of loss of taste and smell with some recovery recently. Complains of intermittent headaches. She is continuing to smoke and has had difficulty quitting in the past despite trying Chantix which she reacted adversely to about 6 years back. More recently she tried NicoDerm but did not work. She tells me that she really wants to quit and is willing to try Chantix again  Also needs refills on her maintenance inhalers  No other complaints offered    Prior to Admission medications    Medication Sig Start Date End Date Taking? Authorizing Provider   calcium-cholecalciferol, d3, (CALCIUM 600 + D) 600-125 mg-unit tab Take 1,200 mg by mouth daily. Yes Provider, Historical   varenicline (CHANTIX STARTER ELLA) 0.5 mg (11)- 1 mg (42) DsPk Take 0.5 mg by mouth daily (after breakfast). 5/4/21  Yes Mariano Amos MD   tiotropium (SPIRIVA) 18 mcg inhalation capsule Take 1 Cap by inhalation daily. 5/4/21  Yes Mariano Amos MD   budesonide-formoteroL (SYMBICORT) 160-4.5 mcg/actuation HFAA Take 1 Puff by inhalation two (2) times a day. 5/4/21  Yes Mariano Amos MD   albuterol (PROVENTIL HFA, VENTOLIN HFA, PROAIR HFA) 90 mcg/actuation inhaler Take 2 Puffs by inhalation every four (4) hours as needed for Wheezing or Shortness of Breath. 5/4/21  Yes Mariano Amos MD   insulin detemir U-100 (LEVEMIR FLEXTOUCH) 100 unit/mL (3 mL) inpn 45 Units by SubCUTAneous route ACB/HS.  Indications: 18 UNITS IN AM 30 UNITS IN THE PM 4/13/21  Yes Alex Love DO   fluticasone propionate (FLONASE) 50 mcg/actuation nasal spray 2 Sprays by Both Nostrils route daily. 11/4/19  Yes Chasity Tariq MD   gabapentin (NEURONTIN) 300 mg capsule Take 2 Caps by mouth nightly. 11/4/19  Yes Chasity Tariq MD   blood-glucose meter ([address]) by Does Not Apply route. Yes Provider, Historical   insulin aspart U-100 (NOVOLOG FLEXPEN U-100 INSULIN) 100 unit/mL (3 mL) inpn Each meal, per sliding scale 5/22/19  Yes José Miguel Vaengas NP   glucose blood VI test strips (BLOOD GLUCOSE TEST) strip Contour next test strips 5/22/19  Yes José Miguel Vanegas NP   pen needle, diabetic (COMFORT EZ PEN NEEDLES) 32 gauge x 3/16\" ndle 1 Pen Needle by Does Not Apply route nightly as needed (with insulin at night). 5/22/19  Yes José Miguel Vanegas NP   LORazepam (ATIVAN) 0.5 mg tablet Take 1 Tab by mouth every eight (8) hours as needed for Anxiety. Max Daily Amount: 1.5 mg. 4/26/19  Yes Barry Rivero MD   flash glucose sensor (FREESTYLE MARLEY 14 DAY SENSOR) kit Apply every 14 days to monitor blood sugar 3/11/19  Yes José Miguel Vanegas NP   flash glucose scanning reader (FREESTYLE MARLEY 14 DAY READER) [facility] Use to check blood sugar 3 times a day 3/11/19  Yes José Miguel Vanegas NP   alendronate (FOSAMAX) 70 mg tablet Take 70 mg by mouth every Sunday. Yes Provider, Historical   pravastatin (PRAVACHOL) 40 mg tablet Take 1 Tab by mouth nightly. 11/4/19   Chasity Tariq MD   omeprazole (PRILOSEC) 40 mg capsule Take 1 Cap by mouth daily. 11/4/19   Chasity Tariq MD   tolterodine ER (DETROL-LA) 2 mg ER capsule Take 1 Cap by mouth daily. 7/30/19   Dustin Gaytan MD   FLUoxetine (PROZAC) 20 mg capsule Take  by mouth daily. Provider, Historical   tiotropium (SPIRIVA WITH HANDIHALER) 18 mcg inhalation capsule Take 1 Cap by inhalation daily. Provider, Historical     Allergies   Allergen Reactions    Penicillin G Anaphylaxis    Bactrim [Sulfamethoprim] Hives and Rash    Dilaudid [Hydromorphone (Bulk)] Other (comments)     Patient was confused for 5 days.   Did not know who she was.  Morphine Itching    Pcn [Penicillins] Swelling       Patient Active Problem List   Diagnosis Code    Diabetes mellitus type 1 ([facility] [address] E10.9    Diabetic neuropathy ([facility] [address] E11.40    Osteoporosis M81.0    Proteinuria R80.9    HLD (hyperlipidemia) E78.5    Depression F32.9    Tobacco abuse Z72.0    Surgical menopause E89.40    Thoracic compression fracture ([facility]) S22.000A    Gastroesophageal reflux disease K21.9    Stage 2 moderate COPD by GOLD classification ([facility]) J44.9    Type 2 diabetes with nephropathy ([facility]) E11.21    Emphysematous pyelitis N12    Pyelonephritis N12    Sepsis ([facility]) A41.9    DKA (diabetic ketoacidoses) ([facility]) E11.10    Abdominal pain, epigastric R10.13    Gestational diabetes O24.419    Hypertension I10    Overeating R63.2    Syncope and collapse R55    COVID-19 U07.1       Review of Systems   Per HPI    There were no vitals taken for this visit.      Constitutional: [x] Appears well-developed and well-nourished [x] No apparent distress        Mental status: [x] Alert and awake  [x] Oriented to person/place/time [x] Able to follow commands      Eyes:   EOM    [x]  Normal        Sclera  [x]  Normal        HENT: [x] Normocephalic, atraumatic    [x] Mouth/Throat: Mucous membranes are moist    External Ears [x] Normal      Neck: [x] No visualized mass     Pulmonary/Chest: [x] Respiratory effort normal   [x] No visualized signs of difficulty breathing or respiratory distress         Musculoskeletal:         [x] Normal range of motion of neck          Neurological:        [x] No Facial Asymmetry (Cranial nerve 7 motor function) (limited exam due to video visit)          [x] No gaze palsy          Skin:        [x] No significant exanthematous lesions or discoloration noted on facial skin               Psychiatric:       [x] Normal Affect         XR Results (most recent):  Results from Hospital Encounter encounter on 04/11/21   XR CHEST PORT    Narrative ONE VIEW CHEST RADIOGRAPH     INDICATION: SARS-CoV-2+ . .. evaluate for Covid-19 Pneumonia. COMPARISON: Chest radiograph 10/17/2019. TECHNIQUE: AP view of the chest    FINDINGS:     LINES/TUBES: None. MEDIASTINUM: Cardiac silhouette is within normal limits. Atherosclerosis. LUNGS: No focal consolidation, pleural effusion, or pneumothorax. BONES/OTHER: No acute osseous abnormality. Prior upper lumbar vertebroplasty. Impression No radiographic evidence of acute cardiopulmonary disease. CT Results (most recent):  Results from Hospital Encounter encounter on 08/19/19   CT ABD PELV W CONT    Narrative CT ABDOMEN AND PELVIS WITH ENHANCEMENT    INDICATION: Follow-up abdominal lymph nodes, history of left hydronephrosis. Recent hospitalization with UTI, hydronephrosis status post stent removal  undergoing follow-up. TECHNIQUE: Axial images obtained of the abdomen and pelvis following the  uneventful administration of  100 cc's Isovue-300 nonionic intravenous contrast.   Coronal and sagittal reformatted images of the abdomen and pelvis were  obtained. All CT scans at this facility are performed using dose optimization technique as  appropriate to a performed exam, to include automated exposure control,  adjustment of the mA and/or kV according to patient size (including appropriate  matching first site-specific examinations), or use of iterative reconstruction  technique. COMPARISON: 7/9/2019. ABDOMEN FINDINGS:     Liver: Unremarkable. Spleen: Unremarkable. Pancreas: Unremarkable. Biliary: Unremarkable. Bowel: Unremarkable. Peritoneum/ Retroperitoneum: Unremarkable. Lymph Nodes: Unremarkable. Adrenal Glands: Unremarkable. Kidneys: There is improved very mild left pelviectasis with prior mild  hydronephrosis. The left perinephric fluid has resolved. Mild cortical thinning  at the right upper pole is unchanged. The gas in the renal collecting system has  resolved.  There is mild wall thickening at the renal pelvis. There is mild  hypoenhancement at the left upper and lower pole kidney. The posterior aspect of  the left lower pole is involved with a wedge-shaped region of hypoenhancement. There is a 7 mm focal hypodensity within the deep parenchyma of the hypodense  region at the left lower pole (axial series 2, image 35) which was present on  prior exam.    Vessels: Mild to moderate atherosclerosis. Coarse calcification left renal  ostia. PELVIS FINDINGS:     Bladder/ Pelvic Organs: Bladder is decompressed. No suspicious adnexal lesions. Lung Base: Coronary arteriosclerosis. Bones: Mild anasarca. Lumbar facet hypertrophy. Osteopenia. Degenerative changes  bilateral hips. Unchanged compression fracture L1 with postsurgical changes of  kyphoplasty/vertebroplasty. Unchanged compression deformity of L5. Impression IMPRESSION:    1. Resolved left hydronephrosis and perinephric fluid with mild residual  pelviectasis and ureteral wall thickening, the latter could represent infection  or sequela of prior stent. 2.  Hypodense regions in the left kidney are most likely residual focal  pyelonephritis. A developing subcentimeter left renal abscess is possible but  this may represent a prominent renal pyramid or cyst. Lesion would be small for  drainage. 3.  Mild to moderate atherosclerosis with Coarse calcification left renal ostia. We discussed the expected course, resolution and complications of the diagnosis(es) in detail. Medication risks, benefits, costs, interactions, and alternatives were discussed as indicated. I advised her to contact the office if her condition worsens, changes or fails to improve as anticipated. She expressed understanding with the diagnosis(es) and plan. This patient is being evaluated by a video visit encounter for concerns as above. A caregiver was present when appropriate.  Due to this being a TeleHealth encounter (During ROWZZ-97 public health emergency), evaluation of the following organ systems was limited: Vitals/Constitutional/EENT/Resp/CV/GI//MS/Neuro/Skin/Heme-Lymph-Imm. Pursuant to the emergency declaration under the [facility] waiver authority and the [employer] Act, this Virtual  Visit was conducted, with patient's (and/or legal guardian's) consent, to reduce the patient's risk of exposure to COVID-19 and provide necessary medical care. Services were provided through a video synchronous discussion virtually to substitute for in-person clinic visit. Patient located in his home. Provider located in clinic.     Elisa Dudley MD

## 2021-05-04 NOTE — PROGRESS NOTES
Nancy Babin is a 64 y.o. female who was seen by synchronous (real-time) audio-video technology on 5/4/2021 for Depression, Diabetes, and Breast Problem        Assessment & Plan:   Diagnoses and all orders for this visit:    1. Type 2 diabetes with nephropathy ([facility] [address]    2. Abnormal mammogram  -     ABDON MAMMO BI DX INCL CAD; Future    3. Other depression    Other orders  -     FLUoxetine (PROzac) 40 mg capsule; Take 1 Cap by mouth daily. -     omeprazole (PRILOSEC) 40 mg capsule; Take 1 Cap by mouth daily. -     pravastatin (PRAVACHOL) 40 mg tablet; Take 1 Tab by mouth nightly. -     tolterodine ER (DETROL-LA) 2 mg ER capsule; Take 1 Cap by mouth daily. -     fluticasone propionate (FLONASE) 50 mcg/actuation nasal spray; 2 Sprays by Both Nostrils route daily. -     varenicline (CHANTIX STARTER ELLA) 0.5 mg (11)- 1 mg (42) DsPk; Take 0.5 mg by mouth daily (after breakfast). -     alendronate (FOSAMAX) 70 mg tablet; Take 1 Tab by mouth every seven (7) days. 712  Subjective:   She was at the ED X 2 days on 4/11/2021 for DKA; note reviewed. She wanted a freestyle temitope 2 meter but this was declined. States that her insurance company will be sending paperwork regarding determining the medical necessity for this. She states she has neuropathy. This makes it difficult to do anger sticks to obtain her blood sugar levels. Depression/anxiety- needs prozac; Denies SI. She was previously on 20 mg of Prozac. She thinks she may benefit from 40 mg of Prozac. She additionally wants to stop smoking; she is interested in chantix. Patient has had an abnormal mammogram.  She was to have a follow-up diagnostic mammogram in 2020. She needs to complete this recommendation. She needs a diagnostic mammogram order. She request refills on her medications including Fosamax , Detrol, pravachol, Flonase and omeprazole. Prior to Admission medications    Medication Sig Start Date End Date Taking?  Authorizing Provider FLUoxetine (PROzac) 40 mg capsule Take 1 Cap by mouth daily. 5/9/21  Yes Chel Deng MD   omeprazole (PRILOSEC) 40 mg capsule Take 1 Cap by mouth daily. 5/9/21  Yes Chel Deng MD   pravastatin (PRAVACHOL) 40 mg tablet Take 1 Tab by mouth nightly. 5/9/21  Yes Chel Deng MD   tolterodine ER (DETROL-LA) 2 mg ER capsule Take 1 Cap by mouth daily. 5/9/21  Yes Chel Deng MD   fluticasone propionate (FLONASE) 50 mcg/actuation nasal spray 2 Sprays by Both Nostrils route daily. 5/9/21  Yes Chel Deng MD   varenicline (CHANTIX STARTER ELLA) 0.5 mg (11)- 1 mg (42) DsPk Take 0.5 mg by mouth daily (after breakfast). 5/9/21  Yes Chel Deng MD   alendronate (FOSAMAX) 70 mg tablet Take 1 Tab by mouth every seven (7) days. 5/9/21  Yes Chel Deng MD   calcium-cholecalciferol, d3, (CALCIUM 600 + D) 600-125 mg-unit tab Take 1,200 mg by mouth daily. Yes Provider, Historical   tiotropium (SPIRIVA) 18 mcg inhalation capsule Take 1 Cap by inhalation daily. 5/4/21  Yes Radha Randall MD   budesonide-formoteroL (SYMBICORT) 160-4.5 mcg/actuation HFAA Take 1 Puff by inhalation two (2) times a day. 5/4/21  Yes Radha Randall MD   albuterol (PROVENTIL HFA, VENTOLIN HFA, PROAIR HFA) 90 mcg/actuation inhaler Take 2 Puffs by inhalation every four (4) hours as needed for Wheezing or Shortness of Breath. 5/4/21  Yes Radha Randall MD   insulin detemir U-100 (LEVEMIR FLEXTOUCH) 100 unit/mL (3 mL) inpn 45 Units by SubCUTAneous route ACB/HS. Indications: 18 UNITS IN AM 30 UNITS IN THE PM  Patient taking differently: 40 Units by SubCUTAneous route nightly. 30 unit in am and 40 units in pm  Indications: 18 UNITS IN AM 30 UNITS IN THE PM 4/13/21  Yes Sia Loyd H, DO   gabapentin (NEURONTIN) 300 mg capsule Take 2 Caps by mouth nightly. 11/4/19  Yes Chel Deng MD   blood-glucose meter ([address]) by Does Not Apply route.    Yes Provider, Historical   insulin aspart U-100 (NOVOLOG FLEXPEN U-100 INSULIN) 100 unit/mL (3 mL) inpn Each meal, per sliding scale 5/22/19  Yes Joan Salinas NP   glucose blood VI test strips (BLOOD GLUCOSE TEST) strip Contour next test strips 5/22/19  Yes Joan Salinas NP   LORazepam (ATIVAN) 0.5 mg tablet Take 1 Tab by mouth every eight (8) hours as needed for Anxiety. Max Daily Amount: 1.5 mg. 4/26/19  Yes Hermilo PÉREZ MD   tiotropium (SPIRIVA WITH HANDIHALER) 18 mcg inhalation capsule Take 1 Cap by inhalation daily. Yes Provider, Historical   alendronate (FOSAMAX) 70 mg tablet Take 70 mg by mouth every Sunday. Yes Provider, Historical   pen needle, diabetic (COMFORT EZ PEN NEEDLES) 32 gauge x 3/16\" ndle 1 Pen Needle by Does Not Apply route nightly as needed (with insulin at night).  5/22/19   Joan Salinas NP   flash glucose sensor (FREESTYLE MARLEY 14 DAY SENSOR) kit Apply every 14 days to monitor blood sugar 3/11/19   Joan Salinas NP   flash glucose scanning reader (FREESTYLE MARLEY 14 DAY READER) [facility] Use to check blood sugar 3 times a day 3/11/19   Joan Salinas NP     Patient Active Problem List    Diagnosis Date Noted    Gestational diabetes 05/04/2021    Hypertension 05/04/2021    Overeating 05/04/2021    COVID-19 05/04/2021    DKA (diabetic ketoacidoses) ([facility] [address] 04/12/2021    Emphysematous pyelitis 07/10/2019    Pyelonephritis 07/10/2019    Sepsis ([address] 07/10/2019    Type 2 diabetes with nephropathy ([address] 06/03/2019    Stage 2 moderate COPD by GOLD classification ([address] 03/27/2019    Syncope and collapse 01/31/2017    Gastroesophageal reflux disease 09/13/2016    Thoracic compression fracture ([address] 02/02/2016    Abdominal pain, epigastric 07/17/2014    Diabetes mellitus type 1 ([address] 05/14/2012    Diabetic neuropathy ([address] 05/14/2012    Osteoporosis 05/14/2012    Proteinuria 05/14/2012    HLD (hyperlipidemia) 05/14/2012    Depression 05/14/2012    Tobacco abuse 05/14/2012    Surgical menopause 05/14/2012     Allergies Allergen Reactions    Penicillin G Anaphylaxis    Bactrim [Sulfamethoprim] Hives and Rash    Dilaudid [Hydromorphone (Bulk)] Other (comments)     Patient was confused for 5 days. Did not know who she was.  Morphine Itching    Pcn [Penicillins] Swelling     Past Medical History:   Diagnosis Date    Arthritis     Autonomic neuropathy     Back pain     Chronic lung disease     Chronic pain     back    Compression fracture T9-10    [facility]ch[facility]) [facility]     Depression     panic attacks    Diabetes mellitus ([facility])     type 1    GERD (gastroesophageal reflux disease)     Hypertension     no longer on meds    MRSA nasal colonization     Neuropathy     Osteoporosis     Tobacco use      Past Surgical History:   Procedure Laterality Date    HX BACK SURGERY  2009    HX DILATION AND CURETTAGE      HX HYSTERECTOMY  2002     Family History   Problem Relation Age of Onset    Thyroid Disease Mother     Stroke Mother    Delvis Gutierrez Arthritis-rheumatoid Mother     Osteoporosis Mother     Diabetes Father     Kidney Disease Father     Tip's Disease Father     Arthritis-rheumatoid Sister     Diabetes Brother     Colon Cancer Maternal Grandmother      Social History     Tobacco Use    Smoking status: Current Every Day Smoker     Packs/day: 1.00     Years: 43.00     Pack years: 43.00     Types: Cigarettes     Start date: 7/27/1976    Smokeless tobacco: Never Used    Tobacco comment: on Chantix   Substance Use Topics    Alcohol use: No       ROS as per HPI    Objective:   No flowsheet data found.    General: alert, cooperative, no distress   Mental  status: normal mood, behavior, speech, dress, motor activity, and thought processes, able to follow commands   HENT: NCAT   Neck: no visualized mass   Resp: no respiratory distress   Neuro: no gross deficits   Skin: no discoloration or lesions of concern on visible areas   Psychiatric: normal affect, consistent with stated mood, no evidence of hallucinations     Additional exam findings: None      We discussed the expected course, resolution and complications of the diagnosis(es) in detail. Medication risks, benefits, costs, interactions, and alternatives were discussed as indicated. I advised her to contact the office if her condition worsens, changes or fails to improve as anticipated. She expressed understanding with the diagnosis(es) and plan. Jd Rodriguez, was evaluated through a synchronous (real-time) audio-video encounter. The patient (or guardian if applicable) is aware that this is a billable service. Verbal consent to proceed has been obtained within the past 12 months. The visit was conducted pursuant to the emergency declaration under the [facility], [address] [place] [address] authority and the [employer] and [employer] General Act. Patient identification was verified, and a caregiver was present when appropriate. The patient was located in a state where the provider was credentialed to provide care.     Higinio Calderon MD

## 2021-05-09 RX ORDER — OMEPRAZOLE 40 MG/1
40 CAPSULE, DELAYED RELEASE ORAL DAILY
Qty: 30 CAP | Refills: 0 | Status: SHIPPED | OUTPATIENT
Start: 2021-05-09 | End: 2021-06-11

## 2021-05-09 RX ORDER — VARENICLINE TARTRATE 25 MG
0.5 KIT ORAL
Qty: 1 DOSE PACK | Refills: 2 | Status: SHIPPED | OUTPATIENT
Start: 2021-05-09 | End: 2022-03-17 | Stop reason: ALTCHOICE

## 2021-05-09 RX ORDER — ALENDRONATE SODIUM 70 MG/1
70 TABLET ORAL
Qty: 4 TAB | Refills: 3 | Status: SHIPPED | OUTPATIENT
Start: 2021-05-09 | End: 2022-03-17 | Stop reason: SDUPTHER

## 2021-05-09 RX ORDER — TOLTERODINE 2 MG/1
2 CAPSULE, EXTENDED RELEASE ORAL DAILY
Qty: 90 CAP | Refills: 3 | Status: SHIPPED | OUTPATIENT
Start: 2021-05-09 | End: 2022-03-17 | Stop reason: ALTCHOICE

## 2021-05-09 RX ORDER — FLUTICASONE PROPIONATE 50 MCG
2 SPRAY, SUSPENSION (ML) NASAL DAILY
Qty: 1 BOTTLE | Refills: 3 | Status: SHIPPED | OUTPATIENT
Start: 2021-05-09

## 2021-05-09 RX ORDER — FLUOXETINE HYDROCHLORIDE 40 MG/1
40 CAPSULE ORAL DAILY
Qty: 30 CAP | Refills: 6 | Status: SHIPPED | OUTPATIENT
Start: 2021-05-09 | End: 2022-01-08

## 2021-05-09 RX ORDER — PRAVASTATIN SODIUM 40 MG/1
40 TABLET ORAL
Qty: 90 TAB | Refills: 3 | Status: SHIPPED | OUTPATIENT
Start: 2021-05-09 | End: 2022-03-17 | Stop reason: SDUPTHER

## 2021-05-10 NOTE — PATIENT INSTRUCTIONS

## 2021-05-24 ENCOUNTER — HOSPITAL ENCOUNTER (OUTPATIENT)
Dept: MAMMOGRAPHY | Age: 61
Discharge: HOME | End: 2021-05-24
Attending: FAMILY MEDICINE
Payer: COMMERCIAL

## 2021-05-24 ENCOUNTER — HOSPITAL ENCOUNTER (OUTPATIENT)
Dept: ULTRASOUND IMAGING | Age: 61
Discharge: HOME | End: 2021-05-24
Attending: FAMILY MEDICINE
Payer: COMMERCIAL

## 2021-05-24 DIAGNOSIS — Z09 FOLLOW-UP EXAM: ICD-10-CM

## 2021-05-24 DIAGNOSIS — R92.8 ABNORMAL MAMMOGRAM: ICD-10-CM

## 2021-05-24 DIAGNOSIS — N64.89 BREAST ASYMMETRY: ICD-10-CM

## 2021-05-24 PROCEDURE — 76642 ULTRASOUND BREAST LIMITED: CPT

## 2021-05-24 PROCEDURE — 77062 BREAST TOMOSYNTHESIS BI: CPT

## 2021-05-25 ENCOUNTER — TELEPHONE (OUTPATIENT)
Dept: FAMILY MEDICINE CLINIC | Age: 61
End: 2021-05-25

## 2021-05-25 NOTE — TELEPHONE ENCOUNTER
PA is required for Tolterodine    Cover My Meds Key:  [id number]: [id number] - PA Case ID: [id number] - Rx #: [id number]

## 2021-06-04 ENCOUNTER — NURSE NAVIGATOR (OUTPATIENT)
Dept: OTHER | Age: 61
End: 2021-06-04

## 2021-06-08 ENCOUNTER — HOSPITAL ENCOUNTER (OUTPATIENT)
Dept: CT IMAGING | Age: 61
Discharge: HOME | End: 2021-06-08
Attending: INTERNAL MEDICINE
Payer: COMMERCIAL

## 2021-06-08 VITALS — HEIGHT: 66 IN | BODY MASS INDEX: 23.3 KG/M2 | WEIGHT: 145 LBS

## 2021-06-08 DIAGNOSIS — Z87.891 PERSONAL HISTORY OF SMOKING: ICD-10-CM

## 2021-06-08 PROCEDURE — 71271 CT THORAX LUNG CANCER SCR C-: CPT

## 2021-06-11 RX ORDER — OMEPRAZOLE 40 MG/1
CAPSULE, DELAYED RELEASE ORAL
Qty: 30 CAPSULE | Refills: 0 | Status: SHIPPED | OUTPATIENT
Start: 2021-06-11 | End: 2022-03-17 | Stop reason: SDUPTHER

## 2021-06-30 NOTE — TELEPHONE ENCOUNTER
[facility] (Key: [id number]) - [id number]  Tolterodine Tartrate ER 2MG er capsules       Status: PA Request    Created: June 30th, 2021    Sent: June 30th, 2021    Open

## 2021-07-13 ENCOUNTER — DOCUMENTATION ONLY (OUTPATIENT)
Dept: PULMONOLOGY | Age: 61
End: 2021-07-13

## 2021-09-01 DIAGNOSIS — Z72.0 TOBACCO ABUSE: ICD-10-CM

## 2021-09-01 DIAGNOSIS — Z87.891 PERSONAL HISTORY OF SMOKING: Primary | ICD-10-CM

## 2021-09-01 DIAGNOSIS — J44.9 COPD MIXED TYPE (HCC): ICD-10-CM

## 2021-09-01 DIAGNOSIS — R06.02 SOB (SHORTNESS OF BREATH): ICD-10-CM

## 2021-09-01 DIAGNOSIS — U07.1 COVID-19: ICD-10-CM

## 2021-09-01 DIAGNOSIS — J44.9 STAGE 2 MODERATE COPD BY GOLD CLASSIFICATION (HCC): ICD-10-CM

## 2021-09-01 NOTE — PROGRESS NOTES
Order placed for COVID-19 test, per Verbal Order from Dr. Eric Leigh on 9/1/2021. Last office visit: 5/4/2021  Follow up Visit: 9/28/2021    Provider is aware of last office visit and follow up. No further action requested from provider.

## 2021-10-07 ENCOUNTER — VIRTUAL VISIT (OUTPATIENT)
Dept: FAMILY MEDICINE CLINIC | Age: 61
End: 2021-10-07

## 2021-10-07 NOTE — PROGRESS NOTES
Patient states no longer needs appointment, adult briefs order has already been signed and faxed over. On 9-2021 and 8-.

## 2021-10-13 NOTE — ED TRIAGE NOTES
EMS reports L lower abd pain (L lower quad tenderness and distention) and vomiting x 6 for 2 hours; VS: 162/69- 83- 16- 98% RA; .
Alert-The patient is alert, awake and responds to voice. The patient is oriented to time, place, and person. The triage nurse is able to obtain subjective information.

## 2022-01-08 RX ORDER — FLUOXETINE HYDROCHLORIDE 40 MG/1
CAPSULE ORAL
Qty: 30 CAPSULE | Refills: 6 | Status: SHIPPED | OUTPATIENT
Start: 2022-01-08

## 2022-02-10 ENCOUNTER — VIRTUAL VISIT (OUTPATIENT)
Dept: FAMILY MEDICINE CLINIC | Age: 62
End: 2022-02-10

## 2022-02-10 NOTE — PROGRESS NOTES
Doxy link sent; No connection made on the platform. we will close encounter at this time. This encounter was created in error - please disregard.

## 2022-03-17 ENCOUNTER — VIRTUAL VISIT (OUTPATIENT)
Dept: FAMILY MEDICINE CLINIC | Age: 62
End: 2022-03-17

## 2022-03-17 DIAGNOSIS — E11.42 DIABETIC POLYNEUROPATHY ASSOCIATED WITH TYPE 2 DIABETES MELLITUS (HCC): Primary | ICD-10-CM

## 2022-03-17 PROCEDURE — 99214 OFFICE O/P EST MOD 30 MIN: CPT | Performed by: FAMILY MEDICINE

## 2022-03-17 RX ORDER — PRAVASTATIN SODIUM 40 MG/1
40 TABLET ORAL
Qty: 90 TABLET | Refills: 0 | Status: SHIPPED | OUTPATIENT
Start: 2022-03-17

## 2022-03-17 RX ORDER — BLOOD-GLUCOSE,RECEIVER,CONT
EACH MISCELLANEOUS
Qty: 1 EACH | Refills: 3 | Status: SHIPPED | OUTPATIENT
Start: 2022-03-17 | End: 2022-04-04 | Stop reason: SDUPTHER

## 2022-03-17 RX ORDER — PEN NEEDLE, DIABETIC 31 GX3/16"
NEEDLE, DISPOSABLE MISCELLANEOUS
Qty: 100 PEN NEEDLE | Refills: 1 | Status: SHIPPED | OUTPATIENT
Start: 2022-03-17

## 2022-03-17 RX ORDER — ALENDRONATE SODIUM 70 MG/1
70 TABLET ORAL
Qty: 4 TABLET | Refills: 3 | Status: SHIPPED | OUTPATIENT
Start: 2022-03-17 | End: 2022-10-06 | Stop reason: ALTCHOICE

## 2022-03-17 RX ORDER — BLOOD-GLUCOSE SENSOR
EACH MISCELLANEOUS
Qty: 5 EACH | Refills: 2 | Status: SHIPPED | OUTPATIENT
Start: 2022-03-17 | End: 2022-04-04 | Stop reason: SDUPTHER

## 2022-03-17 RX ORDER — GABAPENTIN 300 MG/1
600 CAPSULE ORAL
Qty: 60 CAPSULE | Refills: 2 | Status: SHIPPED | OUTPATIENT
Start: 2022-03-17 | End: 2022-07-08

## 2022-03-17 RX ORDER — BLOOD SUGAR DIAGNOSTIC
1 STRIP MISCELLANEOUS
Qty: 100 EACH | Refills: 2 | Status: CANCELLED | OUTPATIENT
Start: 2022-03-17

## 2022-03-17 RX ORDER — OMEPRAZOLE 40 MG/1
40 CAPSULE, DELAYED RELEASE ORAL DAILY
Qty: 30 CAPSULE | Refills: 0 | Status: SHIPPED | OUTPATIENT
Start: 2022-03-17 | End: 2022-04-29

## 2022-03-17 NOTE — PROGRESS NOTES
Nallely Carey is a 63 y.o. female who was seen by synchronous (real-time) audio-video technology on 3/17/2022 for Diabetes        Assessment & Plan:   Diagnoses and all orders for this visit:    1. Diabetic polyneuropathy associated with type 2 diabetes mellitus (HCC)  -     gabapentin (NEURONTIN) 300 mg capsule; Take 2 Capsules by mouth nightly. Other orders  -     omeprazole (PRILOSEC) 40 mg capsule; Take 1 Capsule by mouth daily. -     alendronate (FOSAMAX) 70 mg tablet; Take 1 Tablet by mouth every seven (7) days. -     insulin detemir U-100 (LEVEMIR FLEXTOUCH) 100 unit/mL (3 mL) inpn; Inject  30 units  subcu qam and 40 units subcu  qpm.  -     Insulin Needles, Disposable, (Easy Comfort Pen Needles) 32 gauge x 5/32\" ndle; Use with levemir flextouch pen as directed  -     pravastatin (PRAVACHOL) 40 mg tablet; Take 1 Tablet by mouth nightly. -     Blood-Glucose Meter,Continuous (Dexcom G5 ) misc; Blood sugar check daily  -     Blood-Glucose Sensor (Dexcom G5-G4 Sensor) ar; Blood sugar check daily      As above  Stable  Refilled medications needed  Follow-up and Dispositions    · Return next scheduled visit. This has been fully explained to the patient, who indicates understanding. 712  Subjective:   Patient has diabetes. Patient is on insulin and needs a refill. She does have another scheduled follow-up visit. But she is in need of a refill on her medication for diabetes as well as Fosamax. She also would like to get a continuous glucose monitor, preferably a Dexcom monitor. She additionally requests refills on her Pravachol, Prilosec and Fosamax. Patient also has peripheral neuropathy and is on gabapentin. She needs refills on Neurontin. Prior to Admission medications    Medication Sig Start Date End Date Taking? Authorizing Provider   omeprazole (PRILOSEC) 40 mg capsule Take 1 Capsule by mouth daily.  3/17/22  Yes Yadiel Qiu MD   alendronate (FOSAMAX) 70 mg tablet Take 1 Tablet by mouth every seven (7) days. 3/17/22  Yes Tashia Huntley MD   insulin detemir U-100 (LEVEMIR FLEXTOUCH) 100 unit/mL (3 mL) inpn Inject  30 units  subcu qam and 40 units subcu  qpm. 3/17/22  Yes Tashia Huntley MD   Insulin Needles, Disposable, (Easy Comfort Pen Needles) 32 gauge x 5/32\" ndle Use with levemir flextouch pen as directed 3/17/22  Yes Tashia Huntley MD   gabapentin (NEURONTIN) 300 mg capsule Take 2 Capsules by mouth nightly. 3/17/22  Yes Tashia Huntley MD   pravastatin (PRAVACHOL) 40 mg tablet Take 1 Tablet by mouth nightly. 3/17/22  Yes Tashia Huntley MD   Blood-Glucose Meter,Continuous (Dexcom G5 ) misc Blood sugar check daily 3/17/22  Yes Tashia Huntley MD   Blood-Glucose Sensor (Dexcom G5-G4 Sensor) ar Blood sugar check daily 3/17/22  Yes Tashia Huntley MD   FLUoxetine (PROzac) 40 mg capsule take 1 capsule by mouth once daily 1/8/22  Yes Tashia Huntley MD   fluticasone propionate (FLONASE) 50 mcg/actuation nasal spray 2 Sprays by Both Nostrils route daily. 5/9/21  Yes Tashia Huntley MD   calcium-cholecalciferol, d3, (CALCIUM 600 + D) 600-125 mg-unit tab Take 1,200 mg by mouth daily. Yes Provider, Historical   tiotropium (SPIRIVA) 18 mcg inhalation capsule Take 1 Cap by inhalation daily. 5/4/21  Yes Arlene Mya MD   budesonide-formoteroL (SYMBICORT) 160-4.5 mcg/actuation HFAA Take 1 Puff by inhalation two (2) times a day. 5/4/21  Yes Arlene May MD   albuterol (PROVENTIL HFA, VENTOLIN HFA, PROAIR HFA) 90 mcg/actuation inhaler Take 2 Puffs by inhalation every four (4) hours as needed for Wheezing or Shortness of Breath. 5/4/21  Yes Arlene May MD   insulin detemir U-100 (LEVEMIR FLEXTOUCH) 100 unit/mL (3 mL) inpn 45 Units by SubCUTAneous route ACB/HS. Indications: 18 UNITS IN AM 30 UNITS IN THE PM  Patient taking differently: 40 Units by SubCUTAneous route nightly.  30 unit in am and 40 units in pm  Indications: 18 UNITS IN AM 30 UNITS IN THE PM 4/13/21  Yes Elen Blanchard H, DO   insulin aspart U-100 (NOVOLOG FLEXPEN U-100 INSULIN) 100 unit/mL (3 mL) inpn Each meal, per sliding scale  Patient taking differently: Each meal, per sliding scale 5 units-15 units 5/22/19  Yes Keenan Shi NP   LORazepam (ATIVAN) 0.5 mg tablet Take 1 Tab by mouth every eight (8) hours as needed for Anxiety. Max Daily Amount: 1.5 mg. 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    Pyelonephritis 07/10/2019    Sepsis ([address] 07/10/2019    Type 2 diabetes with nephropathy ([address] 06/03/2019    Stage 2 moderate COPD by GOLD classification ([facility] [address] 03/27/2019    Syncope and collapse 01/31/2017    Gastroesophageal reflux disease 09/13/2016    Thoracic compression fracture ([address] 02/02/2016    Abdominal pain, epigastric 07/17/2014    Diabetes mellitus type 1 ([address] 05/14/2012    Diabetic neuropathy ([facility] [address] 05/14/2012    Osteoporosis 05/14/2012    Proteinuria 05/14/2012    HLD (hyperlipidemia) 05/14/2012    Depression 05/14/2012    Tobacco abuse 05/14/2012    Surgical menopause 05/14/2012     Allergies   Allergen Reactions    Penicillin G Anaphylaxis    Bactrim [Sulfamethoprim] Hives and Rash    Dilaudid [Hydromorphone (Bulk)] Other (comments)     Patient was confused for 5 days. Did not know who she was.     Morphine Itching    Pcn [Penicillins] Swelling     Past Medical History:   Diagnosis Date    Arthritis     Autonomic neuropathy     Back pain     Chronic lung disease     Chronic pain     back    Compression fracture T9-10    [facility] disease) [facility] [address]     Depression     panic attacks    Diabetes mellitus ([facility] [address]     type 1    GERD (gastroesophageal reflux disease)     Hypertension     no longer on meds    MRSA nasal colonization     Neuropathy     Osteoporosis     Tobacco use      Past Surgical History:   Procedure Laterality Date    HX BACK SURGERY  2009    HX DILATION AND CURETTAGE      HX HYSTERECTOMY  2002     Family History   Problem Relation Age of Onset    Thyroid Disease Mother     Stroke Mother    [facility] Arthritis-rheumatoid Mother     Osteoporosis Mother     Diabetes Father     Kidney Disease Father     Tip's Disease Father     Arthritis-rheumatoid Sister     Diabetes Brother     Colon Cancer Maternal Grandmother      Social History     Tobacco Use    Smoking status: Current Every Day Smoker     Packs/day: 1.00     Years: 43.00     Pack years: 43.00     Types: Cigarettes     Start date: 7/27/1976    Smokeless tobacco: Never Used    Tobacco comment: on Chantix   Substance Use Topics    Alcohol use: No       ROS as per HPI    Objective:     Patient-Reported Vitals 2/10/2022   Patient-Reported Weight 145   Patient-Reported Height 5'6\"      General: alert, cooperative, no distress   Mental  status: normal mood, behavior, speech, dress, motor activity, and thought processes, able to follow commands   HENT: NCAT   Neck: no visualized mass   Resp: no respiratory distress   Neuro: no gross deficits   Skin: no discoloration or lesions of concern on visible areas   Psychiatric: normal affect, consistent with stated mood, no evidence of hallucinations     Additional exam findings: None      We discussed the expected course, resolution and complications of the diagnosis(es) in detail. Medication risks, benefits, costs, interactions, and alternatives were discussed as indicated. I advised her to contact the office if her condition worsens, changes or fails to improve as anticipated.  She expressed understanding with the diagnosis(es) and plan. Maia Sterling, was evaluated through a synchronous (real-time) audio-video encounter. The patient (or guardian if applicable) is aware that this is a billable service, which includes applicable co-pays. Verbal consent to proceed has been obtained. The visit was conducted pursuant to the emergency declaration under the [address] [place] [address], [address] [place] [address] authority and the [employer] and [employer] General Act. Patient identification was verified, and a caregiver was present when appropriate. The patient was located at home in a state where the provider was licensed to provide care.     Albino Barney MD

## 2022-03-17 NOTE — PROGRESS NOTES
Richy Gustafson (: 1960) is a 63 y.o. female, established patient, here for evaluation of the following chief complaint(s):   Diabetes and Medication Refill         Richy Gustafson, was evaluated through a synchronous (real-time) audio-video encounter. The patient (or guardian if applicable) is aware that this is a billable service, which includes applicable co-pays. Verbal consent to proceed has been obtained. The visit was conducted pursuant to the emergency declaration under the [address] [place] [address] authority and the [employer] and [employer] General Act. Patient identification was verified, and a caregiver was present when appropriate. The patient was located at home in a state where the provider was licensed to provide care. An electronic signature was used to authenticate this note.   -- Marylene Sage

## 2022-03-18 PROBLEM — R63.2 OVEREATING: Status: ACTIVE | Noted: 2021-05-04

## 2022-03-18 PROBLEM — R55 SYNCOPE AND COLLAPSE: Status: ACTIVE | Noted: 2017-01-31

## 2022-03-18 PROBLEM — I10 HYPERTENSION: Status: ACTIVE | Noted: 2021-05-04

## 2022-03-18 PROBLEM — O24.419 GESTATIONAL DIABETES: Status: ACTIVE | Noted: 2021-05-04

## 2022-03-19 PROBLEM — E11.21 TYPE 2 DIABETES WITH NEPHROPATHY (HCC): Status: ACTIVE | Noted: 2019-06-03

## 2022-03-19 PROBLEM — N12 PYELONEPHRITIS: Status: ACTIVE | Noted: 2019-07-10

## 2022-03-19 PROBLEM — J44.9 STAGE 2 MODERATE COPD BY GOLD CLASSIFICATION (HCC): Status: ACTIVE | Noted: 2019-03-27

## 2022-03-19 PROBLEM — N12 EMPHYSEMATOUS PYELITIS: Status: ACTIVE | Noted: 2019-07-10

## 2022-03-20 PROBLEM — A41.9 SEPSIS (HCC): Status: ACTIVE | Noted: 2019-07-10

## 2022-03-20 PROBLEM — U07.1 COVID-19: Status: ACTIVE | Noted: 2021-05-04

## 2022-03-24 NOTE — PATIENT INSTRUCTIONS
Noninsulin Medicines for Type 2 Diabetes: Care Instructions  Overview     There are different types of noninsulin medicines for diabetes. Each works in a different way. But they all help you control your blood sugar. Some types help your body make insulin to lower your blood sugar. Others lower how much insulin your body needs. Some can slow how fast your body digests sugars. And some can remove extra glucose through your urine. You may need to take more than one medicine for diabetes. Two or more medicines may work better to lower your blood sugar level than just one does. · Metformin. This lowers how much glucose your liver makes. And it helps you respond better to insulin. It also lowers the amount of stored sugar that your liver releases when you are not eating. · Sulfonylureas. These help your body release more insulin. Some work for many hours. They can cause low blood sugar if you don't eat as you planned. An example is glipizide. · Thiazolidinediones. These reduce the amount of blood glucose. They also help you respond better to insulin. An example is pioglitazone. · SGLT2 inhibitors. These help to remove extra glucose through your urine. They may also help some people lose weight. An example is ertugliflozin. · DPP-4 inhibitors. These help your body raise the level of insulin after you eat. They also help your body make less of a hormone that raises blood sugar. An example is alogliptin. · GLP-1 receptor agonists. These help your body make a protein that can raise your insulin level and make you less hungry. They're given as shots or pills. An example is semaglutide. · Meglitinides. These help your body release insulin. They also help slow how your body digests sugars. So they can keep your blood sugar from rising too fast after you eat. · Alpha-glucosidase inhibitors. These keep starches from breaking down. This means that they lower the amount of glucose absorbed when you eat.  They don't help your body make more insulin. So they will not cause low blood sugar unless you use them with other medicines for diabetes. Follow-up care is a key part of your treatment and safety. Be sure to make and go to all appointments, and call your doctor if you are having problems. It's also a good idea to know your test results and keep a list of the medicines you take. How can you care for yourself at home? · Eat a healthy diet. Get some exercise each day. This may help you to reduce how much medicine you need. · Do not take other prescription or over-the-counter medicines, vitamins, herbal products, or supplements without talking to your doctor first. Some medicines for type 2 diabetes can cause problems with other medicines or supplements. · Tell your doctor if you plan to get pregnant. Some of these drugs are not safe for pregnant women. · Be safe with medicines. Take your medicines exactly as prescribed. Meglitinides and sulfonylureas can cause your blood sugar to drop very low. Call your doctor if you think you are having a problem with your medicine. · Check your blood sugar often. You can use a glucose monitor. Keeping track can help you know how certain foods, activities, and medicines affect your blood sugar. And it can help you keep your blood sugar from getting so low that it's not safe. When should you call for help? Call 911 anytime you think you may need emergency care. For example, call if:    · You passed out (lost consciousness).     · You are confused or cannot think clearly.     · Your blood sugar is very high or very low. Watch closely for changes in your health, and be sure to contact your doctor if:    · Your blood sugar stays outside the level your doctor set for you.     · You have any problems. Where can you learn more?   Go to http://www.gray.com/  Enter H153 in the search box to learn more about \"Noninsulin Medicines for Type 2 Diabetes: Care Instructions. \"  Current as of: July 28, 2021               Content Version: 13.2  © 2664-6105 Healthwise, [facility]. Care instructions adapted under license by [email or web address] (which disclaims liability or warranty for this information). If you have questions about a medical condition or this instruction, always ask your healthcare professional. [place] [address] any warranty or liability for your use of this information.

## 2022-04-04 ENCOUNTER — OFFICE VISIT (OUTPATIENT)
Dept: FAMILY MEDICINE CLINIC | Age: 62
End: 2022-04-04
Payer: MEDICARE

## 2022-04-04 ENCOUNTER — HOSPITAL ENCOUNTER (OUTPATIENT)
Dept: LAB | Age: 62
Discharge: HOME | End: 2022-04-04
Payer: MEDICARE

## 2022-04-04 VITALS
OXYGEN SATURATION: 98 % | SYSTOLIC BLOOD PRESSURE: 119 MMHG | WEIGHT: 141 LBS | HEIGHT: 67 IN | TEMPERATURE: 98 F | BODY MASS INDEX: 22.13 KG/M2 | RESPIRATION RATE: 18 BRPM | HEART RATE: 79 BPM | DIASTOLIC BLOOD PRESSURE: 72 MMHG

## 2022-04-04 DIAGNOSIS — E10.65 HYPERGLYCEMIA DUE TO TYPE 1 DIABETES MELLITUS (HCC): Primary | ICD-10-CM

## 2022-04-04 DIAGNOSIS — E11.42 DIABETIC POLYNEUROPATHY ASSOCIATED WITH TYPE 2 DIABETES MELLITUS (HCC): ICD-10-CM

## 2022-04-04 DIAGNOSIS — B37.31 CANDIDA VAGINITIS: ICD-10-CM

## 2022-04-04 LAB
CHOLEST SERPL-MCNC: 160 MG/DL
CREAT UR-MCNC: 30 MG/DL (ref 30–125)
EST. AVERAGE GLUCOSE BLD GHB EST-MCNC: 332 MG/DL
HBA1C MFR BLD: 13.2 % (ref 4.2–5.6)
HDLC SERPL-MCNC: 74 MG/DL (ref 40–60)
HDLC SERPL: 2.2 {RATIO} (ref 0–5)
LDLC SERPL CALC-MCNC: 64.4 MG/DL (ref 0–100)
LIPID PROFILE,FLP: ABNORMAL
MICROALBUMIN UR-MCNC: 1.42 MG/DL (ref 0–3)
MICROALBUMIN/CREAT UR-RTO: 47 MG/G (ref 0–30)
TRIGL SERPL-MCNC: 108 MG/DL (ref ?–150)
VLDLC SERPL CALC-MCNC: 21.6 MG/DL

## 2022-04-04 PROCEDURE — 3017F COLORECTAL CA SCREEN DOC REV: CPT | Performed by: FAMILY MEDICINE

## 2022-04-04 PROCEDURE — 36415 COLL VENOUS BLD VENIPUNCTURE: CPT

## 2022-04-04 PROCEDURE — 82043 UR ALBUMIN QUANTITATIVE: CPT

## 2022-04-04 PROCEDURE — G8427 DOCREV CUR MEDS BY ELIG CLIN: HCPCS | Performed by: FAMILY MEDICINE

## 2022-04-04 PROCEDURE — G9231 DOC ESRD DIA TRANS PREG: HCPCS | Performed by: FAMILY MEDICINE

## 2022-04-04 PROCEDURE — 99214 OFFICE O/P EST MOD 30 MIN: CPT | Performed by: FAMILY MEDICINE

## 2022-04-04 PROCEDURE — 2022F DILAT RTA XM EVC RTNOPTHY: CPT | Performed by: FAMILY MEDICINE

## 2022-04-04 PROCEDURE — 83036 HEMOGLOBIN GLYCOSYLATED A1C: CPT

## 2022-04-04 PROCEDURE — 80061 LIPID PANEL: CPT

## 2022-04-04 PROCEDURE — 3046F HEMOGLOBIN A1C LEVEL >9.0%: CPT | Performed by: FAMILY MEDICINE

## 2022-04-04 PROCEDURE — G8420 CALC BMI NORM PARAMETERS: HCPCS | Performed by: FAMILY MEDICINE

## 2022-04-04 PROCEDURE — G9899 SCRN MAM PERF RSLTS DOC: HCPCS | Performed by: FAMILY MEDICINE

## 2022-04-04 PROCEDURE — G9717 DOC PT DX DEP/BP F/U NT REQ: HCPCS | Performed by: FAMILY MEDICINE

## 2022-04-04 RX ORDER — TERCONAZOLE 4 MG/G
1 CREAM VAGINAL
Qty: 45 G | Refills: 0 | Status: SHIPPED | OUTPATIENT
Start: 2022-04-04

## 2022-04-04 RX ORDER — FLASH GLUCOSE SENSOR
KIT MISCELLANEOUS
Qty: 2 KIT | Refills: 11 | Status: SHIPPED | OUTPATIENT
Start: 2022-04-04 | End: 2022-10-06 | Stop reason: SDUPTHER

## 2022-04-04 RX ORDER — FLASH GLUCOSE SCANNING READER
EACH MISCELLANEOUS
Qty: 1 EACH | Refills: 2 | Status: SHIPPED | OUTPATIENT
Start: 2022-04-04 | End: 2022-10-06 | Stop reason: SDUPTHER

## 2022-04-04 RX ORDER — BLOOD-GLUCOSE,RECEIVER,CONT
EACH MISCELLANEOUS
Qty: 1 EACH | Refills: 3 | Status: SHIPPED | OUTPATIENT
Start: 2022-04-04

## 2022-04-04 RX ORDER — BLOOD-GLUCOSE SENSOR
EACH MISCELLANEOUS
Qty: 5 EACH | Refills: 2 | Status: SHIPPED | OUTPATIENT
Start: 2022-04-04

## 2022-04-04 NOTE — PATIENT INSTRUCTIONS
Learning About Meal Planning for Diabetes  Why plan your meals? Meal planning can be a key part of managing diabetes. Planning meals and snacks with the right balance of carbohydrate, protein, and fat can help you keep your blood sugar at the target level you set with your doctor. You don't have to eat special foods. You can eat what your family eats, including sweets once in a while. But you do have to pay attention to how often you eat and how much you eat of certain foods. You may want to work with a dietitian or a diabetes educator. They can give you tips and meal ideas and can answer your questions about meal planning. This health professional can also help you reach a healthy weight if that is one of your goals. What plan is right for you? Your dietitian or diabetes educator may suggest that you start with the plate format or carbohydrate counting. The plate format  The plate format is a simple way to help you manage how you eat. You plan meals by learning how much space each food should take on a plate. Using the plate format helps you manage the amount of carbohydrate you eat. It can make it easier to keep your blood sugar level within your target range. It also helps you see if you're eating healthy portion sizes. To use the plate format, you put non-starchy vegetables on half your plate. Add lean protein foods, such as fish, lean meats and poultry, or soy products, on one-quarter of the plate. Put a grain or starchy vegetable (such as brown rice or a potato) on the final quarter of the plate. You can add a small piece of fruit and some low-fat or fat-free milk or yogurt, depending on your carbohydrate goal for each meal.  Here are some tips for using the plate format:  · Make sure that you are not using an oversized plate. A 9-inch plate is best. Many restaurants use larger plates. · Get used to using the plate format at home. Then you can use it when you eat out.   · Write down your questions about using the plate format. Talk to your doctor, a dietitian, or a diabetes educator about your concerns. Carbohydrate counting  With carbohydrate counting, you plan meals based on the amount of carbohydrate in each food. Carbohydrate raises blood sugar higher and more quickly than any other nutrient. It is found in desserts, breads and cereals, and fruit. It's also found in starchy vegetables such as potatoes and corn, grains such as rice and pasta, and milk and yogurt. You can help keep your blood sugar levels within your target range by planning how much carbohydrate to have at meals and snacks. The amount you need depends on several things. These include your weight, how active you are, which diabetes medicines you take, and what your goals are for your blood sugar levels. A registered dietitian or diabetes educator can help you plan how much carbohydrate to include in each meal and snack. An example of a carbohydrate counting plan is:  · 45 to 60 grams at each meal. That's about the same as 3 to 4 carbohydrate servings. · 15 to 20 grams at each snack. That's about the same as 1 carbohydrate serving. The Nutrition Facts label on packaged foods tells you how much carbohydrate is in a serving of the food. First, look at the serving size on the food label. Is that the amount you eat in a serving? All of the nutrition information on a food label is based on that serving size. So if you eat more or less than that, you'll need to adjust the other numbers. Total carbohydrate is the next thing you need to look for on the label. If you count carbohydrate servings, one serving of carbohydrate is 15 grams. For foods that don't come with labels, such as fresh fruits and vegetables, you'll need a guide that lists carbohydrate in these foods. Ask your doctor, dietitian, or diabetes educator about books or other nutrition guides you can use.   If you take insulin, you need to know how many grams of carbohydrate are in a meal. This lets you know how much rapid-acting insulin to take before you eat. If you use an insulin pump, you get a constant rate of insulin during the day. So the pump must be programmed at meals to give you extra insulin to cover the rise in blood sugar after meals. When you know how much carbohydrate you will eat, you can take the right amount of insulin. Or, if you always use the same amount of insulin, you need to make sure that you eat the same amount of carbohydrate at meals. If you need more help to understand carbohydrate counting and food labels, ask your doctor, dietitian, or diabetes educator. How can you plan healthy meals? Here are some tips to get started:  · Plan your meals a week at a time. Don't forget to include snacks too. · Use cookbooks or online recipes to plan several main meals. Plan some quick meals for busy nights. You also can double some recipes that freeze well. Then you can save half for other busy nights when you don't have time to cook. · Make sure you have the ingredients you need for your recipes. If you're running low on basic items, put these items on your shopping list too. · List foods that you use to make breakfasts, lunches, and snacks. List plenty of fruits and vegetables. · Post this list on the refrigerator. Add to it as you think of more things you need. · Take the list to the store to do your weekly shopping. Follow-up care is a key part of your treatment and safety. Be sure to make and go to all appointments, and call your doctor if you are having problems. It's also a good idea to know your test results and keep a list of the medicines you take. Where can you learn more? Go to http://www.gray.com/  Enter A732 in the search box to learn more about \"Learning About Meal Planning for Diabetes. \"  Current as of: September 8, 2021               Content Version: 13.2  © 5191-5418 Healthwise, [facility].    Care instructions adapted under license by [employer] (which disclaims liability or warranty for this information). If you have questions about a medical condition or this instruction, always ask your healthcare professional. [address] any warranty or liability for your use of this information.

## 2022-04-04 NOTE — PROGRESS NOTES
1. \"Have you been to the ER, urgent care clinic since your last visit? Hospitalized since your last visit? \" No    2. \"Have you seen or consulted any other health care providers outside of the [address] [place] [address] since your last visit? \" No     3. For patients aged 39-70: Has the patient had a colonoscopy / FIT/ Cologuard? No      If the patient is female:    4. For patients aged 41-77: Has the patient had a mammogram within the past 2 years? Yes - no Care Gap present      5. For patients aged 21-65: Has the patient had a pap smear?  No

## 2022-04-04 NOTE — PROGRESS NOTES
HPI:  Merline Peacemaker is a 63 y.o. female who presents today with   Chief Complaint   Patient presents with    Hypertension    Cholesterol Problem    Diabetes    Other     pt states she has a yeast infection         Pt has Type 1 diabetes. Her Diabetic follow up has not been consistent; Her last A1c was elevated. She has also been noncompliant with her treatment regimen. She requests a Dexcom meter. She is due for blood test.      BP - has been stable  Patient's cholesterol is stable    Patient also has symptoms of a vaginal yeast infection. She requests a medication to treat this  Lab Results   Component Value Date/Time    Cholesterol, total 134 01/09/2019 09:20 AM    HDL Cholesterol 45 01/09/2019 09:20 AM    LDL, calculated 71.8 01/09/2019 09:20 AM    VLDL, calculated 17.2 01/09/2019 09:20 AM    Triglyceride 86 01/09/2019 09:20 AM    CHOL/HDL Ratio 3.0 01/09/2019 09:20 AM     Lab Results   Component Value Date/Time    Sodium 141 04/13/2021 05:30 AM    Potassium 4.0 04/13/2021 05:30 AM    Chloride 110 04/13/2021 05:30 AM    CO2 21 04/13/2021 05:30 AM    Anion gap 10 04/13/2021 05:30 AM    Glucose 220 (H) 04/13/2021 05:30 AM    BUN 26 (H) 04/13/2021 05:30 AM    Creatinine 0.88 04/13/2021 05:30 AM    BUN/Creatinine ratio 30 (H) 04/13/2021 05:30 AM    GFR est AA >60 04/13/2021 05:30 AM    GFR est non-AA >60 04/13/2021 05:30 AM    Calcium 8.3 (L) 04/13/2021 05:30 AM    Bilirubin, total 0.9 04/11/2021 11:45 PM    Alk.  phosphatase 111 04/11/2021 11:45 PM    Protein, total 7.2 04/11/2021 11:45 PM    Albumin 3.3 (L) 04/11/2021 11:45 PM    Globulin 3.9 04/11/2021 11:45 PM    A-G Ratio 0.8 04/11/2021 11:45 PM    ALT (SGPT) 19 04/11/2021 11:45 PM    AST (SGOT) 13 04/11/2021 11:45 PM       Lab Results   Component Value Date/Time    Hemoglobin A1c 12.9 (H) 04/12/2021 04:40 AM    Hemoglobin A1c (POC) 7.8 05/14/2012 04:16 PM             3 most recent PHQ Screens 4/4/2022   PHQ Not Done Patient refuses   Little interest or pleasure in doing things -   Feeling down, depressed, irritable, or hopeless -   Total Score PHQ 2 -   Trouble falling or staying asleep, or sleeping too much -   Feeling tired or having little energy -   Poor appetite, weight loss, or overeating -   Feeling bad about yourself - or that you are a failure or have let yourself or your family down -   Trouble concentrating on things such as school, work, reading, or watching TV -   Moving or speaking so slowly that other people could have noticed; or the opposite being so fidgety that others notice -   Thoughts of being better off dead, or hurting yourself in some way -   PHQ 9 Score -   How difficult have these problems made it for you to do your work, take care of your home and get along with others -               PMH,  Meds, Allergies, Family History, Social history reviewed      Current Outpatient Medications   Medication Sig Dispense Refill    omeprazole (PRILOSEC) 40 mg capsule Take 1 Capsule by mouth daily. 30 Capsule 0    alendronate (FOSAMAX) 70 mg tablet Take 1 Tablet by mouth every seven (7) days. 4 Tablet 3    insulin detemir U-100 (LEVEMIR FLEXTOUCH) 100 unit/mL (3 mL) inpn Inject  30 units  subcu qam and 40 units subcu  qpm. 5 Adjustable Dose Pre-filled Pen Syringe 1    Insulin Needles, Disposable, (Easy Comfort Pen Needles) 32 gauge x 5/32\" ndle Use with levemir flextouch pen as directed 100 Pen Needle 1    gabapentin (NEURONTIN) 300 mg capsule Take 2 Capsules by mouth nightly. 60 Capsule 2    pravastatin (PRAVACHOL) 40 mg tablet Take 1 Tablet by mouth nightly. 90 Tablet 0    Blood-Glucose Meter,Continuous (Dexcom G5 ) misc Blood sugar check daily 1 Each 3    Blood-Glucose Sensor (Dexcom G5-G4 Sensor) ar Blood sugar check daily 5 Each 2    FLUoxetine (PROzac) 40 mg capsule take 1 capsule by mouth once daily 30 Capsule 6    fluticasone propionate (FLONASE) 50 mcg/actuation nasal spray 2 Sprays by Both Nostrils route daily.  1 Bottle 3  calcium-cholecalciferol, d3, (CALCIUM 600 + D) 600-125 mg-unit tab Take 1,200 mg by mouth daily.  tiotropium (SPIRIVA) 18 mcg inhalation capsule Take 1 Cap by inhalation daily. 30 Cap 4    budesonide-formoteroL (SYMBICORT) 160-4.5 mcg/actuation HFAA Take 1 Puff by inhalation two (2) times a day. 1 Inhaler 3    albuterol (PROVENTIL HFA, VENTOLIN HFA, PROAIR HFA) 90 mcg/actuation inhaler Take 2 Puffs by inhalation every four (4) hours as needed for Wheezing or Shortness of Breath. 2 Inhaler 3    insulin detemir U-100 (LEVEMIR FLEXTOUCH) 100 unit/mL (3 mL) inpn 45 Units by SubCUTAneous route ACB/HS. Indications: 18 UNITS IN AM 30 UNITS IN THE PM (Patient taking differently: 40 Units by SubCUTAneous route nightly. 30 unit in am and 40 units in pm  Indications: 18 UNITS IN AM 30 UNITS IN THE PM) 45 Units 0    insulin aspart U-100 (NOVOLOG FLEXPEN U-100 INSULIN) 100 unit/mL (3 mL) inpn Each meal, per sliding scale (Patient taking differently: Each meal, per sliding scale 5 units-15 units) 2 Pen 2    LORazepam (ATIVAN) 0.5 mg tablet Take 1 Tab by mouth every eight (8) hours as needed for Anxiety. Max Daily Amount: 1.5 mg. 60 Tab 0    flash glucose sensor (FREESTYLE [place] 14 DAY SENSOR) kit Apply every 14 days to monitor blood sugar 2 Kit 11        Allergies   Allergen Reactions    Penicillin G Anaphylaxis    Bactrim [Sulfamethoprim] Hives and Rash    Dilaudid [Hydromorphone (Bulk)] Other (comments)     Patient was confused for 5 days. Did not know who she was.     Morphine Itching    Pcn [Penicillins] Swelling                  ROS as per HPI        Visit Vitals  /72 (BP 1 Location: Left upper arm, BP Patient Position: Sitting)   Pulse 79   Temp 98 °F (36.7 °C) (Temporal)   Resp 18   Ht 5' 7\" (1.702 m)   Wt 141 lb (64 kg)   SpO2 98%   BMI 22.08 kg/m²     Physical Exam   General appearance: alert, cooperative, no distress, appears stated age  Neck: supple, symmetrical, trachea midline, no adenopathy, thyroid: not enlarged, symmetric, no tenderness/mass/nodules, no carotid bruit and no JVD  Lungs: clear to auscultation bilaterally  Heart: regular rate and rhythm, S1, S2 normal, no murmur, click, rub or gallop    Extremities: extremities normal, atraumatic, no cyanosis or edema      Lab Results   Component Value Date/Time    Cholesterol, total 134 01/09/2019 09:20 AM    HDL Cholesterol 45 01/09/2019 09:20 AM    LDL, calculated 71.8 01/09/2019 09:20 AM    VLDL, calculated 17.2 01/09/2019 09:20 AM    Triglyceride 86 01/09/2019 09:20 AM    CHOL/HDL Ratio 3.0 01/09/2019 09:20 AM     Lab Results   Component Value Date/Time    Sodium 141 04/13/2021 05:30 AM    Potassium 4.0 04/13/2021 05:30 AM    Chloride 110 04/13/2021 05:30 AM    CO2 21 04/13/2021 05:30 AM    Anion gap 10 04/13/2021 05:30 AM    Glucose 220 (H) 04/13/2021 05:30 AM    BUN 26 (H) 04/13/2021 05:30 AM    Creatinine 0.88 04/13/2021 05:30 AM    BUN/Creatinine ratio 30 (H) 04/13/2021 05:30 AM    GFR est AA >60 04/13/2021 05:30 AM    GFR est non-AA >60 04/13/2021 05:30 AM    Calcium 8.3 (L) 04/13/2021 05:30 AM    Bilirubin, total 0.9 04/11/2021 11:45 PM    Alk. phosphatase 111 04/11/2021 11:45 PM    Protein, total 7.2 04/11/2021 11:45 PM    Albumin 3.3 (L) 04/11/2021 11:45 PM    Globulin 3.9 04/11/2021 11:45 PM    A-G Ratio 0.8 04/11/2021 11:45 PM    ALT (SGPT) 19 04/11/2021 11:45 PM    AST (SGOT) 13 04/11/2021 11:45 PM     Lab Results   Component Value Date/Time    Hemoglobin A1c 12.9 (H) 04/12/2021 04:40 AM    Hemoglobin A1c (POC) 7.8 05/14/2012 04:16 PM         Assessment/Plan:    Diagnoses and all orders for this visit:    1. Hyperglycemia due to type 1 diabetes mellitus ([facility] [address]  -     REFERRAL TO ENDOCRINOLOGY  -     LIPID PANEL; Future  -     HEMOGLOBIN A1C WITH EAG; Future  -     MICROALBUMIN, UR, RAND W/ MICROALB/CREAT RATIO; Future    2.  Candida vaginitis    Other orders  -     Blood-Glucose Meter,Continuous (Dexcom G5 ) misc; Blood sugar check daily  -     Blood-Glucose Sensor (Dexcom G5-G4 Sensor) ar; Blood sugar check daily  -     flash glucose sensor (FreeStyle [place] 14 Day Sensor) kit; Apply every 14 days to monitor blood sugar  -     flash glucose scanning reader (FreeStyle [place] 14 Day [place]) misc; Blood sugar check  -     terconazole (TERAZOL 7) 0.4 % vaginal cream; Insert 1 Applicator into vagina nightly. As above   treatment plan as listed below  Orders Placed This Encounter    LIPID PANEL    HEMOGLOBIN A1C WITH EAG    MICROALBUMIN, UR, RAND W/ MICROALB/CREAT RATIO    REFERRAL TO ENDOCRINOLOGY    Blood-Glucose Meter,Continuous (Dexcom G5 ) misc    Blood-Glucose Sensor (Dexcom G5-G4 Sensor) ar    flash glucose sensor (FreeStyle [place] 14 Day Sensor) kit    flash glucose scanning reader (FreeStyle [place] 14 Day [place]) misc    terconazole (TERAZOL 7) 0.4 % vaginal cream     Endocrinology referral -of note patient has previously been a patient of Dr. Tacho Gil, Dr. Castillo Calderon, she would like to also states closer to this area for endocrinology referral.  Patient has also been to the diabetes [place]. Follow-up and Dispositions    · Return in about 4 months (around 8/4/2022) for Chol. This has been fully explained to the patient, who indicates understanding. An After Visit Summary was printed and given to the patient.                Cheryl Brown MD

## 2022-04-29 RX ORDER — OMEPRAZOLE 40 MG/1
CAPSULE, DELAYED RELEASE ORAL
Qty: 30 CAPSULE | Refills: 0 | Status: SHIPPED | OUTPATIENT
Start: 2022-04-29

## 2022-05-12 ENCOUNTER — TELEPHONE (OUTPATIENT)
Dept: FAMILY MEDICINE CLINIC | Age: 62
End: 2022-05-12

## 2022-06-05 RX ORDER — INSULIN DETEMIR 100 [IU]/ML
INJECTION, SOLUTION SUBCUTANEOUS
Qty: 30 ML | Refills: 1 | Status: SHIPPED | OUTPATIENT
Start: 2022-06-05 | End: 2022-10-06 | Stop reason: SDUPTHER

## 2022-10-06 ENCOUNTER — VIRTUAL VISIT (OUTPATIENT)
Dept: FAMILY MEDICINE CLINIC | Age: 62
End: 2022-10-06
Payer: MEDICARE

## 2022-10-06 DIAGNOSIS — E10.65 HYPERGLYCEMIA DUE TO TYPE 1 DIABETES MELLITUS (HCC): Primary | ICD-10-CM

## 2022-10-06 DIAGNOSIS — J44.9 COPD MIXED TYPE (HCC): ICD-10-CM

## 2022-10-06 DIAGNOSIS — E10.42 DIABETIC POLYNEUROPATHY ASSOCIATED WITH TYPE 1 DIABETES MELLITUS (HCC): ICD-10-CM

## 2022-10-06 PROCEDURE — G9231 DOC ESRD DIA TRANS PREG: HCPCS | Performed by: FAMILY MEDICINE

## 2022-10-06 PROCEDURE — 3017F COLORECTAL CA SCREEN DOC REV: CPT | Performed by: FAMILY MEDICINE

## 2022-10-06 PROCEDURE — G8427 DOCREV CUR MEDS BY ELIG CLIN: HCPCS | Performed by: FAMILY MEDICINE

## 2022-10-06 PROCEDURE — G9717 DOC PT DX DEP/BP F/U NT REQ: HCPCS | Performed by: FAMILY MEDICINE

## 2022-10-06 PROCEDURE — G8420 CALC BMI NORM PARAMETERS: HCPCS | Performed by: FAMILY MEDICINE

## 2022-10-06 PROCEDURE — G9899 SCRN MAM PERF RSLTS DOC: HCPCS | Performed by: FAMILY MEDICINE

## 2022-10-06 PROCEDURE — 99214 OFFICE O/P EST MOD 30 MIN: CPT | Performed by: FAMILY MEDICINE

## 2022-10-06 PROCEDURE — 3046F HEMOGLOBIN A1C LEVEL >9.0%: CPT | Performed by: FAMILY MEDICINE

## 2022-10-06 PROCEDURE — 2022F DILAT RTA XM EVC RTNOPTHY: CPT | Performed by: FAMILY MEDICINE

## 2022-10-06 RX ORDER — GABAPENTIN 300 MG/1
600 CAPSULE ORAL
Qty: 60 CAPSULE | Refills: 0 | Status: SHIPPED | OUTPATIENT
Start: 2022-10-06

## 2022-10-06 RX ORDER — FLASH GLUCOSE SENSOR
KIT MISCELLANEOUS
Qty: 2 KIT | Refills: 11 | Status: SHIPPED | OUTPATIENT
Start: 2022-10-06

## 2022-10-06 RX ORDER — FLASH GLUCOSE SCANNING READER
EACH MISCELLANEOUS
Qty: 1 EACH | Refills: 2 | Status: SHIPPED | OUTPATIENT
Start: 2022-10-06

## 2022-10-06 RX ORDER — INSULIN DETEMIR 100 [IU]/ML
INJECTION, SOLUTION SUBCUTANEOUS
Qty: 30 ML | Refills: 1 | Status: SHIPPED | OUTPATIENT
Start: 2022-10-06

## 2022-10-06 RX ORDER — VARENICLINE TARTRATE 1 MG/1
1 TABLET, FILM COATED ORAL 2 TIMES DAILY
Qty: 60 TABLET | Refills: 2 | Status: SHIPPED | OUTPATIENT
Start: 2022-10-06

## 2022-10-06 NOTE — PROGRESS NOTES
Josefina Shepard (: 1960) is a 63 y.o. female, established patient, here for evaluation of the following chief complaint(s):   Diabetes and Medication Refill           Josefina Shepard, was evaluated through a synchronous (real-time) audio-video encounter. The patient (or guardian if applicable) is aware that this is a billable service, which includes applicable co-pays. This Virtual Visit was conducted with patient's (and/or legal guardian's) consent. The visit was conducted pursuant to the emergency declaration under the [address] [place] [address], [address] [place] [address] [facility] authority and the [employer] General Act. Patient identification was verified, and a caregiver was present when appropriate. The patient was located at: Home: [address] [address]  The provider was located at: Facility (Appt Department): [address] [place] [address] Rd  [address]       An [address] [facility] was used to authenticate this note.   -- Polly Alfonso

## 2022-10-16 NOTE — PROGRESS NOTES
Sis Nieto is a 63 y.o. female who was seen by synchronous (real-time) audio-video technology on 10/6/2022 for Diabetes and Medication Refill        Assessment & Plan:   Diagnoses and all orders for this visit:    1. Hyperglycemia due to type 1 diabetes mellitus (HCC)  -     gabapentin (NEURONTIN) 300 mg capsule; Take 2 Capsules by mouth nightly. 2. COPD mixed type ([address]    3. Diabetic polyneuropathy associated with type 1 diabetes mellitus (HCC)  -     gabapentin (NEURONTIN) 300 mg capsule; Take 2 Capsules by mouth nightly. Other orders  -     flash glucose scanning reader (FreeStyle Hugo 14 Day [place]) misc; Blood sugar check  -     flash glucose sensor (FreeStyle Hugo 14 Day Sensor) kit; Apply every 14 days to monitor blood sugar  -     insulin detemir U-100 (Levemir FlexTouch U-100 Insuln) 100 unit/mL (3 mL) inpn; INJECT 30 UNITS UNDER THE SKIN EVERY MORNING AND 40 UNITS UNDER THE SKIN  EVERY EVENING  -     varenicline (Chantix) 1 mg tablet; Take 1 Tablet by mouth two (2) times a day. As above  Refilled gabapentin  Refilled medications and devices as ordered  We will check the status of patient's referral  to Dr. Daysi Wallace which she has not heard about; Follow-up and Dispositions    Return in about 4 months (around 2/6/2023) for Chol. This has been fully explained to the patient, who indicates understanding. AVS is accessible thru [facility] and pt has been advised of same. Subjective:   Patient has had a recent head cold. She thinks her symptoms are getting better. Patient has type 1 diabetes. She has evidence of polyneuropathy as well as elevated A1c. Patient has been to multiple local endocrinologist including Dr. Clary Foley, and Dr. Tootie Calderon. She has also [facility] to [facility] [place]. A referral has been in progress to find an endocrinologist for her. She also request an endocrinologist who is close to where she lives. Patient has not moved from the area. Patient requests an order for Chantix. She desires to quit smoking. Patient does have COPD. Patient is also no longer taking Prozac and states she feels better without the Prozac. She is due for blood work. Prior to Admission medications    Medication Sig Start Date End Date Taking? Authorizing Provider   flash glucose scanning reader [facility] 14 Day [place]) misc Blood sugar check 10/6/22  Yes Liz Chandler MD   flash glucose sensor (FreeStyle Hugo 14 Day Sensor) kit Apply every 14 days to monitor blood sugar 10/6/22  Yes Liz Chandler MD   gabapentin (NEURONTIN) 300 mg capsule Take 2 Capsules by mouth nightly. 10/6/22  Yes Liz Chandler MD   insulin detemir U-100 (Levemir FlexTouch U-100 Insuln) 100 unit/mL (3 mL) inpn INJECT 30 UNITS UNDER THE SKIN EVERY MORNING AND 40 UNITS UNDER THE SKIN  EVERY EVENING 10/6/22  Yes Liz Chandler MD   varenicline (Chantix) 1 mg tablet Take 1 Tablet by mouth two (2) times a day. 10/6/22  Yes Liz Chandler MD   Insulin Needles, Disposable, (Easy Comfort Pen Needles) 32 gauge x 5/32\" ndle Use with levemir flextouch pen as directed 3/17/22  Yes Liz Chandler MD   pravastatin (PRAVACHOL) 40 mg tablet Take 1 Tablet by mouth nightly. 3/17/22  Yes Liz Chandler MD   fluticasone propionate (FLONASE) 50 mcg/actuation nasal spray 2 Sprays by Both Nostrils route daily. 5/9/21  Yes Liz Chandler MD   calcium-cholecalciferol, d3, (CALCIUM 600 + D) 600-125 mg-unit tab Take 1,200 mg by mouth daily. Yes Provider, Historical   tiotropium (SPIRIVA) 18 mcg inhalation capsule Take 1 Cap by inhalation daily. 5/4/21  Yes Jaime Manley MD   budesonide-formoteroL (SYMBICORT) 160-4.5 mcg/actuation HFAA Take 1 Puff by inhalation two (2) times a day.  5/4/21  Yes Jaime Manley MD   albuterol (PROVENTIL HFA, VENTOLIN HFA, PROAIR HFA) 90 mcg/actuation inhaler Take 2 Puffs by inhalation every four (4) hours as needed for Wheezing or Shortness of Breath. 5/4/21  Yes Sukumar Castle MD   insulin aspart U-100 (NOVOLOG FLEXPEN U-100 INSULIN) 100 unit/mL (3 mL) inpn Each meal, per sliding scale  Patient taking differently: Each meal, per sliding scale 5 units-15 units 5/22/19  Yes Adonis Shea NP   LORazepam (ATIVAN) 0.5 mg tablet Take 1 Tab by mouth every eight (8) hours as needed for Anxiety. Max Daily Amount: 1.5 mg. 4/26/19  Yes Sukumar Castle MD   omeprazole (PRILOSEC) 40 mg capsule take 1 capsule by mouth once daily 4/29/22   Devin David MD   Blood-Glucose Meter,Continuous (Dexcom G5 ) misc Blood sugar check daily 4/4/22   Devin David MD   Blood-Glucose Sensor (Dexcom G5-G4 Sensor) ar Blood sugar check daily 4/4/22   Devin David MD   terconazole (TERAZOL 7) 0.4 % vaginal cream Insert 1 Applicator into vagina nightly. Patient not taking: Reported on 10/6/2022 4/4/22   Devin David MD   FLUoxetine (PROzac) 40 mg capsule take 1 capsule by mouth once daily  Patient not taking: Reported on 10/6/2022 1/8/22   Devin David MD   insulin detemir U-100 (LEVEMIR FLEXTOUCH) 100 unit/mL (3 mL) inpn 45 Units by SubCUTAneous route ACB/HS. Indications: 18 UNITS IN AM 30 UNITS IN THE PM  Patient taking differently: 40 Units by SubCUTAneous route nightly.  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    Pyelonephritis 07/10/2019    Sepsis ([facility] 07/10/2019    Stage 2 moderate COPD by GOLD classification ([facility] 03/27/2019    Syncope and collapse 01/31/2017    Gastroesophageal reflux disease 09/13/2016    Thoracic compression fracture ([facility] 02/02/2016    Abdominal pain, epigastric 07/17/2014    Diabetes mellitus type 1 ([facility] 05/14/2012    Diabetic neuropathy ([facility] 05/14/2012    Osteoporosis 05/14/2012    Proteinuria 05/14/2012    HLD (hyperlipidemia) 05/14/2012    Depression 05/14/2012    Tobacco abuse 05/14/2012    Surgical menopause 05/14/2012     Allergies   Allergen Reactions    Penicillin G Anaphylaxis    Bactrim [Sulfamethoprim] Hives and Rash    Dilaudid [Hydromorphone (Bulk)] Other (comments)     Patient was confused for 5 days. Did not know who she was.     Morphine Itching    Pcn [Penicillins] Swelling     Past Medical History:   Diagnosis Date    Arthritis     Autonomic neuropathy     Back pain     Chronic lung disease     Chronic pain     back    Compression fracture T9-10    COPD (chronic obstructive pulmonary disease) ([facility])     Depression     panic attacks    Diabetes mellitus ([facility])     type 1    GERD (gastroesophageal reflux disease)     Hypertension     no longer on meds    MRSA nasal colonization     Neuropathy     Osteoporosis     Tobacco use      Past Surgical History:   Procedure Laterality Date    HX BACK SURGERY  2009    HX DILATION AND CURETTAGE      HX HYSTERECTOMY  2002     Family History   Problem Relation Age of Onset    Thyroid Disease Mother     Stroke Mother     Arthritis-rheumatoid Mother     Osteoporosis Mother     Diabetes Father     Kidney Disease Father     Tip's Disease Father     Arthritis-rheumatoid Sister     Diabetes Brother     Colon Cancer Maternal Grandmother      Social History     Tobacco Use    Smoking status: Every Day     Packs/day: 1.00     Years: 43.00     Pack years: 43.00     Types: Cigarettes     Start date: 7/27/1976    Smokeless tobacco: Never    Tobacco comments:     on Chantix   Substance Use Topics    Alcohol use: No       ROS as per hpi      Objective:     Patient-Reported Vitals 2/10/2022   Patient-Reported Weight 145   Patient-Reported Height 5'6\"      General: alert, cooperative, no distress   Mental  status: normal mood, behavior, speech, dress, motor activity, and thought processes, able to follow commands   HENT: NCAT   Neck: no visualized mass   Resp: no respiratory distress   Neuro: no gross deficits   Skin: no discoloration or lesions of concern on visible areas   Psychiatric: normal affect, consistent with stated mood, no evidence of hallucinations     Additional exam findings: none      We discussed the expected course, resolution and complications of the diagnosis(es) in detail. Medication risks, benefits, costs, interactions, and alternatives were discussed as indicated. I advised her to contact the office if her condition worsens, changes or fails to improve as anticipated. She expressed understanding with the diagnosis(es) and plan. Be Mcadams, was evaluated through a synchronous (real-time) audio-video encounter. The patient (or guardian if applicable) is aware that this is a billable service, which includes applicable co-pays. This Virtual Visit was conducted with patient's (and/or legal guardian's) consent. The visit was conducted pursuant to the emergency declaration under the [address] [place] [address] authority and the [employer] Act. Patient identification was verified, and a caregiver was present when appropriate. The patient was located at: Home: [address] [address]  The provider was located at:  Facility (Appt Department): [id number] Kevin Partida MD

## 2022-12-28 ENCOUNTER — HOSPITAL ENCOUNTER (EMERGENCY)
Age: 62
Discharge: HOME HEALTH | End: 2022-12-28
Attending: EMERGENCY MEDICINE
Payer: COMMERCIAL

## 2022-12-28 VITALS
SYSTOLIC BLOOD PRESSURE: 125 MMHG | OXYGEN SATURATION: 98 % | HEART RATE: 91 BPM | TEMPERATURE: 98.2 F | RESPIRATION RATE: 16 BRPM | DIASTOLIC BLOOD PRESSURE: 67 MMHG

## 2022-12-28 DIAGNOSIS — L02.31 ABSCESS OF BUTTOCK: Primary | ICD-10-CM

## 2022-12-28 PROCEDURE — 75810000289 HC I&D ABSCESS SIMP/COMP/MULT

## 2022-12-28 PROCEDURE — 99283 EMERGENCY DEPT VISIT LOW MDM: CPT

## 2022-12-28 RX ORDER — CLINDAMYCIN HYDROCHLORIDE 300 MG/1
300 CAPSULE ORAL 4 TIMES DAILY
Qty: 28 CAPSULE | Refills: 0 | Status: SHIPPED | OUTPATIENT
Start: 2022-12-28 | End: 2023-01-04

## 2022-12-28 NOTE — ED PROVIDER NOTES
EMERGENCY DEPARTMENT HISTORY AND PHYSICAL EXAM        Date: 12/28/2022  Patient Name: Renae Morejon    History of Presenting Illness     Chief Complaint   Patient presents with    Skin Problem       History Provided By: Patient    HPI: Renae Morejon, 63 y.o. female PMHx significant for DM, osteoporosis, COPD, hypertension, GERD, depression presents ambulatory to the ED. Pt reports wearing depends at baseline. Patient reports that 4 days ago she began to experience irritation surrounding her rectum. Patient reports she feels she has an abscess in this area. Denies fever, chills, drainage. Patient reports increased pain with sitting and wiping. Patient has not taken anything for symptoms. Denies previous similar symptoms. There are no other complaints, changes, or physical findings at this time. PCP: Macrina Rodriguez MD    No current facility-administered medications on file prior to encounter. Current Outpatient Medications on File Prior to Encounter   Medication Sig Dispense Refill    flash glucose scanning reader (FreeStyle Hugo 14 Day [place]) misc Blood sugar check 1 Each 2    flash glucose sensor (FreeStyle Hugo 14 Day Sensor) kit Apply every 14 days to monitor blood sugar 2 Kit 11    gabapentin (NEURONTIN) 300 mg capsule Take 2 Capsules by mouth nightly. 60 Capsule 0    insulin detemir U-100 (Levemir FlexTouch U-100 Insuln) 100 unit/mL (3 mL) inpn INJECT 30 UNITS UNDER THE SKIN EVERY MORNING AND 40 UNITS UNDER THE SKIN  EVERY EVENING 30 mL 1    varenicline (Chantix) 1 mg tablet Take 1 Tablet by mouth two (2) times a day. 60 Tablet 2    omeprazole (PRILOSEC) 40 mg capsule take 1 capsule by mouth once daily 30 Capsule 0    Blood-Glucose Meter,Continuous (Dexcom G5 ) misc Blood sugar check daily 1 Each 3    Blood-Glucose Sensor (Dexcom G5-G4 Sensor) ar Blood sugar check daily 5 Each 2    terconazole (TERAZOL 7) 0.4 % vaginal cream Insert 1 Applicator into vagina nightly.  (Patient not taking: Reported on 10/6/2022) 45 g 0    Insulin Needles, Disposable, (Easy Comfort Pen Needles) 32 gauge x 5/32\" ndle Use with levemir flextouch pen as directed 100 Pen Needle 1    pravastatin (PRAVACHOL) 40 mg tablet Take 1 Tablet by mouth nightly. 90 Tablet 0    FLUoxetine (PROzac) 40 mg capsule take 1 capsule by mouth once daily (Patient not taking: Reported on 10/6/2022) 30 Capsule 6    fluticasone propionate (FLONASE) 50 mcg/actuation nasal spray 2 Sprays by Both Nostrils route daily. 1 Bottle 3    calcium-cholecalciferol, d3, (CALCIUM 600 + D) 600-125 mg-unit tab Take 1,200 mg by mouth daily. tiotropium (SPIRIVA) 18 mcg inhalation capsule Take 1 Cap by inhalation daily. 30 Cap 4    budesonide-formoteroL (SYMBICORT) 160-4.5 mcg/actuation HFAA Take 1 Puff by inhalation two (2) times a day. 1 Inhaler 3    albuterol (PROVENTIL HFA, VENTOLIN HFA, PROAIR HFA) 90 mcg/actuation inhaler Take 2 Puffs by inhalation every four (4) hours as needed for Wheezing or Shortness of Breath. 2 Inhaler 3    insulin detemir U-100 (LEVEMIR FLEXTOUCH) 100 unit/mL (3 mL) inpn 45 Units by SubCUTAneous route ACB/HS. Indications: 18 UNITS IN AM 30 UNITS IN THE PM (Patient taking differently: 40 Units by SubCUTAneous route nightly. 30 unit in am and 40 units in pm  Indications: 18 UNITS IN AM 30 UNITS IN THE PM) 45 Units 0    insulin aspart U-100 (NOVOLOG FLEXPEN U-100 INSULIN) 100 unit/mL (3 mL) inpn Each meal, per sliding scale (Patient taking differently: Each meal, per sliding scale 5 units-15 units) 2 Pen 2    LORazepam (ATIVAN) 0.5 mg tablet Take 1 Tab by mouth every eight (8) hours as needed for Anxiety.  Max Daily Amount: 1.5 mg. 60 Tab 0       Past History     Past Medical History:  Past Medical History:   Diagnosis Date    Arthritis     Autonomic neuropathy     Back pain     Chronic lung disease     Chronic pain     back    Compression fracture T9-10    COPD (chronic obstructive pulmonary disease) ([facility])     Depression panic attacks    Diabetes mellitus ([facility]     type 1    GERD (gastroesophageal reflux disease)     Hypertension     no longer on meds    MRSA nasal colonization     Neuropathy     Osteoporosis     Tobacco use        Past Surgical History:  Past Surgical History:   Procedure Laterality Date    HX BACK SURGERY  2009    HX DILATION AND CURETTAGE      HX HYSTERECTOMY  2002       Family History:  Family History   Problem Relation Age of Onset    Thyroid Disease Mother     Stroke Mother     Arthritis-rheumatoid Mother     Osteoporosis Mother     Diabetes Father     Kidney Disease Father     Tip's Disease Father     Arthritis-rheumatoid Sister     Diabetes Brother     Colon Cancer Maternal Grandmother        Social History:  Social History     Tobacco Use    Smoking status: Every Day     Packs/day: 1.00     Years: 43.00     Pack years: 43.00     Types: Cigarettes     Start date: 7/27/1976    Smokeless tobacco: Never    Tobacco comments:     on Chantix   Vaping Use    Vaping Use: Never used   Substance Use Topics    Alcohol use: No    Drug use: No       Allergies: Allergies   Allergen Reactions    Penicillin G Anaphylaxis    Bactrim [Sulfamethoprim] Hives and Rash    Dilaudid [Hydromorphone (Bulk)] Other (comments)     Patient was confused for 5 days. Did not know who she was. Morphine Itching    Pcn [Penicillins] Swelling         Review of Systems   Review of Systems   Constitutional:  Negative for chills and fever. Respiratory:  Negative for shortness of breath. Cardiovascular:  Negative for chest pain. Gastrointestinal:  Negative for abdominal pain, nausea and vomiting. Genitourinary:  Negative for flank pain. Musculoskeletal:  Negative for back pain and myalgias. Skin:  Negative for color change, pallor, rash and wound. Abscess to buttock   Neurological:  Negative for dizziness, weakness and light-headedness. All other systems reviewed and are negative.     Physical Exam   Physical Exam  Vitals and nursing note reviewed. Constitutional:       General: She is not in acute distress. Appearance: She is well-developed. Comments: Pt in NAD   HENT:      Head: Normocephalic and atraumatic. Eyes:      Conjunctiva/sclera: Conjunctivae normal.   Cardiovascular:      Rate and Rhythm: Normal rate and regular rhythm. Heart sounds: Normal heart sounds. Pulmonary:      Effort: Pulmonary effort is normal. No respiratory distress. Breath sounds: Normal breath sounds. Abdominal:      General: Bowel sounds are normal. There is no distension. Palpations: Abdomen is soft. Musculoskeletal:         General: Normal range of motion. Skin:     General: Skin is warm. Findings: No rash. Comments: Right buttock: 3 cm indurated area central fluctuance and overlying localized erythema. TTP. Neurological:      Mental Status: She is alert and oriented to person, place, and time. Psychiatric:         Behavior: Behavior normal.       Diagnostic Study Results     Labs -   No results found for this or any previous visit (from the past 12 hour(s)). Radiologic Studies -   No orders to display     CT Results  (Last 48 hours)      None          CXR Results  (Last 48 hours)      None            Medical Decision Making   I am the first provider for this patient. I reviewed the vital signs, available nursing notes, past medical history, past surgical history, family history and social history. Vital Signs-Reviewed the patient's vital signs. Patient Vitals for the past 12 hrs:   Temp Pulse Resp BP SpO2   12/28/22 1059 98.2 °F (36.8 °C) 91 16 125/67 98 %       Records Reviewed: Nursing Notes, Old Medical Records, Previous Radiology Studies, and Previous Laboratory Studies    Provider Notes (Medical Decision Making):   DDx: Abscess, Cyst, Cellulitis    64 yo F who presents with abscess to buttock x 4 days. On exam, indurated area with central fluctuance. TTP.   Abscess incised and drained and packing placed. Will discharge home with antibiotics. Clindamycin chosen based on patient's allergies. Discussed returning in 2 days for wound recheck. At time of discharge, pt non-toxic appearing in NAD. Pt stable for prompt outpatient follow-up with PCP 1 to 2 days. Patient given strict instructions to return if symptoms worsen. ED Course:   Initial assessment performed. The patients presenting problems have been discussed, and they are in agreement with the care plan formulated and outlined with them. I have encouraged them to ask questions as they arise throughout their visit. I&D Abcess Complex    Date/Time: 12/28/2022 11:59 AM  Performed by: LINWOOD Worthy  Authorized by: Lizbeth Garrett MD     Consent:     Consent obtained:  Verbal    Risks, benefits, and alternatives were discussed: yes      Risks discussed:  Bleeding    Alternatives discussed:  Delayed treatment  Universal protocol:     Patient identity confirmed:  Verbally with patient  Location:     Type:  Abscess    Size:  3 cm    Location: right buttock. Pre-procedure details:     Skin preparation:  Povidone-iodine  Sedation:     Sedation type:  None  Anesthesia:     Anesthesia method:  Local infiltration    Local anesthetic:  Lidocaine 1% w/o epi  Procedure type:     Complexity:  Complex  Procedure details:     Ultrasound guidance: no      Needle aspiration: no      Incision types:  Single straight    Incision depth:  Dermal    Wound management:  Probed and deloculated and irrigated with saline    Drainage:  Purulent    Drainage amount: Moderate    Packing materials:  1/4 in iodoform gauze  Post-procedure details:     Procedure completion:  Tolerated well, no immediate complications        Disposition:  12:00 PM  Discussed dx and treatment plan. Discussed importance of PCP follow up. All questions answered. Pt voiced they understood. Return if sx worsen. PLAN:  1.    Current Discharge Medication List START taking these medications    Details   clindamycin (CLEOCIN) 300 mg capsule Take 1 Capsule by mouth four (4) times daily for 7 days. Qty: 28 Capsule, Refills: 0  Start date: 12/28/2022, End date: 1/4/2023           2. Follow-up Information       Follow up With Specialties Details Why Contact Info    Kassandra Ramos MD Family Medicine Schedule an appointment as soon as possible for a visit in 1 day  [address] [phone]      [facility] DEPT Emergency Medicine In 2 days For wound re-check [address] [place] [address] [id number] [phone]          Return to ED if worse     Diagnosis     Clinical Impression:   1. Abscess of buttock        Attestations:    LINWOOD Morrison    Please note that this dictation was completed with [employer], the computer voice recognition software. Quite often unanticipated grammatical, syntax, homophones, and other interpretive errors are inadvertently transcribed by the computer software. Please disregard these errors. Please excuse any errors that have escaped final proofreading. Thank you.

## 2023-01-03 ENCOUNTER — TELEPHONE (OUTPATIENT)
Dept: FAMILY MEDICINE CLINIC | Age: 63
End: 2023-01-03

## 2023-01-19 ENCOUNTER — TELEPHONE (OUTPATIENT)
Dept: FAMILY MEDICINE CLINIC | Age: 63
End: 2023-01-19

## 2023-01-19 NOTE — TELEPHONE ENCOUNTER
Patient says it is a manufactured shortage on her medication LEVEMIR. Says the pharmacist did reach out to other pharmacies but no one had any.  Patient is down to her last pen and needs a replacement

## 2023-01-20 RX ORDER — INSULIN DEGLUDEC 100 U/ML
INJECTION, SOLUTION SUBCUTANEOUS
Qty: 5 ADJUSTABLE DOSE PRE-FILLED PEN SYRINGE | Refills: 1 | Status: SHIPPED | OUTPATIENT
Start: 2023-01-20

## 2023-02-28 ENCOUNTER — TELEPHONE (OUTPATIENT)
Age: 63
End: 2023-02-28

## 2023-03-10 ENCOUNTER — OFFICE VISIT (OUTPATIENT)
Age: 63
End: 2023-03-10
Payer: COMMERCIAL

## 2023-03-10 VITALS
BODY MASS INDEX: 22.29 KG/M2 | HEART RATE: 72 BPM | WEIGHT: 142 LBS | DIASTOLIC BLOOD PRESSURE: 62 MMHG | OXYGEN SATURATION: 99 % | SYSTOLIC BLOOD PRESSURE: 130 MMHG | HEIGHT: 67 IN

## 2023-03-10 DIAGNOSIS — R55 SYNCOPE AND COLLAPSE: ICD-10-CM

## 2023-03-10 DIAGNOSIS — R07.9 CHEST PAIN, UNSPECIFIED TYPE: Primary | ICD-10-CM

## 2023-03-10 PROCEDURE — 3075F SYST BP GE 130 - 139MM HG: CPT | Performed by: INTERNAL MEDICINE

## 2023-03-10 PROCEDURE — G8427 DOCREV CUR MEDS BY ELIG CLIN: HCPCS | Performed by: INTERNAL MEDICINE

## 2023-03-10 PROCEDURE — 3078F DIAST BP <80 MM HG: CPT | Performed by: INTERNAL MEDICINE

## 2023-03-10 PROCEDURE — 93000 ELECTROCARDIOGRAM COMPLETE: CPT | Performed by: INTERNAL MEDICINE

## 2023-03-10 PROCEDURE — G8484 FLU IMMUNIZE NO ADMIN: HCPCS | Performed by: INTERNAL MEDICINE

## 2023-03-10 PROCEDURE — 4004F PT TOBACCO SCREEN RCVD TLK: CPT | Performed by: INTERNAL MEDICINE

## 2023-03-10 PROCEDURE — 99204 OFFICE O/P NEW MOD 45 MIN: CPT | Performed by: INTERNAL MEDICINE

## 2023-03-10 PROCEDURE — G8420 CALC BMI NORM PARAMETERS: HCPCS | Performed by: INTERNAL MEDICINE

## 2023-03-10 PROCEDURE — 3017F COLORECTAL CA SCREEN DOC REV: CPT | Performed by: INTERNAL MEDICINE

## 2023-03-10 ASSESSMENT — ANXIETY QUESTIONNAIRES
7. FEELING AFRAID AS IF SOMETHING AWFUL MIGHT HAPPEN: 0
GAD7 TOTAL SCORE: 0
5. BEING SO RESTLESS THAT IT IS HARD TO SIT STILL: 0
3. WORRYING TOO MUCH ABOUT DIFFERENT THINGS: 0
IF YOU CHECKED OFF ANY PROBLEMS ON THIS QUESTIONNAIRE, HOW DIFFICULT HAVE THESE PROBLEMS MADE IT FOR YOU TO DO YOUR WORK, TAKE CARE OF THINGS AT HOME, OR GET ALONG WITH OTHER PEOPLE: NOT DIFFICULT AT ALL
1. FEELING NERVOUS, ANXIOUS, OR ON EDGE: 0
6. BECOMING EASILY ANNOYED OR IRRITABLE: 0
4. TROUBLE RELAXING: 0
2. NOT BEING ABLE TO STOP OR CONTROL WORRYING: 0

## 2023-03-10 NOTE — PROGRESS NOTES
Gena Mckeon presents today for   Chief Complaint   Patient presents with    New Patient     Re-establishing care last seen 6.27.19    Chest Pain     Center/left chest pressure on/off    Shortness of Breath     SOB with exertion constant     Dizziness     Dizzy spells on/off    Palpitations     Racing palps on off     Edema     Left legs,feet,ankle on/off    Surgical Clearance     Clearance needed for several tooth extraction with [facility]aces, no set date as of now. Gena Mckeon preferred language for health care discussion is english/other. Is someone accompanying this pt? no    Is the patient using any DME equipment during OV? no    Depression Screening:  Depression: At risk    PHQ-2 Score: 9        Learning Assessment:  Who is the primary learner? Patient    What is the preferred language for health care of the primary learner? ENGLISH    How does the primary learner prefer to learn new concepts? DEMONSTRATION    Answered By patient    Relationship to Learner SELF           Pt currently taking Anticoagulant therapy? no    Pt currently taking Antiplatelet therapy ? no      Coordination of Care:  1. Have you been to the ER, urgent care clinic since your last visit? Hospitalized since your last visit? no    2. Have you seen or consulted any other health care providers outside of the [address] [place] [address] since your last visit? Include any pap smears or colon screening.  no

## 2023-03-10 NOTE — PROGRESS NOTES
Mikel Guillen    Chief Complaint   Patient presents with    New Patient     Re-establishing care last seen 6.27.19    Chest Pain     Center/left chest pressure on/off    Shortness of Breath     SOB with exertion constant     Dizziness     Dizzy spells on/off    Palpitations     Racing palps on off     Edema     Left legs,feet,ankle on/off    Surgical Clearance     Clearance needed for several tooth extraction with [facility] and braces, no set date as of now. HPI    Mikel Guillen is a 63 y.o. preop, post ER several complaints, reestablish care. As you know patient is established with Dr. Thomas Damian back in 2016 in the setting of a sudden onset syncopal episode. Apparently patient was in her kitchen early in the morning when she suddenly lost consciousness. There was quite a bit of injury family she sustained several fractures. This led to quite an extensive cardiac work-up. I did personally obtain and review available records but not all of them are available at the time of my encounter. I see patient had an echocardiogram that was completely normal as documented normal LV function of 55% no significant valvular pathology no effusions. She had a Holter monitor that was uneventful. Apparently she did have a positive tilt table test through Dr. Vernon Schroeder office but says she was not able to follow-up because she lost her insurance. She has been having lots of symptoms which we discussed at length today. Has been to the ER several times. Back in 2021 was hospitalized with COVID, and in that setting was told she had \"CHF\" so concerned about this as she's been having edema in her legs for some time. She is retired from her job (where she was on her feet a lot) so edema is better, but she still has CP, palps, and constant SOB and dizziness. She wants to have dental work.        Past Medical History:   Diagnosis Date    Arthritis     Autonomic neuropathy     Back pain     Chronic lung disease     Chronic pain     back    Compression fracture T9-10    COPD (chronic obstructive pulmonary disease) ([facility])     Depression     panic attacks    Diabetes mellitus ([facility] [address]     type 1    GERD (gastroesophageal reflux disease)     Hypertension     no longer on meds    MRSA nasal colonization     Neuropathy     Osteoporosis     Tobacco use        Past Surgical History:   Procedure Laterality Date    BACK SURGERY  2009    DILATION AND CURETTAGE OF UTERUS      HYSTERECTOMY (CERVIX STATUS UNKNOWN)  2002       Current Outpatient Medications   Medication Sig Dispense Refill    albuterol sulfate HFA (PROVENTIL;VENTOLIN;PROAIR) 108 (90 Base) MCG/ACT inhaler Inhale 2 puffs into the lungs every 4 hours as needed      fluticasone (FLONASE) 50 MCG/ACT nasal spray 2 sprays by Nasal route daily      insulin aspart (NOVOLOG) 100 UNIT/ML injection pen Each meal, per sliding scale      insulin detemir (LEVEMIR FLEXTOUCH) 100 UNIT/ML injection pen INJECT 30 UNITS UNDER THE SKIN EVERY MORNING AND 40 UNITS UNDER THE SKIN  EVERY EVENING      LORazepam (ATIVAN) 0.5 MG tablet Take 0.5 mg by mouth every 8 hours as needed. pravastatin (PRAVACHOL) 40 MG tablet Take 40 mg by mouth       No current facility-administered medications for this visit. Allergies   Allergen Reactions    Penicillins Anaphylaxis and Swelling    Hydromorphone Other (See Comments)     Patient was confused for 5 days. Did not know who she was.     Morphine Itching    Sulfamethoxazole-Trimethoprim Hives and Rash       Social History     Socioeconomic History    Marital status:      Spouse name: Not on file    Number of children: Not on file    Years of education: Not on file    Highest education level: Not on file   Occupational History    Not on file   Tobacco Use    Smoking status: Every Day     Packs/day: 1.00     Types: Cigarettes     Start date: 7/27/1976    Smokeless tobacco: Never   Substance and Sexual Activity    Alcohol use: No    Drug use: No    Sexual activity: Not on file   Other Topics Concern    Not on file   Social History Narrative    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 Denies any history chemical and or asbestos exposure      Social Determinants of Health     Financial Resource Strain: High Risk    Difficulty of Paying Living Expenses: Very hard   Food Insecurity: No Food Insecurity    Worried About Running Out of Food in the Last Year: Never true    Ran Out of Food in the Last Year: Never true   Transportation Needs: Not on file   Physical Activity: Not on file   Stress: Not on file   Social Connections: Not on file   Intimate Partner Violence: Not on file   Housing Stability: Not on file    Primary caregiver for her sons kids    Niece has CHF, but says it was broken heart syndrome and she had a cardiac arrest     Review of Systems    14 pt Review of Systems is negative unless otherwise mentioned in the HPI. Wt Readings from Last 3 Encounters:   03/10/23 142 lb (64.4 kg)   04/04/22 141 lb (64 kg)   11/04/21 144 lb (65.3 kg)     Temp Readings from Last 3 Encounters:   No data found for Temp     BP Readings from Last 3 Encounters:   03/10/23 130/62   04/04/22 119/72     Pulse Readings from Last 3 Encounters:   03/10/23 72   04/04/22 79     Physical Exam:    /62 (Site: Left Upper Arm, Position: Sitting, Cuff Size: Small Adult)   Pulse 72   Ht 5' 7\" (1.702 m)   Wt 142 lb (64.4 kg)   SpO2 99%   BMI 22.24 kg/m²    Physical Exam  Vitals and nursing note reviewed. Constitutional:       General: She is not in acute distress. Appearance: Normal appearance. HENT:      Head: Normocephalic. Nose: Nose normal.      Mouth/Throat:      Mouth: Mucous membranes are moist.   Eyes:      Extraocular Movements: Extraocular movements intact. Pupils: Pupils are equal, round, and reactive to light. Neck:      Vascular: No carotid bruit. Cardiovascular:      Rate and Rhythm: Normal rate and regular rhythm. Pulses: Normal pulses.       Heart sounds: No murmur heard. No friction rub. No gallop. Pulmonary:      Effort: Pulmonary effort is normal.      Breath sounds: Normal breath sounds. No wheezing or rales. Abdominal:      Palpations: Abdomen is soft. Musculoskeletal:      Cervical back: Neck supple. Right lower leg: No edema. Left lower leg: No edema. Skin:     General: Skin is warm and dry. Neurological:      General: No focal deficit present. Mental Status: She is alert and oriented to person, place, and time. Psychiatric:         Mood and Affect: Mood normal.       EKG NSR, nonspecific ST and T wave abn, unchanged    Low risk nuc 2019  Echo 59%    Impression and Plan:  Mayela Graham is a 63 y.o. with:    Preop risk  Chest pain, chronic  Dysautonmia, h/o syncope and +tilt  DM1  HTN  COPD    Pharm nuc + echo  Will call with result, if low risk and normal can proceed for dental procedure at low risk from CV standpoint  Can see me yearly for dysautonomia    Thank you for allowing me to participate in the care of your patient, please do not hesitate to call with questions or concerns.     [address] [facility],    Ela Diaz, DO

## 2023-03-16 ENCOUNTER — TELEPHONE (OUTPATIENT)
Age: 63
End: 2023-03-16

## 2023-03-16 NOTE — TELEPHONE ENCOUNTER
Spoke with patient to see if she had medication available since [place] Flex Touch was denied. Patient stated that because Medicare denied her medication she complained to the pharmacy which caused the pharmacy and patients insurance company to fill her Levemir and gave patient enough to cover her until her appointment time with her provider 04/03/23. The provider was made aware. Patients forms and paper work to be scanned into chart, thank you.

## 2023-03-31 SDOH — ECONOMIC STABILITY: INCOME INSECURITY: HOW HARD IS IT FOR YOU TO PAY FOR THE VERY BASICS LIKE FOOD, HOUSING, MEDICAL CARE, AND HEATING?: SOMEWHAT HARD

## 2023-03-31 SDOH — ECONOMIC STABILITY: TRANSPORTATION INSECURITY
IN THE PAST 12 MONTHS, HAS LACK OF TRANSPORTATION KEPT YOU FROM MEETINGS, WORK, OR FROM GETTING THINGS NEEDED FOR DAILY LIVING?: NO

## 2023-03-31 SDOH — ECONOMIC STABILITY: FOOD INSECURITY: WITHIN THE PAST 12 MONTHS, THE FOOD YOU BOUGHT JUST DIDN'T LAST AND YOU DIDN'T HAVE MONEY TO GET MORE.: SOMETIMES TRUE

## 2023-03-31 SDOH — ECONOMIC STABILITY: HOUSING INSECURITY
IN THE LAST 12 MONTHS, WAS THERE A TIME WHEN YOU DID NOT HAVE A STEADY PLACE TO SLEEP OR SLEPT IN A SHELTER (INCLUDING NOW)?: NO

## 2023-03-31 SDOH — ECONOMIC STABILITY: FOOD INSECURITY: WITHIN THE PAST 12 MONTHS, YOU WORRIED THAT YOUR FOOD WOULD RUN OUT BEFORE YOU GOT MONEY TO BUY MORE.: SOMETIMES TRUE

## 2023-04-03 ENCOUNTER — OFFICE VISIT (OUTPATIENT)
Age: 63
End: 2023-04-03
Payer: COMMERCIAL

## 2023-04-03 ENCOUNTER — HOSPITAL ENCOUNTER (OUTPATIENT)
Facility: HOSPITAL | Age: 63
Setting detail: SPECIMEN
Discharge: HOME | End: 2023-04-06
Payer: COMMERCIAL

## 2023-04-03 VITALS
WEIGHT: 137.6 LBS | HEIGHT: 67 IN | SYSTOLIC BLOOD PRESSURE: 121 MMHG | DIASTOLIC BLOOD PRESSURE: 76 MMHG | OXYGEN SATURATION: 97 % | BODY MASS INDEX: 21.6 KG/M2 | RESPIRATION RATE: 16 BRPM | HEART RATE: 83 BPM | TEMPERATURE: 98.1 F

## 2023-04-03 DIAGNOSIS — F32.89 OTHER SPECIFIED DEPRESSIVE EPISODES: ICD-10-CM

## 2023-04-03 DIAGNOSIS — E10.65 TYPE 1 DIABETES MELLITUS WITH HYPERGLYCEMIA (HCC): ICD-10-CM

## 2023-04-03 DIAGNOSIS — F41.9 ANXIETY: ICD-10-CM

## 2023-04-03 DIAGNOSIS — E10.65 TYPE 1 DIABETES MELLITUS WITH HYPERGLYCEMIA (HCC): Primary | ICD-10-CM

## 2023-04-03 DIAGNOSIS — Z12.31 OTHER SCREENING MAMMOGRAM: ICD-10-CM

## 2023-04-03 DIAGNOSIS — J41.0 SIMPLE CHRONIC BRONCHITIS (HCC): ICD-10-CM

## 2023-04-03 DIAGNOSIS — Z72.0 TOBACCO ABUSE: ICD-10-CM

## 2023-04-03 LAB
ALBUMIN SERPL-MCNC: 3.3 G/DL (ref 3.4–5)
ALBUMIN/GLOB SERPL: 0.9 (ref 0.8–1.7)
ALP SERPL-CCNC: 119 U/L (ref 45–117)
ALT SERPL-CCNC: 25 U/L (ref 13–56)
ANION GAP SERPL CALC-SCNC: 9 MMOL/L (ref 3–18)
AST SERPL-CCNC: 13 U/L (ref 10–38)
BILIRUB SERPL-MCNC: 0.5 MG/DL (ref 0.2–1)
BUN SERPL-MCNC: 26 MG/DL (ref 7–18)
BUN/CREAT SERPL: 20 (ref 12–20)
CALCIUM SERPL-MCNC: 9.3 MG/DL (ref 8.5–10.1)
CHLORIDE SERPL-SCNC: 97 MMOL/L (ref 100–111)
CHOLEST SERPL-MCNC: 170 MG/DL
CO2 SERPL-SCNC: 26 MMOL/L (ref 21–32)
CREAT SERPL-MCNC: 1.29 MG/DL (ref 0.6–1.3)
GLOBULIN SER CALC-MCNC: 3.5 G/DL (ref 2–4)
GLUCOSE SERPL-MCNC: 449 MG/DL (ref 74–99)
HBA1C MFR BLD: 13.6 %
HDLC SERPL-MCNC: 51 MG/DL (ref 40–60)
HDLC SERPL: 3.3 (ref 0–5)
LDLC SERPL CALC-MCNC: 50.6 MG/DL (ref 0–100)
LIPID PANEL: ABNORMAL
POTASSIUM SERPL-SCNC: 5.1 MMOL/L (ref 3.5–5.5)
PROT SERPL-MCNC: 6.8 G/DL (ref 6.4–8.2)
SODIUM SERPL-SCNC: 132 MMOL/L (ref 136–145)
TRIGL SERPL-MCNC: 342 MG/DL
TSH SERPL DL<=0.05 MIU/L-ACNC: 1.09 UIU/ML (ref 0.36–3.74)
VLDLC SERPL CALC-MCNC: 68.4 MG/DL

## 2023-04-03 PROCEDURE — G8427 DOCREV CUR MEDS BY ELIG CLIN: HCPCS | Performed by: FAMILY MEDICINE

## 2023-04-03 PROCEDURE — 80061 LIPID PANEL: CPT

## 2023-04-03 PROCEDURE — 3017F COLORECTAL CA SCREEN DOC REV: CPT | Performed by: FAMILY MEDICINE

## 2023-04-03 PROCEDURE — 36415 COLL VENOUS BLD VENIPUNCTURE: CPT

## 2023-04-03 PROCEDURE — 3046F HEMOGLOBIN A1C LEVEL >9.0%: CPT | Performed by: FAMILY MEDICINE

## 2023-04-03 PROCEDURE — 3078F DIAST BP <80 MM HG: CPT | Performed by: FAMILY MEDICINE

## 2023-04-03 PROCEDURE — 83036 HEMOGLOBIN GLYCOSYLATED A1C: CPT | Performed by: FAMILY MEDICINE

## 2023-04-03 PROCEDURE — 80053 COMPREHEN METABOLIC PANEL: CPT

## 2023-04-03 PROCEDURE — 99215 OFFICE O/P EST HI 40 MIN: CPT | Performed by: FAMILY MEDICINE

## 2023-04-03 PROCEDURE — 3023F SPIROM DOC REV: CPT | Performed by: FAMILY MEDICINE

## 2023-04-03 PROCEDURE — 2022F DILAT RTA XM EVC RTNOPTHY: CPT | Performed by: FAMILY MEDICINE

## 2023-04-03 PROCEDURE — 84443 ASSAY THYROID STIM HORMONE: CPT

## 2023-04-03 PROCEDURE — 4004F PT TOBACCO SCREEN RCVD TLK: CPT | Performed by: FAMILY MEDICINE

## 2023-04-03 PROCEDURE — G8420 CALC BMI NORM PARAMETERS: HCPCS | Performed by: FAMILY MEDICINE

## 2023-04-03 PROCEDURE — 3074F SYST BP LT 130 MM HG: CPT | Performed by: FAMILY MEDICINE

## 2023-04-03 PROCEDURE — PBSHW AMB POC HEMOGLOBIN A1C: Performed by: FAMILY MEDICINE

## 2023-04-03 RX ORDER — ALENDRONATE SODIUM 5 MG
TABLET ORAL
Qty: 30 TABLET | Status: CANCELLED | OUTPATIENT
Start: 2023-04-03

## 2023-04-03 RX ORDER — ALENDRONATE SODIUM 5 MG
TABLET ORAL
COMMUNITY
End: 2023-04-03 | Stop reason: SDUPTHER

## 2023-04-03 RX ORDER — INSULIN DEGLUDEC INJECTION 100 U/ML
INJECTION, SOLUTION SUBCUTANEOUS
COMMUNITY
Start: 2023-01-23 | End: 2023-04-03

## 2023-04-03 RX ORDER — GABAPENTIN 300 MG/1
300 CAPSULE ORAL 2 TIMES DAILY PRN
Qty: 180 CAPSULE | Refills: 0 | Status: SHIPPED | OUTPATIENT
Start: 2023-04-03 | End: 2023-07-02

## 2023-04-03 RX ORDER — FLASH GLUCOSE SENSOR
KIT MISCELLANEOUS
COMMUNITY
Start: 2022-10-06

## 2023-04-03 RX ORDER — LORAZEPAM 0.5 MG/1
0.5 TABLET ORAL EVERY 8 HOURS PRN
Qty: 30 TABLET | Refills: 2 | Status: SHIPPED | OUTPATIENT
Start: 2023-04-03 | End: 2023-05-03

## 2023-04-03 RX ORDER — ALENDRONATE SODIUM 5 MG
5 TABLET ORAL
Qty: 90 TABLET | Refills: 1 | Status: SHIPPED | OUTPATIENT
Start: 2023-04-03

## 2023-04-03 RX ORDER — ALBUTEROL SULFATE 90 UG/1
2 AEROSOL, METERED RESPIRATORY (INHALATION) EVERY 4 HOURS PRN
Qty: 18 G | Refills: 2 | Status: SHIPPED | OUTPATIENT
Start: 2023-04-03

## 2023-04-03 RX ORDER — PEN NEEDLE, DIABETIC 29 G X1/2"
NEEDLE, DISPOSABLE MISCELLANEOUS
COMMUNITY
Start: 2015-01-09

## 2023-04-03 RX ORDER — PRAVASTATIN SODIUM 40 MG
40 TABLET ORAL DAILY
Qty: 90 TABLET | Refills: 3 | Status: SHIPPED | OUTPATIENT
Start: 2023-04-03

## 2023-04-03 RX ORDER — BLOOD-GLUCOSE METER
EACH MISCELLANEOUS
COMMUNITY
Start: 2022-03-17

## 2023-04-03 RX ORDER — FLUTICASONE PROPIONATE 50 MCG
2 SPRAY, SUSPENSION (ML) NASAL DAILY
Qty: 16 G | Refills: 2 | Status: SHIPPED | OUTPATIENT
Start: 2023-04-03

## 2023-04-03 RX ORDER — FLASH GLUCOSE SCANNING READER
EACH MISCELLANEOUS
COMMUNITY
Start: 2022-10-06

## 2023-04-03 RX ORDER — BUDESONIDE AND FORMOTEROL FUMARATE DIHYDRATE 160; 4.5 UG/1; UG/1
1 AEROSOL RESPIRATORY (INHALATION) 2 TIMES DAILY
COMMUNITY
Start: 2021-05-04

## 2023-04-03 ASSESSMENT — PATIENT HEALTH QUESTIONNAIRE - PHQ9
10. IF YOU CHECKED OFF ANY PROBLEMS, HOW DIFFICULT HAVE THESE PROBLEMS MADE IT FOR YOU TO DO YOUR WORK, TAKE CARE OF THINGS AT HOME, OR GET ALONG WITH OTHER PEOPLE: 2
4. FEELING TIRED OR HAVING LITTLE ENERGY: 3
1. LITTLE INTEREST OR PLEASURE IN DOING THINGS: 3
SUM OF ALL RESPONSES TO PHQ9 QUESTIONS 1 & 2: 4
7. TROUBLE CONCENTRATING ON THINGS, SUCH AS READING THE NEWSPAPER OR WATCHING TELEVISION: 3
SUM OF ALL RESPONSES TO PHQ QUESTIONS 1-9: 20
3. TROUBLE FALLING OR STAYING ASLEEP: 3
5. POOR APPETITE OR OVEREATING: 2
6. FEELING BAD ABOUT YOURSELF - OR THAT YOU ARE A FAILURE OR HAVE LET YOURSELF OR YOUR FAMILY DOWN: 2
SUM OF ALL RESPONSES TO PHQ QUESTIONS 1-9: 20
2. FEELING DOWN, DEPRESSED OR HOPELESS: 1
8. MOVING OR SPEAKING SO SLOWLY THAT OTHER PEOPLE COULD HAVE NOTICED. OR THE OPPOSITE, BEING SO FIGETY OR RESTLESS THAT YOU HAVE BEEN MOVING AROUND A LOT MORE THAN USUAL: 3
SUM OF ALL RESPONSES TO PHQ QUESTIONS 1-9: 20
SUM OF ALL RESPONSES TO PHQ QUESTIONS 1-9: 20

## 2023-04-03 ASSESSMENT — COLUMBIA-SUICIDE SEVERITY RATING SCALE - C-SSRS
5. HAVE YOU STARTED TO WORK OUT OR WORKED OUT THE DETAILS OF HOW TO KILL YOURSELF? DO YOU INTEND TO CARRY OUT THIS PLAN?: YES
1. WITHIN THE PAST MONTH, HAVE YOU WISHED YOU WERE DEAD OR WISHED YOU COULD GO TO SLEEP AND NOT WAKE UP?: YES
4. HAVE YOU HAD THESE THOUGHTS AND HAD SOME INTENTION OF ACTING ON THEM?: NO
6. HAVE YOU EVER DONE ANYTHING, STARTED TO DO ANYTHING, OR PREPARED TO DO ANYTHING TO END YOUR LIFE?: NO
2. HAVE YOU ACTUALLY HAD ANY THOUGHTS OF KILLING YOURSELF?: YES
3. HAVE YOU BEEN THINKING ABOUT HOW YOU MIGHT KILL YOURSELF?: YES

## 2023-04-03 NOTE — PROGRESS NOTES
1. \"Have you been to the ER, urgent care clinic since your last visit? Hospitalized since your last visit? \" No    2. \"Have you seen or consulted any other health care providers outside of the [address] [place] [address] since your last visit? \" No     3. For patients aged 39-70: Has the patient had a colonoscopy / FIT/ Cologuard? No      If the patient is female:    4. For patients aged 41-77: Has the patient had a mammogram within the past 2 years? No      5. For patients aged 21-65: Has the patient had a pap smear?  NA - based on age or sex
([facility]  -     AMB POC HEMOGLOBIN A1C  -     gabapentin (NEURONTIN) 300 MG capsule; Take 1 capsule by mouth 2 times daily as needed (pain) for up to 90 days. Max Daily Amount: 600 mg  -     External Referral To Endocrinology  -     Lipid Panel; Future  -     Comprehensive Metabolic Panel; Future  -     TSH; Future    Other specified depressive episodes    Other screening mammogram  -     CLEO DIGITAL SCREEN W OR WO CAD BILATERAL; Future    Simple chronic bronchitis (HCC)    Anxiety  -     LORazepam (ATIVAN) 0.5 MG tablet; Take 1 tablet by mouth every 8 hours as needed for Anxiety for up to 30 days. Max Daily Amount: 1.5 mg    Tobacco abuse    Other orders  -     albuterol sulfate HFA (PROVENTIL;VENTOLIN;PROAIR) 108 (90 Base) MCG/ACT inhaler; Inhale 2 puffs into the lungs every 4 hours as needed for Wheezing  -     pravastatin (PRAVACHOL) 40 MG tablet; Take 1 tablet by mouth daily  -     fluticasone (FLONASE) 50 MCG/ACT nasal spray; 2 sprays by Nasal route daily  -     alendronate (FOSAMAX) 5 MG tablet; Take 1 tablet by mouth every morning (before breakfast) Take with full glass of water before any other food or drink. Do not lay down for 30 min after med taken. -     buPROPion (WELLBUTRIN XL) 150 MG extended release tablet; Take 1 tablet by mouth every morning  -     Continuous Blood Gluc Sensor (FREESTYLE PIPER 14 DAY SENSOR) MISC; Blood sugar check daily        As above,   Pt supported and encouraged  Refilled meds needed  Wellbutrin as ordered to assist with mood and  tobacco cessation;  pt expressed desire and need to stop smoking. Ativan for anxiety    Mammogram as per orders    Return in about 4 months (around 8/3/2023) for diabetes. This has been fully explained to the patient, who indicates understanding. Reiterated to the patient the need to for suicidal ideation. Patient voiced understanding. She has been given suicide helpline numbers. Suicide safety plan has been reviewed.   Referral to

## 2023-04-05 ENCOUNTER — PATIENT MESSAGE (OUTPATIENT)
Age: 63
End: 2023-04-05

## 2023-04-05 NOTE — TELEPHONE ENCOUNTER
From: Grzegorz Rios  To: Dr. Lui Homer: 4/5/2023 11:36 AM EDT  Subject: Medications    Dr. Malcolm Gilmore was sending in perscriotions to [facility]. I did not receive the Alendronate or the [place] for checking my BS. Will she send them in?     Grzegorz Rios  1960

## 2023-04-06 RX ORDER — FLASH GLUCOSE SENSOR
KIT MISCELLANEOUS
Qty: 1 EACH | Refills: 3 | Status: SHIPPED | OUTPATIENT
Start: 2023-04-06

## 2023-04-06 RX ORDER — BUPROPION HYDROCHLORIDE 150 MG/1
150 TABLET ORAL EVERY MORNING
Qty: 30 TABLET | Refills: 3 | Status: SHIPPED | OUTPATIENT
Start: 2023-04-06

## 2023-04-17 ENCOUNTER — CLINICAL DOCUMENTATION (OUTPATIENT)
Age: 63
End: 2023-04-17

## 2023-04-17 NOTE — PROGRESS NOTES
Prior Authorization for [employer] 14 day Sensor started on 4/17/2023 via [employer]. Awaiting reply from pt's insurance company via fax/e-mail. Allow 48-72 hours.

## 2023-04-18 ENCOUNTER — CLINICAL DOCUMENTATION (OUTPATIENT)
Age: 63
End: 2023-04-18

## 2023-04-18 NOTE — PROGRESS NOTES
Prior Authorization for [employer] [facility] 14 day Sensor completed and denied.

## 2023-05-03 ENCOUNTER — TELEPHONE (OUTPATIENT)
Age: 63
End: 2023-05-03

## 2023-05-03 NOTE — TELEPHONE ENCOUNTER
Verbal order and read back per Isidoro Jean, DO  Unable to clear pt at this time due to echo and stress test being denied. For faxed back to provider.

## 2023-05-24 ENCOUNTER — HOSPITAL ENCOUNTER (OUTPATIENT)
Facility: HOSPITAL | Age: 63
Setting detail: SPECIMEN
Discharge: HOME | End: 2023-05-27

## 2023-05-24 ENCOUNTER — OFFICE VISIT (OUTPATIENT)
Age: 63
End: 2023-05-24

## 2023-05-24 VITALS
DIASTOLIC BLOOD PRESSURE: 59 MMHG | HEART RATE: 82 BPM | BODY MASS INDEX: 22.73 KG/M2 | RESPIRATION RATE: 18 BRPM | TEMPERATURE: 97.9 F | WEIGHT: 144.8 LBS | OXYGEN SATURATION: 98 % | SYSTOLIC BLOOD PRESSURE: 114 MMHG | HEIGHT: 67 IN

## 2023-05-24 DIAGNOSIS — M79.604 BILATERAL LEG PAIN: Primary | ICD-10-CM

## 2023-05-24 DIAGNOSIS — R22.41 LOCALIZED SWELLING OF RIGHT FOOT: ICD-10-CM

## 2023-05-24 DIAGNOSIS — M79.605 BILATERAL LEG PAIN: Primary | ICD-10-CM

## 2023-05-24 LAB
ANION GAP SERPL CALC-SCNC: 4 MMOL/L (ref 3–18)
BASOPHILS # BLD: 0.1 K/UL (ref 0–0.1)
BASOPHILS NFR BLD: 1 % (ref 0–2)
BUN SERPL-MCNC: 17 MG/DL (ref 7–18)
BUN/CREAT SERPL: 21 (ref 12–20)
CALCIUM SERPL-MCNC: 9.6 MG/DL (ref 8.5–10.1)
CHLORIDE SERPL-SCNC: 101 MMOL/L (ref 100–111)
CO2 SERPL-SCNC: 28 MMOL/L (ref 21–32)
CREAT SERPL-MCNC: 0.82 MG/DL (ref 0.6–1.3)
DIFFERENTIAL METHOD BLD: ABNORMAL
EOSINOPHIL # BLD: 0.2 K/UL (ref 0–0.4)
EOSINOPHIL NFR BLD: 2 % (ref 0–5)
ERYTHROCYTE [DISTWIDTH] IN BLOOD BY AUTOMATED COUNT: 12.4 % (ref 11.6–14.5)
GLUCOSE SERPL-MCNC: 302 MG/DL (ref 74–99)
HCT VFR BLD AUTO: 36.7 % (ref 35–45)
HGB BLD-MCNC: 11.7 G/DL (ref 12–16)
IMM GRANULOCYTES # BLD AUTO: 0 K/UL (ref 0–0.04)
IMM GRANULOCYTES NFR BLD AUTO: 0 % (ref 0–0.5)
LYMPHOCYTES # BLD: 2.4 K/UL (ref 0.9–3.6)
LYMPHOCYTES NFR BLD: 26 % (ref 21–52)
MCH RBC QN AUTO: 29.8 PG (ref 24–34)
MCHC RBC AUTO-ENTMCNC: 31.9 G/DL (ref 31–37)
MCV RBC AUTO: 93.6 FL (ref 78–100)
MONOCYTES # BLD: 0.7 K/UL (ref 0.05–1.2)
MONOCYTES NFR BLD: 8 % (ref 3–10)
NEUTS SEG # BLD: 5.7 K/UL (ref 1.8–8)
NEUTS SEG NFR BLD: 63 % (ref 40–73)
NRBC # BLD: 0 K/UL (ref 0–0.01)
NRBC BLD-RTO: 0 PER 100 WBC
PLATELET # BLD AUTO: 259 K/UL (ref 135–420)
PMV BLD AUTO: 11 FL (ref 9.2–11.8)
POTASSIUM SERPL-SCNC: 5.4 MMOL/L (ref 3.5–5.5)
RBC # BLD AUTO: 3.92 M/UL (ref 4.2–5.3)
SODIUM SERPL-SCNC: 133 MMOL/L (ref 136–145)
URATE SERPL-MCNC: 2.8 MG/DL (ref 2.6–7.2)
WBC # BLD AUTO: 8.9 K/UL (ref 4.6–13.2)

## 2023-05-24 PROCEDURE — 84550 ASSAY OF BLOOD/URIC ACID: CPT

## 2023-05-24 PROCEDURE — 80048 BASIC METABOLIC PNL TOTAL CA: CPT

## 2023-05-24 PROCEDURE — 99213 OFFICE O/P EST LOW 20 MIN: CPT | Performed by: NURSE PRACTITIONER

## 2023-05-24 PROCEDURE — 36415 COLL VENOUS BLD VENIPUNCTURE: CPT

## 2023-05-24 PROCEDURE — 85025 COMPLETE CBC W/AUTO DIFF WBC: CPT

## 2023-05-24 PROCEDURE — 3078F DIAST BP <80 MM HG: CPT | Performed by: NURSE PRACTITIONER

## 2023-05-24 PROCEDURE — 3074F SYST BP LT 130 MM HG: CPT | Performed by: NURSE PRACTITIONER

## 2023-05-24 RX ORDER — INSULIN GLARGINE 100 [IU]/ML
INJECTION, SOLUTION SUBCUTANEOUS
COMMUNITY
Start: 2023-05-08

## 2023-05-24 RX ORDER — SODIUM CHLORIDE 0.65 %
AEROSOL, SPRAY (ML) NASAL
COMMUNITY
Start: 2023-05-01

## 2023-05-24 ASSESSMENT — PATIENT HEALTH QUESTIONNAIRE - PHQ9
1. LITTLE INTEREST OR PLEASURE IN DOING THINGS: 0
2. FEELING DOWN, DEPRESSED OR HOPELESS: 0
SUM OF ALL RESPONSES TO PHQ QUESTIONS 1-9: 0
SUM OF ALL RESPONSES TO PHQ9 QUESTIONS 1 & 2: 0
SUM OF ALL RESPONSES TO PHQ QUESTIONS 1-9: 0

## 2023-05-24 ASSESSMENT — ENCOUNTER SYMPTOMS
SHORTNESS OF BREATH: 0
CHEST TIGHTNESS: 0

## 2023-05-24 NOTE — PROGRESS NOTES
Ervin Sales is a 63 y.o. female who was seen in clinic today (5/24/2023) for Joint Pain (Bilateral knees x's 9 days ), Foot Pain (Bilateral x's 9 days ), Swelling (Right x's 9 days ), and Jaw Pain (Right X's 3 days patient has some swelling )  . Assessment & Plan:   There are no diagnoses linked to this encounter. Subjective:   States she began having left knee pain(hx of arthritis). 9 days ago she began to have right foot pain and swelling. States knees feel like someone took a hammer to them and feet feel like she has been walking on rocks, very tender and sore. Having pain from bilateral knees to bilateral feet, 10/10, feels like she ran a 30 mile race. States she has muscle spasms in the right lower calf and somewhat in the left lower leg. Right foot is the worse and the top of her right great toe has increased pain. She did greatly increased her water intake yesterday. She typically is soda drinker. States stretched out in bed which does help. Positive family history of gout in brother.     Lab Results   Component Value Date/Time     04/03/2023 12:12 PM    K 5.1 04/03/2023 12:12 PM    CL 97 04/03/2023 12:12 PM    CO2 26 04/03/2023 12:12 PM    BUN 26 04/03/2023 12:12 PM    GFRAA >60 04/13/2021 05:30 AM    GLOB 3.5 04/03/2023 12:12 PM    ALT 25 04/03/2023 12:12 PM    AST 13 04/03/2023 12:12 PM     Lab Results   Component Value Date/Time    CHOL 170 04/03/2023 12:12 PM    HDL 51 04/03/2023 12:12 PM     Lab Results   Component Value Date/Time    NUL0UYEX 13.6 04/03/2023 11:21 AM     No results found for: SHAYAN Branch5OHEXT    Lab Results   Component Value Date/Time    WBC 18.6 04/13/2021 05:30 AM    HGB 10.3 04/13/2021 05:30 AM    HCT 31.6 04/13/2021 05:30 AM     04/13/2021 05:30 AM    MCV 94.3 04/13/2021 05:30 AM       Wt Readings from Last 3 Encounters:   05/24/23 144 lb 12.8 oz (65.7 kg)   04/03/23 137 lb 9.6 oz (62.4 kg)   03/10/23 142 lb (64.4 kg)     Temp Readings from Last 3

## 2023-05-24 NOTE — PROGRESS NOTES
1. \"Have you been to the ER, urgent care clinic since your last visit? Hospitalized since your last visit? \" No    2. \"Have you seen or consulted any other health care providers outside of the [address] [place] [address] since your last visit? \"  Endocrinology       3. For patients aged 39-70: Has the patient had a colonoscopy / FIT/ Cologuard? No      If the patient is female:    4. For patients aged 41-77: Has the patient had a mammogram within the past 2 years? No      5. For patients aged 21-65: Has the patient had a pap smear?  No

## 2023-05-25 ENCOUNTER — HOSPITAL ENCOUNTER (OUTPATIENT)
Facility: HOSPITAL | Age: 63
Discharge: HOME | End: 2023-05-27
Payer: COMMERCIAL

## 2023-05-25 DIAGNOSIS — R22.41 LOCALIZED SWELLING OF RIGHT FOOT: ICD-10-CM

## 2023-05-25 PROCEDURE — 93971 EXTREMITY STUDY: CPT | Performed by: SURGERY

## 2023-05-25 PROCEDURE — 93971 EXTREMITY STUDY: CPT

## 2023-07-03 NOTE — TELEPHONE ENCOUNTER
Last Visit: 05- OV   Next Appointment: 08-  Previous Refill Encounter: 04- #16 g with 2 refills      Requested Prescriptions     Pending Prescriptions Disp Refills    fluticasone (FLONASE) 50 MCG/ACT nasal spray [Pharmacy Med Name: FLUTICASONE PROP 50 MCG SPRAY] 16 g 2     Sig: instill 2 sprays INTRANASALLY once daily

## 2023-07-05 ENCOUNTER — TELEPHONE (OUTPATIENT)
Age: 63
End: 2023-07-05

## 2023-07-07 DIAGNOSIS — E10.65 TYPE 1 DIABETES MELLITUS WITH HYPERGLYCEMIA (HCC): ICD-10-CM

## 2023-07-07 NOTE — TELEPHONE ENCOUNTER
Last Visit: 05- OV   Next Appointment: 08-  Previous Refill Encounter: 04- #180 caps with 0 refills  Last Urine Drug Screen: n/a  Controlled Substance Agreement: n/a  : Last filled on 06- for 30 d/s. Requested Prescriptions     Pending Prescriptions Disp Refills    gabapentin (NEURONTIN) 300 MG capsule 180 capsule 0     Sig: Take 1 capsule by mouth 2 times daily as needed (pain) for up to 90 days.  Max Daily Amount: 600 mg

## 2023-07-08 RX ORDER — GABAPENTIN 300 MG/1
300 CAPSULE ORAL 2 TIMES DAILY PRN
Qty: 180 CAPSULE | Refills: 0 | Status: SHIPPED | OUTPATIENT
Start: 2023-07-08 | End: 2023-10-06

## 2023-07-08 RX ORDER — FLUTICASONE PROPIONATE 50 MCG
SPRAY, SUSPENSION (ML) NASAL
Qty: 16 G | Refills: 2 | Status: SHIPPED | OUTPATIENT
Start: 2023-07-08

## 2023-07-11 NOTE — TELEPHONE ENCOUNTER
Spoke to [employer], they stated they do not need a surgical clearance for the pt. Pt updated and verbalized understanding.

## 2023-07-14 ENCOUNTER — OFFICE VISIT (OUTPATIENT)
Age: 63
End: 2023-07-14

## 2023-07-14 VITALS
OXYGEN SATURATION: 98 % | RESPIRATION RATE: 20 BRPM | SYSTOLIC BLOOD PRESSURE: 128 MMHG | HEART RATE: 79 BPM | DIASTOLIC BLOOD PRESSURE: 60 MMHG

## 2023-07-14 DIAGNOSIS — I89.0 LYMPHEDEMA: ICD-10-CM

## 2023-07-14 DIAGNOSIS — I87.2 VENOUS INSUFFICIENCY: Primary | ICD-10-CM

## 2023-07-18 NOTE — PROGRESS NOTES
Ermias [place]    Chief Complaint   Patient presents with    New Patient       History and Physical    Merary Zaragoza is a pleasant 71-year-old female who presents today for evaluation for bilateral lower extremity leg pain, fatigue, and heaviness. She states that her right leg is much worse than her left. She notes that she has right ankle and foot swelling, and despite wearing a brace and elevating her legs she continues to get symptoms. She denies any recent ulceration skin rashes or skin lesions. She denies any recent trauma to her ankle or foot. She continues to ambulate daily without any leg pain or discomfort but by the time she is done with her walks, her legs become extremely heavy fatigued and tired. She denies any chest pain or shortness of breath. Denies any dyspnea on exertion. Reports a very good appetite with no nausea vomiting denies any bowel issues. She is not relatively good shape with a BMI of 22.55. She remains active but does complain of increased swelling in the feet and is hoping for some resolution. She continues to take her daily meds as recommended including statin medication. She also takes gabapentin for peripheral neuropathy. Patient is a diabetic and keeps blood sugars under control.   Patient denies have any issues healing any cuts or abrasions in her lower extremities in the past.    Past Medical History:   Diagnosis Date    Arthritis     Autonomic neuropathy     Back pain     Chronic lung disease     Chronic pain     back    Compression fracture T9-10    COPD (chronic obstructive pulmonary disease) ([facility])     Depression     panic attacks    Diabetes mellitus ([facility])     type 1    GERD (gastroesophageal reflux disease)     Hypertension     no longer on meds    MRSA nasal colonization     Neuropathy     Osteoporosis     Tobacco use      Patient Active Problem List   Diagnosis    Overeating    Syncope and collapse    Hypertension    Gestational diabetes    Diabetic neuropathy ([address])

## 2023-07-26 DIAGNOSIS — I73.9 PVD (PERIPHERAL VASCULAR DISEASE) (HCC): Primary | ICD-10-CM

## 2023-08-03 ENCOUNTER — TELEPHONE (OUTPATIENT)
Age: 63
End: 2023-08-03

## 2023-08-03 DIAGNOSIS — E10.65 TYPE 1 DIABETES MELLITUS WITH HYPERGLYCEMIA (HCC): ICD-10-CM

## 2023-08-03 NOTE — TELEPHONE ENCOUNTER
Last appointment: 05/24/2023    Next appointment: 08/10/2023      Patient requesting refill for     gabapentin (NEURONTIN) 300 MG capsule    (Requesting to be written for 3 times a day.  Patient says she takes 2 at night and 1 during the day)      Pharmacy  [facility] [id number] [facility] [address] [place] [address],Building [facility] [id number]   Phone:  [phone]  Fax:  [phone]

## 2023-08-04 ENCOUNTER — OFFICE VISIT (OUTPATIENT)
Age: 63
End: 2023-08-04

## 2023-08-04 DIAGNOSIS — I87.2 VENOUS INSUFFICIENCY: Primary | ICD-10-CM

## 2023-08-04 DIAGNOSIS — I89.0 LYMPHEDEMA: ICD-10-CM

## 2023-08-04 NOTE — PROGRESS NOTES
Alka Benedict    Bilateral lower extremity venous insufficiency, lymphedema    To a telehealth visit    History and Physical Alka Benedict is a pleasant 68-year-old female who presents today for visit for results review from her recent arterial duplex imaging. Patient was being  evaluated for bilateral lower extremity leg pain, fatigue, and heaviness. Patient was sent for arterial studies to rule out arterial insufficiency, and returns today for results review. Reports no difference since her last visit, unfortunately she has not picked up her compression stockings yet but will. Patient states that she spoke to her primary care doctor about increasing her gabapentin he declines at this time. She states that her right leg is much worse than her left. She notes that she has right ankle and foot swelling, and despite wearing a brace and elevating her legs she continues to get symptoms. She denies any recent ulceration skin rashes or skin lesions. She denies any recent trauma to her ankle or foot. She continues to ambulate daily without any leg pain or discomfort but by the time she is done with her walks, her legs become extremely heavy fatigued and tired. She denies any chest pain or shortness of breath. Denies any dyspnea on exertion. Reports a very good appetite with no nausea vomiting denies any bowel issues. She is not relatively good shape with a BMI of 22.55. She remains active but does complain of increased swelling in the feet and is hoping for some resolution. She continues to take her daily meds as recommended including statin medication. She also takes gabapentin for peripheral neuropathy. Patient is a diabetic and keeps blood sugars under control.   Patient denies have any issues healing any cuts or abrasions in her lower extremities in the past.    Past Medical History:   Diagnosis Date    Arthritis     Autonomic neuropathy     Back pain     Chronic lung disease     Chronic pain     back

## 2023-08-06 RX ORDER — GABAPENTIN 300 MG/1
300 CAPSULE ORAL 3 TIMES DAILY PRN
Qty: 180 CAPSULE | Refills: 0 | Status: SHIPPED | OUTPATIENT
Start: 2023-08-06 | End: 2023-11-04

## 2023-10-04 NOTE — ED NOTES
Assumed care of pt Minocycline Counseling: Patient advised regarding possible photosensitivity and discoloration of the teeth, skin, lips, tongue and gums.  Patient instructed to avoid sunlight, if possible.  When exposed to sunlight, patients should wear protective clothing, sunglasses, and sunscreen.  The patient was instructed to call the office immediately if the following severe adverse effects occur:  hearing changes, easy bruising/bleeding, severe headache, or vision changes.  The patient verbalized understanding of the proper use and possible adverse effects of minocycline.  All of the patient's questions and concerns were addressed.

## 2023-10-09 DIAGNOSIS — E10.65 TYPE 1 DIABETES MELLITUS WITH HYPERGLYCEMIA (HCC): ICD-10-CM

## 2023-10-09 RX ORDER — GABAPENTIN 300 MG/1
CAPSULE ORAL
Qty: 180 CAPSULE | OUTPATIENT
Start: 2023-10-09

## 2023-10-09 NOTE — TELEPHONE ENCOUNTER
Last Visit: 05- OV   Next Appointment: none  Previous Refill Encounter: 08- #180caps with 0 refills  Last Urine Drug Screen: none  Controlled Substance Agreement: none  : Last filled on 09- for 30 d/s. Requested Prescriptions     Pending Prescriptions Disp Refills    gabapentin (NEURONTIN) 300 MG capsule 180 capsule 0     Sig: Take 1 capsule by mouth 3 times daily as needed (pain) for up to 90 days.  Max Daily Amount: 900 mg

## 2023-10-14 DIAGNOSIS — E10.65 TYPE 1 DIABETES MELLITUS WITH HYPERGLYCEMIA (HCC): ICD-10-CM

## 2023-10-15 RX ORDER — GABAPENTIN 300 MG/1
300 CAPSULE ORAL 3 TIMES DAILY PRN
Qty: 180 CAPSULE | Refills: 0 | Status: SHIPPED | OUTPATIENT
Start: 2023-10-15 | End: 2024-01-13

## 2023-10-16 RX ORDER — GABAPENTIN 300 MG/1
CAPSULE ORAL
Qty: 180 CAPSULE | OUTPATIENT
Start: 2023-10-16

## 2023-11-13 ENCOUNTER — TELEPHONE (OUTPATIENT)
Age: 63
End: 2023-11-13

## 2023-11-13 NOTE — TELEPHONE ENCOUNTER
This patient wanted to relay this message to Alba Diez but this is urgent. So this patient stated that she had not received her machine from [facility], Ms. Erazo said it needs prior authorization. Her  from [facility] said she had not received any papers that she needed a machine. She is having severe pain in all 10 toes. She also said has she increased her gabapentin to 1200 mg, she says she is opting to have amputation because she can't live with the pain. Advised pt needed to be seen , patient could not make appointment suggested scheduled pt out next week due to patient schedule . Pt also had cancelled lymphedema day . Pt placed on Arlene's schedule for next week.

## 2023-11-20 ENCOUNTER — OFFICE VISIT (OUTPATIENT)
Age: 63
End: 2023-11-20
Payer: COMMERCIAL

## 2023-11-20 VITALS
BODY MASS INDEX: 22.6 KG/M2 | DIASTOLIC BLOOD PRESSURE: 62 MMHG | HEIGHT: 67 IN | WEIGHT: 144 LBS | HEART RATE: 66 BPM | SYSTOLIC BLOOD PRESSURE: 122 MMHG | OXYGEN SATURATION: 100 %

## 2023-11-20 DIAGNOSIS — Q82.0 HEREDITARY EDEMA OF LEGS: Primary | ICD-10-CM

## 2023-11-20 DIAGNOSIS — E10.43 DIABETIC AUTONOMIC NEUROPATHY ASSOCIATED WITH TYPE 1 DIABETES MELLITUS (HCC): ICD-10-CM

## 2023-11-20 DIAGNOSIS — Z72.0 TOBACCO ABUSE: ICD-10-CM

## 2023-11-20 PROCEDURE — 3074F SYST BP LT 130 MM HG: CPT | Performed by: NURSE PRACTITIONER

## 2023-11-20 PROCEDURE — 99214 OFFICE O/P EST MOD 30 MIN: CPT | Performed by: NURSE PRACTITIONER

## 2023-11-20 PROCEDURE — 3078F DIAST BP <80 MM HG: CPT | Performed by: NURSE PRACTITIONER

## 2023-11-20 NOTE — PROGRESS NOTES
1. Have you been to the ER, urgent care clinic since your last visit? No  Hospitalized since your last visit? No    2. Have you seen or consulted any other health care providers outside of the [address] [place] [address] since your last visit? Include any pap smears or colon screening.  No

## 2023-11-20 NOTE — PROGRESS NOTES
Jakob Torres    Bilateral lower extremity venous insufficiency, lymphedema    To a telehealth visit    History and Physical Jakob Torres is a pleasant 63-year-old female who presents today for visit for results review from her recent arterial duplex imaging. Patient was being  evaluated for bilateral lower extremity leg pain, fatigue, and heaviness. Patient was sent for arterial studies to rule out arterial insufficiency, and returns today for results review. Reports no difference since her last visit, unfortunately she has not picked up her compression stockings yet but will. Patient states that she spoke to her primary care doctor about increasing her gabapentin he declines at this time. She states that her right leg is much worse than her left. She notes that she has right ankle and foot swelling, and despite wearing a brace and elevating her legs she continues to get symptoms. She denies any recent ulceration skin rashes or skin lesions. She denies any recent trauma to her ankle or foot. She continues to ambulate daily without any leg pain or discomfort but by the time she is done with her walks, her legs become extremely heavy fatigued and tired. She denies any chest pain or shortness of breath. Denies any dyspnea on exertion. Reports a very good appetite with no nausea vomiting denies any bowel issues. She is not relatively good shape with a BMI of 22.55. She remains active but does complain of increased swelling in the feet and is hoping for some resolution. She continues to take her daily meds as recommended including statin medication. She also takes gabapentin for peripheral neuropathy. Patient is a diabetic and keeps blood sugars under control. Patient denies have any issues healing any cuts or abrasions in her lower extremities in the past.      She has medicaid. On her mothers side he has a cousin with leg swelling has varicose vein. Bren Lima also has leg swelling.  Father  at an

## 2023-11-30 ENCOUNTER — TELEMEDICINE (OUTPATIENT)
Age: 63
End: 2023-11-30
Payer: COMMERCIAL

## 2023-11-30 DIAGNOSIS — E10.65 TYPE 1 DIABETES MELLITUS WITH HYPERGLYCEMIA (HCC): Primary | ICD-10-CM

## 2023-11-30 DIAGNOSIS — F41.9 ANXIETY: ICD-10-CM

## 2023-11-30 DIAGNOSIS — I73.9 PAD (PERIPHERAL ARTERY DISEASE) (HCC): ICD-10-CM

## 2023-11-30 PROCEDURE — 99214 OFFICE O/P EST MOD 30 MIN: CPT | Performed by: FAMILY MEDICINE

## 2023-11-30 RX ORDER — ACYCLOVIR 400 MG/1
TABLET ORAL
COMMUNITY

## 2023-11-30 RX ORDER — LORAZEPAM 0.5 MG/1
0.5 TABLET ORAL EVERY 8 HOURS PRN
Qty: 30 TABLET | Refills: 2 | Status: SHIPPED | OUTPATIENT
Start: 2023-11-30 | End: 2023-12-30

## 2023-11-30 RX ORDER — DIAZEPAM 5 MG/1
5 TABLET ORAL EVERY 6 HOURS PRN
COMMUNITY
End: 2023-11-30 | Stop reason: CLARIF

## 2023-11-30 RX ORDER — GABAPENTIN 300 MG/1
600 CAPSULE ORAL 2 TIMES DAILY PRN
Qty: 120 CAPSULE | Refills: 1 | Status: SHIPPED | OUTPATIENT
Start: 2023-11-30 | End: 2024-02-28

## 2023-11-30 RX ORDER — LORAZEPAM 0.5 MG/1
0.5 TABLET ORAL EVERY 6 HOURS PRN
COMMUNITY
End: 2023-11-30 | Stop reason: SDUPTHER

## 2023-11-30 NOTE — PROGRESS NOTES
Karla Mayes, was evaluated through a synchronous (real-time) audio-video encounter. The patient (or guardian if applicable) is aware that this is a billable service, which includes applicable co-pays. This Virtual Visit was conducted with patient's (and/or legal guardian's) consent. Patient identification was verified, and a caregiver was present when appropriate.    The patient was located at Home: [address] [facility] [address] [facility] [place] [address]  Provider was located at [facility] ([address] [place] [address]): [address] [facility], [address] [facility],  [address] [facility]      Karla Mayes (:  1960) is a Established patient, presenting virtually for evaluation of the following:    --Tejas Thornton MA

## 2023-11-30 NOTE — PROGRESS NOTES
2023    TELEHEALTH EVALUATION -- Audio/Visual    HPI:    Angy Lamas (:  1960) has requested an audio/video evaluation for the following concern(s):    Patient has type I diabetes. She is [facility]. She is followed by Dr. Adina Rodriguez. She was last seen 3 weeks ago and states that her A1c was down to a 10.2%. She states that she is being considered for a V-Go system since she is unable to utilize the insulin pump. She states that her previous endocrinologist has the documentation regarding why she is unable to use the insulin pump. She has follow-up with Dr. Polly Augustine in December. She states she would like to get blood work done here for the diabetes and have the labs available for Dr. Polly Augustine at her appointment. We will place lab orders per patient request.      Patient has anxiety. She states the anxiety is fairly stable. She is on lorazepam.  She is requesting a refill. She states that the lorazepam helps when she has to decrease her activity due to her history of peripheral arterial disease and lymphedema. She is followed by [facility] [address] [facility] vascular for peripheral arterial disease and lymphedema. She states she is unable to walk long distances without being able to rest.  She is requesting a V parking placard. She does not think she will be driving much longer. She states that her vein and vascular specialist recommended that she increase her gabapentin to 600 mg twice a day. She is in need of a new prescription to reflect this. Review of Systems as per HPI    Prior to Visit Medications    Medication Sig Taking? Authorizing Provider   Continuous Blood Gluc  (Bernita Habermann) [address] [facility] 12 by Does not apply route Yes Niesha Lanza MD   Continuous Blood Gluc Sensor (Vickie Huizar) MISC by Does not apply route Yes Niesha Lanza MD   LORazepam (ATIVAN) 0.5 MG tablet Take 1 tablet by mouth every 8 hours as needed for Anxiety for up to 30 days.

## 2023-12-01 ENCOUNTER — TELEPHONE (OUTPATIENT)
Age: 63
End: 2023-12-01

## 2023-12-01 NOTE — TELEPHONE ENCOUNTER
Left patient voicemail to contact office to schedule follow up appointment     Return in about 4 months (around 3/30/2024) for htn

## 2024-02-06 DIAGNOSIS — E10.65 TYPE 1 DIABETES MELLITUS WITH HYPERGLYCEMIA (HCC): ICD-10-CM

## 2024-02-07 NOTE — TELEPHONE ENCOUNTER
[facility] date : No Access     Last Visit: 11/30/2023- virtual, 05/24/2023- clinic  Next Appointment: N/A  Controlled Substance Agreement: N/A  Urine Drug Screen: N/A  Previous Refill Encounter(s): Date:     to early to fill   Disp Refills Start End    gabapentin (NEURONTIN) 300 MG capsule 120 capsule 1 11/30/2023 2/28/2024    Sig - Route: Take 2 capsules by mouth 2 times daily as needed (pain) for up to 90 days. Max Daily Amount: 1,200 mg - Oral    Sent to pharmacy as: Gabapentin 300 MG Oral Capsule (NEURONTIN)    E-Prescribing Status: Receipt confirmed by pharmacy (11/30/2023  7:03 PM EST)        Requested Prescriptions     Pending Prescriptions Disp Refills    gabapentin (NEURONTIN) 300 MG capsule [Pharmacy Med Name: GABAPENTIN 300 MG CAPSULE] 120 capsule      Sig: take 2 capsules by mouth twice a day if needed for pain (MAX DAILY AMOUNT: 1200 MG)

## 2024-02-13 NOTE — TELEPHONE ENCOUNTER
Patient called to check on status of new prescription request for gabapentin (NEURONTIN) 300 MG capsule , advised has been forwarded and pending provider approval. States will be leaving town this week and out of medication    Vv on11/30/23  Last OV 5/24/23  Next OV- None  CSA form-n/a

## 2024-02-15 RX ORDER — GABAPENTIN 300 MG/1
CAPSULE ORAL
Qty: 120 CAPSULE | Refills: 1 | Status: SHIPPED | OUTPATIENT
Start: 2024-02-15 | End: 2024-03-16

## 2024-03-27 ENCOUNTER — TELEPHONE (OUTPATIENT)
Age: 64
End: 2024-03-27

## 2024-03-27 NOTE — TELEPHONE ENCOUNTER
----- Message from Pranav Yogesh sent at 3/27/2024 11:26 AM EDT -----  Subject: Referral Request    Reason for referral request? US OF BREAST since pt has lumps in her breast   with soreness.   Provider patient wants to be referred to(if known):     Provider Phone Number(if known):    Additional Information for Provider? Pt would like a call back.   ---------------------------------------------------------------------------  --------------  CALL BACK INFO    [phone]; OK to leave message on voicemail  ---------------------------------------------------------------------------  --------------

## 2024-03-27 NOTE — TELEPHONE ENCOUNTER
Spoke with the patient and informed her an appointment is needed for further evaluation in clinic.  The patient acknowledged understanding and will call back in the morning to schedule an appointment due to her busy schedule and only having a certain day available. Patient was told that if the provider is not available that day she can also see anyone available that day, thank you

## 2024-04-17 DIAGNOSIS — E10.65 TYPE 1 DIABETES MELLITUS WITH HYPERGLYCEMIA (HCC): ICD-10-CM

## 2024-04-19 RX ORDER — GABAPENTIN 300 MG/1
CAPSULE ORAL
Qty: 120 CAPSULE | Refills: 0 | Status: SHIPPED | OUTPATIENT
Start: 2024-04-19 | End: 2024-05-19

## 2024-04-26 ENCOUNTER — APPOINTMENT (OUTPATIENT)
Facility: HOSPITAL | Age: 64
DRG: 392 | End: 2024-04-26
Payer: MEDICARE

## 2024-04-26 ENCOUNTER — HOSPITAL ENCOUNTER (INPATIENT)
Facility: HOSPITAL | Age: 64
LOS: 3 days | Discharge: HOME | DRG: 392 | End: 2024-04-30
Attending: STUDENT IN AN ORGANIZED HEALTH CARE EDUCATION/TRAINING PROGRAM | Admitting: INTERNAL MEDICINE
Payer: MEDICARE

## 2024-04-26 DIAGNOSIS — R19.7 DIARRHEA, UNSPECIFIED TYPE: ICD-10-CM

## 2024-04-26 DIAGNOSIS — R78.81 BACTEREMIA: ICD-10-CM

## 2024-04-26 DIAGNOSIS — R73.9 HYPERGLYCEMIA: ICD-10-CM

## 2024-04-26 DIAGNOSIS — N17.9 AKI (ACUTE KIDNEY INJURY) (HCC): Primary | ICD-10-CM

## 2024-04-26 DIAGNOSIS — S22.32XA CLOSED FRACTURE OF ONE RIB OF LEFT SIDE, INITIAL ENCOUNTER: ICD-10-CM

## 2024-04-26 DIAGNOSIS — D64.9 NORMOCYTIC ANEMIA: ICD-10-CM

## 2024-04-26 LAB
ALBUMIN SERPL-MCNC: 2.4 G/DL (ref 3.4–5)
ALBUMIN/GLOB SERPL: 0.6 (ref 0.8–1.7)
ALP SERPL-CCNC: 91 U/L (ref 45–117)
ALT SERPL-CCNC: 10 U/L (ref 13–56)
ANION GAP SERPL CALC-SCNC: 6 MMOL/L (ref 3–18)
AST SERPL-CCNC: 11 U/L (ref 10–38)
B PERT DNA SPEC QL NAA+PROBE: NOT DETECTED
BASOPHILS # BLD: 0.1 K/UL (ref 0–0.1)
BASOPHILS NFR BLD: 1 % (ref 0–2)
BILIRUB DIRECT SERPL-MCNC: 0.3 MG/DL (ref 0–0.2)
BILIRUB SERPL-MCNC: 1.1 MG/DL (ref 0.2–1)
BORDETELLA PARAPERTUSSIS BY PCR: NOT DETECTED
BUN SERPL-MCNC: 42 MG/DL (ref 7–18)
BUN/CREAT SERPL: 29 (ref 12–20)
C PNEUM DNA SPEC QL NAA+PROBE: NOT DETECTED
CALCIUM SERPL-MCNC: 7.8 MG/DL (ref 8.5–10.1)
CHLORIDE SERPL-SCNC: 104 MMOL/L (ref 100–111)
CO2 SERPL-SCNC: 22 MMOL/L (ref 21–32)
CREAT SERPL-MCNC: 1.46 MG/DL (ref 0.6–1.3)
DIFFERENTIAL METHOD BLD: ABNORMAL
EOSINOPHIL # BLD: 0 K/UL (ref 0–0.4)
EOSINOPHIL NFR BLD: 0 % (ref 0–5)
ERYTHROCYTE [DISTWIDTH] IN BLOOD BY AUTOMATED COUNT: 12.9 % (ref 11.6–14.5)
FLUAV SUBTYP SPEC NAA+PROBE: NOT DETECTED
FLUBV RNA SPEC QL NAA+PROBE: NOT DETECTED
GLOBULIN SER CALC-MCNC: 4.3 G/DL (ref 2–4)
GLUCOSE SERPL-MCNC: 257 MG/DL (ref 74–99)
HADV DNA SPEC QL NAA+PROBE: NOT DETECTED
HCOV 229E RNA SPEC QL NAA+PROBE: NOT DETECTED
HCOV HKU1 RNA SPEC QL NAA+PROBE: NOT DETECTED
HCOV NL63 RNA SPEC QL NAA+PROBE: NOT DETECTED
HCOV OC43 RNA SPEC QL NAA+PROBE: NOT DETECTED
HCT VFR BLD AUTO: 32.2 % (ref 35–45)
HEMOCCULT STL QL: NEGATIVE
HGB BLD-MCNC: 10.6 G/DL (ref 12–16)
HMPV RNA SPEC QL NAA+PROBE: NOT DETECTED
HPIV1 RNA SPEC QL NAA+PROBE: NOT DETECTED
HPIV2 RNA SPEC QL NAA+PROBE: NOT DETECTED
HPIV3 RNA SPEC QL NAA+PROBE: NOT DETECTED
HPIV4 RNA SPEC QL NAA+PROBE: NOT DETECTED
IMM GRANULOCYTES # BLD AUTO: 0.2 K/UL (ref 0–0.04)
IMM GRANULOCYTES NFR BLD AUTO: 2 % (ref 0–0.5)
INR PPP: 1.2 (ref 0.9–1.1)
LACTATE SERPL-SCNC: 1.6 MMOL/L (ref 0.4–2)
LIPASE SERPL-CCNC: 11 U/L (ref 13–75)
LYMPHOCYTES # BLD: 0.7 K/UL (ref 0.9–3.6)
LYMPHOCYTES NFR BLD: 5 % (ref 21–52)
M PNEUMO DNA SPEC QL NAA+PROBE: NOT DETECTED
MAGNESIUM SERPL-MCNC: 2.1 MG/DL (ref 1.6–2.6)
MCH RBC QN AUTO: 29.5 PG (ref 24–34)
MCHC RBC AUTO-ENTMCNC: 32.9 G/DL (ref 31–37)
MCV RBC AUTO: 89.7 FL (ref 78–100)
MONOCYTES # BLD: 0.8 K/UL (ref 0.05–1.2)
MONOCYTES NFR BLD: 5 % (ref 3–10)
NEUTS SEG # BLD: 13.9 K/UL (ref 1.8–8)
NEUTS SEG NFR BLD: 88 % (ref 40–73)
NRBC # BLD: 0 K/UL (ref 0–0.01)
NRBC BLD-RTO: 0 PER 100 WBC
NT PRO BNP: 552 PG/ML (ref 0–900)
PHOSPHATE SERPL-MCNC: 3 MG/DL (ref 2.5–4.9)
PLATELET # BLD AUTO: 169 K/UL (ref 135–420)
PMV BLD AUTO: 10.9 FL (ref 9.2–11.8)
POTASSIUM SERPL-SCNC: 4.3 MMOL/L (ref 3.5–5.5)
PROCALCITONIN SERPL-MCNC: 21.49 NG/ML
PROT SERPL-MCNC: 6.7 G/DL (ref 6.4–8.2)
PROTHROMBIN TIME: 15.4 SEC (ref 11.9–14.7)
RBC # BLD AUTO: 3.59 M/UL (ref 4.2–5.3)
RSV RNA SPEC QL NAA+PROBE: NOT DETECTED
RV+EV RNA SPEC QL NAA+PROBE: NOT DETECTED
SARS-COV-2 RNA RESP QL NAA+PROBE: NOT DETECTED
SODIUM SERPL-SCNC: 132 MMOL/L (ref 136–145)
TROPONIN I SERPL HS-MCNC: 7 NG/L (ref 0–54)
TSH SERPL DL<=0.05 MIU/L-ACNC: 1.83 UIU/ML (ref 0.36–3.74)
WBC # BLD AUTO: 15.7 K/UL (ref 4.6–13.2)

## 2024-04-26 PROCEDURE — 74177 CT ABD & PELVIS W/CONTRAST: CPT

## 2024-04-26 PROCEDURE — 96365 THER/PROPH/DIAG IV INF INIT: CPT

## 2024-04-26 PROCEDURE — 2580000003 HC RX 258: Performed by: STUDENT IN AN ORGANIZED HEALTH CARE EDUCATION/TRAINING PROGRAM

## 2024-04-26 PROCEDURE — 83605 ASSAY OF LACTIC ACID: CPT

## 2024-04-26 PROCEDURE — 83690 ASSAY OF LIPASE: CPT

## 2024-04-26 PROCEDURE — 84100 ASSAY OF PHOSPHORUS: CPT

## 2024-04-26 PROCEDURE — 6360000002 HC RX W HCPCS: Performed by: STUDENT IN AN ORGANIZED HEALTH CARE EDUCATION/TRAINING PROGRAM

## 2024-04-26 PROCEDURE — 87040 BLOOD CULTURE FOR BACTERIA: CPT

## 2024-04-26 PROCEDURE — 80048 BASIC METABOLIC PNL TOTAL CA: CPT

## 2024-04-26 PROCEDURE — 71045 X-RAY EXAM CHEST 1 VIEW: CPT

## 2024-04-26 PROCEDURE — 83880 ASSAY OF NATRIURETIC PEPTIDE: CPT

## 2024-04-26 PROCEDURE — 85025 COMPLETE CBC W/AUTO DIFF WBC: CPT

## 2024-04-26 PROCEDURE — 84443 ASSAY THYROID STIM HORMONE: CPT

## 2024-04-26 PROCEDURE — 6360000004 HC RX CONTRAST MEDICATION: Performed by: STUDENT IN AN ORGANIZED HEALTH CARE EDUCATION/TRAINING PROGRAM

## 2024-04-26 PROCEDURE — 80076 HEPATIC FUNCTION PANEL: CPT

## 2024-04-26 PROCEDURE — 71260 CT THORAX DX C+: CPT

## 2024-04-26 PROCEDURE — A4216 STERILE WATER/SALINE, 10 ML: HCPCS | Performed by: STUDENT IN AN ORGANIZED HEALTH CARE EDUCATION/TRAINING PROGRAM

## 2024-04-26 PROCEDURE — 83735 ASSAY OF MAGNESIUM: CPT

## 2024-04-26 PROCEDURE — 96374 THER/PROPH/DIAG INJ IV PUSH: CPT

## 2024-04-26 PROCEDURE — 82272 OCCULT BLD FECES 1-3 TESTS: CPT

## 2024-04-26 PROCEDURE — 93005 ELECTROCARDIOGRAM TRACING: CPT

## 2024-04-26 PROCEDURE — 87186 SC STD MICRODIL/AGAR DIL: CPT

## 2024-04-26 PROCEDURE — 85610 PROTHROMBIN TIME: CPT

## 2024-04-26 PROCEDURE — 96375 TX/PRO/DX INJ NEW DRUG ADDON: CPT

## 2024-04-26 PROCEDURE — 99285 EMERGENCY DEPT VISIT HI MDM: CPT

## 2024-04-26 PROCEDURE — 84145 PROCALCITONIN (PCT): CPT

## 2024-04-26 PROCEDURE — 84484 ASSAY OF TROPONIN QUANT: CPT

## 2024-04-26 PROCEDURE — C9113 INJ PANTOPRAZOLE SODIUM, VIA: HCPCS | Performed by: STUDENT IN AN ORGANIZED HEALTH CARE EDUCATION/TRAINING PROGRAM

## 2024-04-26 PROCEDURE — 0202U NFCT DS 22 TRGT SARS-COV-2: CPT

## 2024-04-26 PROCEDURE — 87154 CUL TYP ID BLD PTHGN 6+ TRGT: CPT

## 2024-04-26 PROCEDURE — 87077 CULTURE AEROBIC IDENTIFY: CPT

## 2024-04-26 RX ORDER — IODIXANOL 320 MG/ML
100 INJECTION, SOLUTION INTRAVASCULAR
Status: COMPLETED | OUTPATIENT
Start: 2024-04-26 | End: 2024-04-26

## 2024-04-26 RX ORDER — SODIUM CHLORIDE, SODIUM LACTATE, POTASSIUM CHLORIDE, CALCIUM CHLORIDE 600; 310; 30; 20 MG/100ML; MG/100ML; MG/100ML; MG/100ML
INJECTION, SOLUTION INTRAVENOUS CONTINUOUS
Status: DISCONTINUED | OUTPATIENT
Start: 2024-04-26 | End: 2024-04-28

## 2024-04-26 RX ORDER — CIPROFLOXACIN 2 MG/ML
400 INJECTION, SOLUTION INTRAVENOUS EVERY 12 HOURS
Status: DISCONTINUED | OUTPATIENT
Start: 2024-04-26 | End: 2024-04-26

## 2024-04-26 RX ORDER — CIPROFLOXACIN 2 MG/ML
400 INJECTION, SOLUTION INTRAVENOUS ONCE
Status: COMPLETED | OUTPATIENT
Start: 2024-04-26 | End: 2024-04-26

## 2024-04-26 RX ORDER — SODIUM CHLORIDE, SODIUM LACTATE, POTASSIUM CHLORIDE, AND CALCIUM CHLORIDE .6; .31; .03; .02 G/100ML; G/100ML; G/100ML; G/100ML
1000 INJECTION, SOLUTION INTRAVENOUS ONCE
Status: COMPLETED | OUTPATIENT
Start: 2024-04-26 | End: 2024-04-26

## 2024-04-26 RX ADMIN — IODIXANOL 100 ML: 320 INJECTION, SOLUTION INTRAVASCULAR at 23:20

## 2024-04-26 RX ADMIN — SODIUM CHLORIDE, POTASSIUM CHLORIDE, SODIUM LACTATE AND CALCIUM CHLORIDE 1000 ML: 600; 310; 30; 20 INJECTION, SOLUTION INTRAVENOUS at 19:33

## 2024-04-26 RX ADMIN — CIPROFLOXACIN 400 MG: 400 INJECTION, SOLUTION INTRAVENOUS at 20:17

## 2024-04-26 RX ADMIN — SODIUM CHLORIDE, POTASSIUM CHLORIDE, SODIUM LACTATE AND CALCIUM CHLORIDE: 600; 310; 30; 20 INJECTION, SOLUTION INTRAVENOUS at 21:29

## 2024-04-26 RX ADMIN — PANTOPRAZOLE SODIUM 40 MG: 40 INJECTION, POWDER, FOR SOLUTION INTRAVENOUS at 19:28

## 2024-04-26 NOTE — ED PROVIDER NOTES
Emergency Department Treatment Report    Patient: Shelia Erazo Age: 63 y.o. Sex: female    YOB: 1960 Admit Date: 4/26/2024 PCP: Karen Dill MD   MRN: [id number]  CSN: [id number]     Room: [place] [address] Time Dictated: 10:35 PM        Chief Complaint   Chief Complaint   Patient presents with    Extremity Weakness       History of Present Illness   Shelia Erazo is a 63 y.o. female with past medical history of COPD, type 1 diabetes mellitus, lymphedema who presents to the ED with the chief complaint of generalized weakness, fatigue, diarrhea.     Patient reports that starting on Tuesday, started having diarrhea, about 8 bowel movements per day.  She denies any similar symptoms in the past.  Does note the stool is very dark, almost black.  No hematochezia.  She has had some nausea, but no vomiting.  She reports generalized weakness, but no focal weakness, no headache, no vision changes.  No contact any new animals, no foreign travel, no fever, no raw or undercooked foods.  No sick contacts.  No recent antibiotics.  No urine or vaginal symptoms.  She does report a fall last night, does not know exactly how, did not hit her head, no loss of consciousness, does think she hit her left chest wall against the toilet.    No drug use, no alcohol use, smokes cigarettes.    Review of Systems   Review of Systems   Constitutional:  Positive for appetite change, chills and fatigue. Negative for fever.   HENT:  Negative for sore throat.    Respiratory:  Positive for cough.    Cardiovascular:  Negative for chest pain and leg swelling.   Gastrointestinal:  Positive for diarrhea and nausea. Negative for abdominal pain, blood in stool and vomiting.   Genitourinary:  Negative for dysuria and hematuria.   Musculoskeletal:  Negative for neck pain.   Neurological:  Positive for weakness. Negative for syncope and headaches.          Past Medical/Surgical History     Past Medical History:   Diagnosis Date    Arthritis      detected NOTD      Influenza A by PCR Not detected NOTD      Influenza B PCR Not detected NOTD      Parainfluenza 1 PCR Not detected NOTD      Parainfluenza 2 PCR Not detected NOTD      Parainfluenza 3 PCR Not detected NOTD      Parainfluenza 4 PCR Not detected NOTD      Respiratory Syncytial Virus by PCR Not detected NOTD      Bordetella parapertussis by PCR Not detected NOTD      Bordetella pertussis by PCR Not detected NOTD      Chlamydophila Pneumonia PCR Not detected NOTD      Mycoplasma pneumo by PCR Not detected NOTD         Imaging:    XR CHEST PORTABLE   Final Result      Hyperinflation with no pneumonia or CHF.      CT CHEST ABDOMEN PELVIS W CONTRAST Additional Contrast? None    (Results Pending)          ED Course/Medical Decision Making       ED Course as of 04/26/24 2235 Fri Apr 26, 2024 1913 WBC(!): 15.7  Leukocytosis, normocytic anemia with Hb 10.6. [MW]   1913 Creatinine(!): 1.46  LANEY, last Cr 0.8, no evidence of DKA.  [MW]   1914 XR CHEST PORTABLE  IMPRESSION:  Hyperinflation with no pneumonia or CHF. [MW]   1915 Troponin, High Sensitivity: 7  Normal [MW]   1915 Magnesium: 2.1  Normal [MW]   1926 NT Pro-BNP: 552  Normal [MW]   1926 TSH, 3RD GENERATION: 1.83  Normal [MW]   1926 Lactic Acid, Plasma: 1.6  Normal [MW]   1927 RN working on EKG. On rectal exam, brown stool, no melena, no hematochezia, occult card completed.  [MW]   1945 Total Bilirubin(!): 1.1  Mild elevation [MW]   1956 Procalcitonin: 21.49  Elevated [MW]   2017 POC Occult Blood, Fecal: Negative [MW]   2145 EKG personally interpreted as rate 81, normal RI, narrow QRS, normal QTc, normal axis, abnormal R wave progression V3, no significant ST elevations or depressions [MW]   2150 Patient turned over to Dr. Garcia, attending/staff physician who has been following along and active in the patient's care throughout her ER stay. He agrees to follow-up on her remaining results, including her CT scan and U/A, anticipate admission for

## 2024-04-26 NOTE — ED TRIAGE NOTES
Pt in ED via medics with c/o weakness that started  on Tuesday . Pt also states she has been having diarrhea. FSBG 319 via medics. Medics placed 20G Left AC. Fluids currently running. Pt also states she had a fall walking to the restroom due to numb feet. No LOC,No blood thinners. Pt has neuropathy

## 2024-04-27 PROBLEM — R78.81 BACTEREMIA: Status: ACTIVE | Noted: 2024-04-27

## 2024-04-27 LAB
ACCESSION NUMBER, LLC1M: ABNORMAL
ACINETOBACTER CALCOAC BAUMANNII COMPLEX BY PCR: NOT DETECTED
APPEARANCE UR: CLEAR
B FRAGILIS DNA BLD POS QL NAA+NON-PROBE: NOT DETECTED
BACTERIA URNS QL MICRO: ABNORMAL /HPF
BILIRUB UR QL: NEGATIVE
BIOFIRE TEST COMMENT: ABNORMAL
BLACTX-M ISLT/SPM QL: NOT DETECTED
BLAIMP ISLT/SPM QL: NOT DETECTED
BLAKPC BLD POS QL NAA+NON-PROBE: NOT DETECTED
BLAOXA-48-LIKE ISLT/SPM QL: NOT DETECTED
BLAVIM ISLT/SPM QL: NOT DETECTED
C ALBICANS DNA BLD POS QL NAA+NON-PROBE: NOT DETECTED
C AURIS DNA BLD POS QL NAA+NON-PROBE: NOT DETECTED
C GATTII+NEOFOR DNA BLD POS QL NAA+N-PRB: NOT DETECTED
C GLABRATA DNA BLD POS QL NAA+NON-PROBE: NOT DETECTED
C KRUSEI DNA BLD POS QL NAA+NON-PROBE: NOT DETECTED
C PARAP DNA BLD POS QL NAA+NON-PROBE: NOT DETECTED
C TROPICLS DNA BLD POS QL NAA+NON-PROBE: NOT DETECTED
COLISTIN RES MCR-1 ISLT/SPM QL: NOT DETECTED
COLOR UR: YELLOW
E CLOAC COMP DNA BLD POS NAA+NON-PROBE: NOT DETECTED
E COLI DNA BLD POS QL NAA+NON-PROBE: DETECTED
E FAECALIS DNA BLD POS QL NAA+NON-PROBE: NOT DETECTED
E FAECIUM DNA BLD POS QL NAA+NON-PROBE: NOT DETECTED
ENTEROBACTERALES DNA BLD POS NAA+N-PRB: DETECTED
EPITH CASTS URNS QL MICRO: NEGATIVE /LPF (ref 0–5)
GLUCOSE BLD STRIP.AUTO-MCNC: 263 MG/DL (ref 70–110)
GLUCOSE UR STRIP.AUTO-MCNC: >1000 MG/DL
GP B STREP DNA BLD POS QL NAA+NON-PROBE: NOT DETECTED
HAEM INFLU DNA BLD POS QL NAA+NON-PROBE: NOT DETECTED
HGB UR QL STRIP: ABNORMAL
K OXYTOCA DNA BLD POS QL NAA+NON-PROBE: NOT DETECTED
KETONES UR QL STRIP.AUTO: 15 MG/DL
KLEBSIELLA SP DNA BLD POS QL NAA+NON-PRB: NOT DETECTED
KLEBSIELLA SP DNA BLD POS QL NAA+NON-PRB: NOT DETECTED
L MONOCYTOG DNA BLD POS QL NAA+NON-PROBE: NOT DETECTED
LACTATE SERPL-SCNC: 1 MMOL/L (ref 0.4–2)
LEUKOCYTE ESTERASE UR QL STRIP.AUTO: NEGATIVE
N MEN DNA BLD POS QL NAA+NON-PROBE: NOT DETECTED
NITRITE UR QL STRIP.AUTO: NEGATIVE
P AERUGINOSA DNA BLD POS NAA+NON-PROBE: NOT DETECTED
PH UR STRIP: 5.5 (ref 5–8)
PROT UR STRIP-MCNC: 30 MG/DL
PROTEUS SP DNA BLD POS QL NAA+NON-PROBE: NOT DETECTED
RBC #/AREA URNS HPF: ABNORMAL /HPF (ref 0–5)
RESISTANT GENE NDM BY PCR: NOT DETECTED
RESISTANT GENE TARGETS: ABNORMAL
S AUREUS DNA BLD POS QL NAA+NON-PROBE: NOT DETECTED
S AUREUS+CONS DNA BLD POS NAA+NON-PROBE: NOT DETECTED
S EPIDERMIDIS DNA BLD POS QL NAA+NON-PRB: NOT DETECTED
S LUGDUNENSIS DNA BLD POS QL NAA+NON-PRB: NOT DETECTED
S MALTOPHILIA DNA BLD POS QL NAA+NON-PRB: NOT DETECTED
S MARCESCENS DNA BLD POS NAA+NON-PROBE: NOT DETECTED
S PNEUM DNA BLD POS QL NAA+NON-PROBE: NOT DETECTED
S PYO DNA BLD POS QL NAA+NON-PROBE: NOT DETECTED
SALMONELLA DNA BLD POS QL NAA+NON-PROBE: NOT DETECTED
SP GR UR REFRACTOMETRY: 1.03 (ref 1–1.03)
STREPTOCOCCUS DNA BLD POS NAA+NON-PROBE: NOT DETECTED
UROBILINOGEN UR QL STRIP.AUTO: 1 EU/DL (ref 0.2–1)
WBC URNS QL MICRO: ABNORMAL /HPF (ref 0–4)

## 2024-04-27 PROCEDURE — 6360000002 HC RX W HCPCS: Performed by: INTERNAL MEDICINE

## 2024-04-27 PROCEDURE — 82962 GLUCOSE BLOOD TEST: CPT

## 2024-04-27 PROCEDURE — 94640 AIRWAY INHALATION TREATMENT: CPT

## 2024-04-27 PROCEDURE — 2580000003 HC RX 258: Performed by: STUDENT IN AN ORGANIZED HEALTH CARE EDUCATION/TRAINING PROGRAM

## 2024-04-27 PROCEDURE — 87088 URINE BACTERIA CULTURE: CPT

## 2024-04-27 PROCEDURE — 81001 URINALYSIS AUTO W/SCOPE: CPT

## 2024-04-27 PROCEDURE — 6360000002 HC RX W HCPCS: Performed by: STUDENT IN AN ORGANIZED HEALTH CARE EDUCATION/TRAINING PROGRAM

## 2024-04-27 PROCEDURE — 6370000000 HC RX 637 (ALT 250 FOR IP): Performed by: STUDENT IN AN ORGANIZED HEALTH CARE EDUCATION/TRAINING PROGRAM

## 2024-04-27 PROCEDURE — 83605 ASSAY OF LACTIC ACID: CPT

## 2024-04-27 PROCEDURE — 1100000000 HC RM PRIVATE

## 2024-04-27 PROCEDURE — 87086 URINE CULTURE/COLONY COUNT: CPT

## 2024-04-27 PROCEDURE — 99223 1ST HOSP IP/OBS HIGH 75: CPT | Performed by: INTERNAL MEDICINE

## 2024-04-27 PROCEDURE — 6370000000 HC RX 637 (ALT 250 FOR IP): Performed by: INTERNAL MEDICINE

## 2024-04-27 PROCEDURE — 94761 N-INVAS EAR/PLS OXIMETRY MLT: CPT

## 2024-04-27 PROCEDURE — 87186 SC STD MICRODIL/AGAR DIL: CPT

## 2024-04-27 RX ORDER — ALBUTEROL SULFATE 90 UG/1
2 AEROSOL, METERED RESPIRATORY (INHALATION) EVERY 4 HOURS PRN
Status: DISCONTINUED | OUTPATIENT
Start: 2024-04-27 | End: 2024-04-27 | Stop reason: CLARIF

## 2024-04-27 RX ORDER — INSULIN GLARGINE 100 [IU]/ML
20 INJECTION, SOLUTION SUBCUTANEOUS EVERY MORNING
Status: DISCONTINUED | OUTPATIENT
Start: 2024-04-28 | End: 2024-04-29

## 2024-04-27 RX ORDER — LIDOCAINE 50 MG/G
1 PATCH TOPICAL DAILY
Qty: 10 PATCH | Refills: 0 | Status: SHIPPED | OUTPATIENT
Start: 2024-04-27 | End: 2024-05-07

## 2024-04-27 RX ORDER — 0.9 % SODIUM CHLORIDE 0.9 %
1000 INTRAVENOUS SOLUTION INTRAVENOUS ONCE
Status: COMPLETED | OUTPATIENT
Start: 2024-04-27 | End: 2024-04-27

## 2024-04-27 RX ORDER — LIDOCAINE 4 G/G
1 PATCH TOPICAL
Status: COMPLETED | OUTPATIENT
Start: 2024-04-27 | End: 2024-04-27

## 2024-04-27 RX ORDER — DEXTROSE MONOHYDRATE 100 MG/ML
INJECTION, SOLUTION INTRAVENOUS CONTINUOUS PRN
Status: DISCONTINUED | OUTPATIENT
Start: 2024-04-27 | End: 2024-04-30 | Stop reason: HOSPADM

## 2024-04-27 RX ORDER — BUDESONIDE 0.5 MG/2ML
0.5 INHALANT ORAL
Status: DISCONTINUED | OUTPATIENT
Start: 2024-04-27 | End: 2024-04-30 | Stop reason: HOSPADM

## 2024-04-27 RX ORDER — BUDESONIDE AND FORMOTEROL FUMARATE DIHYDRATE 160; 4.5 UG/1; UG/1
2 AEROSOL RESPIRATORY (INHALATION)
Status: DISCONTINUED | OUTPATIENT
Start: 2024-04-27 | End: 2024-04-27 | Stop reason: CLARIF

## 2024-04-27 RX ORDER — GABAPENTIN 300 MG/1
300 CAPSULE ORAL 2 TIMES DAILY
Status: DISCONTINUED | OUTPATIENT
Start: 2024-04-27 | End: 2024-04-30 | Stop reason: HOSPADM

## 2024-04-27 RX ORDER — HEPARIN SODIUM 5000 [USP'U]/ML
5000 INJECTION, SOLUTION INTRAVENOUS; SUBCUTANEOUS EVERY 8 HOURS SCHEDULED
Status: DISCONTINUED | OUTPATIENT
Start: 2024-04-27 | End: 2024-04-30 | Stop reason: HOSPADM

## 2024-04-27 RX ORDER — ARFORMOTEROL TARTRATE 15 UG/2ML
15 SOLUTION RESPIRATORY (INHALATION)
Status: DISCONTINUED | OUTPATIENT
Start: 2024-04-27 | End: 2024-04-30 | Stop reason: HOSPADM

## 2024-04-27 RX ORDER — CIPROFLOXACIN 500 MG/1
500 TABLET, FILM COATED ORAL 2 TIMES DAILY
Qty: 14 TABLET | Refills: 0 | Status: SHIPPED | OUTPATIENT
Start: 2024-04-27 | End: 2024-04-30 | Stop reason: HOSPADM

## 2024-04-27 RX ORDER — ACETAMINOPHEN 325 MG/1
650 TABLET ORAL EVERY 4 HOURS PRN
Status: DISCONTINUED | OUTPATIENT
Start: 2024-04-27 | End: 2024-04-30 | Stop reason: HOSPADM

## 2024-04-27 RX ORDER — ALBUTEROL SULFATE 2.5 MG/3ML
2.5 SOLUTION RESPIRATORY (INHALATION) EVERY 4 HOURS PRN
Status: DISCONTINUED | OUTPATIENT
Start: 2024-04-27 | End: 2024-04-30 | Stop reason: HOSPADM

## 2024-04-27 RX ORDER — HYDROCODONE BITARTRATE AND ACETAMINOPHEN 5; 325 MG/1; MG/1
1 TABLET ORAL
Status: COMPLETED | OUTPATIENT
Start: 2024-04-27 | End: 2024-04-27

## 2024-04-27 RX ADMIN — GABAPENTIN 300 MG: 100 CAPSULE ORAL at 20:11

## 2024-04-27 RX ADMIN — HYDROCODONE BITARTRATE AND ACETAMINOPHEN 1 TABLET: 5; 325 TABLET ORAL at 01:25

## 2024-04-27 RX ADMIN — BUDESONIDE 500 MCG: 0.5 INHALANT RESPIRATORY (INHALATION) at 20:12

## 2024-04-27 RX ADMIN — HEPARIN SODIUM 5000 UNITS: 5000 INJECTION INTRAVENOUS; SUBCUTANEOUS at 22:25

## 2024-04-27 RX ADMIN — ACETAMINOPHEN 650 MG: 325 TABLET ORAL at 12:57

## 2024-04-27 RX ADMIN — ACETAMINOPHEN 650 MG: 325 TABLET ORAL at 20:11

## 2024-04-27 RX ADMIN — SODIUM CHLORIDE, POTASSIUM CHLORIDE, SODIUM LACTATE AND CALCIUM CHLORIDE: 600; 310; 30; 20 INJECTION, SOLUTION INTRAVENOUS at 12:47

## 2024-04-27 RX ADMIN — ARFORMOTEROL TARTRATE 15 MCG: 15 SOLUTION RESPIRATORY (INHALATION) at 20:12

## 2024-04-27 RX ADMIN — WATER 2000 MG: 1 INJECTION INTRAMUSCULAR; INTRAVENOUS; SUBCUTANEOUS at 10:41

## 2024-04-27 RX ADMIN — SODIUM CHLORIDE 1000 ML: 9 INJECTION, SOLUTION INTRAVENOUS at 10:41

## 2024-04-27 ASSESSMENT — PAIN DESCRIPTION - LOCATION
LOCATION: RIB CAGE
LOCATION: RIB CAGE

## 2024-04-27 ASSESSMENT — PAIN - FUNCTIONAL ASSESSMENT
PAIN_FUNCTIONAL_ASSESSMENT: PREVENTS OR INTERFERES SOME ACTIVE ACTIVITIES AND ADLS
PAIN_FUNCTIONAL_ASSESSMENT: ACTIVITIES ARE NOT PREVENTED

## 2024-04-27 ASSESSMENT — PAIN DESCRIPTION - DIRECTION: RADIATING_TOWARDS: BACK

## 2024-04-27 ASSESSMENT — PAIN DESCRIPTION - PAIN TYPE
TYPE: ACUTE PAIN
TYPE: ACUTE PAIN

## 2024-04-27 ASSESSMENT — PAIN SCALES - GENERAL
PAINLEVEL_OUTOF10: 3
PAINLEVEL_OUTOF10: 0
PAINLEVEL_OUTOF10: 9
PAINLEVEL_OUTOF10: 3
PAINLEVEL_OUTOF10: 10
PAINLEVEL_OUTOF10: 8

## 2024-04-27 ASSESSMENT — PAIN DESCRIPTION - ONSET
ONSET: ON-GOING
ONSET: GRADUAL

## 2024-04-27 ASSESSMENT — PAIN DESCRIPTION - DESCRIPTORS
DESCRIPTORS: ACHING;SORE
DESCRIPTORS: CRUSHING

## 2024-04-27 ASSESSMENT — PAIN DESCRIPTION - ORIENTATION
ORIENTATION: LEFT
ORIENTATION: LEFT

## 2024-04-27 ASSESSMENT — PAIN DESCRIPTION - FREQUENCY
FREQUENCY: INTERMITTENT
FREQUENCY: CONTINUOUS

## 2024-04-27 NOTE — FLOWSHEET NOTE
04/27/24 0848   Departure Condition   Departure Condition Good   Mobility at Departure Ambulatory   Patient Teaching Discharge instructions reviewed;Patient verbalized understanding   Departure Mode With family   Discharged with LDA No

## 2024-04-27 NOTE — ED NOTES
Pt provided a purewick d/t neuropathy in bilateral feet. Pt brief remained in placed purewick set to suction.

## 2024-04-27 NOTE — ED NOTES
Assumed care of pt after receiving bedside report from Jenelle ANTHONY. Pt here for UTI, awaiting transport home. Pt resting in bed, awake and alert. NAD.

## 2024-04-27 NOTE — ED NOTES
TRANSFER - OUT REPORT:    Verbal report given to 4N on Shelia Erazo  being transferred to [facility] for routine progression of patient care       Report consisted of patient's Situation, Background, Assessment and   Recommendations(SBAR).     Information from the following report(s) ED Encounter Summary was reviewed with the receiving nurse.    [place] Fall Assessment:    Presents to emergency department  because of falls (Syncope, seizure, or loss of consciousness): Yes  Age > 70: No  Altered Mental Status, Intoxication with alcohol or substance confusion (Disorientation, impaired judgment, poor safety awaremess, or inability to follow instructions): No  Impaired Mobility: Ambulates or transfers with assistive devices or assistance; Unable to ambulate or transer.: Yes (lymphedema and neuropathy)  Nursing Judgement: Yes          Lines:   Peripheral IV 04/27/24 Left Antecubital (Active)        Opportunity for questions and clarification was provided.

## 2024-04-27 NOTE — PROGRESS NOTES
Pharmacy Note     Cefepime 2000 mg Every 12 hours ordered for treatment of Intra-abdominal infection. Per [facility] Policy, Cefepime 2000 mg Every 12 hours will be changed to Cefepime 2000 gm ONCE followed by  Cefepime 2000 mg Every 24 hours.     Estimated Creatinine Clearance: Estimated Creatinine Clearance: 39 mL/min (A) (based on SCr of 1.46 mg/dL (H)).  Dialysis Status, LANEY, CKD: N/A    BMI:  Body mass index is 22.55 kg/m².    Rationale for Adjustment:  [facility] B-Lactam extended infusion policy    Pharmacy will continue to monitor and adjust dose as necessary.      Please call with any questions.    Thank you,  Zion Montana RPH

## 2024-04-27 NOTE — H&P
Hospitalist Admission History and Physical    NAME:  Shelia Erazo   :   1960   MRN:   [id number]     PCP:  Karen Dill MD  Date/Time:  2024 5:32 PM  Subjective:   CHIEF COMPLAINT:  generalized weakness    HISTORY OF PRESENT ILLNESS:     Shelia is a 63 y.o.   female with history of of urinary incontinence among other medical conditions who presents with above-stated reports.  Patient reports that about 4 to 5 days ago she had flulike symptoms where she generally felt unwell and subsequently started having significant amount of diarrhea without abdominal pain.  No reports of fevers chills.  She reported having some nausea no vomiting.  Due to generalized weakness she presented to the emergency room and had workup done including blood cultures.  We were asked to admit patient because her blood culture came back positive for E. coli.  Patient reports she is incontinent of urine and thus difficult to say if she has urinary symptoms such as urgency and dysuria but patient reports that she has not been having dysuria or any other bladder symptoms other than the chronic incontinence.  She reports that her the diarrhea has resolved.  And although she had had nausea she feels that now she can have regular diet and feels better overall.    Pt also reports having an episode where due to weakness she had a near fall where she got \"caught\" between the bathroom wall and another object recently. Noted to have left posterior 10th rib fracture which pt states is likely due to this incident.        Past Medical History:   Diagnosis Date    Arthritis     Autonomic neuropathy     Back pain     Chronic lung disease     Chronic pain     back    Compression fracture T9-10    COPD (chronic obstructive pulmonary disease) ([facility])     Depression     panic attacks    Diabetes mellitus ([facility])     type 1    GERD (gastroesophageal reflux disease)     Hypertension     no longer on meds    MRSA nasal colonization     Neuropathy      142/87   Pulse 78   Temp 97.6 °F (36.4 °C) (Oral)   Resp 16   Ht 1.702 m (5' 7\")   Wt 65.3 kg (144 lb)   LMP  (LMP Unknown)   SpO2 98%   BMI 22.55 kg/m²   Temp (24hrs), Av.4 °F (36.3 °C), Min:97.3 °F (36.3 °C), Max:97.6 °F (36.4 °C)      PHYSICAL EXAM:   General:    Alert, cooperative, no distress, appears stated age.     Head:   Normocephalic, without obvious abnormality, atraumatic.  Eyes:   Conjunctivae clear, anicteric sclerae.  Pupils are equal  Nose:  Nares normal. No drainage or sinus tenderness.  Throat:    Lips, mucosa, and tongue normal.  No Thrush  Neck:  Supple, symmetrical,  no adenopathy, thyroid: non tender    no carotid bruit and no JVD.  Back:    Symmetric,  No CVA tenderness.  Lungs:   Clear to auscultation bilaterally.  No Wheezing or Rhonchi. No rales.  Chest wall:  No tenderness or deformity. No Accessory muscle use.  Heart:   Regular rate and rhythm,  no murmur, rub or gallop.  Abdomen:   Soft, non-tender. Not distended.  Bowel sounds normal. No masses  Extremities: Extremities normal, atraumatic, No cyanosis.  No edema. No clubbing  Skin:     Texture, turgor normal. No rashes or lesions.  Not Jaundiced  Lymph nodes: Cervical, supraclavicular normal.  Psych:  Good insight.  Not depressed.  Not anxious or agitated.  Neurologic: EOMs intact. No facial asymmetry. No aphasia or slurred speech. Normal   strength, Alert and oriented X 3.       LAB DATA REVIEWED:    No components found for: \"GLPOC\"  Recent Labs     24  1840   *   K 4.3      CO2 22   BUN 42*   PHOS 3.0   WBC 15.7*   HGB 10.6*   HCT 32.2*            IMAGING RESULTS:   []  I have personally reviewed the actual   []CXR  []CT scan    CXR:  CT :CT Result (most recent):  CT CHEST ABDOMEN PELVIS W CONTRAST 2024    Narrative  EXAM: CT CHEST, ABDOMEN AND PELVIS WITH CONTRAST    CLINICAL INDICATION/HISTORY: diarrhea, abdominal pain, fall with left chest wall  pain    COMPARISON: ,  infection is  recommended.    Emphysema.    Additional findings as fully described above.        Thank you for enabling us to participate in the care of this patient.        Assessment/Plan:      Principal Problem:    Bacteremia  Resolved Problems:    * No resolved hospital problems. *  -E.coli bacteremia likely urinary source: No evidence of pyelonephritis or biliary abnormalities on CT abdomen  -Leukocytosis  -LANEY: Also noted that patient has received IV dye   -Enteritis seems to have resolved  -Left posterior 10th rib fracture  -Near fall  -Generalized weakness     HISTORY OF:  -DM  -COPD  -PAD  -Tobacco use, active      ___________________________________________________  PLAN:     -Abx  -Monitor E.coli sensitivity and adjust meds  -Repeat blood cx   -IV fluids and monitor renal function.  Avoid nephrotoxic agents IV dye.  Nephrology consult should renal function not improve with this intervention.  Renally dose medications  -incentive spirometry. Pt states the rib fx pain was controlled w/ tylenol. She has allergy to morphine and states she has never taken vicodin, percocet, ultram and would not know if she has reactions to these other narcotics.   -PT/OT  -Fall precautions    Risk of deterioration:  []Low    [x]Moderate  []High              Prophylaxis:  []Lovenox  []Coumadin  [x]Hep SQ  []SCD’s  []H2B/PPI    Disposition:  [x]Home w/ Family   [] PT,OT,RN   []SNF/LTC   []SAH/Rehab    Discussed Code Status:    [x]Full Code      []DNR     ___________________________________________________    Care Plan discussed with:    [x]Patient   []Family    []ED Care Manager  [x]ED Doc   []Specialist :    Total Time Coordinating Admission:      minutes    []Total Critical Care Time:     ___________________________________________________  Admitting Physician: Diane Santamaria MD

## 2024-04-27 NOTE — PLAN OF CARE
Problem: Safety - Adult  Goal: Free from fall injury  Outcome: Progressing  Flowsheets (Taken 4/27/2024 1334)  Free From Fall Injury:   Instruct family/caregiver on patient safety   Based on caregiver fall risk screen, instruct family/caregiver to ask for assistance with transferring infant if caregiver noted to have fall risk factors     Problem: Chronic Conditions and Co-morbidities  Goal: Patient's chronic conditions and co-morbidity symptoms are monitored and maintained or improved  Outcome: Progressing  Flowsheets (Taken 4/27/2024 1213)  Care Plan - Patient's Chronic Conditions and Co-Morbidity Symptoms are Monitored and Maintained or Improved:   Monitor and assess patient's chronic conditions and comorbid symptoms for stability, deterioration, or improvement   Collaborate with multidisciplinary team to address chronic and comorbid conditions and prevent exacerbation or deterioration     Problem: Pain  Goal: Verbalizes/displays adequate comfort level or baseline comfort level  Outcome: Progressing

## 2024-04-28 LAB
ALBUMIN SERPL-MCNC: 2.2 G/DL (ref 3.4–5)
ALBUMIN/GLOB SERPL: 0.5 (ref 0.8–1.7)
ALP SERPL-CCNC: 100 U/L (ref 45–117)
ALT SERPL-CCNC: 8 U/L (ref 13–56)
ANION GAP SERPL CALC-SCNC: 8 MMOL/L (ref 3–18)
AST SERPL-CCNC: 10 U/L (ref 10–38)
BASOPHILS # BLD: 0.1 K/UL (ref 0–0.1)
BASOPHILS NFR BLD: 1 % (ref 0–2)
BILIRUB SERPL-MCNC: 0.7 MG/DL (ref 0.2–1)
BUN SERPL-MCNC: 26 MG/DL (ref 7–18)
BUN/CREAT SERPL: 23 (ref 12–20)
CALCIUM SERPL-MCNC: 8.6 MG/DL (ref 8.5–10.1)
CHLORIDE SERPL-SCNC: 98 MMOL/L (ref 100–111)
CO2 SERPL-SCNC: 24 MMOL/L (ref 21–32)
CREAT SERPL-MCNC: 1.15 MG/DL (ref 0.6–1.3)
DIFFERENTIAL METHOD BLD: ABNORMAL
EOSINOPHIL # BLD: 0.2 K/UL (ref 0–0.4)
EOSINOPHIL NFR BLD: 2 % (ref 0–5)
ERYTHROCYTE [DISTWIDTH] IN BLOOD BY AUTOMATED COUNT: 13 % (ref 11.6–14.5)
EST. AVERAGE GLUCOSE BLD GHB EST-MCNC: ABNORMAL MG/DL
GLOBULIN SER CALC-MCNC: 4.5 G/DL (ref 2–4)
GLUCOSE BLD STRIP.AUTO-MCNC: 239 MG/DL (ref 70–110)
GLUCOSE BLD STRIP.AUTO-MCNC: 322 MG/DL (ref 70–110)
GLUCOSE BLD STRIP.AUTO-MCNC: 380 MG/DL (ref 70–110)
GLUCOSE BLD STRIP.AUTO-MCNC: 412 MG/DL (ref 70–110)
GLUCOSE BLD STRIP.AUTO-MCNC: 425 MG/DL (ref 70–110)
GLUCOSE BLD STRIP.AUTO-MCNC: 438 MG/DL (ref 70–110)
GLUCOSE BLD STRIP.AUTO-MCNC: 460 MG/DL (ref 70–110)
GLUCOSE BLD STRIP.AUTO-MCNC: 502 MG/DL (ref 70–110)
GLUCOSE SERPL-MCNC: 454 MG/DL (ref 74–99)
HBA1C MFR BLD: <3.8 % (ref 4.2–5.6)
HCT VFR BLD AUTO: 31.7 % (ref 35–45)
HGB BLD-MCNC: 10.3 G/DL (ref 12–16)
IMM GRANULOCYTES # BLD AUTO: 0.1 K/UL (ref 0–0.04)
IMM GRANULOCYTES NFR BLD AUTO: 1 % (ref 0–0.5)
LYMPHOCYTES # BLD: 1 K/UL (ref 0.9–3.6)
LYMPHOCYTES NFR BLD: 9 % (ref 21–52)
MCH RBC QN AUTO: 29.4 PG (ref 24–34)
MCHC RBC AUTO-ENTMCNC: 32.5 G/DL (ref 31–37)
MCV RBC AUTO: 90.6 FL (ref 78–100)
MONOCYTES # BLD: 0.9 K/UL (ref 0.05–1.2)
MONOCYTES NFR BLD: 8 % (ref 3–10)
NEUTS SEG # BLD: 9.4 K/UL (ref 1.8–8)
NEUTS SEG NFR BLD: 81 % (ref 40–73)
NRBC # BLD: 0 K/UL (ref 0–0.01)
NRBC BLD-RTO: 0 PER 100 WBC
PLATELET # BLD AUTO: 178 K/UL (ref 135–420)
PMV BLD AUTO: 12 FL (ref 9.2–11.8)
POTASSIUM SERPL-SCNC: 4.3 MMOL/L (ref 3.5–5.5)
PROT SERPL-MCNC: 6.7 G/DL (ref 6.4–8.2)
RBC # BLD AUTO: 3.5 M/UL (ref 4.2–5.3)
SODIUM SERPL-SCNC: 130 MMOL/L (ref 136–145)
WBC # BLD AUTO: 11.6 K/UL (ref 4.6–13.2)

## 2024-04-28 PROCEDURE — 94640 AIRWAY INHALATION TREATMENT: CPT

## 2024-04-28 PROCEDURE — 36415 COLL VENOUS BLD VENIPUNCTURE: CPT

## 2024-04-28 PROCEDURE — 6370000000 HC RX 637 (ALT 250 FOR IP): Performed by: INTERNAL MEDICINE

## 2024-04-28 PROCEDURE — 6360000002 HC RX W HCPCS: Performed by: INTERNAL MEDICINE

## 2024-04-28 PROCEDURE — 2580000003 HC RX 258: Performed by: STUDENT IN AN ORGANIZED HEALTH CARE EDUCATION/TRAINING PROGRAM

## 2024-04-28 PROCEDURE — 83036 HEMOGLOBIN GLYCOSYLATED A1C: CPT

## 2024-04-28 PROCEDURE — 94761 N-INVAS EAR/PLS OXIMETRY MLT: CPT

## 2024-04-28 PROCEDURE — 82962 GLUCOSE BLOOD TEST: CPT

## 2024-04-28 PROCEDURE — 87040 BLOOD CULTURE FOR BACTERIA: CPT

## 2024-04-28 PROCEDURE — 1100000000 HC RM PRIVATE

## 2024-04-28 PROCEDURE — 85025 COMPLETE CBC W/AUTO DIFF WBC: CPT

## 2024-04-28 PROCEDURE — 2580000003 HC RX 258: Performed by: INTERNAL MEDICINE

## 2024-04-28 PROCEDURE — 99232 SBSQ HOSP IP/OBS MODERATE 35: CPT | Performed by: INTERNAL MEDICINE

## 2024-04-28 PROCEDURE — 80053 COMPREHEN METABOLIC PANEL: CPT

## 2024-04-28 PROCEDURE — 6360000002 HC RX W HCPCS: Performed by: STUDENT IN AN ORGANIZED HEALTH CARE EDUCATION/TRAINING PROGRAM

## 2024-04-28 RX ORDER — SODIUM CHLORIDE 9 MG/ML
INJECTION, SOLUTION INTRAVENOUS CONTINUOUS
Status: DISCONTINUED | OUTPATIENT
Start: 2024-04-28 | End: 2024-04-29

## 2024-04-28 RX ORDER — INSULIN LISPRO 100 [IU]/ML
10 INJECTION, SOLUTION INTRAVENOUS; SUBCUTANEOUS ONCE
Status: COMPLETED | OUTPATIENT
Start: 2024-04-28 | End: 2024-04-28

## 2024-04-28 RX ORDER — INSULIN LISPRO 100 [IU]/ML
0-4 INJECTION, SOLUTION INTRAVENOUS; SUBCUTANEOUS NIGHTLY
Status: DISCONTINUED | OUTPATIENT
Start: 2024-04-28 | End: 2024-04-28

## 2024-04-28 RX ORDER — INSULIN LISPRO 100 [IU]/ML
0-16 INJECTION, SOLUTION INTRAVENOUS; SUBCUTANEOUS
Status: DISCONTINUED | OUTPATIENT
Start: 2024-04-28 | End: 2024-04-30 | Stop reason: HOSPADM

## 2024-04-28 RX ORDER — INSULIN LISPRO 100 [IU]/ML
0-4 INJECTION, SOLUTION INTRAVENOUS; SUBCUTANEOUS
Status: DISCONTINUED | OUTPATIENT
Start: 2024-04-28 | End: 2024-04-28

## 2024-04-28 RX ORDER — INSULIN LISPRO 100 [IU]/ML
0-4 INJECTION, SOLUTION INTRAVENOUS; SUBCUTANEOUS NIGHTLY
Status: DISCONTINUED | OUTPATIENT
Start: 2024-04-28 | End: 2024-04-30 | Stop reason: HOSPADM

## 2024-04-28 RX ORDER — INSULIN LISPRO 100 [IU]/ML
10 INJECTION, SOLUTION INTRAVENOUS; SUBCUTANEOUS ONCE
Status: DISCONTINUED | OUTPATIENT
Start: 2024-04-28 | End: 2024-04-28 | Stop reason: SDUPTHER

## 2024-04-28 RX ADMIN — BUDESONIDE 500 MCG: 0.5 INHALANT RESPIRATORY (INHALATION) at 21:19

## 2024-04-28 RX ADMIN — GABAPENTIN 300 MG: 100 CAPSULE ORAL at 08:42

## 2024-04-28 RX ADMIN — BUDESONIDE 500 MCG: 0.5 INHALANT RESPIRATORY (INHALATION) at 08:29

## 2024-04-28 RX ADMIN — ACETAMINOPHEN 650 MG: 325 TABLET ORAL at 18:05

## 2024-04-28 RX ADMIN — ACETAMINOPHEN 650 MG: 325 TABLET ORAL at 06:30

## 2024-04-28 RX ADMIN — INSULIN LISPRO 10 UNITS: 100 INJECTION, SOLUTION INTRAVENOUS; SUBCUTANEOUS at 12:19

## 2024-04-28 RX ADMIN — INSULIN GLARGINE 20 UNITS: 100 INJECTION, SOLUTION SUBCUTANEOUS at 08:43

## 2024-04-28 RX ADMIN — INSULIN LISPRO 10 UNITS: 100 INJECTION, SOLUTION INTRAVENOUS; SUBCUTANEOUS at 14:35

## 2024-04-28 RX ADMIN — SODIUM CHLORIDE: 9 INJECTION, SOLUTION INTRAVENOUS at 14:38

## 2024-04-28 RX ADMIN — HEPARIN SODIUM 5000 UNITS: 5000 INJECTION INTRAVENOUS; SUBCUTANEOUS at 22:09

## 2024-04-28 RX ADMIN — CEFEPIME 2000 MG: 2 INJECTION, POWDER, FOR SOLUTION INTRAVENOUS at 09:45

## 2024-04-28 RX ADMIN — ARFORMOTEROL TARTRATE 15 MCG: 15 SOLUTION RESPIRATORY (INHALATION) at 21:19

## 2024-04-28 RX ADMIN — HEPARIN SODIUM 5000 UNITS: 5000 INJECTION INTRAVENOUS; SUBCUTANEOUS at 06:30

## 2024-04-28 RX ADMIN — HEPARIN SODIUM 5000 UNITS: 5000 INJECTION INTRAVENOUS; SUBCUTANEOUS at 14:19

## 2024-04-28 RX ADMIN — INSULIN LISPRO 12 UNITS: 100 INJECTION, SOLUTION INTRAVENOUS; SUBCUTANEOUS at 17:18

## 2024-04-28 RX ADMIN — ARFORMOTEROL TARTRATE 15 MCG: 15 SOLUTION RESPIRATORY (INHALATION) at 08:29

## 2024-04-28 ASSESSMENT — PAIN SCALES - GENERAL
PAINLEVEL_OUTOF10: 0
PAINLEVEL_OUTOF10: 0
PAINLEVEL_OUTOF10: 4
PAINLEVEL_OUTOF10: 0
PAINLEVEL_OUTOF10: 4
PAINLEVEL_OUTOF10: 7
PAINLEVEL_OUTOF10: 6
PAINLEVEL_OUTOF10: 2
PAINLEVEL_OUTOF10: 3

## 2024-04-28 ASSESSMENT — PAIN DESCRIPTION - LOCATION
LOCATION: RIB CAGE
LOCATION: OTHER (COMMENT)

## 2024-04-28 ASSESSMENT — PAIN DESCRIPTION - PAIN TYPE: TYPE: ACUTE PAIN

## 2024-04-28 ASSESSMENT — PAIN DESCRIPTION - DIRECTION: RADIATING_TOWARDS: BACK

## 2024-04-28 ASSESSMENT — PAIN DESCRIPTION - ORIENTATION
ORIENTATION: LEFT
ORIENTATION: LEFT

## 2024-04-28 ASSESSMENT — PAIN DESCRIPTION - FREQUENCY: FREQUENCY: INTERMITTENT

## 2024-04-28 ASSESSMENT — PAIN DESCRIPTION - DESCRIPTORS
DESCRIPTORS: DISCOMFORT;ACHING
DESCRIPTORS: ACHING;SORE

## 2024-04-28 ASSESSMENT — PAIN DESCRIPTION - ONSET: ONSET: GRADUAL

## 2024-04-28 ASSESSMENT — PAIN - FUNCTIONAL ASSESSMENT: PAIN_FUNCTIONAL_ASSESSMENT: ACTIVITIES ARE NOT PREVENTED

## 2024-04-28 NOTE — CARE COORDINATION
04/28/24 1200   Service Assessment   Patient Orientation Alert and Oriented   Cognition Alert   History Provided By Patient   Primary Caregiver Self   Support Systems Children;Family Members   Patient's Healthcare Decision Maker is: Legal Next of Kin   PCP Verified by CM Yes   Last Visit to PCP Within last year   Prior Functional Level Independent in ADLs/IADLs   Current Functional Level Independent in ADLs/IADLs   Can patient return to prior living arrangement Yes   Ability to make needs known: Good   Family able to assist with home care needs: Yes   Would you like for me to discuss the discharge plan with any other family members/significant others, and if so, who? No   Financial Resources Medicare;Medicaid   Social/Functional History   Lives With Son;Family   Type of Home House   Home Layout One level   Home Access Level entry   Bathroom Shower/Tub Tub/Shower unit   Bathroom Toilet Standard   Bathroom Equipment None   Bathroom Accessibility Accessible   Home Equipment Walker, rolling  ('I just started using it a week ago when I started feeling weak.\")   ADL Assistance Independent   Homemaking Assistance Independent   Homemaking Responsibilities Yes   Ambulation Assistance Independent   Transfer Assistance Independent   Active  Yes   Mode of Transportation SUV   Occupation On disability   Discharge Planning   Type of Residence House   Living Arrangements Children;Family Members   Current Services Prior To Admission None   Potential Assistance Needed N/A   DME Ordered? No   Potential Assistance Purchasing Medications No   Type of Home Care Services None   Patient expects to be discharged to: House   One/Two Story Residence One story   Services At/After Discharge   Transition of Care Consult (CM Consult) Discharge Planning    Resource Information Provided? No   Mode of Transport at Discharge Other (see comment)  (Medicaid cab)   Confirm Follow Up Transport Self   Condition of Participation: Discharge

## 2024-04-28 NOTE — PLAN OF CARE
Problem: Safety - Adult  Goal: Free from fall injury  4/28/2024 1307 by Susan Silverio, RN  Outcome: Progressing  4/28/2024 0341 by Roberto Epps RN  Outcome: Progressing     Problem: Chronic Conditions and Co-morbidities  Goal: Patient's chronic conditions and co-morbidity symptoms are monitored and maintained or improved  4/28/2024 1307 by Susan Silverio, RN  Outcome: Progressing  4/28/2024 0341 by Roberto Epps, RN  Outcome: Progressing     Problem: Pain  Goal: Verbalizes/displays adequate comfort level or baseline comfort level  4/28/2024 1307 by Susan Silverio RN  Outcome: Progressing  4/28/2024 0341 by Roberto Epps, RN  Outcome: Progressing

## 2024-04-29 LAB
ALBUMIN SERPL-MCNC: 2.1 G/DL (ref 3.4–5)
ALBUMIN/GLOB SERPL: 0.5 (ref 0.8–1.7)
ALP SERPL-CCNC: 85 U/L (ref 45–117)
ALT SERPL-CCNC: 10 U/L (ref 13–56)
ANION GAP SERPL CALC-SCNC: 5 MMOL/L (ref 3–18)
AST SERPL-CCNC: 10 U/L (ref 10–38)
BACTERIA SPEC CULT: ABNORMAL
BACTERIA SPEC CULT: ABNORMAL
BASOPHILS # BLD: 0 K/UL (ref 0–0.1)
BASOPHILS NFR BLD: 0 % (ref 0–2)
BILIRUB SERPL-MCNC: 0.6 MG/DL (ref 0.2–1)
BUN SERPL-MCNC: 15 MG/DL (ref 7–18)
BUN/CREAT SERPL: 15 (ref 12–20)
CALCIUM SERPL-MCNC: 8.6 MG/DL (ref 8.5–10.1)
CHLORIDE SERPL-SCNC: 102 MMOL/L (ref 100–111)
CO2 SERPL-SCNC: 27 MMOL/L (ref 21–32)
CREAT SERPL-MCNC: 0.98 MG/DL (ref 0.6–1.3)
DIFFERENTIAL METHOD BLD: ABNORMAL
EOSINOPHIL # BLD: 0.3 K/UL (ref 0–0.4)
EOSINOPHIL NFR BLD: 3 % (ref 0–5)
ERYTHROCYTE [DISTWIDTH] IN BLOOD BY AUTOMATED COUNT: 12.9 % (ref 11.6–14.5)
GLOBULIN SER CALC-MCNC: 4.4 G/DL (ref 2–4)
GLUCOSE BLD STRIP.AUTO-MCNC: 176 MG/DL (ref 70–110)
GLUCOSE BLD STRIP.AUTO-MCNC: 220 MG/DL (ref 70–110)
GLUCOSE BLD STRIP.AUTO-MCNC: 326 MG/DL (ref 70–110)
GLUCOSE BLD STRIP.AUTO-MCNC: 372 MG/DL (ref 70–110)
GLUCOSE BLD STRIP.AUTO-MCNC: 382 MG/DL (ref 70–110)
GLUCOSE SERPL-MCNC: 374 MG/DL (ref 74–99)
GRAM STN SPEC: ABNORMAL
HCT VFR BLD AUTO: 30.9 % (ref 35–45)
HGB BLD-MCNC: 10.3 G/DL (ref 12–16)
IMM GRANULOCYTES # BLD AUTO: 0.1 K/UL (ref 0–0.04)
IMM GRANULOCYTES NFR BLD AUTO: 1 % (ref 0–0.5)
LYMPHOCYTES # BLD: 2 K/UL (ref 0.9–3.6)
LYMPHOCYTES NFR BLD: 20 % (ref 21–52)
MCH RBC QN AUTO: 29.8 PG (ref 24–34)
MCHC RBC AUTO-ENTMCNC: 33.3 G/DL (ref 31–37)
MCV RBC AUTO: 89.3 FL (ref 78–100)
MONOCYTES # BLD: 1.1 K/UL (ref 0.05–1.2)
MONOCYTES NFR BLD: 11 % (ref 3–10)
NEUTS SEG # BLD: 6.6 K/UL (ref 1.8–8)
NEUTS SEG NFR BLD: 65 % (ref 40–73)
NRBC # BLD: 0 K/UL (ref 0–0.01)
NRBC BLD-RTO: 0 PER 100 WBC
PLATELET # BLD AUTO: 190 K/UL (ref 135–420)
PMV BLD AUTO: 11.4 FL (ref 9.2–11.8)
POTASSIUM SERPL-SCNC: 3.9 MMOL/L (ref 3.5–5.5)
PROT SERPL-MCNC: 6.5 G/DL (ref 6.4–8.2)
RBC # BLD AUTO: 3.46 M/UL (ref 4.2–5.3)
SERVICE CMNT-IMP: ABNORMAL
SERVICE CMNT-IMP: ABNORMAL
SODIUM SERPL-SCNC: 134 MMOL/L (ref 136–145)
WBC # BLD AUTO: 10 K/UL (ref 4.6–13.2)

## 2024-04-29 PROCEDURE — 1100000000 HC RM PRIVATE

## 2024-04-29 PROCEDURE — 94761 N-INVAS EAR/PLS OXIMETRY MLT: CPT

## 2024-04-29 PROCEDURE — 2580000003 HC RX 258: Performed by: INTERNAL MEDICINE

## 2024-04-29 PROCEDURE — 85025 COMPLETE CBC W/AUTO DIFF WBC: CPT

## 2024-04-29 PROCEDURE — 6370000000 HC RX 637 (ALT 250 FOR IP): Performed by: INTERNAL MEDICINE

## 2024-04-29 PROCEDURE — 97162 PT EVAL MOD COMPLEX 30 MIN: CPT

## 2024-04-29 PROCEDURE — 6360000002 HC RX W HCPCS: Performed by: INTERNAL MEDICINE

## 2024-04-29 PROCEDURE — 80053 COMPREHEN METABOLIC PANEL: CPT

## 2024-04-29 PROCEDURE — 6360000002 HC RX W HCPCS: Performed by: STUDENT IN AN ORGANIZED HEALTH CARE EDUCATION/TRAINING PROGRAM

## 2024-04-29 PROCEDURE — 2580000003 HC RX 258: Performed by: STUDENT IN AN ORGANIZED HEALTH CARE EDUCATION/TRAINING PROGRAM

## 2024-04-29 PROCEDURE — 97535 SELF CARE MNGMENT TRAINING: CPT

## 2024-04-29 PROCEDURE — 97166 OT EVAL MOD COMPLEX 45 MIN: CPT

## 2024-04-29 PROCEDURE — 97530 THERAPEUTIC ACTIVITIES: CPT

## 2024-04-29 PROCEDURE — 99232 SBSQ HOSP IP/OBS MODERATE 35: CPT | Performed by: INTERNAL MEDICINE

## 2024-04-29 PROCEDURE — 94640 AIRWAY INHALATION TREATMENT: CPT

## 2024-04-29 PROCEDURE — 82962 GLUCOSE BLOOD TEST: CPT

## 2024-04-29 PROCEDURE — 36415 COLL VENOUS BLD VENIPUNCTURE: CPT

## 2024-04-29 RX ORDER — POLYETHYLENE GLYCOL 3350 17 G/17G
17 POWDER, FOR SOLUTION ORAL DAILY
Status: DISCONTINUED | OUTPATIENT
Start: 2024-04-29 | End: 2024-04-30 | Stop reason: HOSPADM

## 2024-04-29 RX ORDER — DIPHENHYDRAMINE HYDROCHLORIDE 50 MG/ML
25 INJECTION INTRAMUSCULAR; INTRAVENOUS EVERY 6 HOURS PRN
Status: DISCONTINUED | OUTPATIENT
Start: 2024-04-29 | End: 2024-04-30 | Stop reason: HOSPADM

## 2024-04-29 RX ORDER — LOPERAMIDE HYDROCHLORIDE 2 MG/1
2 CAPSULE ORAL 4 TIMES DAILY PRN
Status: DISCONTINUED | OUTPATIENT
Start: 2024-04-29 | End: 2024-04-29

## 2024-04-29 RX ORDER — INSULIN GLARGINE 100 [IU]/ML
20 INJECTION, SOLUTION SUBCUTANEOUS NIGHTLY
Status: DISCONTINUED | OUTPATIENT
Start: 2024-04-30 | End: 2024-04-30 | Stop reason: HOSPADM

## 2024-04-29 RX ORDER — SENNA AND DOCUSATE SODIUM 50; 8.6 MG/1; MG/1
2 TABLET, FILM COATED ORAL DAILY
Status: DISCONTINUED | OUTPATIENT
Start: 2024-04-29 | End: 2024-04-30 | Stop reason: HOSPADM

## 2024-04-29 RX ADMIN — WATER 1000 MG: 1 INJECTION INTRAMUSCULAR; INTRAVENOUS; SUBCUTANEOUS at 20:39

## 2024-04-29 RX ADMIN — ACETAMINOPHEN 650 MG: 325 TABLET ORAL at 15:02

## 2024-04-29 RX ADMIN — INSULIN LISPRO 16 UNITS: 100 INJECTION, SOLUTION INTRAVENOUS; SUBCUTANEOUS at 08:35

## 2024-04-29 RX ADMIN — ACETAMINOPHEN 650 MG: 325 TABLET ORAL at 05:34

## 2024-04-29 RX ADMIN — INSULIN LISPRO 16 UNITS: 100 INJECTION, SOLUTION INTRAVENOUS; SUBCUTANEOUS at 13:40

## 2024-04-29 RX ADMIN — HEPARIN SODIUM 5000 UNITS: 5000 INJECTION INTRAVENOUS; SUBCUTANEOUS at 13:34

## 2024-04-29 RX ADMIN — BUDESONIDE 500 MCG: 0.5 INHALANT RESPIRATORY (INHALATION) at 07:33

## 2024-04-29 RX ADMIN — INSULIN GLARGINE 20 UNITS: 100 INJECTION, SOLUTION SUBCUTANEOUS at 08:36

## 2024-04-29 RX ADMIN — ARFORMOTEROL TARTRATE 15 MCG: 15 SOLUTION RESPIRATORY (INHALATION) at 07:33

## 2024-04-29 RX ADMIN — HEPARIN SODIUM 5000 UNITS: 5000 INJECTION INTRAVENOUS; SUBCUTANEOUS at 20:59

## 2024-04-29 RX ADMIN — GABAPENTIN 300 MG: 100 CAPSULE ORAL at 20:39

## 2024-04-29 RX ADMIN — HEPARIN SODIUM 5000 UNITS: 5000 INJECTION INTRAVENOUS; SUBCUTANEOUS at 05:41

## 2024-04-29 RX ADMIN — GABAPENTIN 300 MG: 100 CAPSULE ORAL at 08:36

## 2024-04-29 RX ADMIN — CEFEPIME 2000 MG: 2 INJECTION, POWDER, FOR SOLUTION INTRAVENOUS at 09:45

## 2024-04-29 ASSESSMENT — PAIN - FUNCTIONAL ASSESSMENT
PAIN_FUNCTIONAL_ASSESSMENT: ACTIVITIES ARE NOT PREVENTED
PAIN_FUNCTIONAL_ASSESSMENT: ACTIVITIES ARE NOT PREVENTED

## 2024-04-29 ASSESSMENT — PAIN DESCRIPTION - ONSET
ONSET: GRADUAL
ONSET: GRADUAL

## 2024-04-29 ASSESSMENT — PAIN SCALES - GENERAL
PAINLEVEL_OUTOF10: 0
PAINLEVEL_OUTOF10: 3
PAINLEVEL_OUTOF10: 0
PAINLEVEL_OUTOF10: 0
PAINLEVEL_OUTOF10: 2
PAINLEVEL_OUTOF10: 0
PAINLEVEL_OUTOF10: 6

## 2024-04-29 ASSESSMENT — PAIN DESCRIPTION - LOCATION
LOCATION: RIB CAGE
LOCATION: GENERALIZED

## 2024-04-29 ASSESSMENT — PAIN DESCRIPTION - PAIN TYPE
TYPE: ACUTE PAIN
TYPE: ACUTE PAIN

## 2024-04-29 ASSESSMENT — PAIN DESCRIPTION - DIRECTION: RADIATING_TOWARDS: BACK

## 2024-04-29 ASSESSMENT — PAIN DESCRIPTION - FREQUENCY
FREQUENCY: INTERMITTENT
FREQUENCY: INTERMITTENT

## 2024-04-29 ASSESSMENT — PAIN DESCRIPTION - ORIENTATION: ORIENTATION: LEFT

## 2024-04-29 ASSESSMENT — PAIN DESCRIPTION - DESCRIPTORS
DESCRIPTORS: ACHING
DESCRIPTORS: ACHING;SORE

## 2024-04-29 NOTE — PROGRESS NOTES
OCCUPATIONAL THERAPY EVALUATION/DISCHARGE    Patient: Shelia Erazo (63 y.o. female)  Date: 4/29/2024  Primary Diagnosis: Bacteremia [R78.81]  Normocytic anemia [D64.9]  Hyperglycemia [R73.9]  LANEY (acute kidney injury) (HCC) [N17.9]  Closed fracture of one rib of left side, initial encounter [S22.32XA]  Diarrhea, unspecified type [R19.7]       Precautions: General Precautions,  ,  ,  ,  ,  ,  ,    PLOF: independent with ADLs and functional mobility using RW as needed     ASSESSMENT AND RECOMMENDATIONS:  Bed mobility: Mod I  supine <-> sit edge of bed. LB dress: Mod I doff and don slipper socks with good balance using cross leg technique. Toilet transfer: Mod I using RW with good balance, Mod I toilet tasks and washing hands afterwards.Pt laying semi-reclined in bed at end of tx session, call bell within reach & pt verbalized understanding to utilize for assist e.g. functional transfers in order to prevent falls.     Maximum therapeutic gains met at current level of care and patient will be discharged from occupational therapy at this time.    Further Equipment Recommendations for Discharge: shower chair    AMPAC: AM-PAC Inpatient Daily Activity Raw Score: 24    At this time and based on an AM-PAC score, no further OT is recommended upon discharge due to patient at baseline functional status. Recommend patient returns to prior setting with prior services.    This AMPA score should be considered in conjunction with interdisciplinary team recommendations to determine the most appropriate discharge setting. Patient's social support, diagnosis, medical stability, and prior level of function should also be taken into consideration.     SUBJECTIVE:   Patient stated “My son and his two kids live with me.”    OBJECTIVE DATA SUMMARY:     Past Medical History:   Diagnosis Date    Arthritis     Autonomic neuropathy     Back pain     Chronic lung disease     Chronic pain     back    Compression fracture T9-10    COPD (chronic  obstructive pulmonary disease) (HCC)     Depression     panic attacks    Diabetes mellitus (HCC)     type 1    GERD (gastroesophageal reflux disease)     Hypertension     no longer on meds    MRSA nasal colonization     Neuropathy     Osteoporosis     Tobacco use      Past Surgical History:   Procedure Laterality Date    BACK SURGERY  2009    DILATION AND CURETTAGE OF UTERUS      HYSTERECTOMY (CERVIX STATUS UNKNOWN)  2002       Home Situation:   Social/Functional History  Lives With: Son, Family  Type of Home: House  Home Layout: One level  Home Access: Level entry  Bathroom Shower/Tub: Tub/Shower unit  Bathroom Toilet: Standard  Bathroom Equipment: None  Bathroom Accessibility: Accessible  Home Equipment: Walker, rolling  Has the patient had two or more falls in the past year or any fall with injury in the past year?: Unknown  ADL Assistance: Independent  Homemaking Assistance: Independent  Homemaking Responsibilities: Yes  Ambulation Assistance: Independent  Transfer Assistance: Independent  Active : Yes  Mode of Transportation: [facility]  Occupation: On disability  []  Right hand dominant   []  Left hand dominant    Cognitive/Behavioral Status:  Orientation  Overall Orientation Status: Within Normal Limits  Orientation Level: Oriented X4  Cognition  Overall Cognitive Status: WNL    Skin: appears intact    Edema: b/l feet    Vision/Perceptual:    Vision  Vision: Within Functional Limits       Coordination: BUE  Coordination: Within functional limits       Strength: BUE  Strength: Generally decreased, functional (MMT NT d/t pain with broken (L) 10th rib)    Tone & Sensation: BUE          Range of Motion: BUE  AROM: Within functional limits       Functional Mobility and Transfers for ADLs:  Bed Mobility:     Bed Mobility Training  Bed Mobility Training: Yes  Supine to Sit: Modified independent  Sit to Supine: Modified independent  Scooting: Modified independent  Transfers:      ADL Assessment:   Feeding:

## 2024-04-29 NOTE — PROGRESS NOTES
PHYSICAL THERAPY EVALUATION/DISCHARGE    Patient: Shelia Erazo (63 y.o. female)  Date: 4/29/2024  Primary Diagnosis: Bacteremia [R78.81]  Normocytic anemia [D64.9]  Hyperglycemia [R73.9]  LANEY (acute kidney injury) (HCC) [N17.9]  Closed fracture of one rib of left side, initial encounter [S22.32XA]  Diarrhea, unspecified type [R19.7]       Precautions: General Precautions,  ,  ,  ,  ,  ,  ,    PLOF: Mod I with mobility using RW.      ASSESSMENT AND RECOMMENDATIONS:  Patient resting in bed up on entry and agreeable to PT evaluation. Completes bed mobility and functional transfers with mod I. She ambulates to the rest room using RW with supervision. Verbal cues given for safe management of walk in small spaces. Patient returns to sitting EOB and encouraged to sit for all her meals. All needs left within reach.     Patient does not require further skilled physical therapy intervention at this level of care.    Further Equipment Recommendations for Discharge: RW    AMPAC:    20    At this time and based on an AM-PAC score, no further PT is recommended upon discharge due to (i.e. patient at baseline functional status).  Recommend patient returns to prior setting with prior services.    This AMPAC score should be considered in conjunction with interdisciplinary team recommendations to determine the most appropriate discharge setting. Patient's social support, diagnosis, medical stability, and prior level of function should also be taken into consideration.     SUBJECTIVE:   Patient stated \" I had to use my walker to get back up .”    OBJECTIVE DATA SUMMARY:     Past Medical History:   Diagnosis Date    Arthritis     Autonomic neuropathy     Back pain     Chronic lung disease     Chronic pain     back    Compression fracture T9-10    COPD (chronic obstructive pulmonary disease) (HCC)     Depression     panic attacks    Diabetes mellitus (HCC)     type 1    GERD (gastroesophageal reflux disease)     Hypertension     no longer

## 2024-04-29 NOTE — PLAN OF CARE
Problem: Safety - Adult  Goal: Free from fall injury  4/29/2024 0900 by Sharon Jose RN  Outcome: Progressing  Flowsheets (Taken 4/29/2024 0859)  Free From Fall Injury: Instruct family/caregiver on patient safety  4/28/2024 2003 by Susan Silverio RN  Outcome: Progressing     Problem: Chronic Conditions and Co-morbidities  Goal: Patient's chronic conditions and co-morbidity symptoms are monitored and maintained or improved  4/29/2024 0900 by Sharon Jose RN  Outcome: Progressing  Flowsheets (Taken 4/29/2024 0837)  Care Plan - Patient's Chronic Conditions and Co-Morbidity Symptoms are Monitored and Maintained or Improved: Monitor and assess patient's chronic conditions and comorbid symptoms for stability, deterioration, or improvement  4/28/2024 2003 by Susan Silverio RN  Outcome: Progressing     Problem: Pain  Goal: Verbalizes/displays adequate comfort level or baseline comfort level  4/29/2024 0900 by Sharon Jose RN  Outcome: Progressing  4/28/2024 2003 by Susan Silverio RN  Outcome: Progressing

## 2024-04-29 NOTE — CONSULTS
obtained in details. Pertinent positives as mentioned in HPI,   otherwise negative    Physical Exam:    General: Well developed, well nourished female laying on the bed  AAOx3 in no acute distress.    HEENT:  Normocephalic, atraumatic, EOMI, no scleral icterus or pallor; no conjunctival hemmohage;  nasal and oral mucous are moist and without evidence of lesions. Neck supple, no bruits.   Lymph Nodes:   Not examined   Lungs:   non-labored, bilateral chest movements equal, no audible wheezing   Heart:  RRR, s1 and s2; no  rubs or gallops, no edema, + pedal pulses   Abdomen:  soft, non-distended, active bowel sounds, no hepatomegaly, no splenomegaly. Non tender   Genitourinary:  No saul   Extremities:   no clubbing, cyanosis; no joint effusions or swelling; Full ROM of all large joints to the upper and lower extremities; muscle mass appropriate for age   Neurologic:  No gross focal sensory or motor abnormalities;  Speech appropriate. Cranial nerves intact                        Skin:  No rash or ulcers noted   Back:  no spinal or paraspinal muscle tenderness or rigidity, no CVA tenderness     Psychiatric:  No suicidal or homicidal ideations, appropriate mood and affect         Labs: Results:   Chemistry Recent Labs     04/26/24  1840 04/28/24  1055 04/29/24  0410   GLUCOSE 257* 454* 374*   * 130* 134*   K 4.3 4.3 3.9    98* 102   CO2 22 24 27   BUN 42* 26* 15   CREATININE 1.46* 1.15 0.98   GLOB 4.3* 4.5* 4.4*   ALT 10* 8* 10*   AST 11 10 10      CBC w/Diff Recent Labs     04/26/24  1840 04/28/24  1055 04/29/24  0410   WBC 15.7* 11.6 10.0   RBC 3.59* 3.50* 3.46*   HGB 10.6* 10.3* 10.3*   HCT 32.2* 31.7* 30.9*    178 190      Microbiology Results       Procedure Component Value Units Date/Time    Culture, Blood 2 [[id number]] Collected: 04/28/24 1110    Order Status: Completed Specimen: Blood Updated: 04/29/24 0656     Special Requests NO SPECIAL REQUESTS        Culture NO GROWTH AFTER 17 HOURS        Blood Culture 2 [[id number]]  (Abnormal)  (Susceptibility) Collected: 04/26/24 1840    Order Status: Completed Specimen: Blood Updated: 04/29/24 1042     Special Requests NO SPECIAL REQUESTS        Gram Stain       AEROBIC AND ANAEROBIC BOTTLES Gram negative rods                  CALLED TO AND CORRECTLY REPEATED BY:  ED AT [id number] TO S           Culture       Escherichia coli GROWING IN BOTH BOTTLES DRAWN NO SITE          Susceptibility        Escherichia coli      BACTERIAL SUSCEPTIBILITY PANEL TONEY      amikacin <=2 ug/mL Sensitive      ampicillin 4 ug/mL Sensitive      ampicillin-sulbactam <=2 ug/mL Sensitive      ceFAZolin <=4 ug/mL Sensitive      cefepime <=1 ug/mL Sensitive      cefOXitin <=4 ug/mL Sensitive      cefTAZidime <=1 ug/mL Sensitive      cefTRIAXone <=1 ug/mL Sensitive      ciprofloxacin >=4 ug/mL Resistant      gentamicin <=1 ug/mL Sensitive      levofloxacin >=8 ug/mL Resistant      meropenem <=0.25 ug/mL Sensitive      piperacillin-tazobactam <=4 ug/mL Sensitive      tobramycin <=1 ug/mL Sensitive      trimethoprim-sulfamethoxazole <=20 ug/mL Sensitive                           Culture, Blood, PCR ID Panel [[id number]]  (Abnormal) Collected: 04/26/24 1840    Order Status: Completed Specimen: Blood Updated: 04/27/24 0934     Accession Number [id number]     Enterococcus faecalis by PCR Not detected        Enterococcus faecium by PCR Not detected        Listeria monocytogenes by PCR Not detected        STAPHYLOCOCCUS Not detected        Staphylococcus Aureus Not detected        Staphylococcus epidermidis by PCR Not detected        Staphylococcus lugdunensis by PCR Not detected        STREPTOCOCCUS Not detected        Streptococcus agalactiae (Group B) Not detected        Strep pneumoniae Not detected        Strep pyogenes,(Grp. A) Not detected        Acinetobacter calcoac baumannii complex by PCR Not detected        Bacteroides fragilis by PCR Not detected         please contact our department.    Dr. Saloni Perales, Infectious Disease Specialist  [phone]  April 29, 2024  4:53 PM

## 2024-04-29 NOTE — PROGRESS NOTES
Advance Care Planning   Healthcare Decision Maker:      Click here to complete Healthcare Decision Makers including selection of the Healthcare Decision Maker Relationship (ie \"Primary\").  Today we documented Decision Maker(s) consistent with Legal Next of Kin hierarchy.       Megan Patiño Brother/Sister    Primary Phone: [phone] (M)Mobile Phone: [phone]         conducted an initial consultation and Spiritual Assessment for Shelia Erazo, who is a 63 y.o.,female. Patient's Primary Language is: English.   According to the patient's EMR Restorationist Affiliation is: None.     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) 07/10/2019    Stage 2 moderate COPD by GOLD classification ([facility]) 03/27/2019    Syncope and collapse 01/31/2017    Gastroesophageal reflux disease 09/13/2016    Thoracic compression fracture ([facility]) 02/02/2016    Abdominal pain, epigastric 07/17/2014    Diabetic neuropathy ([facility]) 05/14/2012    Depression 05/14/2012    Osteoporosis 05/14/2012    Proteinuria 05/14/2012    Surgical menopause 05/14/2012    Tobacco abuse 05/14/2012    Diabetes mellitus type 1 ([facility]) 05/14/2012    HLD (hyperlipidemia) 05/14/2012        The  provided the following Interventions:  Initiated a relationship of care and support.   Provided information about Spiritual Care Services.  Chart reviewed.    Assessment:  Patient does not have any Confucianist/cultural needs that will affect patient's preferences in health care.    Plan:  Chaplains will continue to follow and will provide pastoral care on an as needed/requested basis.   recommends bedside caregivers page  on duty if patient shows signs of acute spiritual or emotional

## 2024-04-29 NOTE — PLAN OF CARE
Problem: Safety - Adult  Goal: Free from fall injury  4/28/2024 2003 by Susan Silverio RN  Outcome: Progressing  4/28/2024 1307 by Susan Silverio RN  Outcome: Progressing     Problem: Chronic Conditions and Co-morbidities  Goal: Patient's chronic conditions and co-morbidity symptoms are monitored and maintained or improved  4/28/2024 2003 by Susan Silverio RN  Outcome: Progressing  4/28/2024 1307 by Susan Silverio RN  Outcome: Progressing     Problem: Pain  Goal: Verbalizes/displays adequate comfort level or baseline comfort level  4/28/2024 2003 by Susan Silverio RN  Outcome: Progressing  4/28/2024 1307 by Susan Silverio RN  Outcome: Progressing

## 2024-04-29 NOTE — PROGRESS NOTES
[facility]  Progress Note    Patient: Shelia Erazo Age: 63 y.o. : 1960 MR#: [id number] SSN: [id number]  Date/Time: 2024    Subjective:   Patient reports feeling well this morning. Continues to feel weak since admission, though this is improved. Was able to walk the pierre overnight. Looking forward to going home when able.    Assessment/Plan:   #E. Coli bacteremia: likely 2/2 gastroenteritis/diarrhea vs UTI  - Continue IV cefepime  - Transition to PO abx pending culture sensitivities  - F/u repeat Bcx: NGTD    #DM2: complicated by hyperglycemia, no evidence of DKA/HHS  - Continue Lantus 20 units daily, consider increase to 30 unit tomorrow  - Continue high dose SSI  - Goal -180 while inpatient    #LANEY: resolved. Cr 1.46 on admission, 0.98 today. Baseline Cr ~0.8-0.9 (variable). Suspect likely prerenal in setting of diarrheal illnes.  - Avoid nephrotoxic agents  - Encourage PO intake    #Chronic normocytic anemia: HDS, no evidence of acute blood loss. At baseline Hgb ~10.  - Monitor with daily CBCs  - Transfuse for Hgb goal >7    #Mild euvolemic hyponatremia: Asymptomatic, improved    #COPD with current tobacco use  - Encourage tobacco cessation  - Continue Pulmicort, Brovana    #Neuropathy: likely 2/2 poorly controlled DM2  - Continue Gabapentin    Case discussed with:  [x]Patient  []Family  []Nursing  []Case Management  DVT Prophylaxis:  []Lovenox  [x]Hep SQ  []SCDs  []Coumadin   []On Heparin gtt    Objective:   VS: /67   Pulse 85   Temp 98.3 °F (36.8 °C) (Oral)   Resp 20   Ht 1.702 m (5' 7\")   Wt 65.3 kg (144 lb)   LMP  (LMP Unknown)   SpO2 97%   BMI 22.55 kg/m²    Tmax/24hrs: Temp (24hrs), Av.1 °F (36.7 °C), Min:97.9 °F (36.6 °C), Max:98.3 °F (36.8 °C)    Input/Output:   Intake/Output Summary (Last 24 hours) at 2024 1238  Last data filed at 2024 0921  Gross per 24 hour   Intake 910 ml   Output --   Net 910 ml       General:  alert. NAD. Poor dentition.  Cardiovascular: RRR. No M/G/R  Pulmonary: CTAB without wheezes or rales. No accessory muscle use.   GI: soft, NT, ND. No rebound or guarding  Extremities: warm. No edema  Additional: no focal deficits. No obvious skin rashes    Labs:    Recent Results (from the past 24 hour(s))   POCT Glucose    Collection Time: 04/28/24  1:37 PM   Result Value Ref Range    POC Glucose 438 (HH) 70 - 110 mg/dL   POCT Glucose    Collection Time: 04/28/24  1:39 PM   Result Value Ref Range    POC Glucose 425 (HH) 70 - 110 mg/dL   POCT Glucose    Collection Time: 04/28/24  4:37 PM   Result Value Ref Range    POC Glucose 322 (H) 70 - 110 mg/dL   POCT Glucose    Collection Time: 04/28/24 10:08 PM   Result Value Ref Range    POC Glucose 239 (H) 70 - 110 mg/dL   Comprehensive Metabolic Panel    Collection Time: 04/29/24  4:10 AM   Result Value Ref Range    Sodium 134 (L) 136 - 145 mmol/L    Potassium 3.9 3.5 - 5.5 mmol/L    Chloride 102 100 - 111 mmol/L    CO2 27 21 - 32 mmol/L    Anion Gap 5 3.0 - 18 mmol/L    Glucose 374 (H) 74 - 99 mg/dL    BUN 15 7.0 - 18 MG/DL    Creatinine 0.98 0.6 - 1.3 MG/DL    Bun/Cre Ratio 15 12 - 20      Est, Glom Filt Rate 66 >60 ml/min/1.73m2    Calcium 8.6 8.5 - 10.1 MG/DL    Total Bilirubin 0.6 0.2 - 1.0 MG/DL    ALT 10 (L) 13 - 56 U/L    AST 10 10 - 38 U/L    Alk Phosphatase 85 45 - 117 U/L    Total Protein 6.5 6.4 - 8.2 g/dL    Albumin 2.1 (L) 3.4 - 5.0 g/dL    Globulin 4.4 (H) 2.0 - 4.0 g/dL    Albumin/Globulin Ratio 0.5 (L) 0.8 - 1.7     CBC with Auto Differential    Collection Time: 04/29/24  4:10 AM   Result Value Ref Range    WBC 10.0 4.6 - 13.2 K/uL    RBC 3.46 (L) 4.20 - 5.30 M/uL    Hemoglobin 10.3 (L) 12.0 - 16.0 g/dL    Hematocrit 30.9 (L) 35.0 - 45.0 %    MCV 89.3 78.0 - 100.0 FL    MCH 29.8 24.0 - 34.0 PG    MCHC 33.3 31.0 - 37.0 g/dL    RDW 12.9 11.6 - 14.5 %    Platelets 190 135 - 420 K/uL    MPV 11.4 9.2 - 11.8 FL    Nucleated RBCs 0.0 0  WBC    nRBC 0.00

## 2024-04-30 VITALS
OXYGEN SATURATION: 96 % | TEMPERATURE: 97.7 F | SYSTOLIC BLOOD PRESSURE: 122 MMHG | BODY MASS INDEX: 22.6 KG/M2 | DIASTOLIC BLOOD PRESSURE: 70 MMHG | HEART RATE: 76 BPM | RESPIRATION RATE: 20 BRPM | WEIGHT: 144 LBS | HEIGHT: 67 IN

## 2024-04-30 PROBLEM — Z79.4 TYPE 2 DIABETES MELLITUS WITHOUT COMPLICATION, WITH LONG-TERM CURRENT USE OF INSULIN (HCC): Status: ACTIVE | Noted: 2024-04-30

## 2024-04-30 PROBLEM — E11.9 TYPE 2 DIABETES MELLITUS WITHOUT COMPLICATION, WITH LONG-TERM CURRENT USE OF INSULIN (HCC): Status: ACTIVE | Noted: 2024-04-30

## 2024-04-30 PROBLEM — D64.9 CHRONIC ANEMIA: Status: ACTIVE | Noted: 2024-04-30

## 2024-04-30 PROBLEM — K52.9 GASTROENTERITIS: Status: ACTIVE | Noted: 2024-04-30

## 2024-04-30 LAB
EKG ATRIAL RATE: 81 BPM
EKG DIAGNOSIS: NORMAL
EKG P AXIS: 79 DEGREES
EKG P-R INTERVAL: 150 MS
EKG Q-T INTERVAL: 366 MS
EKG QRS DURATION: 70 MS
EKG QTC CALCULATION (BAZETT): 425 MS
EKG R AXIS: 70 DEGREES
EKG T AXIS: 66 DEGREES
EKG VENTRICULAR RATE: 81 BPM
GLUCOSE BLD STRIP.AUTO-MCNC: 288 MG/DL (ref 70–110)
GLUCOSE BLD STRIP.AUTO-MCNC: 306 MG/DL (ref 70–110)

## 2024-04-30 PROCEDURE — 99239 HOSP IP/OBS DSCHRG MGMT >30: CPT | Performed by: INTERNAL MEDICINE

## 2024-04-30 PROCEDURE — 82962 GLUCOSE BLOOD TEST: CPT

## 2024-04-30 PROCEDURE — 6360000002 HC RX W HCPCS: Performed by: INTERNAL MEDICINE

## 2024-04-30 PROCEDURE — 94761 N-INVAS EAR/PLS OXIMETRY MLT: CPT

## 2024-04-30 PROCEDURE — 6370000000 HC RX 637 (ALT 250 FOR IP): Performed by: INTERNAL MEDICINE

## 2024-04-30 PROCEDURE — 6370000000 HC RX 637 (ALT 250 FOR IP): Performed by: STUDENT IN AN ORGANIZED HEALTH CARE EDUCATION/TRAINING PROGRAM

## 2024-04-30 RX ORDER — LACTOBACILLUS RHAMNOSUS GG 10B CELL
1 CAPSULE ORAL
Status: DISCONTINUED | OUTPATIENT
Start: 2024-04-30 | End: 2024-04-30 | Stop reason: HOSPADM

## 2024-04-30 RX ORDER — CEFUROXIME AXETIL 250 MG/1
500 TABLET ORAL EVERY 12 HOURS SCHEDULED
Status: DISCONTINUED | OUTPATIENT
Start: 2024-04-30 | End: 2024-04-30 | Stop reason: HOSPADM

## 2024-04-30 RX ORDER — CEFUROXIME AXETIL 500 MG/1
500 TABLET ORAL EVERY 12 HOURS SCHEDULED
Qty: 22 TABLET | Refills: 0 | Status: SHIPPED | OUTPATIENT
Start: 2024-04-30 | End: 2024-05-11

## 2024-04-30 RX ORDER — LACTOBACILLUS RHAMNOSUS GG 10B CELL
1 CAPSULE ORAL
Qty: 30 CAPSULE | Refills: 0 | Status: SHIPPED | OUTPATIENT
Start: 2024-04-30

## 2024-04-30 RX ADMIN — INSULIN LISPRO 8 UNITS: 100 INJECTION, SOLUTION INTRAVENOUS; SUBCUTANEOUS at 08:24

## 2024-04-30 RX ADMIN — HEPARIN SODIUM 5000 UNITS: 5000 INJECTION INTRAVENOUS; SUBCUTANEOUS at 05:34

## 2024-04-30 RX ADMIN — INSULIN LISPRO 12 UNITS: 100 INJECTION, SOLUTION INTRAVENOUS; SUBCUTANEOUS at 12:06

## 2024-04-30 RX ADMIN — Medication 1 CAPSULE: at 10:17

## 2024-04-30 RX ADMIN — ACETAMINOPHEN 650 MG: 325 TABLET ORAL at 08:25

## 2024-04-30 RX ADMIN — CEFUROXIME AXETIL 500 MG: 250 TABLET, FILM COATED ORAL at 10:17

## 2024-04-30 RX ADMIN — GABAPENTIN 300 MG: 100 CAPSULE ORAL at 08:26

## 2024-04-30 ASSESSMENT — PAIN SCALES - GENERAL
PAINLEVEL_OUTOF10: 0
PAINLEVEL_OUTOF10: 0
PAINLEVEL_OUTOF10: 5

## 2024-04-30 ASSESSMENT — PAIN - FUNCTIONAL ASSESSMENT: PAIN_FUNCTIONAL_ASSESSMENT: ACTIVITIES ARE NOT PREVENTED

## 2024-04-30 ASSESSMENT — PAIN DESCRIPTION - LOCATION: LOCATION: RIB CAGE

## 2024-04-30 ASSESSMENT — PAIN DESCRIPTION - ORIENTATION: ORIENTATION: ANTERIOR

## 2024-04-30 ASSESSMENT — PAIN DESCRIPTION - DESCRIPTORS: DESCRIPTORS: ACHING;DISCOMFORT

## 2024-04-30 NOTE — PLAN OF CARE
Problem: Safety - Adult  Goal: Free from fall injury  Outcome: Progressing     Problem: Chronic Conditions and Co-morbidities  Goal: Patient's chronic conditions and co-morbidity symptoms are monitored and maintained or improved  Outcome: Progressing  Flowsheets (Taken 4/29/2024 1937)  Care Plan - Patient's Chronic Conditions and Co-Morbidity Symptoms are Monitored and Maintained or Improved:   Monitor and assess patient's chronic conditions and comorbid symptoms for stability, deterioration, or improvement   Collaborate with multidisciplinary team to address chronic and comorbid conditions and prevent exacerbation or deterioration     Problem: Pain  Goal: Verbalizes/displays adequate comfort level or baseline comfort level  Outcome: Progressing  Flowsheets  Taken 4/29/2024 2330  Verbalizes/displays adequate comfort level or baseline comfort level:   Encourage patient to monitor pain and request assistance   Assess pain using appropriate pain scale  Taken 4/29/2024 1937  Verbalizes/displays adequate comfort level or baseline comfort level:   Encourage patient to monitor pain and request assistance   Assess pain using appropriate pain scale

## 2024-04-30 NOTE — PROGRESS NOTES
Infectious Disease progress Note        Reason: E. coli bloodstream infection    Current abx Prior abx   Cefepime since 4/27-4/29  Ceftriaxone since 4/29 Ciprofloxacin on 4/26  Vancomycin on 4/27     Lines:       Assessment :   63 y.o. female with past medical history of COPD, type 1 diabetes mellitus, lymphedema who presented to the ED on 4/26/24 with the chief complaint of generalized weakness, fatigue, diarrhea.     Clinical presentation consistent with a E. coli bloodstream infection-positive blood culture 4/26, negative blood culture 4/28;  probable cystitis, gastroenteritis    Exact source of E. coli bloodstream infection not entirely clear.  Possibilities include cystitis versus bacterial translocation secondary to gastroenteritis.  Lack of dysuria argues against cystitis.  However patient does have significant bacteriuria on urinalysis to suggest cystitis.  Urine specimen was obtained on 4/27 after administration of antibiotic and hence could be false negative.    No evidence of obstructive uropathy or complicated UTI per CT scan.    Patient clinically improving.  Improving diarrhea    Antibiotic management complicated due to history of penicillin, sulfa allergy: Had swelling at the left arm injection site when given penicillin at the age of 13.  Had nausea, diarrhea when she took amoxicillin at the age of 32.  Had hives with sulfa is a history of 18 years.  Denies anaphylaxis/life-threatening reaction to penicillin.  Currently tolerating ceftriaxone without difficulties    Recommendations:    Discontinue  ceftriaxone- start po cefuroxime 500 mg po bid till 5/7/24  Discharge planning per primary team     Above plan was discussed in details with patient, and dr Valdez. Please call me if any further questions or concerns. Will continue to participate in the care of this patient.  HPI:     Patient feels better .  Denies any dysuria, hematuria flank pain or lower abdominal discomfort throughout this time.        Past Medical History:   Diagnosis Date    Arthritis     Autonomic neuropathy     Back pain     Chronic lung disease     Chronic pain     back    Compression fracture T9-10    COPD (chronic obstructive pulmonary disease) (HCC)     Depression     panic attacks    Diabetes mellitus (HCC)     type 1    GERD (gastroesophageal reflux disease)     Hypertension     no longer on meds    MRSA nasal colonization     Neuropathy     Osteoporosis     Tobacco use        Past Surgical History:   Procedure Laterality Date    BACK SURGERY  2009    DILATION AND CURETTAGE OF UTERUS      HYSTERECTOMY (CERVIX STATUS UNKNOWN)  2002       [email or web address]    Current Facility-Administered Medications   Medication Dose Route Frequency    sennosides-docusate sodium (SENOKOT-S) 8.6-50 MG tablet 2 tablet  2 tablet Oral Daily    polyethylene glycol (GLYCOLAX) packet 17 g  17 g Oral Daily    insulin glargine (LANTUS) injection vial 20 Units  20 Units SubCUTAneous Nightly    cefTRIAXone (ROCEPHIN) 1,000 mg in sterile water 10 mL IV syringe  1,000 mg IntraVENous Q24H    diphenhydrAMINE (BENADRYL) injection 25 mg  25 mg IntraVENous Q6H PRN    insulin lispro (HUMALOG) injection vial 0-16 Units  0-16 Units SubCUTAneous TID WC    insulin lispro (HUMALOG) injection vial 0-4 Units  0-4 Units SubCUTAneous Nightly    **Patient has home meds stored in the pharmacy. Please retrieve upon discharge. Thank you.   Other RX Placeholder    acetaminophen (TYLENOL) tablet 650 mg  650 mg Oral Q4H PRN    gabapentin (NEURONTIN) capsule 300 mg  300 mg Oral BID    glucose chewable tablet 16 g  4 tablet Oral PRN    dextrose bolus 10% 125 mL  125 mL IntraVENous PRN    Or    dextrose bolus 10% 250 mL  250 mL IntraVENous PRN    glucagon (rDNA) injection 1 mg  1 mg SubCUTAneous PRN    dextrose 10 % infusion   IntraVENous Continuous PRN    heparin (porcine) injection 5,000 Units  5,000 Units SubCUTAneous 3 times per day    albuterol (PROVENTIL) (2.5 MG/3ML) 0.083%

## 2024-04-30 NOTE — PLAN OF CARE
Problem: Safety - Adult  Goal: Free from fall injury  4/30/2024 1215 by Sharon Jose RN  Outcome: Completed  4/30/2024 1142 by Sharon Jose RN  Outcome: Progressing  Flowsheets (Taken 4/30/2024 1140)  Free From Fall Injury: Instruct family/caregiver on patient safety  4/30/2024 0053 by Elizabeth Rodriguez RN  Outcome: Progressing     Problem: Chronic Conditions and Co-morbidities  Goal: Patient's chronic conditions and co-morbidity symptoms are monitored and maintained or improved  4/30/2024 1215 by Sharon Jose RN  Outcome: Completed  4/30/2024 1142 by Sharon Jose RN  Outcome: Progressing  4/30/2024 0053 by Elizabeth Rodriguez RN  Outcome: Progressing  Flowsheets (Taken 4/29/2024 1937)  Care Plan - Patient's Chronic Conditions and Co-Morbidity Symptoms are Monitored and Maintained or Improved:   Monitor and assess patient's chronic conditions and comorbid symptoms for stability, deterioration, or improvement   Collaborate with multidisciplinary team to address chronic and comorbid conditions and prevent exacerbation or deterioration     Problem: Pain  Goal: Verbalizes/displays adequate comfort level or baseline comfort level  4/30/2024 1215 by Sharon Jose RN  Outcome: Completed  4/30/2024 1142 by Sharon Jose RN  Outcome: Progressing  Flowsheets (Taken 4/30/2024 0325 by Elizabeth Rodriguez RN)  Verbalizes/displays adequate comfort level or baseline comfort level:   Encourage patient to monitor pain and request assistance   Assess pain using appropriate pain scale  4/30/2024 0053 by Elizabeth Rodriguez RN  Outcome: Progressing  Flowsheets  Taken 4/29/2024 2330  Verbalizes/displays adequate comfort level or baseline comfort level:   Encourage patient to monitor pain and request assistance   Assess pain using appropriate pain scale  Taken 4/29/2024 1937  Verbalizes/displays adequate comfort level or baseline comfort level:   Encourage patient to monitor pain and request assistance   Assess pain using

## 2024-04-30 NOTE — RT PROTOCOL NOTE
RT into room to give breathing tx this am. Pt currently in with broken ribs.Reports breathing tx makes coughing worse and does not help breathing. pt denies taking any breathing medications at home other than albuterol and prefers not to take treatments. Patient BS clear and diminished and patient showing no signs of distress. Patient would benefit more from IS to encourage deep breathing. Please consider discontinuing brovana/pulmicort

## 2024-04-30 NOTE — DISCHARGE SUMMARY
Discharge Summary    Patient: Shelia Erazo MRN: [id number]  [facility]: [id number]    YOB: 1960  Age: 63 y.o.  Sex: female    DOA: 4/26/2024 LOS:  LOS: 3 days        Disposition: Home    Discharge Date: 4/30/2024    Admission Diagnosis: Bacteremia [R78.81]  Normocytic anemia [D64.9]  Hyperglycemia [R73.9]  LANEY (acute kidney injury) (HCC) [N17.9]  Closed fracture of one rib of left side, initial encounter [S22.32XA]  Diarrhea, unspecified type [R19.7]    Discharge Diagnosis:   E. Coli bacteremia  Gastroenteritis  Insulin dependent diabetes mellitus type 2 complicated by hyperglycemia and neuropathy  Acute kidney injury, resolved  Chronic normocytic anemia  Mild euvolemic hyponatremia, improved  COPD with active tobacco use  Closed fracture of one rib of left side      Discharge Condition: Stable      PHYSICAL EXAM  Visit Vitals  BP (!) 154/68   Pulse 66   Temp 98.2 °F (36.8 °C) (Oral)   Resp 18   Ht 1.702 m (5' 7\")   Wt 65.3 kg (144 lb)   SpO2 92%   BMI 22.55 kg/m²       General: Alert, cooperative, no acute distress    HEENT: EOMI. Anicteric sclerae.  Lungs:  CTA Bilaterally. No Wheezing/Rales.  Heart:             Regular rate and Rhythm.  Abdomen: Soft, Non distended, Non tender. + Bowel sounds.  Extremities: No edema.  Psych:   Good insight. Not anxious or agitated.  Neurologic:  AA, oriented X 3. Moves all ext                                 Hospital Course:   Ms. Erazo is a 62yo F with hx of COPD and IDDM2 who was admitted for E. Coli bacteremia most likely secondary to gastroenteritis vs cystitis. Patient improved on IV antibiotics and was transitioned to appropriate oral antibiotic therapy based on culture data/sensitivities. She was discharge home to complete a 14 day course of antibiotics with a probiotic. Her hospital course was complicated by hyperglycemia which was treated with insulin. She was recommended to resume her home doses of insulin on discharge.    On day of discharge, she was afebrile,  subcutaneously as directed daily      !! Continuous Blood Gluc Sensor (FREESTYLE PIPER 14 DAY SENSOR) MISC Apply every 14 days to monitor blood sugar      !! Insulin Pen Needle (EASY COMFORT PEN NEEDLES) 32G X 4 MM MISC Use with levemir flextouch pen as directed      !! Insulin Pen Needle (ULTRA UMM INSULIN PEN NEEDLES) 31G X 5 MM MISC Use SC BID with Lantus insulin pen      albuterol sulfate HFA (PROVENTIL;VENTOLIN;PROAIR) 108 (90 Base) MCG/ACT inhaler Inhale 2 puffs into the lungs every 4 hours as needed for Wheezing  Qty: 18 g, Refills: 2      insulin aspart (NOVOLOG) 100 UNIT/ML injection pen Each meal, per sliding scale       !! - Potential duplicate medications found. Please discuss with provider.        It is important that you take the medication exactly as they are prescribed.   Keep your medication in the bottles provided by the pharmacist and keep a list of the medication names, dosages, and times to be taken in your wallet.   Do not take other medications without consulting your doctor.         FOLLOW UP CARE:   Follow-up Information     Karen Dill MD. Call in 2 days.    Specialty: Family Medicine  Contact information:  [address] [address] [phone]             [facility] EMERGENCY DEPT .    Specialty: Emergency Medicine  Why: As needed, If symptoms worsen  Contact information:  [address] [place] [address] [phone]                     Minutes spent on discharge: 60 minutes spent coordinating this discharge (review instructions/follow-up, prescriptions, preparing report for sign off)    Elinor Mendiola MD  Internal Medicine, PGY3    Case discussed with attending physician, Dr. Valdez

## 2024-04-30 NOTE — PLAN OF CARE
Problem: Safety - Adult  Goal: Free from fall injury  4/30/2024 1142 by Sharon Jose RN  Outcome: Progressing  Flowsheets (Taken 4/30/2024 1140)  Free From Fall Injury: Instruct family/caregiver on patient safety  4/30/2024 0053 by Elizabeth Rodriguez RN  Outcome: Progressing     Problem: Chronic Conditions and Co-morbidities  Goal: Patient's chronic conditions and co-morbidity symptoms are monitored and maintained or improved  4/30/2024 1142 by Sharon Jose RN  Outcome: Progressing  4/30/2024 0053 by Elizabeth Rodriguez RN  Outcome: Progressing  Flowsheets (Taken 4/29/2024 1937)  Care Plan - Patient's Chronic Conditions and Co-Morbidity Symptoms are Monitored and Maintained or Improved:   Monitor and assess patient's chronic conditions and comorbid symptoms for stability, deterioration, or improvement   Collaborate with multidisciplinary team to address chronic and comorbid conditions and prevent exacerbation or deterioration     Problem: Pain  Goal: Verbalizes/displays adequate comfort level or baseline comfort level  4/30/2024 1142 by Sharon Jose RN  Outcome: Progressing  Flowsheets (Taken 4/30/2024 0325 by Elizabeth Rodriguez RN)  Verbalizes/displays adequate comfort level or baseline comfort level:   Encourage patient to monitor pain and request assistance   Assess pain using appropriate pain scale  4/30/2024 0053 by Elizabeth Rodriguez RN  Outcome: Progressing  Flowsheets  Taken 4/29/2024 2330  Verbalizes/displays adequate comfort level or baseline comfort level:   Encourage patient to monitor pain and request assistance   Assess pain using appropriate pain scale  Taken 4/29/2024 1937  Verbalizes/displays adequate comfort level or baseline comfort level:   Encourage patient to monitor pain and request assistance   Assess pain using appropriate pain scale

## 2024-04-30 NOTE — CARE COORDINATION
Discharge order noted for today. Orders received. No needs identified at this time. Case management remains available as needed.    Patient's son to transport pt home at time of discharge.    Samira Vuong RN, BSN, [facility]  Case Management  [phone]

## 2024-05-01 ENCOUNTER — CARE COORDINATION (OUTPATIENT)
Facility: CLINIC | Age: 64
End: 2024-05-01

## 2024-05-01 NOTE — CARE COORDINATION
Care Transitions Note    Initial Call - Call within 2 business days of discharge: Yes     Attempted to reach patient for transitions of care follow up. Unable to reach patient.    Outreach Attempts:   HIPAA compliant voicemail left for patient.   Contacted jaime Celis on PHI release on file, who stated a plan to notify patient of CTN's call.    Patient: Shelia Erazo    Patient : 1960   MRN: [id number]    Reason for Admission: E coli Bacteremia 2/2  Gastroenteritis vs Cystitis  Discharge Date: 24  RURS: Readmission Risk              Risk of Unplanned Readmission:  0          Last Discharge Facility       Date Complaint Diagnosis Description Type Department Provider    24 Extremity Weakness LANEY (acute kidney injury) (HCC) ... ED to Hosp-Admission (Discharged) (ADMITTED) [id number] Felice Valdez, DO; Jose, And...            Was this an external facility discharge? No    Follow Up Appointment:   Patient does not have a follow up appointment scheduled at time of call.  Plan to address on next outreach.      Plan for follow-up on next business day.      Skylar Vargas RN

## 2024-05-02 ENCOUNTER — CARE COORDINATION (OUTPATIENT)
Facility: CLINIC | Age: 64
End: 2024-05-02

## 2024-05-02 DIAGNOSIS — Z79.4 TYPE 2 DIABETES MELLITUS WITHOUT COMPLICATION, WITH LONG-TERM CURRENT USE OF INSULIN (HCC): ICD-10-CM

## 2024-05-02 DIAGNOSIS — R78.81 BACTEREMIA: ICD-10-CM

## 2024-05-02 DIAGNOSIS — J44.9 STAGE 2 MODERATE COPD BY GOLD CLASSIFICATION (HCC): Primary | ICD-10-CM

## 2024-05-02 DIAGNOSIS — E11.9 TYPE 2 DIABETES MELLITUS WITHOUT COMPLICATION, WITH LONG-TERM CURRENT USE OF INSULIN (HCC): ICD-10-CM

## 2024-05-02 LAB
BACTERIA SPEC CULT: ABNORMAL
CC UR VC: ABNORMAL
SERVICE CMNT-IMP: ABNORMAL

## 2024-05-02 RX ORDER — ACETAMINOPHEN 500 MG
1000 TABLET ORAL EVERY 6 HOURS PRN
COMMUNITY

## 2024-05-02 RX ORDER — BUDESONIDE AND FORMOTEROL FUMARATE DIHYDRATE 160; 4.5 UG/1; UG/1
2 AEROSOL RESPIRATORY (INHALATION) 2 TIMES DAILY
COMMUNITY

## 2024-05-02 NOTE — CARE COORDINATION
Advance Care Planning:   Does patient have an Advance Directive: deferred at this time, will discuss on future follow up. .    Medication Reconciliation:  Medication reconciliation was performed with patient,1111F entered: yes.     Remote Patient Monitoring:  Offered patient enrollment in the Remote Patient Monitoring (RPM) program for in-home monitoring: Patient is not eligible for RPM program because: insurance coverage.    Assessments:  Care Transitions 24 Hour Call    Do you have all of your prescriptions and are they filled?: No  Have you been contacted by a [facility] Pharmacist?: No  Have you scheduled your follow up appointment?: No  Do you have support at home?: Child  Do you feel like you have everything you need to keep you well at home?: No  Care Transitions Interventions          Follow Up Appointment:   Patient does not have a follow up appointment scheduled at time of call.  CTN unable to address with patient due to patient needing to end the call.        Care Transition Nurse provided contact information.  Plan for follow-up on next business day.  based on severity of symptoms and risk factors.  Plan for next call:  discuss follow up appointments      Skylar Vargas RN

## 2024-05-03 ENCOUNTER — CARE COORDINATION (OUTPATIENT)
Facility: CLINIC | Age: 64
End: 2024-05-03

## 2024-05-03 NOTE — CARE COORDINATION
Care Transitions Note    Follow Up Call      Attempted to reach patient for transitions of care follow up.  Unable to reach patient.      Outreach Attempts:   HIPAA compliant voicemail left for patient.     Care Summary Note: Patient needed to end the call with CTN yesterday, so CTN was unable to address follow up appt with PCP. Left patient a VMM today notifying her that request will be sent to PCP office to reach out to her and offer a virtual visit if PCP is able to accommodate this option since patient has difficulty leaving home.    Follow Up Appointment:   CTN routed request to PCP office to set up virtual visit with patient.      Plan for follow-up call in 6-10 days based on severity of symptoms and risk factors. Plan for next call: symptom management-assess for infection red flags  follow-up appointment-confirm if PCP appt was scheduled    Skylar Vargas RN

## 2024-05-10 ENCOUNTER — CARE COORDINATION (OUTPATIENT)
Facility: CLINIC | Age: 64
End: 2024-05-10

## 2024-05-10 NOTE — CARE COORDINATION
Care Transitions Note    Follow Up Call      Attempted to reach patient for transitions of care follow up.  Unable to reach patient.      Outreach Attempts:   HIPAA compliant voicemail left for patient.       Follow Up Appointment:   none    Plan for follow-up call in 6-10 days based on severity of symptoms and risk factors. Plan for next call: symptom management-assess for return of UTI and gastroenteritis symptoms  self management-review recent BG control  follow-up appointment-confirm if patient is able to schedule PCP and endocrinology appts    Skylar Vargas RN

## 2024-05-16 NOTE — PROGRESS NOTES
Physician Progress Note      PATIENT:               MEETA RUBIN  CSN #:                  [id number]  :                       1960  ADMIT DATE:       2024 6:27 PM  DISCH DATE:        2024 12:32 PM  RESPONDING  PROVIDER #:        Felice Valdez DO          QUERY TEXT:    Good morning  Pt admitted with Diarrhea.  Pt noted to have bacteremia.    If possible, please document in the progress notes and discharge summary if   you are evaluating and /or treating any of the following:    The medical record reflects the following:  Risk Factors: diarrhea, bacteremia, Ecoli, leukocytosis  Clinical Indicators: Patient presented with generalized weakness, fatigue,   diarrhea. Reports about 8 bowel movements per day. Found to have Ecoli   bacteremia. VSS on presentation. Also noted to have a WBC of 15.7 and   procalcitonin 21.49   DS \"admitted for E. Coli bacteremia most likely secondary to   gastroenteritis vs cystitis\"  BC- Ecoli  Treatment: daily labs, BC, ID consult, IV Maxipime, Rocephin, Cipro,   Vancomycin, PO Culturelle, Imodium    Thank you  Maryan Ojeda RN [facility]  [phone]  Options provided:  -- Ecoli gastroenteritis Sepsis, present on admission  -- Ecoli gastroenteritis without Sepsis  -- Sepsis was ruled out  -- Other - I will add my own diagnosis  -- Disagree - Not applicable / Not valid  -- Disagree - Clinically unable to determine / Unknown  -- Refer to Clinical Documentation Reviewer    PROVIDER RESPONSE TEXT:    This patient has Ecoli gastroenteritis sepsis which was present on admission.    Query created by: Maryan Ojeda on 2024 8:11 AM      Electronically signed by:  Felice Valdez DO 2024 10:12 AM

## 2024-05-17 ENCOUNTER — CARE COORDINATION (OUTPATIENT)
Facility: CLINIC | Age: 64
End: 2024-05-17

## 2024-05-17 NOTE — CARE COORDINATION
Care Transitions Note    Follow Up Call      Attempted to reach patient for transitions of care follow up.  Unable to reach patient.      Outreach Attempts:   HIPAA compliant voicemail left for patient.     Care Summary Note: Patient has not scheduled appt with PCP.    Follow Up Appointment:       Plan for follow-up call in 11-14 days based on severity of symptoms and risk factors. Plan for next call: symptom management-assess for red flags    Skylar Vargas RN

## 2024-05-24 DIAGNOSIS — E10.65 TYPE 1 DIABETES MELLITUS WITH HYPERGLYCEMIA (HCC): ICD-10-CM

## 2024-05-29 ENCOUNTER — CARE COORDINATION (OUTPATIENT)
Facility: CLINIC | Age: 64
End: 2024-05-29

## 2024-05-29 NOTE — TELEPHONE ENCOUNTER
Medication Renewal Request  Received: Today  Sd Erazoah  DAO [facility] Primary Care Clinical Staff  Phone Number: [phone]     Refills have been requested for the following medications:        gabapentin (NEURONTIN) 300 MG capsule [Dr. Karen Dill MD]      Patient Comment: Blood work done at [facility] about 3 weeks ago. I was admitted at the time for a Bacteria in my blood    Preferred pharmacy: [id number] [address] [phone] -  [phone]

## 2024-05-29 NOTE — CARE COORDINATION
Care Transitions Note    Final Call      Patient Current Location:  [place]    Care Transition Nurse contacted the patient by telephone. Verified name and  as identifiers.    Patient graduated from the Care Transitions program on 24.  Patient/family verbalizes confidence in the ability to self-manage at this time..      Advance Care Planning:   Does patient have an Advance Directive:  not on file; unable to address due to time constraints .    Handoff:   Patient was not referred to the ACM team due to patient not eligible for ACM services due to predicted PHP status.      Care Summary Note: Patient reports she's doing fairly well. Today she has felt \"yucky\" after cutting the grass yesterday for the first time in a long time. She notes having had previous episodes of significant N/V with increased gastric secretions back in December and in March, each episode was progressively worse, including the third episode that lead to her admission. She notes a hx of GERD diagnosis with occasional reflux. These episodes caused burning inside her mouth with a white film covering her tongue and back of throat which continues to persist. She's avoiding acidic foods and has cut back significantly on soda which has improved the burning pain in her mouth.     CTN discussed the possibility that she has thrush and should be evaluated, as thrush can spread to the esophagus and GI system. Reviewed potential sources of E coli and the possible need to see GI given her recurrent episodes of N/V/D. She states a plan to call PCP office in the morning to schedule an acute care visit and address these concerns. She called the office previously and noted Dr. Dill was booked until Aug or Sept, so she did not schedule an appt at that time. If she's unable to see an associate soon she will go to [facility]. Her son and his two boys moved out, so she plans to play catch up with appointments.    Assessments:  Care Transitions

## 2024-05-29 NOTE — TELEPHONE ENCOUNTER
[facility]  [place] [facility] [place] [facility] [place] [facility]     Last Visit: 11/30/2023- virtual, 05/24/2023- clinic   Next Appointment: N/A  Controlled Substance Agreement: N/A  Urine Drug Screen: N/A  Previous Refill Encounter(s): Date:     Disp Refills Start End     gabapentin (NEURONTIN) 300 MG capsule 120 capsule 0 4/19/2024 5/19/2024    Sig: take 2 capsules by mouth twice a day if needed for pain (MAX DAILY AMOUNT: 1200 MG)    Sent to pharmacy as: Gabapentin 300 MG Oral Capsule (NEURONTIN)    E-Prescribing Status: Receipt confirmed by pharmacy (4/19/2024  2:10 PM EDT)  Pharmacy    [id number] - [place], [place] - [address] [place] [address] -  [phone] - F [phone]  [address] [place] [id number]  Phone: [phone]  Fax: [phone]

## 2024-06-03 ENCOUNTER — PATIENT MESSAGE (OUTPATIENT)
Age: 64
End: 2024-06-03

## 2024-06-03 NOTE — PROGRESS NOTES
Referring Provider: Indu Orozco MD      Lung Cancer Risk Profile:   Age: 64  Gender: Female  Height: 66\"  Weight: 133#    Smoking History:  Smoking Status: current use  # years smoking:   # years quit: 0  Packs/day:   Pack years: 28, per provider note    Patient discussed smoking cessation with PCP: Yes, per provider note    Patient participated in shared decision making process with PCP: Yes, per provider note    Patient is currently experiencing symptoms: No    If yes what symptoms:     Co-Morbidities:  COPD    Cancer History:      Additional Risk Factors:       Patient's smoking history verified via provider note. Patient meets LDCT lung cancer screening criteria.      ROSEY EnnisN, RN, OCN  Lung Health Nurse Navigator
No

## 2024-06-04 RX ORDER — GABAPENTIN 300 MG/1
CAPSULE ORAL
Qty: 120 CAPSULE | Refills: 1 | Status: SHIPPED | OUTPATIENT
Start: 2024-06-04 | End: 2024-07-04

## 2024-08-01 DIAGNOSIS — E10.65 TYPE 1 DIABETES MELLITUS WITH HYPERGLYCEMIA (HCC): ICD-10-CM

## 2024-08-04 RX ORDER — GABAPENTIN 300 MG/1
CAPSULE ORAL
Qty: 120 CAPSULE | Refills: 0 | Status: SHIPPED | OUTPATIENT
Start: 2024-08-04 | End: 2024-09-03

## 2024-08-25 SDOH — ECONOMIC STABILITY: FOOD INSECURITY: WITHIN THE PAST 12 MONTHS, THE FOOD YOU BOUGHT JUST DIDN'T LAST AND YOU DIDN'T HAVE MONEY TO GET MORE.: SOMETIMES TRUE

## 2024-08-25 SDOH — ECONOMIC STABILITY: INCOME INSECURITY: HOW HARD IS IT FOR YOU TO PAY FOR THE VERY BASICS LIKE FOOD, HOUSING, MEDICAL CARE, AND HEATING?: NOT VERY HARD

## 2024-08-25 SDOH — ECONOMIC STABILITY: FOOD INSECURITY: WITHIN THE PAST 12 MONTHS, YOU WORRIED THAT YOUR FOOD WOULD RUN OUT BEFORE YOU GOT MONEY TO BUY MORE.: SOMETIMES TRUE

## 2024-08-26 ENCOUNTER — OFFICE VISIT (OUTPATIENT)
Facility: CLINIC | Age: 64
End: 2024-08-26
Payer: COMMERCIAL

## 2024-08-26 VITALS
RESPIRATION RATE: 16 BRPM | TEMPERATURE: 97.7 F | SYSTOLIC BLOOD PRESSURE: 129 MMHG | DIASTOLIC BLOOD PRESSURE: 70 MMHG | HEIGHT: 67 IN | BODY MASS INDEX: 23.1 KG/M2 | OXYGEN SATURATION: 97 % | WEIGHT: 147.2 LBS | HEART RATE: 72 BPM

## 2024-08-26 DIAGNOSIS — J41.0 SIMPLE CHRONIC BRONCHITIS (HCC): ICD-10-CM

## 2024-08-26 DIAGNOSIS — I73.9 PAD (PERIPHERAL ARTERY DISEASE) (HCC): ICD-10-CM

## 2024-08-26 DIAGNOSIS — K59.1 FUNCTIONAL DIARRHEA: ICD-10-CM

## 2024-08-26 DIAGNOSIS — N63.20 MASSES OF BOTH BREASTS: Primary | ICD-10-CM

## 2024-08-26 DIAGNOSIS — N63.10 MASSES OF BOTH BREASTS: Primary | ICD-10-CM

## 2024-08-26 DIAGNOSIS — E10.43 DIABETIC AUTONOMIC NEUROPATHY ASSOCIATED WITH TYPE 1 DIABETES MELLITUS (HCC): ICD-10-CM

## 2024-08-26 DIAGNOSIS — E10.65 TYPE 1 DIABETES MELLITUS WITH HYPERGLYCEMIA (HCC): ICD-10-CM

## 2024-08-26 PROCEDURE — 3074F SYST BP LT 130 MM HG: CPT | Performed by: FAMILY MEDICINE

## 2024-08-26 PROCEDURE — 3078F DIAST BP <80 MM HG: CPT | Performed by: FAMILY MEDICINE

## 2024-08-26 PROCEDURE — 3046F HEMOGLOBIN A1C LEVEL >9.0%: CPT | Performed by: FAMILY MEDICINE

## 2024-08-26 PROCEDURE — 99214 OFFICE O/P EST MOD 30 MIN: CPT | Performed by: FAMILY MEDICINE

## 2024-08-26 RX ORDER — FLUTICASONE PROPIONATE 50 MCG
2 SPRAY, SUSPENSION (ML) NASAL DAILY
Qty: 16 G | Refills: 2 | Status: SHIPPED | OUTPATIENT
Start: 2024-08-26

## 2024-08-26 RX ORDER — GABAPENTIN 300 MG/1
300 CAPSULE ORAL 2 TIMES DAILY PRN
Qty: 120 CAPSULE | Refills: 0 | Status: SHIPPED | OUTPATIENT
Start: 2024-08-26 | End: 2024-10-25

## 2024-08-26 RX ORDER — CHLORHEXIDINE GLUCONATE ORAL RINSE 1.2 MG/ML
SOLUTION DENTAL
COMMUNITY
Start: 2024-07-01

## 2024-08-26 ASSESSMENT — ANXIETY QUESTIONNAIRES
6. BECOMING EASILY ANNOYED OR IRRITABLE: NOT AT ALL
7. FEELING AFRAID AS IF SOMETHING AWFUL MIGHT HAPPEN: NOT AT ALL
IF YOU CHECKED OFF ANY PROBLEMS ON THIS QUESTIONNAIRE, HOW DIFFICULT HAVE THESE PROBLEMS MADE IT FOR YOU TO DO YOUR WORK, TAKE CARE OF THINGS AT HOME, OR GET ALONG WITH OTHER PEOPLE: NOT DIFFICULT AT ALL
2. NOT BEING ABLE TO STOP OR CONTROL WORRYING: NOT AT ALL
5. BEING SO RESTLESS THAT IT IS HARD TO SIT STILL: NOT AT ALL
3. WORRYING TOO MUCH ABOUT DIFFERENT THINGS: NOT AT ALL
1. FEELING NERVOUS, ANXIOUS, OR ON EDGE: NOT AT ALL
4. TROUBLE RELAXING: NOT AT ALL
GAD7 TOTAL SCORE: 0

## 2024-08-26 ASSESSMENT — PATIENT HEALTH QUESTIONNAIRE - PHQ9: DEPRESSION UNABLE TO ASSESS: PT REFUSES

## 2024-08-26 NOTE — PROGRESS NOTES
HPI:  Shelia Erazo is a 64 y.o. female who presents today with   Chief Complaint   Patient presents with    Breast Mass     Bilateral: Breast.. moreso Right Breast x2 weeks, unresolved (Requesting mammogram)        History of Present Illness  The patient presents for evaluation of multiple medical concerns.  She is primarily here because of breast masses.  Patient is a type I diabetic followed by [facility].  She has follow-up there tomorrow.    She was hospitalized in 04/2024 due to severe diabetic ketoacidosis (DKA) and dehydration.  She was also noted to have chronic anemia. During her hospital stay, she was found to have E. coli bacteremia, possibly related to gastroenteritis or cystitis. She was treated with IV antibiotics, which were later switched to oral antibiotics.  She was subsequently discharged.    She has a history of diarrhea, with episodes occurring in 12/2023, 03/2024, and most recently 2.5 weeks ago. She was diagnosed with GERD years ago but is not currently on any medication as her symptoms have improved. She has never undergone a colonoscopy. She experiences stomach pain and bloating, which are so severe that they often cause her to lie down. She also frequently feels extremely cold, particularly in the evenings.    She has noticed lumps in her breasts and experiences sharp pains that radiate to her nipple. She is unsure if the lumps are fatty glands or something more serious. She has not had a mammogram in the past 2 to 3 years. Additionally, her left breast did not produce milk for about 2 weeks after her son's birth, and she developed mastitis immediately afterward.    She is seeking a refill of her gabapentin prescription, which she takes at a dose of 300 mg twice daily as needed.  She has diabetic neuropathy.  She reports that her symptoms improve when she is able to move around. She experiences burning, tingling, and numbness in her toes.    She uses Flonase for her sinus issues and reports  (PROVENTIL;VENTOLIN;PROAIR) 108 (90 Base) MCG/ACT inhaler Inhale 2 puffs into the lungs every 4 hours as needed for Wheezing 18 g 2    insulin aspart (NOVOLOG) 100 UNIT/ML injection pen Each meal, per sliding scale       No current facility-administered medications for this visit.        Allergies   Allergen Reactions    Penicillin G Anaphylaxis, Other (See Comments) and Swelling     Other reaction(s): Unknown    Penicillins Anaphylaxis and Swelling    Hydromorphone Other (See Comments)     Patient was confused for 5 days.  Did not know who she was.  Other reaction(s): psychological reaction    Sulfamethoxazole-Trimethoprim Hives and Rash     Other reaction(s): rash/itching    Hydromorphone Hcl Other (See Comments)     Other reaction(s): Psychotic disorder    Other Reaction(s): Psychotic disorder    Morphine Itching     Other reaction(s): itching, rash/itching    Sulfamethoxazole Rash     Other reaction(s): rash    Trimethoprim Rash     Other reaction(s): rash                  Review of Systems as per HPI        /70 (Site: Left Upper Arm, Position: Sitting, Cuff Size: Small Adult)   Pulse 72   Temp 97.7 °F (36.5 °C) (Temporal)   Resp 16   Ht 1.702 m (5' 7\")   Wt 66.8 kg (147 lb 3.2 oz) Comment: with shoes  LMP 09/07/2002 (Approximate)   SpO2 97%   BMI 23.05 kg/m²   General appearance: alert, cooperative, no distress, appears stated age    Lungs: clear to auscultation bilaterally  Heart: regular rate and rhythm, S1, S2 normal, no murmur, click, rub or gallop  Abdomen: soft, non-tender. Bowel sounds normal. No masses,  no organomegaly  Extremities: extremities normal, atraumatic, no cyanosis or edema  Breast exam: symmetric fibrous changes bilaterally.  There is a mobile subcutaneous palpable mass noted in the right upper outer quadrant of the breast.  There is a similar fibrous type mass noted in the left upper outer quadrant of the breast.  These are not hardened or indurated necessarily; she has no

## 2024-08-26 NOTE — PROGRESS NOTES
\"Have you been to the ER, urgent care clinic since your last visit?  Hospitalized since your last visit?\"    NO    “Have you seen or consulted any other health care providers outside of [facility] since your last visit?”    Yes, Endocrine- Dr. Garland    Have you had a mammogram?”   NO    Date of last Mammogram: 5/24/2021     Click Here for Release of Records Request

## 2024-08-27 ENCOUNTER — TELEPHONE (OUTPATIENT)
Facility: CLINIC | Age: 64
End: 2024-08-27

## 2024-08-27 NOTE — TELEPHONE ENCOUNTER
Left patient voicemail to contact office to schedule follow up appointment.       Return in about 3 months (around 11/26/2024) for well exam.

## 2024-09-03 DIAGNOSIS — E10.65 TYPE 1 DIABETES MELLITUS WITH HYPERGLYCEMIA (HCC): ICD-10-CM

## 2024-09-03 RX ORDER — GABAPENTIN 300 MG/1
300 CAPSULE ORAL 2 TIMES DAILY PRN
Qty: 120 CAPSULE | Refills: 0 | Status: CANCELLED | OUTPATIENT
Start: 2024-09-03 | End: 2024-11-02

## 2024-09-04 NOTE — TELEPHONE ENCOUNTER
Patient inquiring the reason for Dose change     Previous dose:      Disp Refills Start End    gabapentin (NEURONTIN) 300 MG capsule (Discontinued) 120 capsule 0 8/4/2024 8/26/2024    Sig: take 2 capsules by mouth twice a day if needed for pain (MAX DAILY AMOUNT: 1200 MG)    Sent to pharmacy as: Gabapentin 300 MG Oral Capsule (NEURONTIN)    Reason for Discontinue: REORDER    E-Prescribing Status: Receipt confirmed by pharmacy (8/4/2024  6:38 PM EDT)      New Order:     gabapentin (NEURONTIN) 300 MG capsule 120 capsule 0 8/26/2024 10/25/2024     Sig - Route: Take 1 capsule by mouth 2 times daily as needed (neuropathy) for up to 60 days. Max Daily Amount: 600 mg - Oral     Sent to pharmacy as: Gabapentin 300 MG Oral Capsule (NEURONTIN)     E-Prescribing Status: Receipt confirmed by pharmacy (8/26/2024  5:04 PM EDT)

## 2024-09-08 SDOH — HEALTH STABILITY: PHYSICAL HEALTH: ON AVERAGE, HOW MANY MINUTES DO YOU ENGAGE IN EXERCISE AT THIS LEVEL?: 0 MIN

## 2024-09-08 SDOH — HEALTH STABILITY: PHYSICAL HEALTH: ON AVERAGE, HOW MANY DAYS PER WEEK DO YOU ENGAGE IN MODERATE TO STRENUOUS EXERCISE (LIKE A BRISK WALK)?: 0 DAYS

## 2024-09-11 ENCOUNTER — HOSPITAL ENCOUNTER (OUTPATIENT)
Facility: HOSPITAL | Age: 64
Discharge: HOME | End: 2024-09-14

## 2024-09-11 ENCOUNTER — OFFICE VISIT (OUTPATIENT)
Age: 64
End: 2024-09-11

## 2024-09-11 ENCOUNTER — TELEPHONE (OUTPATIENT)
Facility: CLINIC | Age: 64
End: 2024-09-11

## 2024-09-11 VITALS — BODY MASS INDEX: 22.71 KG/M2 | WEIGHT: 145 LBS

## 2024-09-11 DIAGNOSIS — M99.01 CERVICAL SOMATIC DYSFUNCTION: ICD-10-CM

## 2024-09-11 DIAGNOSIS — M51.36 DDD (DEGENERATIVE DISC DISEASE), LUMBAR: ICD-10-CM

## 2024-09-11 DIAGNOSIS — M99.03 SOMATIC DYSFUNCTION OF LUMBAR REGION: ICD-10-CM

## 2024-09-11 DIAGNOSIS — M25.811 SHOULDER IMPINGEMENT, RIGHT: Primary | ICD-10-CM

## 2024-09-11 DIAGNOSIS — M99.08 RIB CAGE REGION SOMATIC DYSFUNCTION: ICD-10-CM

## 2024-09-11 DIAGNOSIS — M99.07 UPPER EXTREMITY SOMATIC DYSFUNCTION: ICD-10-CM

## 2024-09-11 DIAGNOSIS — Z87.81 HISTORY OF COMPRESSION FRACTURE OF SPINE: ICD-10-CM

## 2024-09-11 DIAGNOSIS — M99.02 THORACIC REGION SOMATIC DYSFUNCTION: ICD-10-CM

## 2024-09-11 DIAGNOSIS — M99.06 LOWER LIMB REGION SOMATIC DYSFUNCTION: ICD-10-CM

## 2024-09-11 DIAGNOSIS — M99.09 SOMATIC DYSFUNCTION OF ABDOMINAL REGION: ICD-10-CM

## 2024-09-11 DIAGNOSIS — M99.04 SACRAL REGION SOMATIC DYSFUNCTION: ICD-10-CM

## 2024-09-11 DIAGNOSIS — M25.811 SHOULDER IMPINGEMENT, RIGHT: ICD-10-CM

## 2024-09-11 DIAGNOSIS — M99.05 PELVIC REGION SOMATIC DYSFUNCTION: ICD-10-CM

## 2024-09-11 RX ORDER — LIDOCAINE HYDROCHLORIDE 10 MG/ML
5 INJECTION, SOLUTION INFILTRATION; PERINEURAL ONCE
Status: SHIPPED | OUTPATIENT
Start: 2024-09-11

## 2024-09-11 RX ORDER — MELOXICAM 7.5 MG/1
7.5 TABLET ORAL DAILY PRN
Qty: 30 TABLET | Refills: 1 | Status: SHIPPED | OUTPATIENT
Start: 2024-09-11

## 2024-09-11 RX ORDER — LIDOCAINE HYDROCHLORIDE 10 MG/ML
4 INJECTION, SOLUTION INFILTRATION; PERINEURAL ONCE
Status: COMPLETED | OUTPATIENT
Start: 2024-09-11 | End: 2024-09-11

## 2024-09-11 RX ORDER — METHYLPREDNISOLONE ACETATE 40 MG/ML
40 INJECTION, SUSPENSION INTRA-ARTICULAR; INTRALESIONAL; INTRAMUSCULAR; SOFT TISSUE ONCE
Status: COMPLETED | OUTPATIENT
Start: 2024-09-11 | End: 2024-09-11

## 2024-09-11 RX ADMIN — LIDOCAINE HYDROCHLORIDE 2 ML: 10 INJECTION, SOLUTION INFILTRATION; PERINEURAL at 14:28

## 2024-09-11 RX ADMIN — METHYLPREDNISOLONE ACETATE 40 MG: 40 INJECTION, SUSPENSION INTRA-ARTICULAR; INTRALESIONAL; INTRAMUSCULAR; SOFT TISSUE at 14:37

## 2024-09-18 ENCOUNTER — HOSPITAL ENCOUNTER (OUTPATIENT)
Facility: HOSPITAL | Age: 64
Setting detail: RECURRING SERIES
Discharge: HOME | End: 2024-09-21
Payer: COMMERCIAL

## 2024-09-18 DIAGNOSIS — E10.65 TYPE 1 DIABETES MELLITUS WITH HYPERGLYCEMIA (HCC): ICD-10-CM

## 2024-09-18 PROCEDURE — 97162 PT EVAL MOD COMPLEX 30 MIN: CPT

## 2024-09-19 RX ORDER — GABAPENTIN 300 MG/1
600 CAPSULE ORAL 2 TIMES DAILY PRN
Qty: 120 CAPSULE | Refills: 2 | Status: SHIPPED | OUTPATIENT
Start: 2024-09-19 | End: 2024-12-18

## 2024-09-26 ENCOUNTER — HOSPITAL ENCOUNTER (OUTPATIENT)
Facility: HOSPITAL | Age: 64
Setting detail: RECURRING SERIES
Discharge: HOME | End: 2024-09-29
Payer: COMMERCIAL

## 2024-09-26 PROCEDURE — 97112 NEUROMUSCULAR REEDUCATION: CPT

## 2024-09-26 PROCEDURE — 97535 SELF CARE MNGMENT TRAINING: CPT

## 2024-09-26 PROCEDURE — 97530 THERAPEUTIC ACTIVITIES: CPT

## 2024-09-26 PROCEDURE — 97110 THERAPEUTIC EXERCISES: CPT

## 2024-09-26 NOTE — PROGRESS NOTES
PHYSICAL / OCCUPATIONAL THERAPY - DAILY TREATMENT NOTE    Patient Name: Shelia Erazo    Date: 2024    : 1960  Insurance: Payor: [facility] / Plan: [facility] / Product Type: *No Product type* /      Patient  verified Yes     Visit #   Current / Total 2 12   Time   In / Out 1049 1129   Pain   In / Out 0 1-1.5   Subjective Functional Status/Changes: Patient states that she has been completing her HEP but has had difficulty with increased pain during bridging. She cannot tolerate washing an entire sink of dishes 2/2 back pain. Her shoulder exercises have been going well.      TREATMENT AREA =  Pain in right shoulder [M25.511]  Other low back pain [M54.59]     OBJECTIVE         Therapeutic Procedures:    Tx Min Billable or 1:1 Min (if diff from Tx Min) Procedure, Rationale, Specifics   16  47681 Therapeutic Exercise (timed):  increase ROM, strength, coordination, balance, and proprioception to improve patient's ability to progress to PLOF and address remaining functional goals. (see flow sheet as applicable)     Details if applicable:       8  20514 Neuromuscular Re-Education (timed):  improve balance, coordination, kinesthetic sense, posture, core stability and proprioception to improve patient's ability to develop conscious control of individual muscles and awareness of position of extremities in order to progress to PLOF and address remaining functional goals. (see flow sheet as applicable)     Details if applicable:      92725 Therapeutic Activity (timed):  use of dynamic activities replicating functional movements to increase ROM, strength, coordination, balance, and proprioception in order to improve patient's ability to progress to PLOF and address remaining functional goals.  (see flow sheet as applicable)     Details if applicable:      37106 Self Care/Home Management (timed):  improve patient knowledge and understanding of pain reducing techniques,

## 2024-09-27 ENCOUNTER — HOSPITAL ENCOUNTER (OUTPATIENT)
Facility: HOSPITAL | Age: 64
Setting detail: RECURRING SERIES
End: 2024-09-27
Payer: COMMERCIAL

## 2024-09-28 RX ORDER — FLUTICASONE PROPIONATE 50 MCG
SPRAY, SUSPENSION (ML) NASAL
Qty: 3 EACH | Refills: 2 | Status: SHIPPED | OUTPATIENT
Start: 2024-09-28 | End: 2024-12-27

## 2024-09-30 ENCOUNTER — HOSPITAL ENCOUNTER (OUTPATIENT)
Facility: HOSPITAL | Age: 64
Setting detail: RECURRING SERIES
Discharge: HOME | End: 2024-10-03
Payer: COMMERCIAL

## 2024-09-30 PROCEDURE — 97112 NEUROMUSCULAR REEDUCATION: CPT

## 2024-09-30 PROCEDURE — 97110 THERAPEUTIC EXERCISES: CPT

## 2024-09-30 PROCEDURE — 97530 THERAPEUTIC ACTIVITIES: CPT

## 2024-09-30 NOTE — PROGRESS NOTES
PHYSICAL / OCCUPATIONAL THERAPY - DAILY TREATMENT NOTE    Patient Name: Shelia Erazo    Date: 2024    : 1960  Insurance: Payor: [facility] / Plan: [facility] / Product Type: *No Product type* /      Patient  verified Yes     Visit #   Current / Total 3 12   Time   In / Out 1210 pm  1249 pm    Pain   In / Out 0/10  010    Subjective Functional Status/Changes: Patient reports that her right shoulder hurt a lot this weekend but is feeling better today.      TREATMENT AREA =  Pain in right shoulder [M25.511]  Other low back pain [M54.59]     OBJECTIVE         Therapeutic Procedures:    Tx Min Billable or 1:1 Min (if diff from Tx Min) Procedure, Rationale, Specifics   20  88117 Therapeutic Exercise (timed):  increase ROM, strength, coordination, balance, and proprioception to improve patient's ability to progress to PLOF and address remaining functional goals. (see flow sheet as applicable)     Details if applicable:       8  31324 Neuromuscular Re-Education (timed):  improve balance, coordination, kinesthetic sense, posture, core stability and proprioception to improve patient's ability to develop conscious control of individual muscles and awareness of position of extremities in order to progress to PLOF and address remaining functional goals. (see flow sheet as applicable)     Details if applicable:     11  52021 Therapeutic Activity (timed):  use of dynamic activities replicating functional movements to increase ROM, strength, coordination, balance, and proprioception in order to improve patient's ability to progress to PLOF and address remaining functional goals.  (see flow sheet as applicable)     Details if applicable:            Details if applicable:            Details if applicable:     39  [facility] Totals Reminder: bill using total billable min of TIMED therapeutic procedures (example: do not include dry needle or estim unattended, both untimed codes, in totals to

## 2024-10-02 ENCOUNTER — APPOINTMENT (OUTPATIENT)
Facility: HOSPITAL | Age: 64
End: 2024-10-02
Payer: COMMERCIAL

## 2024-10-04 ENCOUNTER — HOSPITAL ENCOUNTER (OUTPATIENT)
Facility: HOSPITAL | Age: 64
Setting detail: RECURRING SERIES
Discharge: HOME | End: 2024-10-07
Payer: COMMERCIAL

## 2024-10-04 PROCEDURE — 97112 NEUROMUSCULAR REEDUCATION: CPT

## 2024-10-04 PROCEDURE — 97110 THERAPEUTIC EXERCISES: CPT

## 2024-10-04 PROCEDURE — 97530 THERAPEUTIC ACTIVITIES: CPT

## 2024-10-04 NOTE — PROGRESS NOTES
house without severe back pain.   EVAL: 10/10 max pain; vaccuming takes hours due to having to take many breaks b/c of pain.  Current: Patient reports 0/10 pain today. 10/4/2024     2.  Improve Quick DASH Score by 20% points and Oswestry by 20% points in order to show significant functional improvement.  EVAL: Quick DASH = 55%   Oswestry = 68%  Current: Ongoing, will assess at next PN. 9/30/24     3.  Pt can tolerate standing to wash dishes after making a meal without having to take a break or sit.   EVAL:  currently has to take breaks and sit        Next PN/ RC due 10/18/2024  Auth due (visit number/ date) 12 visits exp: 12/18/24        PLAN  - Continue Plan of Care    Stephanie Copeland PTA    10/4/2024    10:21 AM  If an interpreting service was utilized for treatment of this patient, the contents of this document represent the material reviewed with the patient via the .     Future Appointments   Date Time Provider Department Center   10/7/2024 12:50 PM Stephanie Copeland PTA [facility]   10/8/2024  8:30 AM [facility] RM 1 2D [facility] [place]   10/8/2024  9:00 AM [facility] US RM 1 [facility] [place]   10/9/2024 10:10 AM [facility] PT [facility] 3 [facility]   10/14/2024 10:10 AM Kyra Burton, PT [facility]   10/16/2024 10:10 AM Lorrie Villarreal, PT [facility]   10/23/2024 10:00 AM Yaya Rudd DO [facility]   11/4/2024  1:00 PM Amol Lama MD [facility]   11/27/2024  9:20 AM [facility]ill [facility]

## 2024-10-07 ENCOUNTER — HOSPITAL ENCOUNTER (OUTPATIENT)
Facility: HOSPITAL | Age: 64
Setting detail: RECURRING SERIES
Discharge: HOME | End: 2024-10-10
Payer: COMMERCIAL

## 2024-10-07 PROCEDURE — 97110 THERAPEUTIC EXERCISES: CPT

## 2024-10-07 PROCEDURE — 97530 THERAPEUTIC ACTIVITIES: CPT

## 2024-10-07 PROCEDURE — 97112 NEUROMUSCULAR REEDUCATION: CPT

## 2024-10-07 NOTE — PROGRESS NOTES
PHYSICAL / OCCUPATIONAL THERAPY - DAILY TREATMENT NOTE    Patient Name: Shelia Erazo    Date: 10/7/2024    : 1960  Insurance: Payor: [facility] [facility] / Plan: [facility] / Product Type: *No Product type* /      Patient  verified Yes     Visit #   Current / Total 5 12   Time   In / Out 12:55 1:34   Pain   In / Out 0 0   Subjective Functional Status/Changes: \"I think I over exert myself at work.\"     TREATMENT AREA =  Pain in right shoulder [M25.511]  Other low back pain [M54.59]     OBJECTIVE    Modalities Rationale:     decrease pain and increase tissue extensibility to improve patient's ability to progress to PLOF and address remaining functional goals.     min [] Estim Unattended, type/location:                                      []  w/ice    []  w/heat    min [] Estim Attended, type/location:                                     []  w/US     []  w/ice    []  w/heat    []  TENS insruct      min []  Mechanical Traction: type/lbs                   []  pro   []  sup   []  int   []  cont    []  before manual    []  after manual    min []  Ultrasound, settings/location:     10 min  unbill []  Ice     [x]  Heat    location/position: Prone  (B) LSP    min []  Paraffin,  details:     min []  Vasopneumatic Device, press/temp:     min []  Whirlpool / Fluido:    If using vaso (only need to measure limb vaso being performed on)      pre-treatment girth :       post-treatment girth :       measured at (landmark location) :      min []  Other:    Skin assessment post-treatment:   Intact      Therapeutic Procedures:    Tx Min Billable or 1:1 Min (if diff from Tx Min) Procedure, Rationale, Specifics   11  45399 Therapeutic Exercise (timed):  increase ROM, strength, coordination, balance, and proprioception to improve patient's ability to progress to PLOF and address remaining functional goals. (see flow sheet as applicable)     Details if applicable:       8 05444 Therapeutic Activity (timed):

## 2024-10-09 ENCOUNTER — HOSPITAL ENCOUNTER (OUTPATIENT)
Facility: HOSPITAL | Age: 64
Setting detail: RECURRING SERIES
Discharge: HOME | End: 2024-10-12
Payer: COMMERCIAL

## 2024-10-09 PROCEDURE — 97112 NEUROMUSCULAR REEDUCATION: CPT

## 2024-10-09 PROCEDURE — 97530 THERAPEUTIC ACTIVITIES: CPT

## 2024-10-09 PROCEDURE — 97110 THERAPEUTIC EXERCISES: CPT

## 2024-10-09 NOTE — PROGRESS NOTES
currently has to take breaks and sit        Next PN/ RC due 10/18/2024  Auth due (visit number/ date) 12 visits exp: 12/18/24       PLAN  - Continue Plan of Care    Lori Lockhart, PTA    10/9/2024    10:15 AM  If an interpreting service was utilized for treatment of this patient, the contents of this document represent the material reviewed with the patient via the .     Future Appointments   Date Time Provider Department Center   10/14/2024 10:10 [facility]   10/16/2024 10:10 AM Lorrie Villarreal, PT [facility]   10/23/2024 10:00 AM Yaya Rudd DO [facility]   11/4/2024  1:00 PM Amol Lama MD [facility] AMB   11/5/2024  9:30 AM [facility] RM 1 2D [facility] [place]   11/5/2024 10:00 AM [facility] US RM 1 [facility] [place]   11/27/2024  9:20 AM Karen Dill MD HR [facility] BS [facility] DEP

## 2024-10-10 DIAGNOSIS — Z87.81 HISTORY OF COMPRESSION FRACTURE OF SPINE: ICD-10-CM

## 2024-10-10 DIAGNOSIS — M25.811 SHOULDER IMPINGEMENT, RIGHT: ICD-10-CM

## 2024-10-10 DIAGNOSIS — M51.369 DDD (DEGENERATIVE DISC DISEASE), LUMBAR: ICD-10-CM

## 2024-10-10 RX ORDER — MELOXICAM 7.5 MG/1
TABLET ORAL
Qty: 90 TABLET | Refills: 1 | Status: SHIPPED | OUTPATIENT
Start: 2024-10-10

## 2024-10-14 ENCOUNTER — APPOINTMENT (OUTPATIENT)
Facility: HOSPITAL | Age: 64
End: 2024-10-14
Payer: COMMERCIAL

## 2024-10-14 ENCOUNTER — TELEPHONE (OUTPATIENT)
Facility: HOSPITAL | Age: 64
End: 2024-10-14

## 2024-10-14 NOTE — TELEPHONE ENCOUNTER
Patient cancelled appt. on 10/14/24 at 8:26am due to the patient not being able to afford the gas going back and forth to therapy and she would like to be discharged.

## 2024-10-16 ENCOUNTER — APPOINTMENT (OUTPATIENT)
Facility: HOSPITAL | Age: 64
End: 2024-10-16
Payer: COMMERCIAL

## 2024-10-23 ENCOUNTER — OFFICE VISIT (OUTPATIENT)
Age: 64
End: 2024-10-23
Payer: COMMERCIAL

## 2024-10-23 VITALS — BODY MASS INDEX: 23.49 KG/M2 | WEIGHT: 150 LBS

## 2024-10-23 DIAGNOSIS — M51.360 DEGENERATION OF INTERVERTEBRAL DISC OF LUMBAR REGION WITH DISCOGENIC BACK PAIN: Primary | ICD-10-CM

## 2024-10-23 DIAGNOSIS — M99.04 SACRAL REGION SOMATIC DYSFUNCTION: ICD-10-CM

## 2024-10-23 DIAGNOSIS — M99.09 SOMATIC DYSFUNCTION OF ABDOMINAL REGION: ICD-10-CM

## 2024-10-23 DIAGNOSIS — M99.02 THORACIC REGION SOMATIC DYSFUNCTION: ICD-10-CM

## 2024-10-23 DIAGNOSIS — M25.811 SHOULDER IMPINGEMENT, RIGHT: ICD-10-CM

## 2024-10-23 DIAGNOSIS — M99.03 SOMATIC DYSFUNCTION OF LUMBAR REGION: ICD-10-CM

## 2024-10-23 DIAGNOSIS — M99.07 UPPER EXTREMITY SOMATIC DYSFUNCTION: ICD-10-CM

## 2024-10-23 DIAGNOSIS — M99.01 CERVICAL SOMATIC DYSFUNCTION: ICD-10-CM

## 2024-10-23 DIAGNOSIS — M99.08 RIB CAGE REGION SOMATIC DYSFUNCTION: ICD-10-CM

## 2024-10-23 DIAGNOSIS — M99.05 PELVIC REGION SOMATIC DYSFUNCTION: ICD-10-CM

## 2024-10-23 DIAGNOSIS — Z87.81 HISTORY OF COMPRESSION FRACTURE OF SPINE: ICD-10-CM

## 2024-10-23 DIAGNOSIS — M99.06 LOWER LIMB REGION SOMATIC DYSFUNCTION: ICD-10-CM

## 2024-10-23 PROCEDURE — 98929 OSTEOPATH MANJ 9-10 REGIONS: CPT | Performed by: FAMILY MEDICINE

## 2024-10-23 PROCEDURE — 99214 OFFICE O/P EST MOD 30 MIN: CPT | Performed by: FAMILY MEDICINE

## 2024-10-23 NOTE — PROGRESS NOTES
HISTORY OF PRESENT ILLNESS    Shelia Erazo 1960 is a 64 y.o. year old female comes in today to be evaluated and treated for: back pain, right shoulder chronic    Since last appt 9/11/2024 has noticed Sx improved with PT but needed to stop due to cost. Pain level 0 - No pain/10. Using Mobic 7.5mg PRN very rare with benefit. Subacromial last appt great help.    IMAGING: XR lumbar 9/11/2024  IMPRESSION:  Chronic L1 vertebral body compression fracture with cement augmentation.  Chronic T9 vertebral body compression fracture.  Mild lumbar spondylosis.    XR right shoulder 9/11/2024  IMPRESSION:  Mild to moderate acromioclavicular joint degenerative disease.  No acute osseous abnormality.    MRI thoracic 1/30/2015  Impression: Chronic compression deformity T9, with about 25% residual vertebral   body height. No active marrow edema. No acute compression fracture in T5 or T6.   Vertebroplasty in L1 noted. Thoracic spinal cord is unremarkable. No compression   or edema.     Past Surgical History:   Procedure Laterality Date    BACK SURGERY  2009    DILATION AND CURETTAGE OF UTERUS      HYSTERECTOMY (CERVIX STATUS UNKNOWN)  2002    HYSTERECTOMY, TOTAL ABDOMINAL (CERVIX REMOVED)      HYSTERECTOMY, VAGINAL  9/2002    TUBAL LIGATION  8/1996     Social History     Socioeconomic History    Marital status:      Spouse name: None    Number of children: None    Years of education: None    Highest education level: None   Tobacco Use    Smoking status: Every Day     Current packs/day: 1.00     Average packs/day: 1.1 packs/day for 62.1 years (69.1 ttl pk-yrs)     Types: Cigarettes     Start date: 7/27/1976    Smokeless tobacco: Never    Tobacco comments:     Have tried many times..3 times with Chantix   Vaping Use    Vaping status: Never Used   Substance and Sexual Activity    Alcohol use: Not Currently     Comment: Occasional beer    Drug use: No    Sexual activity: Not Currently     Partners: Male   Social History

## 2024-10-23 NOTE — PATIENT INSTRUCTIONS
Either scan this QR code on your smartphone:        Or search YouTube for my channel:    Dr. ANIL Rudd    Rotator cuff

## 2024-11-04 ENCOUNTER — OFFICE VISIT (OUTPATIENT)
Age: 64
End: 2024-11-04
Payer: COMMERCIAL

## 2024-11-04 VITALS
DIASTOLIC BLOOD PRESSURE: 65 MMHG | SYSTOLIC BLOOD PRESSURE: 140 MMHG | HEART RATE: 80 BPM | WEIGHT: 150 LBS | OXYGEN SATURATION: 99 % | TEMPERATURE: 97.4 F | HEIGHT: 67 IN | RESPIRATION RATE: 18 BRPM | BODY MASS INDEX: 23.54 KG/M2

## 2024-11-04 DIAGNOSIS — G47.33 OSA (OBSTRUCTIVE SLEEP APNEA): ICD-10-CM

## 2024-11-04 DIAGNOSIS — J30.2 SEASONAL ALLERGIC RHINITIS, UNSPECIFIED TRIGGER: ICD-10-CM

## 2024-11-04 DIAGNOSIS — J43.2 CENTRILOBULAR EMPHYSEMA (HCC): Primary | ICD-10-CM

## 2024-11-04 DIAGNOSIS — Z72.0 TOBACCO ABUSE: ICD-10-CM

## 2024-11-04 PROCEDURE — 90471 IMMUNIZATION ADMIN: CPT | Performed by: HOSPITALIST

## 2024-11-04 PROCEDURE — 99407 BEHAV CHNG SMOKING > 10 MIN: CPT | Performed by: HOSPITALIST

## 2024-11-04 PROCEDURE — 3078F DIAST BP <80 MM HG: CPT | Performed by: HOSPITALIST

## 2024-11-04 PROCEDURE — 3077F SYST BP >= 140 MM HG: CPT | Performed by: HOSPITALIST

## 2024-11-04 PROCEDURE — 99205 OFFICE O/P NEW HI 60 MIN: CPT | Performed by: HOSPITALIST

## 2024-11-04 PROCEDURE — 90661 CCIIV3 VAC ABX FR 0.5 ML IM: CPT | Performed by: HOSPITALIST

## 2024-11-04 RX ORDER — FLUTICASONE FUROATE, UMECLIDINIUM BROMIDE AND VILANTEROL TRIFENATATE 200; 62.5; 25 UG/1; UG/1; UG/1
1 POWDER RESPIRATORY (INHALATION) DAILY
Qty: 2 EACH | Refills: 0 | COMMUNITY
Start: 2024-11-04 | End: 2024-11-04

## 2024-11-04 RX ORDER — NICOTINE 21 MG/24HR
1 PATCH, TRANSDERMAL 24 HOURS TRANSDERMAL DAILY
Qty: 42 PATCH | Refills: 2 | Status: SHIPPED | OUTPATIENT
Start: 2024-11-04 | End: 2025-03-10

## 2024-11-04 RX ORDER — VARENICLINE TARTRATE 0.5 MG/1
.5-1 TABLET, FILM COATED ORAL SEE ADMIN INSTRUCTIONS
Qty: 57 TABLET | Refills: 0 | Status: SHIPPED | OUTPATIENT
Start: 2024-11-04

## 2024-11-04 NOTE — PROGRESS NOTES
Shelia Erazo presents today for   Chief Complaint   Patient presents with    COPD       Is someone accompanying this pt? no    Is the patient using any DME equipment during OV? no    -DME Company no    Depression Screenin/24/2023     9:52 AM   PHQ-9 Questionaire   Little interest or pleasure in doing things 0   Feeling down, depressed, or hopeless 0   PHQ-9 Total Score 0       Learning Assessment:    Failed to redirect to the Timeline version of the [employer] SmartLink.    Abuse Screening:         No data to display                Fall Risk    Failed to redirect to the Timeline version of the [employer] SmartLink.    Coordination of Care:    1. Have you been to the ER, urgent care clinic since your last visit? Hospitalized since your last visit? no    2. Have you seen or consulted any other health care providers outside of the [facility] since your last visit? Include any pap smears or colon screening. no    Medication list has been update per patient.  
vaccination today.  Plans to get COVID booster later.  He is already up-to-date on pneumococcal vaccination  Order home sleep study.  If positive for CPAP.  Counseled on dangers of uncontrolled sleep apnea and worsening daytime COPD.  Follow up -6 weeks & or sooner should new symptoms or problems arise.       Education:  Education provided on Brester inhaler, beer smoking triggers, including tobacco    Smoking cessation  The patient was counseled on the dangers of tobacco use, and was advised to quit  Reviewed strategies to maximize success, including removing cigarettes and smoking materials from environment, stress management, substitution of other forms of reinforcement, written materials, and pharmacotherapy (Chantix, Wellbutrin, nicotine). A total of 12 minutes was spent on this discussion.    Health maintenance screens deferred to Primary care provider.     maricruz Lama MD  11/4/2024  Pulmonary, Critical Care Medicine  [facility]       Total time on the date of the encounter that was met and/or exceeded --- 64 min (total aggregate time)    This patient has a high complexity chronic care condition   This Visit neededhigh complexity medically necessary decision making and management plans.    Time spent in preparing for the visit-review of history, tests done prior to arrival, additional time reviewing clinical data, imaging, outside records and test results as well as time spent in ordering tests, treatments and referring patient for further care was a total of 31 minutes.  Additional time-counseling with patient and family members regarding care plan 33 minutes.

## 2024-11-05 ENCOUNTER — HOSPITAL ENCOUNTER (OUTPATIENT)
Facility: HOSPITAL | Age: 64
Discharge: HOME | End: 2024-11-08
Attending: FAMILY MEDICINE
Payer: COMMERCIAL

## 2024-11-05 VITALS — HEIGHT: 67 IN | WEIGHT: 150 LBS | BODY MASS INDEX: 23.54 KG/M2

## 2024-11-05 DIAGNOSIS — N63.10 MASSES OF BOTH BREASTS: ICD-10-CM

## 2024-11-05 DIAGNOSIS — N63.20 MASSES OF BOTH BREASTS: ICD-10-CM

## 2024-11-05 PROCEDURE — G0279 TOMOSYNTHESIS, MAMMO: HCPCS

## 2024-11-05 PROCEDURE — 76642 ULTRASOUND BREAST LIMITED: CPT

## 2024-11-07 ENCOUNTER — TELEPHONE (OUTPATIENT)
Age: 64
End: 2024-11-07

## 2024-11-07 NOTE — TELEPHONE ENCOUNTER
Pt called to request Rx for prednisone as discussed. Please send to [facility] on [address]

## 2024-11-08 RX ORDER — PREDNISONE 20 MG/1
TABLET ORAL
Qty: 15 TABLET | Refills: 0 | Status: SHIPPED | OUTPATIENT
Start: 2024-11-08 | End: 2024-11-18

## 2024-11-15 ENCOUNTER — TELEPHONE (OUTPATIENT)
Age: 64
End: 2024-11-15

## 2024-11-15 NOTE — TELEPHONE ENCOUNTER
Pt stated that her prednisone is causing her blood sugar to go and that she stopped taking them and the coughing and the mucus is coming back. Pt is wanting to know what she need to do. Please advise [phone]   The patient is a 89y Female complaining of weakness.

## 2024-11-18 ENCOUNTER — TELEPHONE (OUTPATIENT)
Facility: CLINIC | Age: 64
End: 2024-11-18

## 2024-11-18 DIAGNOSIS — E78.00 PURE HYPERCHOLESTEROLEMIA: ICD-10-CM

## 2024-11-18 DIAGNOSIS — E10.65 TYPE 1 DIABETES MELLITUS WITH HYPERGLYCEMIA (HCC): Primary | ICD-10-CM

## 2024-11-19 ENCOUNTER — HOSPITAL ENCOUNTER (OUTPATIENT)
Dept: SLEEP MEDICINE | Facility: HOSPITAL | Age: 64
Discharge: HOME | End: 2024-11-22
Attending: HOSPITALIST
Payer: COMMERCIAL

## 2024-11-19 DIAGNOSIS — G47.33 OSA (OBSTRUCTIVE SLEEP APNEA): ICD-10-CM

## 2024-11-19 PROCEDURE — 95800 SLP STDY UNATTENDED: CPT

## 2024-11-20 NOTE — TELEPHONE ENCOUNTER
Spoke with the patient and informed her that the  Labs are currently have been on file in the system since 11/30/2023 and Expires 11/30/2024 fr patient to have drawn. Patient acknowledged understanding and is requesting labs to be placed in the System for TSH and Urine for CKD. Patient was dx with CKD in 08/2024 Dr. MERE Palmer in [place] at [facility] and does not have an Nephrology provider at this time. Patient states she does not have time to go back and forth with labs between each provider and would prefer to have labs drawn by PCP and take results to Dr. Palmer. Patient requesting call once orders been placed. This request/message has been forwarded to the provider for review, thank you.

## 2024-11-20 NOTE — TELEPHONE ENCOUNTER
Labs are currently on file for patient in the system since 11/30/2023 and Expires 11/30/2024. I will call the patient and inform them, thank you

## 2024-11-21 PROBLEM — R06.83 SNORING: Status: ACTIVE | Noted: 2024-11-21

## 2024-12-04 ENCOUNTER — OFFICE VISIT (OUTPATIENT)
Age: 64
End: 2024-12-04
Payer: COMMERCIAL

## 2024-12-04 VITALS — WEIGHT: 150 LBS | BODY MASS INDEX: 23.49 KG/M2

## 2024-12-04 DIAGNOSIS — M99.09 SOMATIC DYSFUNCTION OF ABDOMINAL REGION: ICD-10-CM

## 2024-12-04 DIAGNOSIS — M25.811 SHOULDER IMPINGEMENT, RIGHT: ICD-10-CM

## 2024-12-04 DIAGNOSIS — M99.02 THORACIC REGION SOMATIC DYSFUNCTION: ICD-10-CM

## 2024-12-04 DIAGNOSIS — Z87.81 HISTORY OF COMPRESSION FRACTURE OF SPINE: ICD-10-CM

## 2024-12-04 DIAGNOSIS — M51.360 DEGENERATION OF INTERVERTEBRAL DISC OF LUMBAR REGION WITH DISCOGENIC BACK PAIN: Primary | ICD-10-CM

## 2024-12-04 DIAGNOSIS — M99.04 SACRAL REGION SOMATIC DYSFUNCTION: ICD-10-CM

## 2024-12-04 DIAGNOSIS — M99.08 RIB CAGE REGION SOMATIC DYSFUNCTION: ICD-10-CM

## 2024-12-04 DIAGNOSIS — M99.06 LOWER LIMB REGION SOMATIC DYSFUNCTION: ICD-10-CM

## 2024-12-04 DIAGNOSIS — M99.01 CERVICAL SOMATIC DYSFUNCTION: ICD-10-CM

## 2024-12-04 DIAGNOSIS — M99.07 UPPER EXTREMITY SOMATIC DYSFUNCTION: ICD-10-CM

## 2024-12-04 DIAGNOSIS — M99.05 PELVIC REGION SOMATIC DYSFUNCTION: ICD-10-CM

## 2024-12-04 DIAGNOSIS — M99.03 SOMATIC DYSFUNCTION OF LUMBAR REGION: ICD-10-CM

## 2024-12-04 PROCEDURE — 98929 OSTEOPATH MANJ 9-10 REGIONS: CPT | Performed by: FAMILY MEDICINE

## 2024-12-04 PROCEDURE — 99213 OFFICE O/P EST LOW 20 MIN: CPT | Performed by: FAMILY MEDICINE

## 2024-12-04 NOTE — PROGRESS NOTES
HISTORY OF PRESENT ILLNESS    Shelia Erazo 1960 is a 64 y.o. year old female comes in today to be evaluated and treated for: back, right shoulder pain chronic    Since last appt 10/23/2024 has noticed Sx improved with consistent HEP. Finished PT OCT2024 Pain level 0 - No pain/10. Using Mobic 15mg rare PRN with benefit.    IMAGING: XR lumbar 9/11/2024  IMPRESSION:  Chronic L1 vertebral body compression fracture with cement augmentation.  Chronic T9 vertebral body compression fracture.  Mild lumbar spondylosis.     XR right shoulder 9/11/2024  IMPRESSION:  Mild to moderate acromioclavicular joint degenerative disease.  No acute osseous abnormality.     MRI thoracic 1/30/2015  Impression: Chronic compression deformity T9, with about 25% residual vertebral   body height. No active marrow edema. No acute compression fracture in T5 or T6.   Vertebroplasty in L1 noted. Thoracic spinal cord is unremarkable. No compression   or edema.     Past Surgical History:   Procedure Laterality Date    BACK SURGERY  2009    DILATION AND CURETTAGE OF UTERUS      HYSTERECTOMY (CERVIX STATUS UNKNOWN)  2002    HYSTERECTOMY, TOTAL ABDOMINAL (CERVIX REMOVED)      HYSTERECTOMY, VAGINAL  9/2002    TUBAL LIGATION  8/1996     Social History     Socioeconomic History    Marital status:      Spouse name: None    Number of children: None    Years of education: None    Highest education level: None   Tobacco Use    Smoking status: Every Day     Current packs/day: 1.00     Average packs/day: 1.1 packs/day for 62.3 years (69.2 ttl pk-yrs)     Types: Cigarettes     Start date: 7/27/1976     Passive exposure: Past    Smokeless tobacco: Never    Tobacco comments:     Have tried many times..3 times with Chantix   Vaping Use    Vaping status: Never Used   Substance and Sexual Activity    Alcohol use: Not Currently     Comment: Occasional beer    Drug use: No    Sexual activity: Not Currently     Partners: Male   Social History Narrative

## 2024-12-09 RX ORDER — BUDESONIDE, GLYCOPYRROLATE, AND FORMOTEROL FUMARATE 160; 9; 4.8 UG/1; UG/1; UG/1
AEROSOL, METERED RESPIRATORY (INHALATION)
Qty: 32.1 G | Refills: 3 | Status: SHIPPED | OUTPATIENT
Start: 2024-12-09

## 2024-12-16 DIAGNOSIS — E10.65 TYPE 1 DIABETES MELLITUS WITH HYPERGLYCEMIA (HCC): ICD-10-CM

## 2024-12-16 NOTE — TELEPHONE ENCOUNTER
[place]     Last Visit: 08/26/2024  Next Appointment: 12/23/2024  Controlled Substance Agreement: N/A  Urine Drug Screen: N/A  Previous Refill Encounter(s): Date:    Disp Refills Start End    gabapentin (NEURONTIN) 300 MG capsule 120 capsule 2 9/19/2024 12/18/2024    Sig - Route: Take 2 capsules by mouth 2 times daily as needed (pain) for up to 90 days. Max Daily Amount: 1,200 mg - Oral    Sent to pharmacy as: Gabapentin 300 MG Oral Capsule (NEURONTIN)    E-Prescribing Status: Receipt confirmed by pharmacy (9/19/2024  9:12 PM EDT)  Pharmacy    [id number] - [place], [place] - [address] [place] [address] -  [phone] - F [phone]  [address] [place] [id number]  Phone: [phone]  Fax: [phone]         Requested Prescriptions     Pending Prescriptions Disp Refills    albuterol sulfate HFA (PROVENTIL;VENTOLIN;PROAIR) 108 (90 Base) MCG/ACT inhaler 18 g 2     Sig: Inhale 2 puffs into the lungs every 4 hours as needed for Wheezing    gabapentin (NEURONTIN) 300 MG capsule 120 capsule 2     Sig: Take 2 capsules by mouth 2 times daily as needed (pain) for up to 90 days. Max Daily Amount: 1,200 mg

## 2024-12-18 ENCOUNTER — HOSPITAL ENCOUNTER (OUTPATIENT)
Facility: HOSPITAL | Age: 64
Setting detail: SPECIMEN
Discharge: HOME | End: 2024-12-21
Payer: COMMERCIAL

## 2024-12-18 DIAGNOSIS — E10.65 TYPE 1 DIABETES MELLITUS WITH HYPERGLYCEMIA (HCC): ICD-10-CM

## 2024-12-18 DIAGNOSIS — E78.00 PURE HYPERCHOLESTEROLEMIA: ICD-10-CM

## 2024-12-18 LAB
ALBUMIN SERPL-MCNC: 3.6 G/DL (ref 3.4–5)
ALBUMIN/GLOB SERPL: 1.1 (ref 0.8–1.7)
ALP SERPL-CCNC: 80 U/L (ref 45–117)
ALT SERPL-CCNC: 20 U/L (ref 13–56)
ANION GAP SERPL CALC-SCNC: 4 MMOL/L (ref 3–18)
AST SERPL-CCNC: 12 U/L (ref 10–38)
BILIRUB SERPL-MCNC: 0.6 MG/DL (ref 0.2–1)
BUN SERPL-MCNC: 24 MG/DL (ref 7–18)
BUN/CREAT SERPL: 23 (ref 12–20)
CALCIUM SERPL-MCNC: 9.5 MG/DL (ref 8.5–10.1)
CHLORIDE SERPL-SCNC: 103 MMOL/L (ref 100–111)
CHOLEST SERPL-MCNC: 159 MG/DL
CO2 SERPL-SCNC: 28 MMOL/L (ref 21–32)
CREAT SERPL-MCNC: 1.05 MG/DL (ref 0.6–1.3)
CREAT UR-MCNC: 87 MG/DL (ref 30–125)
EST. AVERAGE GLUCOSE BLD GHB EST-MCNC: 171 MG/DL
GLOBULIN SER CALC-MCNC: 3.3 G/DL (ref 2–4)
GLUCOSE SERPL-MCNC: 191 MG/DL (ref 74–99)
HBA1C MFR BLD: 7.6 % (ref 4.2–5.6)
HDLC SERPL-MCNC: 87 MG/DL (ref 40–60)
HDLC SERPL: 1.8 (ref 0–5)
LDLC SERPL CALC-MCNC: 54.2 MG/DL (ref 0–100)
LIPID PANEL: ABNORMAL
MICROALBUMIN UR-MCNC: 25 MG/DL (ref 0–3)
MICROALBUMIN/CREAT UR-RTO: 287 MG/G (ref 0–30)
POTASSIUM SERPL-SCNC: 5.2 MMOL/L (ref 3.5–5.5)
PROT SERPL-MCNC: 6.9 G/DL (ref 6.4–8.2)
SODIUM SERPL-SCNC: 135 MMOL/L (ref 136–145)
TRIGL SERPL-MCNC: 89 MG/DL
TSH SERPL DL<=0.05 MIU/L-ACNC: 1.2 UIU/ML (ref 0.36–3.74)
VLDLC SERPL CALC-MCNC: 17.8 MG/DL

## 2024-12-18 PROCEDURE — 80053 COMPREHEN METABOLIC PANEL: CPT

## 2024-12-18 PROCEDURE — 83036 HEMOGLOBIN GLYCOSYLATED A1C: CPT

## 2024-12-18 PROCEDURE — 84443 ASSAY THYROID STIM HORMONE: CPT

## 2024-12-18 PROCEDURE — 80061 LIPID PANEL: CPT

## 2024-12-18 PROCEDURE — 36415 COLL VENOUS BLD VENIPUNCTURE: CPT

## 2024-12-18 PROCEDURE — 82570 ASSAY OF URINE CREATININE: CPT

## 2024-12-18 PROCEDURE — 82043 UR ALBUMIN QUANTITATIVE: CPT

## 2024-12-19 DIAGNOSIS — E10.65 TYPE 1 DIABETES MELLITUS WITH HYPERGLYCEMIA (HCC): ICD-10-CM

## 2024-12-19 NOTE — TELEPHONE ENCOUNTER
Duplicate Medication request. Please see message from 12/16/2024. Patient notified via [employer], thank you.

## 2024-12-20 DIAGNOSIS — E10.65 TYPE 1 DIABETES MELLITUS WITH HYPERGLYCEMIA (HCC): ICD-10-CM

## 2024-12-20 RX ORDER — ALBUTEROL SULFATE 90 UG/1
2 INHALANT RESPIRATORY (INHALATION) EVERY 4 HOURS PRN
Qty: 18 G | Refills: 2 | Status: SHIPPED | OUTPATIENT
Start: 2024-12-20

## 2024-12-20 RX ORDER — GABAPENTIN 300 MG/1
600 CAPSULE ORAL 2 TIMES DAILY PRN
Qty: 120 CAPSULE | Refills: 2 | Status: CANCELLED | OUTPATIENT
Start: 2024-12-20 | End: 2025-03-20

## 2024-12-20 RX ORDER — GABAPENTIN 300 MG/1
600 CAPSULE ORAL 2 TIMES DAILY PRN
Qty: 120 CAPSULE | Refills: 2 | Status: SHIPPED | OUTPATIENT
Start: 2024-12-20 | End: 2025-03-20

## 2024-12-23 ENCOUNTER — OFFICE VISIT (OUTPATIENT)
Age: 64
End: 2024-12-23
Payer: COMMERCIAL

## 2024-12-23 ENCOUNTER — PATIENT MESSAGE (OUTPATIENT)
Facility: CLINIC | Age: 64
End: 2024-12-23

## 2024-12-23 VITALS
OXYGEN SATURATION: 99 % | DIASTOLIC BLOOD PRESSURE: 63 MMHG | BODY MASS INDEX: 23.98 KG/M2 | HEIGHT: 67 IN | SYSTOLIC BLOOD PRESSURE: 126 MMHG | WEIGHT: 152.8 LBS | HEART RATE: 80 BPM | RESPIRATION RATE: 16 BRPM | TEMPERATURE: 97.2 F

## 2024-12-23 DIAGNOSIS — G47.33 OSA (OBSTRUCTIVE SLEEP APNEA): Primary | ICD-10-CM

## 2024-12-23 DIAGNOSIS — Z72.0 TOBACCO ABUSE: ICD-10-CM

## 2024-12-23 DIAGNOSIS — J43.2 CENTRILOBULAR EMPHYSEMA (HCC): ICD-10-CM

## 2024-12-23 PROCEDURE — 99214 OFFICE O/P EST MOD 30 MIN: CPT | Performed by: HOSPITALIST

## 2024-12-23 PROCEDURE — 3074F SYST BP LT 130 MM HG: CPT | Performed by: HOSPITALIST

## 2024-12-23 PROCEDURE — 99406 BEHAV CHNG SMOKING 3-10 MIN: CPT | Performed by: HOSPITALIST

## 2024-12-23 PROCEDURE — 3078F DIAST BP <80 MM HG: CPT | Performed by: HOSPITALIST

## 2024-12-23 RX ORDER — GABAPENTIN 300 MG/1
CAPSULE ORAL
Qty: 120 CAPSULE | OUTPATIENT
Start: 2024-12-23

## 2024-12-23 NOTE — PROGRESS NOTES
Shelia Erazo presents today for   Chief Complaint   Patient presents with    COPD       Is someone accompanying this pt? no    Is the patient using any DME equipment during OV? no    -DME Company no    Depression Screenin/24/2023     9:52 AM   PHQ-9 Questionaire   Little interest or pleasure in doing things 0   Feeling down, depressed, or hopeless 0   PHQ-9 Total Score 0       Learning Assessment:    Failed to redirect to the Timeline version of the [employer] SmartLink.    Abuse Screening:         No data to display                Fall Risk    Failed to redirect to the Timeline version of the [employer] SmartLink.    Coordination of Care:    1. Have you been to the ER, urgent care clinic since your last visit? Hospitalized since your last visit?  no    2. Have you seen or consulted any other health care providers outside of the [facility] since your last visit? Include any pap smears or colon screening. no    Medication list has been update per patient.  
Wellbutrin, Chantix, nicotine patches.  Likely her only route for it is to obtain social supports to help her stay countable and quit smoking  Up-to-date on flu and COVID boosters.  Is updated pneumococcal vaccination  Order in lab sleep study.  Prior home sleep study was negative but she remains a high pretest probability for LEILANI   counseled on dangers of uncontrolled sleep apnea and worsening daytime COPD.  Follow up - 3 months & or sooner should new symptoms or problems arise.          Smoking cessation  The patient was counseled on the dangers of tobacco use, and was advised to quit  Reviewed strategies to maximize success, including removing cigarettes and smoking materials from environment, support of family/friends, and pharmacotherapy (nicotine, Wellbutrin, Chantix). A total of 7 minutes was spent on this discussion.    Health maintenance screens deferred to Primary care provider.     maricruz Lama MD    This patient has a moderate complexity chronic care condition   This Visit needed moderate complexity medically necessary decision making and management plans.    Time spent in preparing for the visit-review of history, tests done prior to arrival, additional time reviewing clinical data, imaging, outside records and test results as well as time spent in ordering tests, treatments and referring patient for further care was a total of 17 minutes.  Additional time-counseling with patient and family members regarding care plan 17 minutes.    Total time on the date of the encounter that was met and/or exceeded --- 34min (total aggregate time)

## 2024-12-24 ENCOUNTER — TELEPHONE (OUTPATIENT)
Dept: SLEEP MEDICINE | Facility: HOSPITAL | Age: 64
End: 2024-12-24

## 2024-12-24 NOTE — TELEPHONE ENCOUNTER
This request/message has been forwarded to the provider for review of her Urine Results from 12/18/2024, thank you.

## 2024-12-26 ENCOUNTER — TELEPHONE (OUTPATIENT)
Dept: SLEEP MEDICINE | Facility: HOSPITAL | Age: 64
End: 2024-12-26

## 2024-12-27 ENCOUNTER — TELEPHONE (OUTPATIENT)
Dept: SLEEP MEDICINE | Facility: HOSPITAL | Age: 64
End: 2024-12-27

## 2025-02-28 RX ORDER — ALBUTEROL SULFATE 90 UG/1
INHALANT RESPIRATORY (INHALATION)
Qty: 8.5 G | Refills: 2 | Status: SHIPPED | OUTPATIENT
Start: 2025-02-28

## 2025-03-11 SDOH — ECONOMIC STABILITY: INCOME INSECURITY: IN THE LAST 12 MONTHS, WAS THERE A TIME WHEN YOU WERE NOT ABLE TO PAY THE MORTGAGE OR RENT ON TIME?: NO

## 2025-03-11 SDOH — ECONOMIC STABILITY: FOOD INSECURITY: WITHIN THE PAST 12 MONTHS, THE FOOD YOU BOUGHT JUST DIDN'T LAST AND YOU DIDN'T HAVE MONEY TO GET MORE.: PATIENT DECLINED

## 2025-03-11 SDOH — ECONOMIC STABILITY: FOOD INSECURITY: WITHIN THE PAST 12 MONTHS, YOU WORRIED THAT YOUR FOOD WOULD RUN OUT BEFORE YOU GOT MONEY TO BUY MORE.: SOMETIMES TRUE

## 2025-03-11 SDOH — ECONOMIC STABILITY: TRANSPORTATION INSECURITY
IN THE PAST 12 MONTHS, HAS THE LACK OF TRANSPORTATION KEPT YOU FROM MEDICAL APPOINTMENTS OR FROM GETTING MEDICATIONS?: NO

## 2025-03-13 ENCOUNTER — OFFICE VISIT (OUTPATIENT)
Facility: CLINIC | Age: 65
End: 2025-03-13
Payer: MEDICARE

## 2025-03-13 ENCOUNTER — RESULTS FOLLOW-UP (OUTPATIENT)
Facility: HOSPITAL | Age: 65
End: 2025-03-13

## 2025-03-13 VITALS
SYSTOLIC BLOOD PRESSURE: 129 MMHG | RESPIRATION RATE: 16 BRPM | HEART RATE: 68 BPM | TEMPERATURE: 97.2 F | BODY MASS INDEX: 24.55 KG/M2 | HEIGHT: 67 IN | OXYGEN SATURATION: 98 % | WEIGHT: 156.4 LBS | DIASTOLIC BLOOD PRESSURE: 72 MMHG

## 2025-03-13 DIAGNOSIS — J43.2 CENTRILOBULAR EMPHYSEMA (HCC): ICD-10-CM

## 2025-03-13 DIAGNOSIS — E10.65 TYPE 1 DIABETES MELLITUS WITH HYPERGLYCEMIA (HCC): ICD-10-CM

## 2025-03-13 DIAGNOSIS — K59.1 FUNCTIONAL DIARRHEA: ICD-10-CM

## 2025-03-13 DIAGNOSIS — E78.00 PURE HYPERCHOLESTEROLEMIA: ICD-10-CM

## 2025-03-13 DIAGNOSIS — E10.43 DIABETIC AUTONOMIC NEUROPATHY ASSOCIATED WITH TYPE 1 DIABETES MELLITUS (HCC): ICD-10-CM

## 2025-03-13 DIAGNOSIS — Z00.00 INITIAL MEDICARE ANNUAL WELLNESS VISIT: Primary | ICD-10-CM

## 2025-03-13 PROCEDURE — G0438 PPPS, INITIAL VISIT: HCPCS | Performed by: FAMILY MEDICINE

## 2025-03-13 PROCEDURE — 3017F COLORECTAL CA SCREEN DOC REV: CPT | Performed by: FAMILY MEDICINE

## 2025-03-13 PROCEDURE — 3078F DIAST BP <80 MM HG: CPT | Performed by: FAMILY MEDICINE

## 2025-03-13 PROCEDURE — 3046F HEMOGLOBIN A1C LEVEL >9.0%: CPT | Performed by: FAMILY MEDICINE

## 2025-03-13 PROCEDURE — 3074F SYST BP LT 130 MM HG: CPT | Performed by: FAMILY MEDICINE

## 2025-03-13 RX ORDER — BUSPIRONE HYDROCHLORIDE 10 MG/1
10 TABLET ORAL 2 TIMES DAILY
Qty: 60 TABLET | Refills: 5 | Status: SHIPPED | OUTPATIENT
Start: 2025-03-13 | End: 2025-09-09

## 2025-03-13 RX ORDER — INSULIN ASPART 100 [IU]/ML
INJECTION, SOLUTION INTRAVENOUS; SUBCUTANEOUS
COMMUNITY
Start: 2024-10-28

## 2025-03-13 RX ORDER — INSULIN PMP CART,AUT,G6/7,CNTR
EACH SUBCUTANEOUS
COMMUNITY
Start: 2024-10-31

## 2025-03-13 RX ORDER — GABAPENTIN 300 MG/1
600 CAPSULE ORAL 2 TIMES DAILY PRN
Qty: 120 CAPSULE | Refills: 2 | Status: CANCELLED | OUTPATIENT
Start: 2025-03-13 | End: 2025-06-11

## 2025-03-13 RX ORDER — GABAPENTIN 300 MG/1
900 CAPSULE ORAL 2 TIMES DAILY PRN
Qty: 120 CAPSULE | Refills: 2 | Status: SHIPPED | OUTPATIENT
Start: 2025-03-13 | End: 2025-06-11

## 2025-03-13 RX ORDER — ATORVASTATIN CALCIUM 10 MG/1
10 TABLET, FILM COATED ORAL NIGHTLY
COMMUNITY

## 2025-03-13 ASSESSMENT — ANXIETY QUESTIONNAIRES
5. BEING SO RESTLESS THAT IT IS HARD TO SIT STILL: MORE THAN HALF THE DAYS
GAD7 TOTAL SCORE: 14
2. NOT BEING ABLE TO STOP OR CONTROL WORRYING: NEARLY EVERY DAY
3. WORRYING TOO MUCH ABOUT DIFFERENT THINGS: NEARLY EVERY DAY
6. BECOMING EASILY ANNOYED OR IRRITABLE: MORE THAN HALF THE DAYS
1. FEELING NERVOUS, ANXIOUS, OR ON EDGE: MORE THAN HALF THE DAYS
7. FEELING AFRAID AS IF SOMETHING AWFUL MIGHT HAPPEN: NOT AT ALL
4. TROUBLE RELAXING: MORE THAN HALF THE DAYS
IF YOU CHECKED OFF ANY PROBLEMS ON THIS QUESTIONNAIRE, HOW DIFFICULT HAVE THESE PROBLEMS MADE IT FOR YOU TO DO YOUR WORK, TAKE CARE OF THINGS AT HOME, OR GET ALONG WITH OTHER PEOPLE: VERY DIFFICULT

## 2025-03-13 ASSESSMENT — LIFESTYLE VARIABLES
HOW MANY STANDARD DRINKS CONTAINING ALCOHOL DO YOU HAVE ON A TYPICAL DAY: PATIENT DOES NOT DRINK
HOW OFTEN DO YOU HAVE A DRINK CONTAINING ALCOHOL: NEVER

## 2025-03-13 ASSESSMENT — PATIENT HEALTH QUESTIONNAIRE - PHQ9
5. POOR APPETITE OR OVEREATING: SEVERAL DAYS
SUM OF ALL RESPONSES TO PHQ QUESTIONS 1-9: 14
10. IF YOU CHECKED OFF ANY PROBLEMS, HOW DIFFICULT HAVE THESE PROBLEMS MADE IT FOR YOU TO DO YOUR WORK, TAKE CARE OF THINGS AT HOME, OR GET ALONG WITH OTHER PEOPLE: VERY DIFFICULT
2. FEELING DOWN, DEPRESSED OR HOPELESS: MORE THAN HALF THE DAYS
SUM OF ALL RESPONSES TO PHQ QUESTIONS 1-9: 14
6. FEELING BAD ABOUT YOURSELF - OR THAT YOU ARE A FAILURE OR HAVE LET YOURSELF OR YOUR FAMILY DOWN: SEVERAL DAYS
3. TROUBLE FALLING OR STAYING ASLEEP: NEARLY EVERY DAY
7. TROUBLE CONCENTRATING ON THINGS, SUCH AS READING THE NEWSPAPER OR WATCHING TELEVISION: MORE THAN HALF THE DAYS
SUM OF ALL RESPONSES TO PHQ QUESTIONS 1-9: 14
4. FEELING TIRED OR HAVING LITTLE ENERGY: SEVERAL DAYS
8. MOVING OR SPEAKING SO SLOWLY THAT OTHER PEOPLE COULD HAVE NOTICED. OR THE OPPOSITE, BEING SO FIGETY OR RESTLESS THAT YOU HAVE BEEN MOVING AROUND A LOT MORE THAN USUAL: MORE THAN HALF THE DAYS
SUM OF ALL RESPONSES TO PHQ QUESTIONS 1-9: 14
9. THOUGHTS THAT YOU WOULD BE BETTER OFF DEAD, OR OF HURTING YOURSELF: NOT AT ALL
1. LITTLE INTEREST OR PLEASURE IN DOING THINGS: MORE THAN HALF THE DAYS

## 2025-03-13 NOTE — PATIENT INSTRUCTIONS
support services.  Social groups: They can help you meet new people and get involved in activities you enjoy.  Community support groups: In a support group, you can talk to others who have dealt with the same problems or illness as you. You can encourage one another and learn ways to cope with tough emotions.  How can you find a support group?  Finding a support group that works for you may take time. There are many options. Some groups have a  who helps lead discussions or shares information. Others are less formal. Some meet in person, while others meet online.  Try using these resources to help you find the best support group for you.  Your doctor, health care team, or counselor.  People with the same health concern.  Your local Amish, Voodoo, Sabianist, or other Church group.  A city, state, or national group that provides support for your health concern. Check your local library or community center for a list of these groups. Or look for information online.  Your local community, friends, and family.  Supportive relationships  A supportive relationship includes emotional support such as love, trust, and understanding, as well as advice and concrete help, such as help managing your time.  Reach out to others  Family and friends can help you. Ask them to:  Listen to you and give you encouragement. This can keep you from feeling hopeless or alone.  Help with small daily tasks or with bigger problems. A helping hand can keep you from feeling overwhelmed.  Help you manage a health problem. For example, ask them to go to doctor visits with you. Your loved ones can offer support by being involved in your medical care.  Respect your relationships  A good relationship is also a two-way street. You count on help from others, but they also count on you.  Know your friends' limits. You don't have to see or call your friends every day. If you are going through a rough patch, ask friends if you can contact

## 2025-03-13 NOTE — PROGRESS NOTES
Medicare Annual Wellness Visit    Shelia Erazo is here for Medicare AWV and Medication Refill    Assessment & Plan    Shelia was seen today for medicare awv and medication refill.    Diagnoses and all orders for this visit:    Initial Medicare annual wellness visit    Type 1 diabetes mellitus with hyperglycemia (HCC)  -     gabapentin (NEURONTIN) 300 MG capsule; Take 3 capsules by mouth 2 times daily as needed (pain) for up to 90 days. Max Daily Amount: 1,800 mg  -     Hemoglobin A1C; Future    Pure hypercholesterolemia  -     Lipid Panel; Future  -     TSH; Future    Functional diarrhea  -     Comprehensive Metabolic Panel; Future    Centrilobular emphysema (HCC)    Diabetic autonomic neuropathy associated with type 1 diabetes mellitus (HCC)    Other orders  -     busPIRone (BUSPAR) 10 MG tablet; Take 1 tablet by mouth 2 times daily      As above  Add BuSpar 10 mg twice a day for anxiety  Will increase gabapentin to 900 mg twice daily as needed for the neuropathy  Labs as ordered  Refilled medications needed  AVS is accessible thru [facility] and pt has been advised of same.   This has been fully explained to the patient, who indicates understanding.      Additional discussion below:    1. Anxiety.  She reports significant stress and worry affecting her daily life. She has been using Valium 0.5 mg as needed for severe anxiety but finds it too sedating. Buspirone will be initiated, to be taken twice daily, to help manage her anxiety without causing excessive sedation.    2. Diabetes Mellitus.  Her blood sugar levels have been consistently high, with a recent spike to 454 mg/dL. She is currently using an OmniPod insulin pump but is dissatisfied with its performance. She has not been able to obtain Dexcom sensors due to insurance issues. Her A1c was previously down to 7.6. She will continue her current insulin regimen and follow up with her endocrinologist on the 15th. Fasting blood work, including A1c and thyroid function

## 2025-03-13 NOTE — PROGRESS NOTES
\"Have you been to the ER, urgent care clinic since your last visit?  Hospitalized since your last visit?\"    NO    “Have you seen or consulted any other health care providers outside our system since your last visit?”    Yes, Ophthalmology- [facility] Dr. Kaiser, Endocrinology- [facility] Dr. MERE Palmer and [facility]- [facility])      “Have you had a diabetic eye exam?”    YES - Where: 08/2024 Ophthalmology- [facility] Dr. Kaiser, pending 09/2025  Nurse/CMA to request most recent records if not in the chart     No diabetic eye exam on file

## 2025-04-03 ENCOUNTER — LAB (OUTPATIENT)
Facility: CLINIC | Age: 65
End: 2025-04-03

## 2025-04-03 ENCOUNTER — HOSPITAL ENCOUNTER (OUTPATIENT)
Facility: HOSPITAL | Age: 65
Setting detail: SPECIMEN
Discharge: HOME | End: 2025-04-03
Payer: MEDICARE

## 2025-04-03 DIAGNOSIS — E10.65 TYPE 1 DIABETES MELLITUS WITH HYPERGLYCEMIA (HCC): ICD-10-CM

## 2025-04-03 DIAGNOSIS — K59.1 FUNCTIONAL DIARRHEA: ICD-10-CM

## 2025-04-03 DIAGNOSIS — E78.00 PURE HYPERCHOLESTEROLEMIA: ICD-10-CM

## 2025-04-03 LAB
ALBUMIN SERPL-MCNC: 3.3 G/DL (ref 3.4–5)
ALBUMIN/GLOB SERPL: 0.9 (ref 0.8–1.7)
ALP SERPL-CCNC: 94 U/L (ref 45–117)
ALT SERPL-CCNC: 22 U/L (ref 13–56)
ANION GAP SERPL CALC-SCNC: 5 MMOL/L (ref 3–18)
AST SERPL-CCNC: 19 U/L (ref 10–38)
BILIRUB SERPL-MCNC: 0.6 MG/DL (ref 0.2–1)
BUN SERPL-MCNC: 20 MG/DL (ref 7–18)
BUN/CREAT SERPL: 21 (ref 12–20)
CALCIUM SERPL-MCNC: 9.6 MG/DL (ref 8.5–10.1)
CHLORIDE SERPL-SCNC: 102 MMOL/L (ref 100–111)
CHOLEST SERPL-MCNC: 129 MG/DL
CO2 SERPL-SCNC: 27 MMOL/L (ref 21–32)
CREAT SERPL-MCNC: 0.97 MG/DL (ref 0.6–1.3)
GLOBULIN SER CALC-MCNC: 3.6 G/DL (ref 2–4)
GLUCOSE SERPL-MCNC: 61 MG/DL (ref 74–99)
HDLC SERPL-MCNC: 82 MG/DL (ref 40–60)
HDLC SERPL: 1.6 (ref 0–5)
LDLC SERPL CALC-MCNC: 36.4 MG/DL (ref 0–100)
LIPID PANEL: ABNORMAL
POTASSIUM SERPL-SCNC: 5.6 MMOL/L (ref 3.5–5.5)
PROT SERPL-MCNC: 6.9 G/DL (ref 6.4–8.2)
SODIUM SERPL-SCNC: 134 MMOL/L (ref 136–145)
TRIGL SERPL-MCNC: 53 MG/DL
TSH SERPL DL<=0.05 MIU/L-ACNC: 1.66 UIU/ML (ref 0.36–3.74)
VLDLC SERPL CALC-MCNC: 10.6 MG/DL

## 2025-04-03 PROCEDURE — 83036 HEMOGLOBIN GLYCOSYLATED A1C: CPT

## 2025-04-03 PROCEDURE — 36415 COLL VENOUS BLD VENIPUNCTURE: CPT

## 2025-04-03 PROCEDURE — 84443 ASSAY THYROID STIM HORMONE: CPT

## 2025-04-03 PROCEDURE — 80053 COMPREHEN METABOLIC PANEL: CPT

## 2025-04-03 PROCEDURE — 80061 LIPID PANEL: CPT

## 2025-04-04 LAB
EST. AVERAGE GLUCOSE BLD GHB EST-MCNC: 214 MG/DL
HBA1C MFR BLD: 9.1 % (ref 4.2–5.6)

## 2025-04-14 ENCOUNTER — TELEPHONE (OUTPATIENT)
Facility: CLINIC | Age: 65
End: 2025-04-14

## 2025-04-14 NOTE — TELEPHONE ENCOUNTER
Patient called asking if Dr. Dill could write a prescription and send it to her insurance company [employer] in order to receive Adult briefs.

## 2025-04-18 DIAGNOSIS — E10.65 TYPE 1 DIABETES MELLITUS WITH HYPERGLYCEMIA (HCC): ICD-10-CM

## 2025-04-18 NOTE — TELEPHONE ENCOUNTER
Patient called to request medication refill:    Last visit date: 03/13/2025    Next visit date: 07/14/2025    gabapentin (NEURONTIN) 300 MG capsule [[id number]]     Preferred Pharmacy: [place] [id number] Phone [phone] Fax [phone]

## 2025-04-21 NOTE — TELEPHONE ENCOUNTER
Patient requesting Pharmacy change, Thank you.      [facility]  reports the last fill date : No Access     Last Visit: 03/13/2025  Next Appointment: 07/14/2025  Controlled Substance Agreement: N/A  Urine Drug Screen: N/A  Previous Refill Encounter(s): Date:    Disp Refills Start End    gabapentin (NEURONTIN) 300 MG capsule 120 capsule 2 3/13/2025 6/11/2025    Sig - Route: Take 3 capsules by mouth 2 times daily as needed (pain) for up to 90 days. Max Daily Amount: 1,800 mg - Oral    Sent to pharmacy as: Gabapentin 300 MG Oral Capsule (NEURONTIN)    E-Prescribing Status: Receipt confirmed by pharmacy (3/13/2025  1:02 PM EDT)  Pharmacy    [id number] - [place], [place] - [address] [place] [address] -  [phone] - F [phone]  [address] [place] [address]  Phone: [phone]  Fax: [phone]         Requested Prescriptions     Pending Prescriptions Disp Refills    gabapentin (NEURONTIN) 300 MG capsule 120 capsule 2     Sig: Take 3 capsules by mouth 2 times daily as needed (pain) for up to 90 days. Max Daily Amount: 1,800 mg

## 2025-04-23 DIAGNOSIS — E10.65 TYPE 1 DIABETES MELLITUS WITH HYPERGLYCEMIA (HCC): ICD-10-CM

## 2025-04-23 RX ORDER — GABAPENTIN 300 MG/1
900 CAPSULE ORAL 2 TIMES DAILY PRN
Qty: 120 CAPSULE | Refills: 2 | Status: CANCELLED | OUTPATIENT
Start: 2025-04-23 | End: 2025-07-22

## 2025-04-23 NOTE — TELEPHONE ENCOUNTER
Duplicate Medication request. Please see message from 04/18/2025. Patient notified via [employer], thank you.

## 2025-04-24 ENCOUNTER — PATIENT MESSAGE (OUTPATIENT)
Facility: CLINIC | Age: 65
End: 2025-04-24

## 2025-04-25 RX ORDER — GABAPENTIN 300 MG/1
900 CAPSULE ORAL 2 TIMES DAILY PRN
Qty: 120 CAPSULE | Refills: 2 | Status: SHIPPED | OUTPATIENT
Start: 2025-04-25 | End: 2025-07-24

## 2025-05-16 DIAGNOSIS — E10.65 TYPE 1 DIABETES MELLITUS WITH HYPERGLYCEMIA (HCC): ICD-10-CM

## 2025-05-16 NOTE — TELEPHONE ENCOUNTER
Dispense quantity needs to be changed to #180 tablets/30 day supply.     Last dispensed per med report: 4/25/2025 #120 tablets   Prescription pending provider review.

## 2025-05-16 NOTE — TELEPHONE ENCOUNTER
Patient called  was contacted by the pharmacy advising that the prescription for the   gabapentin (NEURONTIN) 300 MG capsule is incorrect and needs to be corrected and sent back.  States quantity is wrong based on dosage directions. Patient states only has enough medication for one more day

## 2025-05-18 RX ORDER — GABAPENTIN 300 MG/1
900 CAPSULE ORAL 2 TIMES DAILY PRN
Qty: 180 CAPSULE | Refills: 1 | Status: SHIPPED | OUTPATIENT
Start: 2025-05-18 | End: 2025-08-16

## 2025-05-20 ENCOUNTER — PATIENT MESSAGE (OUTPATIENT)
Facility: CLINIC | Age: 65
End: 2025-05-20

## 2025-05-20 DIAGNOSIS — E10.65 TYPE 1 DIABETES MELLITUS WITH HYPERGLYCEMIA (HCC): ICD-10-CM

## 2025-05-22 NOTE — TELEPHONE ENCOUNTER
Patient requesting Pharmacy change. Medication sent to the wrong Pharmacy. And to cancel the medication fill at the [facility], Thank you.        [facility]  reports the last fill date : No Access     Last Visit: 03/13/2025  Next Appointment: 07/14/2025  Controlled Substance Agreement: N/A  Urine Drug Screen: N/A  Previous Refill Encounter(s): Date:      Disp Refills Start End    gabapentin (NEURONTIN) 300 MG capsule 180 capsule 1 5/18/2025 8/16/2025    Sig - Route: Take 3 capsules by mouth 2 times daily as needed (pain) for up to 90 days. Max Daily Amount: 1,800 mg - Oral    Sent to pharmacy as: Gabapentin 300 MG Oral Capsule (NEURONTIN)    E-Prescribing Status: Receipt confirmed by pharmacy (5/18/2025 10:14 PM EDT)  Pharmacy    [facility] #[id number] - [place], [place] - [address] [place] [address] - P [phone] - F [phone]  [address] [place] [address]  Phone: [phone]  Fax: [phone]         Requested Prescriptions     Pending Prescriptions Disp Refills    gabapentin (NEURONTIN) 300 MG capsule 180 capsule 1     Sig: Take 3 capsules by mouth 2 times daily as needed (pain) for up to 90 days. Max Daily Amount: 1,800 mg

## 2025-05-22 NOTE — TELEPHONE ENCOUNTER
Patient called stating medication sent to wrong pharmacy. Requesting medication be sent to [facility] on [address]    gabapentin (NEURONTIN) 300 MG capsule

## 2025-05-23 NOTE — TELEPHONE ENCOUNTER
Patient called to check status of refill.advised message has been send to provider but she not signed off on it yet.

## 2025-05-30 ENCOUNTER — TELEPHONE (OUTPATIENT)
Facility: CLINIC | Age: 65
End: 2025-05-30

## 2025-05-30 RX ORDER — GABAPENTIN 300 MG/1
900 CAPSULE ORAL 2 TIMES DAILY PRN
Qty: 180 CAPSULE | Refills: 1 | Status: SHIPPED | OUTPATIENT
Start: 2025-05-30 | End: 2025-08-28

## 2025-06-21 ENCOUNTER — HOSPITAL ENCOUNTER (EMERGENCY)
Facility: HOSPITAL | Age: 65
Discharge: HOME | End: 2025-06-21
Payer: MEDICARE

## 2025-06-21 VITALS
HEART RATE: 80 BPM | RESPIRATION RATE: 14 BRPM | WEIGHT: 156 LBS | BODY MASS INDEX: 24.48 KG/M2 | OXYGEN SATURATION: 99 % | SYSTOLIC BLOOD PRESSURE: 122 MMHG | DIASTOLIC BLOOD PRESSURE: 64 MMHG | HEIGHT: 67 IN | TEMPERATURE: 97.5 F

## 2025-06-21 DIAGNOSIS — R73.9 HYPERGLYCEMIA: Primary | ICD-10-CM

## 2025-06-21 LAB
ALBUMIN SERPL-MCNC: 3.1 G/DL (ref 3.4–5)
ALBUMIN/GLOB SERPL: 0.9 (ref 0.8–1.7)
ALP SERPL-CCNC: 77 U/L (ref 45–117)
ALT SERPL-CCNC: 12 U/L (ref 10–35)
ANION GAP SERPL CALC-SCNC: 12 MMOL/L (ref 3–18)
AST SERPL-CCNC: 17 U/L (ref 10–38)
BASOPHILS # BLD: 0.06 K/UL (ref 0–0.1)
BASOPHILS NFR BLD: 0.7 % (ref 0–2)
BILIRUB SERPL-MCNC: 0.9 MG/DL (ref 0.2–1)
BUN SERPL-MCNC: 28 MG/DL (ref 6–23)
BUN/CREAT SERPL: 24 (ref 12–20)
CALCIUM SERPL-MCNC: 9.6 MG/DL (ref 8.5–10.1)
CHLORIDE SERPL-SCNC: 98 MMOL/L (ref 98–107)
CO2 SERPL-SCNC: 21 MMOL/L (ref 21–32)
CREAT SERPL-MCNC: 1.17 MG/DL (ref 0.6–1.3)
DIFFERENTIAL METHOD BLD: ABNORMAL
EOSINOPHIL # BLD: 0.11 K/UL (ref 0–0.4)
EOSINOPHIL NFR BLD: 1.3 % (ref 0–5)
ERYTHROCYTE [DISTWIDTH] IN BLOOD BY AUTOMATED COUNT: 12.2 % (ref 11.6–14.5)
GLOBULIN SER CALC-MCNC: 3.6 G/DL (ref 2–4)
GLUCOSE BLD STRIP.AUTO-MCNC: 227 MG/DL (ref 70–110)
GLUCOSE BLD STRIP.AUTO-MCNC: 361 MG/DL (ref 70–110)
GLUCOSE BLD STRIP.AUTO-MCNC: 427 MG/DL (ref 70–110)
GLUCOSE BLD STRIP.AUTO-MCNC: 430 MG/DL (ref 70–110)
GLUCOSE SERPL-MCNC: 436 MG/DL (ref 74–108)
HCT VFR BLD AUTO: 33.6 % (ref 35–45)
HGB BLD-MCNC: 11.1 G/DL (ref 12–16)
IMM GRANULOCYTES # BLD AUTO: 0.03 K/UL (ref 0–0.04)
IMM GRANULOCYTES NFR BLD AUTO: 0.4 % (ref 0–0.5)
LIPASE SERPL-CCNC: 14 U/L (ref 13–75)
LYMPHOCYTES # BLD: 1.34 K/UL (ref 0.9–3.6)
LYMPHOCYTES NFR BLD: 16.2 % (ref 21–52)
MAGNESIUM SERPL-MCNC: 2 MG/DL (ref 1.6–2.6)
MCH RBC QN AUTO: 30.2 PG (ref 24–34)
MCHC RBC AUTO-ENTMCNC: 33 G/DL (ref 31–37)
MCV RBC AUTO: 91.3 FL (ref 78–100)
MONOCYTES # BLD: 0.39 K/UL (ref 0.05–1.2)
MONOCYTES NFR BLD: 4.7 % (ref 3–10)
NEUTS SEG # BLD: 6.34 K/UL (ref 1.8–8)
NEUTS SEG NFR BLD: 76.7 % (ref 40–73)
NRBC # BLD: 0 K/UL (ref 0–0.01)
NRBC BLD-RTO: 0 PER 100 WBC
PH BLDV: 7.39 (ref 7.32–7.42)
PLATELET # BLD AUTO: 255 K/UL (ref 135–420)
PMV BLD AUTO: 11.3 FL (ref 9.2–11.8)
POTASSIUM SERPL-SCNC: 4.3 MMOL/L (ref 3.5–5.5)
PROT SERPL-MCNC: 6.7 G/DL (ref 6.4–8.2)
RBC # BLD AUTO: 3.68 M/UL (ref 4.2–5.3)
SODIUM SERPL-SCNC: 132 MMOL/L (ref 136–145)
WBC # BLD AUTO: 8.3 K/UL (ref 4.6–13.2)

## 2025-06-21 PROCEDURE — 96375 TX/PRO/DX INJ NEW DRUG ADDON: CPT

## 2025-06-21 PROCEDURE — 2580000003 HC RX 258: Performed by: PHYSICIAN ASSISTANT

## 2025-06-21 PROCEDURE — 80053 COMPREHEN METABOLIC PANEL: CPT

## 2025-06-21 PROCEDURE — 82800 BLOOD PH: CPT

## 2025-06-21 PROCEDURE — 96374 THER/PROPH/DIAG INJ IV PUSH: CPT

## 2025-06-21 PROCEDURE — 99284 EMERGENCY DEPT VISIT MOD MDM: CPT

## 2025-06-21 PROCEDURE — 82962 GLUCOSE BLOOD TEST: CPT

## 2025-06-21 PROCEDURE — 83735 ASSAY OF MAGNESIUM: CPT

## 2025-06-21 PROCEDURE — 96361 HYDRATE IV INFUSION ADD-ON: CPT

## 2025-06-21 PROCEDURE — 83690 ASSAY OF LIPASE: CPT

## 2025-06-21 PROCEDURE — 85025 COMPLETE CBC W/AUTO DIFF WBC: CPT

## 2025-06-21 PROCEDURE — 6370000000 HC RX 637 (ALT 250 FOR IP): Performed by: PHYSICIAN ASSISTANT

## 2025-06-21 PROCEDURE — 6360000002 HC RX W HCPCS: Performed by: PHYSICIAN ASSISTANT

## 2025-06-21 RX ORDER — ONDANSETRON 2 MG/ML
4 INJECTION INTRAMUSCULAR; INTRAVENOUS
Status: COMPLETED | OUTPATIENT
Start: 2025-06-21 | End: 2025-06-21

## 2025-06-21 RX ORDER — 0.9 % SODIUM CHLORIDE 0.9 %
1000 INTRAVENOUS SOLUTION INTRAVENOUS ONCE
Status: COMPLETED | OUTPATIENT
Start: 2025-06-21 | End: 2025-06-21

## 2025-06-21 RX ADMIN — INSULIN HUMAN 5 UNITS: 100 INJECTION, SOLUTION PARENTERAL at 10:20

## 2025-06-21 RX ADMIN — SODIUM CHLORIDE 1000 ML: 0.9 INJECTION, SOLUTION INTRAVENOUS at 08:32

## 2025-06-21 RX ADMIN — ONDANSETRON 4 MG: 2 INJECTION, SOLUTION INTRAMUSCULAR; INTRAVENOUS at 08:31

## 2025-06-21 RX ADMIN — SODIUM CHLORIDE 1000 ML: 0.9 INJECTION, SOLUTION INTRAVENOUS at 10:19

## 2025-06-21 ASSESSMENT — PAIN - FUNCTIONAL ASSESSMENT: PAIN_FUNCTIONAL_ASSESSMENT: NONE - DENIES PAIN

## 2025-06-21 NOTE — ED TRIAGE NOTES
Pt presents to ED for hyperglycemia. Pt has been having issues with insulin pump, changed it out 3 times since yesterday. Today, glucometer reading is over 500. EMS glucose 516. Endorses N/V since last night.

## 2025-06-21 NOTE — ED PROVIDER NOTES
status: Every Day     Current packs/day: 1.00     Average packs/day: 1.1 packs/day for 62.8 years (69.8 ttl pk-yrs)     Types: Cigarettes     Start date: 7/27/1976     Passive exposure: Past    Smokeless tobacco: Never    Tobacco comments:     Have tried many times..3 times with Chantix. AS of 03/13/2025 48yrs with no intension of quitting. mrw   Vaping Use    Vaping status: Never Used   Substance Use Topics    Alcohol use: Not Currently     Comment: Occasional beer    Drug use: Yes     Types: Marijuana (Weed)     Comment: Occasional use       Allergies:  Allergies   Allergen Reactions    Penicillin G Anaphylaxis, Other (See Comments) and Swelling     Other reaction(s): Unknown    Penicillins Anaphylaxis and Swelling    Hydromorphone Other (See Comments)     Patient was confused for 5 days.  Did not know who she was.  Other reaction(s): psychological reaction    Sulfamethoxazole-Trimethoprim Hives and Rash     Other reaction(s): rash/itching    Hydromorphone Hcl Other (See Comments)     Other reaction(s): Psychotic disorder    Other Reaction(s): Psychotic disorder    Morphine Itching     Other reaction(s): itching, rash/itching    Sulfamethoxazole Rash     Other reaction(s): rash    Trimethoprim Rash     Other reaction(s): rash         Physical Exam     Vitals:    06/21/25 0817 06/21/25 1015 06/21/25 1215   BP: (!) 111/58 (!) 120/54 122/64   Pulse: 86 83 80   Resp: 13 13 14   Temp: 97.5 °F (36.4 °C)     TempSrc: Oral     SpO2: 99% 98% 99%   Weight: 70.8 kg (156 lb)     Height: 1.702 m (5' 7\")         Physical Exam  Vitals and nursing note reviewed.   Constitutional:       General: She is not in acute distress.     Appearance: Normal appearance. She is not ill-appearing, toxic-appearing or diaphoretic.   HENT:      Head: Normocephalic and atraumatic.   Cardiovascular:      Rate and Rhythm: Normal rate and regular rhythm.      Pulses: Normal pulses.      Heart sounds: Normal heart sounds.   Pulmonary:      Effort:  33.6.      pH venous 7.39 [DP]   0926 CMP demonstrates mild hyponatremia at 132, hyperglycemia at 436, CO2 21, anion gap 12.  No evidence of DKA.    Will provide insulin, fluids, and reassess [DP]   1157 Repeat glucose 220. Pt is asking to go home.     Reviewed all results and plan with patient, resting comfortably, no distress.  Patient notes she does have subQ insulin at home that she can use until she follows up with her primary care physician this week for reassessment of her OmniPod.      Do not feel further labs or imaging are warranted in the ED today.    Patient is stable for discharge with close outpatient follow-up for further assessment.  Strict return precautions provided [DP]      ED Course User Index  [DP] Christen Chow PA           Is this patient to be included in the SEP-1 core measure? No Exclusion criteria - the patient is NOT to be included for SEP-1 Core Measure due to: Infection is not suspected      Procedures:  Procedures        Social Determinants of Health: none       Supplemental Historians include: none       Documentation/Prior Results Review:  Old medical records.  Nursing notes.  Previous radiology studies.      Discussion of Mangement with other Physicians, QHP or Appropriate Source:  none           Diagnosis and Disposition     CLINICAL IMPRESSION:  1. Hyperglycemia         Medication List        CONTINUE taking these medications      Dexcom G7  Teresa     Dexcom G7 Sensor Misc     Omnipod 5 G7 Pods (Gen 5) Misc            ASK your doctor about these medications      albuterol sulfate  (90 Base) MCG/ACT inhaler  Commonly known as: PROVENTIL;VENTOLIN;PROAIR  inhale 2 puffs by mouth and INTO THE LUNGS every 4 hours if needed for wheezing     atorvastatin 10 MG tablet  Commonly known as: LIPITOR     Brezamy Aerosphere 160-9-4.8 MCG/ACT Aero  Generic drug: Budeson-Glycopyrrol-Formoterol  inhale 2 puffs INTO THE LUNGS every morning and at bedtime     busPIRone 10 MG

## 2025-06-21 NOTE — FLOWSHEET NOTE
06/21/25 1219   Departure Condition   Mobility at Departure Ambulatory   Ambulatory Mobility With No Difficulty   Departure Mode By self   Discharged To Home   Patient Teaching Discharge instructions reviewed;Patient verbalized understanding

## 2025-06-21 NOTE — ED PROVIDER NOTES
ED  PA / NP Face to Face Supervision Note    I personally performed a substantive portion of the care of this patient Shelia Erazo, to include Hx and management plan. I agree with the findings except as I have noted. My independent encounter is notable for as stated below.      S:  64 y.o. female  with a chief complaint of Hyperglycemia    Nabila Erazo, a 64-year-old female, presents with the chief complaint of high blood sugar and vomiting. She reports that her Omnipod insulin pump malfunctioned, requiring her to change it three times. By 6:00 AM this morning, her blood sugar had risen to 561 mg/dL, accompanied by vomiting. The patient expresses concern about potentially experiencing Diabetic Ketoacidosis (DKA), which she has had before.  Ms. Erazo reports that she always urinates frequently, describing herself as a \"leaker.\" She mentions that her most recent hemoglobin A1c was 7.6%, down from a previous 13.4%. The patient denies consuming excessive sugar the night before, stating, \"I like my A1C being down so I've been trying to behave.\"  Past Medical History:  Diabetes  Lymphedema  Diabetic nephropathy  Peripheral Artery Disease (PAD)  Osteoporosis  Chronic Obstructive Pulmonary Disease (COPD)      A/P:   MDM:     Hyperglycemia with DKA concern  Administered IV fluids and insulin in the emergency department  Blood glucose reduced to 216 mg/dL  Patient reports feeling better and becoming hungry  Discharge home with instructions to continue using Omnipod insulin pump  Diabetes Mellitus  Continue current management with Omnipod insulin pump  Encourage adherence to diet and medication regimen  Follow up with primary care physician, Dr. Moffett, for ongoing diabetes management  DDX DKA, hyperglycemia, sepsis     Rhythm interpretation from monitor: NSR      Social Determinants of Health: none       Supplemental Historians include: EMS       Documentation/Prior Results Review:  Nursing notes.      O:      Exam:     NAD  CTA

## 2025-07-01 DIAGNOSIS — E10.65 TYPE 1 DIABETES MELLITUS WITH HYPERGLYCEMIA (HCC): ICD-10-CM

## 2025-07-01 NOTE — TELEPHONE ENCOUNTER
Patient requesting medication refill states pharmacy will not refill until 07/15/25     LOV 07/14/25    gabapentin (NEURONTIN) 300 MG capsule   [id number] - [place], [place] - [address] - P [phone] - F [phone] [address] (SEC), [facility] [address]  Phone: [phone]  Fax: [phone]

## 2025-07-02 NOTE — TELEPHONE ENCOUNTER
[facility]  reports the last fill date : No Access     Last Visit: 003/13/2025  Next Appointment: 07/14/2025  Controlled Substance Agreement: N/A  Urine Drug Screen: N/A  Previous Refill Encounter(s): Date:    Disp Refills Start End    gabapentin (NEURONTIN) 300 MG capsule 180 capsule 1 5/30/2025 8/28/2025    Sig - Route: Take 3 capsules by mouth 2 times daily as needed (pain) for up to 90 days. Max Daily Amount: 1,800 mg - Oral    Sent to pharmacy as: Gabapentin 300 MG Oral Capsule (NEURONTIN)    E-Prescribing Status: Receipt confirmed by pharmacy (5/30/2025  3:55 PM EDT)  Pharmacy    [id number] - [place], [place] - [address] - P [phone] - F [phone]  [address] [place] [address] [address] ([address]), [facility] [address]  Phone: [phone]  Fax: [phone]           Requested Prescriptions     Pending Prescriptions Disp Refills    gabapentin (NEURONTIN) 300 MG capsule 180 capsule 1     Sig: Take 3 capsules by mouth 2 times daily as needed (pain) for up to 90 days. Max Daily Amount: 1,800 mg

## 2025-07-04 RX ORDER — GABAPENTIN 300 MG/1
900 CAPSULE ORAL 2 TIMES DAILY PRN
Qty: 180 CAPSULE | Refills: 1 | Status: SHIPPED | OUTPATIENT
Start: 2025-07-04 | End: 2025-10-02

## 2025-07-11 DIAGNOSIS — E10.65 TYPE 1 DIABETES MELLITUS WITH HYPERGLYCEMIA (HCC): ICD-10-CM

## 2025-07-11 RX ORDER — GABAPENTIN 300 MG/1
900 CAPSULE ORAL 2 TIMES DAILY PRN
Qty: 180 CAPSULE | Refills: 1 | Status: CANCELLED | OUTPATIENT
Start: 2025-07-11 | End: 2025-10-09

## 2025-07-14 ENCOUNTER — OFFICE VISIT (OUTPATIENT)
Facility: CLINIC | Age: 65
End: 2025-07-14
Payer: MEDICARE

## 2025-07-14 VITALS
OXYGEN SATURATION: 95 % | BODY MASS INDEX: 25.62 KG/M2 | DIASTOLIC BLOOD PRESSURE: 77 MMHG | TEMPERATURE: 98.3 F | WEIGHT: 163.2 LBS | HEART RATE: 68 BPM | SYSTOLIC BLOOD PRESSURE: 128 MMHG | HEIGHT: 67 IN | RESPIRATION RATE: 16 BRPM

## 2025-07-14 DIAGNOSIS — E10.43 DIABETIC AUTONOMIC NEUROPATHY ASSOCIATED WITH TYPE 1 DIABETES MELLITUS (HCC): ICD-10-CM

## 2025-07-14 DIAGNOSIS — E10.65 TYPE 1 DIABETES MELLITUS WITH HYPERGLYCEMIA (HCC): Primary | ICD-10-CM

## 2025-07-14 DIAGNOSIS — Z12.11 SCREENING FOR COLON CANCER: ICD-10-CM

## 2025-07-14 DIAGNOSIS — F41.9 ANXIETY: ICD-10-CM

## 2025-07-14 PROCEDURE — 3074F SYST BP LT 130 MM HG: CPT | Performed by: FAMILY MEDICINE

## 2025-07-14 PROCEDURE — 3046F HEMOGLOBIN A1C LEVEL >9.0%: CPT | Performed by: FAMILY MEDICINE

## 2025-07-14 PROCEDURE — 99214 OFFICE O/P EST MOD 30 MIN: CPT | Performed by: FAMILY MEDICINE

## 2025-07-14 PROCEDURE — 3078F DIAST BP <80 MM HG: CPT | Performed by: FAMILY MEDICINE

## 2025-07-14 RX ORDER — ATORVASTATIN CALCIUM 20 MG/1
20 TABLET, FILM COATED ORAL DAILY
COMMUNITY
Start: 2025-04-15

## 2025-07-14 RX ORDER — DIAZEPAM 5 MG/1
5 TABLET ORAL EVERY 8 HOURS PRN
Qty: 10 TABLET | Refills: 0 | Status: SHIPPED | OUTPATIENT
Start: 2025-07-14 | End: 2025-07-24

## 2025-07-14 RX ORDER — GABAPENTIN 300 MG/1
900 CAPSULE ORAL 2 TIMES DAILY
Qty: 180 CAPSULE | Refills: 1 | Status: SHIPPED | OUTPATIENT
Start: 2025-07-14 | End: 2025-07-17 | Stop reason: SDUPTHER

## 2025-07-14 ASSESSMENT — PATIENT HEALTH QUESTIONNAIRE - PHQ9
8. MOVING OR SPEAKING SO SLOWLY THAT OTHER PEOPLE COULD HAVE NOTICED. OR THE OPPOSITE, BEING SO FIGETY OR RESTLESS THAT YOU HAVE BEEN MOVING AROUND A LOT MORE THAN USUAL: MORE THAN HALF THE DAYS
5. POOR APPETITE OR OVEREATING: MORE THAN HALF THE DAYS
6. FEELING BAD ABOUT YOURSELF - OR THAT YOU ARE A FAILURE OR HAVE LET YOURSELF OR YOUR FAMILY DOWN: NEARLY EVERY DAY
10. IF YOU CHECKED OFF ANY PROBLEMS, HOW DIFFICULT HAVE THESE PROBLEMS MADE IT FOR YOU TO DO YOUR WORK, TAKE CARE OF THINGS AT HOME, OR GET ALONG WITH OTHER PEOPLE: NOT DIFFICULT AT ALL
2. FEELING DOWN, DEPRESSED OR HOPELESS: NEARLY EVERY DAY
3. TROUBLE FALLING OR STAYING ASLEEP: MORE THAN HALF THE DAYS
1. LITTLE INTEREST OR PLEASURE IN DOING THINGS: NEARLY EVERY DAY
9. THOUGHTS THAT YOU WOULD BE BETTER OFF DEAD, OR OF HURTING YOURSELF: SEVERAL DAYS
SUM OF ALL RESPONSES TO PHQ QUESTIONS 1-9: 20
SUM OF ALL RESPONSES TO PHQ QUESTIONS 1-9: 19
7. TROUBLE CONCENTRATING ON THINGS, SUCH AS READING THE NEWSPAPER OR WATCHING TELEVISION: MORE THAN HALF THE DAYS
4. FEELING TIRED OR HAVING LITTLE ENERGY: MORE THAN HALF THE DAYS

## 2025-07-14 ASSESSMENT — COLUMBIA-SUICIDE SEVERITY RATING SCALE - C-SSRS
6. HAVE YOU EVER DONE ANYTHING, STARTED TO DO ANYTHING, OR PREPARED TO DO ANYTHING TO END YOUR LIFE?: NO
1. WITHIN THE PAST MONTH, HAVE YOU WISHED YOU WERE DEAD OR WISHED YOU COULD GO TO SLEEP AND NOT WAKE UP?: NO
2. HAVE YOU ACTUALLY HAD ANY THOUGHTS OF KILLING YOURSELF?: NO

## 2025-07-14 NOTE — TELEPHONE ENCOUNTER
Duplicate Medication request. Please see message from 07/01/2025. Medication ha since bee filled. Patient notified via [employer], thank you.

## 2025-07-14 NOTE — PROGRESS NOTES
Have you been to the ER, urgent care clinic since your last visit?  Hospitalized since your last visit?   NO    Have you seen or consulted any other health care providers outside our system since your last visit?   NO    “Have you had a colorectal cancer screening such as a colonoscopy/FIT/Cologuard?    NO, \"Never had one\"    No colonoscopy on file  No cologuard on file  Date of last FIT: 4/26/2024   No flexible sigmoidoscopy on file     “Have you had a diabetic eye exam?”    YES - Where: 08/2024 Ophthalmology- [facility] Dr. Kaiser, pending 09/2025  Nurse/CMA to request most recent records if not in the chart     No diabetic eye exam on file

## 2025-07-14 NOTE — PROGRESS NOTES
Shelia Erazo (:  1960) is a 64 y.o. female, Established patient, here for evaluation of the following chief complaint(s):  Hypertension (4 month follow-up ) and Anxiety         Assessment & Plan    Shelia was seen today for hypertension and anxiety.    Diagnoses and all orders for this visit:    Type 1 diabetes mellitus with hyperglycemia (HCC)  -     gabapentin (NEURONTIN) 300 MG capsule; Take 3 capsules by mouth 2 times daily for 30 days. Max Daily Amount: 1,800 mg  -      DIABETES FOOT EXAM  -     Comprehensive Metabolic Panel; Future  -     Hemoglobin A1C; Future  -     Lipid Panel; Future  -     TSH; Future    Screening for colon cancer  -     Cologuard (Fecal DNA Colorectal Cancer Screening)    Diabetic autonomic neuropathy associated with type 1 diabetes mellitus (HCC)    Anxiety  -     diazePAM (VALIUM) 5 MG tablet; Take 1 tablet by mouth every 8 hours as needed for Anxiety for up to 10 days. Max Daily Amount: 15 mg        As above    above all stable unless otherwise noted    Patient stable  Labs as ordered  Refilled medications needed  [facility] and pt has been advised of same.   This has been fully explained to the patient, who indicates understanding.      Additional discussion below:      1. Anxiety.  - Significant anxiety related to pharmacy issues.  - Pt discussed need for Valium to manage anxiety and improve sleep.  - Prescription for Valium will be provided.    2. Depression.  - Severe anger issues with BuSpar.  - BuSpar will be removed from medication list.  - No new medications prescribed for depression.    3. Hyperlipidemia.  - Taking atorvastatin 20 mg daily as prescribed by Dr. Moffett.  - Continue atorvastatin 20 mg daily.    4. Diabetes mellitus.  - Recent high blood sugar episode due to malfunctioning insulin pump.  - Will continue to monitor blood sugar levels.  - Follow up with endocrinologist in 10/2025.    5. Health maintenance.  - Family history of colon

## 2025-07-14 NOTE — PROGRESS NOTES
Patient given Questionnaires for JERONIMO-7 & PHQ-9 to be filled out before their appointment with the provider, thank you.

## 2025-07-16 RX ORDER — ALBUTEROL SULFATE 90 UG/1
INHALANT RESPIRATORY (INHALATION)
Qty: 3 EACH | Refills: 1 | Status: SHIPPED | OUTPATIENT
Start: 2025-07-16

## 2025-07-16 RX ORDER — BUDESONIDE, GLYCOPYRROLATE, AND FORMOTEROL FUMARATE 160; 9; 4.8 UG/1; UG/1; UG/1
AEROSOL, METERED RESPIRATORY (INHALATION)
Qty: 3 EACH | Refills: 1 | Status: SHIPPED | OUTPATIENT
Start: 2025-07-16

## 2025-07-17 DIAGNOSIS — E10.65 TYPE 1 DIABETES MELLITUS WITH HYPERGLYCEMIA (HCC): ICD-10-CM

## 2025-07-17 NOTE — TELEPHONE ENCOUNTER
*PHARMACY CHANGE*    Last appointment: 07/14/2025    Next appointment: 01/15/2026    Pharmacy requesting refill for     gabapentin (NEURONTIN) 300 MG capsule [[id number]]    Pharmacy   [facility] - [address] [phone] - F [phone] (Pharmacy)

## 2025-07-17 NOTE — TELEPHONE ENCOUNTER
Patient requesting Pharmacy change, Thank you.      [facility]  reports the last fill date : No Access     Controlled Substance Agreement: N/A  Urine Drug Screen: N/A  Previous Refill Encounter(s): Date:    Disp Refills Start End    gabapentin (NEURONTIN) 300 MG capsule 180 capsule 1 7/14/2025 8/13/2025    Sig - Route: Take 3 capsules by mouth 2 times daily for 30 days. Max Daily Amount: 1,800 mg - Oral    Sent to pharmacy as: Gabapentin 300 MG Oral Capsule (NEURONTIN)    E-Prescribing Status: Receipt confirmed by pharmacy (7/14/2025 12:31 PM EDT)  Pharmacy    [id number] - [place], [place] - [address] - P [phone] - F [phone]  [address] [place] [address] RD ([address]), [facility] [address]  Phone: [phone]  Fax: [phone]           Requested Prescriptions     Pending Prescriptions Disp Refills    gabapentin (NEURONTIN) 300 MG capsule 180 capsule 1     Sig: Take 3 capsules by mouth 2 times daily for 30 days. Max Daily Amount: 1,800 mg

## 2025-07-18 RX ORDER — GABAPENTIN 300 MG/1
900 CAPSULE ORAL 2 TIMES DAILY
Qty: 180 CAPSULE | Refills: 1 | Status: SHIPPED | OUTPATIENT
Start: 2025-07-18 | End: 2025-09-16

## 2025-07-30 LAB — NONINV COLON CA DNA+OCC BLD SCRN STL QL: NEGATIVE

## 2025-08-08 DIAGNOSIS — E10.65 TYPE 1 DIABETES MELLITUS WITH HYPERGLYCEMIA (HCC): ICD-10-CM

## 2025-08-21 RX ORDER — GABAPENTIN 300 MG/1
900 CAPSULE ORAL 2 TIMES DAILY
Qty: 320 CAPSULE | Refills: 1 | Status: SHIPPED | OUTPATIENT
Start: 2025-08-21 | End: 2025-11-19

## 2025-09-04 ENCOUNTER — PATIENT MESSAGE (OUTPATIENT)
Age: 65
End: 2025-09-04

## 2025-09-04 RX ORDER — NICOTINE 21 MG/24HR
1 PATCH, TRANSDERMAL 24 HOURS TRANSDERMAL DAILY
Qty: 42 PATCH | Refills: 2 | Status: SHIPPED | OUTPATIENT
Start: 2025-09-04 | End: 2026-01-08

## 2025-09-04 RX ORDER — POLYETHYLENE GLYCOL 3350 17 G
2 POWDER IN PACKET (EA) ORAL PRN
Qty: 100 EACH | Refills: 3 | Status: SHIPPED | OUTPATIENT
Start: 2025-09-04

## (undated) DEVICE — 3M™ BAIR PAWS FLEX™ WARMING GOWN, STANDARD, 20 PER CASE 81003: Brand: BAIR PAWS™

## (undated) DEVICE — BAG DRAINAGE CUST DISP

## (undated) DEVICE — MEDI-VAC NON-CONDUCTIVE SUCTION TUBING: Brand: CARDINAL HEALTH

## (undated) DEVICE — SOLUTION IRRIG 3000ML H2O STRL BAG

## (undated) DEVICE — FLEXIBLE TIP

## (undated) DEVICE — Y-TYPE TUR/BLADDER IRRIGATION SET, REGULATING CLAMP

## (undated) DEVICE — GAUZE,SPONGE,4"X4",16PLY,STRL,LF,10/TRAY: Brand: MEDLINE

## (undated) DEVICE — GDWIRE 3CM FLX-TIP 0.038X150CM -- BX/5 SENSOR

## (undated) DEVICE — CATHETER URETH 16FR BLLN 5CC SIL ALLY W/ SIL HYDRGEL 2 W F

## (undated) DEVICE — 4-PORT MANIFOLD: Brand: NEPTUNE 2

## (undated) DEVICE — LUB SURG MEDC STRL 2OZ TUBE MC -- MEDICHOICE

## (undated) DEVICE — BAG DRAIN URIN 2000ML LF STRL -- CONVERT TO ITEM 363123

## (undated) DEVICE — STERILE POLYISOPRENE POWDER-FREE SURGICAL GLOVES: Brand: PROTEXIS

## (undated) DEVICE — Z DUP USE 2565107 PACK SURG PROC LEG CYSTO T-DRAPE REINF TBL CVR HND TWL

## (undated) DEVICE — KENDALL SCD EXPRESS SLEEVES, KNEE LENGTH, MEDIUM: Brand: KENDALL SCD